# Patient Record
Sex: MALE | Race: WHITE | NOT HISPANIC OR LATINO | Employment: UNEMPLOYED | URBAN - METROPOLITAN AREA
[De-identification: names, ages, dates, MRNs, and addresses within clinical notes are randomized per-mention and may not be internally consistent; named-entity substitution may affect disease eponyms.]

---

## 2017-01-06 ENCOUNTER — ALLSCRIPTS OFFICE VISIT (OUTPATIENT)
Dept: OTHER | Facility: OTHER | Age: 22
End: 2017-01-06

## 2017-01-26 ENCOUNTER — ALLSCRIPTS OFFICE VISIT (OUTPATIENT)
Dept: OTHER | Facility: OTHER | Age: 22
End: 2017-01-26

## 2017-02-01 ENCOUNTER — GENERIC CONVERSION - ENCOUNTER (OUTPATIENT)
Dept: OTHER | Facility: OTHER | Age: 22
End: 2017-02-01

## 2017-04-11 ENCOUNTER — ALLSCRIPTS OFFICE VISIT (OUTPATIENT)
Dept: OTHER | Facility: OTHER | Age: 22
End: 2017-04-11

## 2017-04-11 DIAGNOSIS — R73.09 OTHER ABNORMAL GLUCOSE: ICD-10-CM

## 2017-04-11 DIAGNOSIS — E78.2 MIXED HYPERLIPIDEMIA: ICD-10-CM

## 2017-04-17 ENCOUNTER — GENERIC CONVERSION - ENCOUNTER (OUTPATIENT)
Dept: OTHER | Facility: OTHER | Age: 22
End: 2017-04-17

## 2017-07-25 ENCOUNTER — GENERIC CONVERSION - ENCOUNTER (OUTPATIENT)
Dept: OTHER | Facility: OTHER | Age: 22
End: 2017-07-25

## 2017-07-31 ENCOUNTER — GENERIC CONVERSION - ENCOUNTER (OUTPATIENT)
Dept: OTHER | Facility: OTHER | Age: 22
End: 2017-07-31

## 2017-08-04 ENCOUNTER — GENERIC CONVERSION - ENCOUNTER (OUTPATIENT)
Dept: OTHER | Facility: OTHER | Age: 22
End: 2017-08-04

## 2017-09-02 LAB
A/G RATIO (HISTORICAL): 1.5 (CALC) (ref 1–2.5)
ALBUMIN SERPL BCP-MCNC: 4.5 G/DL (ref 3.6–5.1)
ALP SERPL-CCNC: 61 U/L (ref 40–115)
ALT SERPL W P-5'-P-CCNC: 33 U/L (ref 9–46)
AST SERPL W P-5'-P-CCNC: 26 U/L (ref 10–40)
BILIRUB SERPL-MCNC: 0.9 MG/DL (ref 0.2–1.2)
BUN SERPL-MCNC: 19 MG/DL (ref 7–25)
BUN/CREA RATIO (HISTORICAL): ABNORMAL (CALC) (ref 6–22)
CALCIUM SERPL-MCNC: 9.5 MG/DL (ref 8.6–10.3)
CHLORIDE SERPL-SCNC: 108 MMOL/L (ref 98–110)
CHOLEST SERPL-MCNC: 160 MG/DL
CHOLEST/HDLC SERPL: 5.7 (CALC)
CO2 SERPL-SCNC: 20 MMOL/L (ref 20–31)
CREAT SERPL-MCNC: 1.08 MG/DL (ref 0.6–1.35)
EGFR AFRICAN AMERICAN (HISTORICAL): 114 ML/MIN/1.73M2
EGFR-AMERICAN CALC (HISTORICAL): 98 ML/MIN/1.73M2
GAMMA GLOBULIN (HISTORICAL): 3 G/DL (CALC) (ref 1.9–3.7)
GLUCOSE (HISTORICAL): 106 MG/DL (ref 65–99)
HDLC SERPL-MCNC: 28 MG/DL
LDL CHOLESTEROL (HISTORICAL): 108 MG/DL (CALC)
NON-HDL-CHOL (CHOL-HDL) (HISTORICAL): 132 MG/DL (CALC)
POTASSIUM SERPL-SCNC: 3.9 MMOL/L (ref 3.5–5.3)
SODIUM SERPL-SCNC: 138 MMOL/L (ref 135–146)
TOTAL PROTEIN (HISTORICAL): 7.5 G/DL (ref 6.1–8.1)
TRIGL SERPL-MCNC: 127 MG/DL

## 2017-10-30 ENCOUNTER — ALLSCRIPTS OFFICE VISIT (OUTPATIENT)
Dept: OTHER | Facility: OTHER | Age: 22
End: 2017-10-30

## 2018-01-09 NOTE — PROGRESS NOTES
Assessment    1  Elevated glucose (790 29) (R73 09)   2  Hyperlipidemia, mixed (272 2) (E78 2)   3  Mood disorder (296 90) (F39)    Plan  Mood disorder    · Ziprasidone HCl - 40 MG Oral Capsule; TAKE 1 CAPSULE TWICE DAILY    Discussion/Summary  Advice and education were given regarding aerobic exercise and weight loss  Patient discussion: discussed with the patient  Pt is fit for driving and forms are completed  He is to keep appointments as scheduled  The patient, patient's family was counseled regarding instructions for management, risk factor reductions, impressions, risks and benefits of treatment options, importance of compliance with treatment  The treatment plan was reviewed with the patient/guardian  The patient/guardian understands and agrees with the treatment plan     Self Referrals: Yes Psychiatrist      Chief Complaint  Patient is here for a PE      History of Present Illness  HPI: Pt is here for a drivers permit PE  He is not having any changes in his health  He is followed by Dr Tennille Nobles for his depression and Mood DO  Hyperlipidemia (Follow-Up): The patient states his hyperlipidemia has been stable since the last visit  He has no comorbid illnesses  He has no significant interval events  Symptoms: The patient is currently asymptomatic  Medications: The patient is not currently on any medications for his hyperlipidemia  Review of Systems    Constitutional: No fever or chills, feels well, no tiredness, no recent weight gain or weight loss  Eyes: No complaints of eye pain, no red eyes, no discharge from eyes, no itchy eyes  ENT: no complaints of earache, no hearing loss, no nosebleeds, no nasal discharge, no sore throat, no hoarseness  Cardiovascular: No complaints of slow heart rate, no fast heart rate, no chest pain, no palpitations, no leg claudication, no lower extremity     Respiratory: No complaints of shortness of breath, no wheezing, no cough, no SOB on exertion, no orthopnea or PND  Gastrointestinal: No complaints of abdominal pain, no constipation, no nausea or vomiting, no diarrhea or bloody stools  Genitourinary: No complaints of dysuria, no incontinence, no hesitancy, no nocturia, no genital lesion, no testicular pain  Musculoskeletal: No complaints of arthralgia, no myalgias, no joint swelling or stiffness, no limb pain or swelling  Integumentary: No complaints of skin rash or skin lesions, no itching, no skin wound, no dry skin  Neurological: No compliants of headache, no confusion, no convulsions, no numbness or tingling, no dizziness or fainting, no limb weakness, no difficulty walking  Psychiatric: Is not suicidal, no sleep disturbances, no anxiety or depression, no change in personality, no emotional problems  Endocrine: No complaints of proptosis, no hot flashes, no muscle weakness, no erectile dysfunction, no deepening of the voice, no feelings of weakness  Hematologic/Lymphatic: No complaints of swollen glands, no swollen glands in the neck, does not bleed easily, no easy bruising  ROS reviewed  Active Problems    1  Anxiety, generalized (300 02) (F41 1)   2  Depression, recurrent (296 30) (F33 9)   3  Elevated glucose (790 29) (R73 09)   4  Hyperlipidemia, mixed (272 2) (E78 2)   5  Mood disorder (296 90) (F39)   6   PPD positive (795 51) (R76 11)    Past Medical History    · History of Acute gastritis without hemorrhage, unspecified gastritis type (535 00) (K29 00)   · History of Acute maxillary sinusitis, recurrence not specified (461 0) (J01 00)   · History of Hand pain, right (729 5) (M79 641)   · History of acute pharyngitis (V12 69) (Z87 09)   · History of bronchitis (V12 69) (Z87 09)   · History of headache (V13 89) (Z87 898)   · History of Right acute serous otitis media, recurrence not specified (381 01) (H65 01)    Surgical History    · Denied: History Of Prior Surgery    Family History  Family History    · Family history of Adopted child    Social History    · Adopted a child   · Adopted at 5y/o by Lisa Mackey and Kelsey Cordero  [facility] [place] [facility]  · Employed   · Part time job as a  at the [employer]   · Home   · Lives with adoptive parents Lisa Mackey and Kelsey Cordero   · Never a smoker   · Unobtainable family history due to adoption (V49 89) (Z78 9)    Current Meds   1  ClonazePAM 0 5 MG Oral Tablet; Take one tab by mouth each day; Therapy: 90Axi4311 to (Evaluate:26Mar2017); Last Rx:02Sgl3091 Ordered   2  FluvoxaMINE Maleate 100 MG Oral Tablet; 1 tab in the AM and 2 tab in the PM Recorded   3  Topiramate 100 MG Oral Tablet; take 1 tab po daily; Therapy: 92Yuw2694 to (Evaluate:25Feb2017)  Requested for: 74Phi3117; Last   Rx:00Snz4585 Ordered   4  Topiramate 25 MG Oral Tablet Recorded    Allergies    1  Abilify TABS   2  Lithium   3  SEROquel TABS    Vitals   Recorded: 99KWO5459 03:39PM   Heart Rate 92   Systolic 715   Diastolic 80   Height 5 ft 4 in   Weight 236 lb    BMI Calculated 40 51   BSA Calculated 2 1   O2 Saturation 97     Physical Exam    Eyes   Conjunctiva and lids: No swelling, erythema, or discharge  Pupils and irises: Equal, round and reactive to light  Ears, Nose, Mouth, and Throat   External inspection of ears and nose: Normal     Otoscopic examination: Tympanic membrance translucent with normal light reflex  Canals patent without erythema  Oropharynx: Normal with no erythema, edema, exudate or lesions  Pulmonary   Respiratory effort: No increased work of breathing or signs of respiratory distress  Auscultation of lungs: Clear to auscultation  Cardiovascular   Auscultation of heart: Normal rate and rhythm, normal S1 and S2, without murmurs  Examination of extremities for edema and/or varicosities: Normal     Abdomen   Abdomen: Non-tender, no masses  Liver and spleen: No hepatomegaly or splenomegaly  Lymphatic   Palpation of lymph nodes in neck: No lymphadenopathy  Musculoskeletal   Gait and station: Normal     Digits and nails: Normal without clubbing or cyanosis  Inspection/palpation of joints, bones, and muscles: Normal     Skin   Skin and subcutaneous tissue: Normal without rashes or lesions  Neurologic   Cranial nerves: Cranial nerves 2-12 intact  Reflexes: 2+ and symmetric  Sensation: No sensory loss      Psychiatric   Orientation to person, place and time: Normal     Mood and affect: Normal        Signatures   Electronically signed by : ITALO Man; Oct 30 2017  4:28PM EST                       (Author)    Electronically signed by : Claude Decree, M D ; Oct 30 2017  4:31PM EST

## 2018-01-09 NOTE — MISCELLANEOUS
Message   Recorded as Task   Date: 12/06/2016 01:30 PM, Created By: Ayala Rich   Task Name: Medical Complaint Callback   Assigned To: Shirley Brooks   Regarding Patient: Ede Echols, Status: Active   CommentElnita Area - 06 Dec 2016 1:30 PM     TASK CREATED  Caller: Self; Medical Complaint; [phone] (Mobile Phone)  mo asked if you can give her a call about an urgent matter regarding her sons depression and medicine   I called the given contact # (James's father's cell phone) and his mom states he is sleeping too many hours, 1 full tab and even 1/2 tab of Promethazine is too tiring in the day  He also has been very "Negative' just down" staying on the couch, no ambition, also very "Nasty, ugly fights " He tells her he is depressed  Pt would not get on the phone but told mom he presently denies SI and HI  I reduced Promethazine to 1/4 tab qd and 1 full tab qhs  I increased Fluvoxamine to 125mg total per day  I stated if he becomes suicidal/homicidal she must take him to the ER  She asked what she should do if he refuses all meds (though she states he has been compliant so far), I told her if he has withdrawal Sxs to bring him to the ER  Otherwise f/u next appt later this month  Mom was satisfied with this and will call if any further problems      Plan  Anxiety, generalized    · From  Promethazine HCl - 50 MG Oral Tablet Take 1/2 - tablet at bedtime To  Promethazine HCl - 50 MG Oral Tablet Take 1/4 tab qd and (1) tab po at bedtime  Depression, recurrent    · FluvoxaMINE Maleate 50 MG Oral Tablet;  Take 1/2 tab po qd   (To be taken with the  100mg tab)    Signatures   Electronically signed by : JYOTI Prasad; Dec  6 2016  5:32PM EST                       (Author)
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
## 2018-01-11 NOTE — MISCELLANEOUS
Message   Recorded as Task   Date: 09/01/2016 02:54 PM, Created By: Tabatha Adair   Task Name: Care Coordination   Assigned To: Shirley Brooks   Regarding Patient: Ede Echols, Status: Active   Comment:    Tabatha Adair - 01 Sep 2016 2:54 PM     TASK CREATED  This is a pt of Dr Caitlin Walls who is transfering to your schedule  He is out of meds and was suppose to be scheduled for today with Dr Caitlin Walls   Please call mother Celio Ballard @ [phone] about medication problems and refills  James's mom Lisa left msg that Pt was out of medicines  Rx log shows he was given refills  I called mom on home phone # of record and she states she got his PCP Dr Bushra Grady to renew medicines  Note: Rx log shows Pt had refills on 8/4/2016 Rxs of Fluvoxamine and Trazodone, but no refills on the Topiramate  No further action required at this time        Signatures   Electronically signed by : JYOTI Prasad; Sep  1 2016  6:16PM EST                       (Author)

## 2018-01-12 NOTE — PSYCH
Behavioral Health Outpatient Intake    Referred By: DR Sherry Slaughter  Intake Questions: there are no developmental disabilities  the patient does not have a hearing impairment  the patient does not have an ICM or CTT  patient is not taking injectable psychiatric medications  Employment: The patient is not employed  at     Emergency Contact Information:   Emergency Contact: Kailee Folres   Phone Number: [phone]   Previous Psychiatric Treatment: He has previously been seen by a psychiatrist  Acosta Gallegos, 2015  He has previously been seen by a therapist  Diana Salazar   History: no  service  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

## 2018-01-13 VITALS
SYSTOLIC BLOOD PRESSURE: 120 MMHG | WEIGHT: 236 LBS | DIASTOLIC BLOOD PRESSURE: 80 MMHG | HEIGHT: 64 IN | HEART RATE: 92 BPM | OXYGEN SATURATION: 97 % | BODY MASS INDEX: 40.29 KG/M2

## 2018-01-13 VITALS
HEIGHT: 64 IN | BODY MASS INDEX: 42.51 KG/M2 | HEART RATE: 88 BPM | SYSTOLIC BLOOD PRESSURE: 118 MMHG | OXYGEN SATURATION: 98 % | DIASTOLIC BLOOD PRESSURE: 80 MMHG | WEIGHT: 249 LBS
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
## 2018-01-15 VITALS
TEMPERATURE: 97.4 F | SYSTOLIC BLOOD PRESSURE: 122 MMHG | HEIGHT: 64 IN | BODY MASS INDEX: 44.22 KG/M2 | HEART RATE: 94 BPM | DIASTOLIC BLOOD PRESSURE: 82 MMHG | WEIGHT: 259 LBS
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
## 2018-01-16 NOTE — MISCELLANEOUS
Message   Recorded as Task   Date: 09/30/2016 02:34 PM, Created By: Narinder Worthington   Task Name: Call Back   Assigned To: Stefano Huynh   Regarding Patient: Naveen Arriaza, Status: Active   Comment:    Beverly Lobo - 30 Sep 2016 2:34 PM     TASK CREATED  Caller: dad, Father; Results Inquiry; [phone] (Mobile Phone)  patient's father called this afternoon to see if you recieved recent labs you ordered? I left msg on home phone # of record that I did receive the results and for patient to f/u with PMD Dr Sherice Medrano, for a couple of abnormalities  PMD received the results from [facility] as well  Pt can call me to discuss results if desired        Signatures   Electronically signed by : JYOTI Black; Sep 30 2016  3:18PM EST                       (Author)

## 2018-01-16 NOTE — PSYCH
Message  Message Free Text Note Form: Mother had contacted the office regarding her son being sedated on Promethazine  She described he has tried several medications to help him with his sleep, and he was prescribed  Promethazine to help insomnia and decrease anxiety  He has been sedated and over sleeping  We discussed for now to decrease bedtime dose to 1/2 of 50 mg   and to hold morning dose  If any problems to contact us tomorrow, and ask to talk to our nurse Tamtomasz Blum  Mother agreeable to plan  Active Problems    1  Acute maxillary sinusitis, recurrence not specified (461 0) (J01 00)   2  Anxiety, generalized (300 02) (F41 1)   3  Depression, recurrent (296 30) (F33 9)   4  Elevated glucose (790 29) (R73 09)   5  Hand pain, right (729 5) (M79 641)   6  Hyperlipidemia, mixed (272 2) (E78 2)   7  Mood disorder (296 90) (F39)   8  PPD positive (795 51) (R76 11)    Current Meds   1  FluvoxaMINE Maleate 100 MG Oral Tablet; TAKE 1 TAB PO QHS; Therapy: 00VLM1918 to (Evaluate:02Jan2017)  Requested for: 07EQI9568; Last   Rx:03Nov2016 Ordered   2  Promethazine HCl - 50 MG Oral Tablet; Take 1/2 - 1 tab po qd and 1 tab po qhs for   anxiety and insomnia; Therapy: 98CPV9027 to (Evaluate:02Jan2017)  Requested for: 05TJR8552; Last   Rx:03Nov2016 Ordered   3  Topiramate 100 MG Oral Tablet (Topamax); take 1 tab po QD; Therapy: 71JRX2452 to (Evaluate:02Jan2017)  Requested for: 67VIJ5744; Last   Rx:03Nov2016 Ordered    Allergies    1  Abilify TABS   2  Lithium   3  SEROquel TABS    Plan    1   From  Promethazine HCl - 50 MG Oral Tablet Take 1/2 - 1 tab po qd and 1 tab   po qhs for anxiety and insomnia To Promethazine HCl - 50 MG Oral Tablet Take 1/2 -   tablet at bedtime    Signatures   Electronically signed by : Pop Chaparro MD; Nov 10 2016  8:26PM EST                       (Author)
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
## 2018-01-17 NOTE — PROGRESS NOTES
Assessment    1  Encounter for preventive health examination (V70 0) (Z00 00)   2  Hyperlipidemia, mixed (272 2) (E78 2)   3  Elevated glucose (790 29) (R73 09)    Plan  Elevated glucose, Hyperlipidemia, mixed    · (1) COMPREHENSIVE METABOLIC PANEL; Status:Active; Requested for:54Tdp3514;    · (1) LIPID PANEL, FASTING; Status:Active; Requested for:95Cef2569;     Discussion/Summary  Impression: health maintenance visit  Currently, he eats a healthy diet  Prostate cancer screening: PSA is not indicated  Testicular cancer screening: the risks and benefits of testicular cancer screening were discussed  Colorectal cancer screening: colorectal cancer screening is not indicated  The risks and benefits of immunizations were discussed  Advice and education were given regarding aerobic exercise, weight loss and cardiovascular risk reduction  Patient discussion: discussed with the patient  Follow-up with Dr Atilio Corbin as scheduled  Chief Complaint  Pt  in office today for a check up and to discuss his medications  History of Present Illness  HM, Adult Male: The patient is being seen for a health maintenance evaluation  General Health: The patient's health since the last visit is described as good  He has regular dental visits  He denies vision problems  He denies hearing loss  Immunizations status: up to date  Lifestyle:  He consumes a diverse and healthy diet  He does not have any weight concerns  He exercises regularly  He does not use tobacco  He denies alcohol use  He denies drug use  Screening: cancer screening reviewed and current  metabolic screening reviewed and current  risk screening reviewed and current  Review of Systems    Constitutional: No fever or chills, feels well, no tiredness, no recent weight gain or weight loss  Eyes: No complaints of eye pain, no red eyes, no discharge from eyes, no itchy eyes     ENT: no complaints of earache, no hearing loss, no nosebleeds, no nasal discharge, no sore throat, no hoarseness  Cardiovascular: No complaints of slow heart rate, no fast heart rate, no chest pain, no palpitations, no leg claudication, no lower extremity  Respiratory: No complaints of shortness of breath, no wheezing, no cough, no SOB on exertion, no orthopnea or PND  Gastrointestinal: No complaints of abdominal pain, no constipation, no nausea or vomiting, no diarrhea or bloody stools  Genitourinary: No complaints of dysuria, no incontinence, no hesitancy, no nocturia, no genital lesion, no testicular pain  Musculoskeletal: No complaints of arthralgia, no myalgias, no joint swelling or stiffness, no limb pain or swelling  Integumentary: No complaints of skin rash or skin lesions, no itching, no skin wound, no dry skin  Neurological: No compliants of headache, no confusion, no convulsions, no numbness or tingling, no dizziness or fainting, no limb weakness, no difficulty walking  Psychiatric: Is not suicidal, no sleep disturbances, no anxiety or depression, no change in personality, no emotional problems  Endocrine: No complaints of proptosis, no hot flashes, no muscle weakness, no erectile dysfunction, no deepening of the voice, no feelings of weakness  Hematologic/Lymphatic: No complaints of swollen glands, no swollen glands in the neck, does not bleed easily, no easy bruising  Active Problems    1  Acute gastritis without hemorrhage, unspecified gastritis type (535 00) (K29 00)   2  Acute pharyngitis, unspecified etiology (462) (J02 9)   3  Anxiety, generalized (300 02) (F41 1)   4  Bronchitis (490) (J40)   5  Depression, recurrent (296 30) (F33 9)   6  Elevated glucose (790 29) (R73 09)   7  Hyperlipidemia, mixed (272 2) (E78 2)   8  Mood disorder (296 90) (F39)   9  PPD positive (795 51) (R76 11)   10   Right acute serous otitis media, recurrence not specified (381 01) (H65 01)    Past Medical History    · History of Acute maxillary sinusitis, recurrence not specified (461 0) (J01 00)   · History of Hand pain, right (729 5) (M79 641)   · History of headache (V13 89) (Z87 898)    Family History  Family History    · Family history of Adopted child    Social History    · Adopted a child   · Adopted at 5y/o by Suni Carreno Raquel [employer]cuttaker  Pt is from [facility]  · Employed   · Part time job as a  at the [employer]   · Home   · Lives with adoptive parents Suni Leblanc and Raquel Boateng   · Never a smoker   · Unobtainable family history due to adoption (V49 89) (Z78 9)    Current Meds   1  ClonazePAM 0 5 MG Oral Tablet; Take one tab by mouth each day; Therapy: 72Qnk3632 to (Evaluate:26Mar2017); Last Rx:83Whj9443 Ordered   2  FluvoxaMINE Maleate 100 MG Oral Tablet; 1 tab in the AM and 2 tab in the PM Recorded   3  Topiramate 100 MG Oral Tablet; take 1 tab po daily; Therapy: 73Fuz2372 to (Evaluate:62Vap2318)  Requested for: 72Jbo0117; Last   Rx:52Jlq7848 Ordered   4  Topiramate 25 MG Oral Tablet Recorded    Allergies    1  Abilify TABS   2  Lithium   3  SEROquel TABS    Vitals   Recorded: 09Ljo7695 04:57PM   Heart Rate 89   Systolic 374   Diastolic 86   Height 5 ft 4 in   Weight 236 lb    BMI Calculated 40 51   BSA Calculated 2 11   O2 Saturation 97     Physical Exam    Constitutional   General appearance: No acute distress, well appearing and well nourished  Eyes   Conjunctiva and lids: No erythema, swelling or discharge  Pupils and irises: Equal, round, reactive to light  Ophthalmoscopic examination: Normal fundi and optic discs  Ears, Nose, Mouth, and Throat   External inspection of ears and nose: Normal     Otoscopic examination: Tympanic membranes translucent with normal light reflex  Canals patent without erythema  Hearing: Normal     Nasal mucosa, septum, and turbinates: Normal without edema or erythema  Lips, teeth, and gums: Normal, good dentition  Oropharynx: Normal with no erythema, edema, exudate or lesions      Neck   Neck: Supple, symmetric, trachea midline, no masses  Thyroid: Normal, no thyromegaly  Pulmonary   Respiratory effort: No increased work of breathing or signs of respiratory distress  Percussion of chest: Normal     Palpation of chest: Normal     Auscultation of lungs: Clear to auscultation  Cardiovascular   Palpation of heart: Normal PMI, no thrills  Auscultation of heart: Normal rate and rhythm, normal S1 and S2, no murmurs  Carotid pulses: 2+ bilaterally  Abdominal aorta: Normal     Femoral pulses: 2+ bilaterally  Pedal pulses: 2+ bilaterally  Examination of extremities for edema and/or varicosities: Normal     Chest   Breasts: Normal, no dimpling or skin changes appreciated  Palpation of breasts and axillae: Normal, no masses palpated  Chest: Normal     Abdomen   Abdomen: Non-tender, no masses  Liver and spleen: No hepatomegaly or splenomegaly  Examination for hernias: No hernias appreciated  Lymphatic   Palpation of lymph nodes in neck: No lymphadenopathy  Palpation of lymph nodes in axillae: No lymphadenopathy  Musculoskeletal   Gait and station: Normal     Inspection/palpation of digits and nails: Normal without clubbing or cyanosis  Inspection/palpation of joints, bones, and muscles: Normal     Range of motion: Normal     Stability: Normal     Muscle strength/tone: Normal     Skin   Skin and subcutaneous tissue: Normal without rashes or lesions  Palpation of skin and subcutaneous tissue: Normal turgor  Neurologic   Cranial nerves: Cranial nerves 2-12 intact  Reflexes: 2+ and symmetric  Sensation: No sensory loss  Psychiatric   Judgment and insight: Normal     Orientation to person, place and time: Normal     Recent and remote memory: Intact  Mood and affect: Normal        Signatures   Electronically signed by : Aditya Orona DO;  Apr 11 2017  5:17PM EST                       (Author)

## 2018-01-22 VITALS
DIASTOLIC BLOOD PRESSURE: 86 MMHG | HEART RATE: 89 BPM | OXYGEN SATURATION: 97 % | WEIGHT: 236 LBS | SYSTOLIC BLOOD PRESSURE: 126 MMHG | HEIGHT: 64 IN | BODY MASS INDEX: 40.29 KG/M2
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
## 2018-02-16 ENCOUNTER — TELEPHONE (OUTPATIENT)
Dept: FAMILY MEDICINE CLINIC | Facility: CLINIC | Age: 23
End: 2018-02-16

## 2018-02-16 DIAGNOSIS — R06.83 SNORING: Primary | ICD-10-CM

## 2018-02-16 RX ORDER — ZIPRASIDONE HYDROCHLORIDE 80 MG/1
80 CAPSULE ORAL EVERY EVENING
COMMUNITY
Start: 2018-01-31

## 2018-02-16 RX ORDER — FLUVOXAMINE MALEATE 100 MG
TABLET ORAL
COMMUNITY
End: 2018-05-03 | Stop reason: ALTCHOICE

## 2018-02-16 RX ORDER — CLONAZEPAM 0.5 MG/1
TABLET ORAL
COMMUNITY
Start: 2016-12-27 | End: 2020-02-24

## 2018-02-16 NOTE — TELEPHONE ENCOUNTER
Patient's mother is asking if an order can be written for pt to see a specialist to have a Sleep apnea study done, she said Dr Lawrence Thompson ordered for him like five years ago but they never followed through and forgot the name of the Doctor, she thinks was someone in [facility] area
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
## 2018-04-17 ENCOUNTER — OFFICE VISIT (OUTPATIENT)
Dept: PULMONOLOGY | Facility: CLINIC | Age: 23
End: 2018-04-17
Payer: COMMERCIAL

## 2018-04-17 VITALS
HEART RATE: 104 BPM | DIASTOLIC BLOOD PRESSURE: 100 MMHG | WEIGHT: 245 LBS | HEIGHT: 65 IN | SYSTOLIC BLOOD PRESSURE: 126 MMHG | BODY MASS INDEX: 40.82 KG/M2 | OXYGEN SATURATION: 97 %

## 2018-04-17 DIAGNOSIS — E66.9 OBESITY (BMI 35.0-39.9 WITHOUT COMORBIDITY): ICD-10-CM

## 2018-04-17 DIAGNOSIS — R06.83 SNORING: ICD-10-CM

## 2018-04-17 DIAGNOSIS — R40.0 DAYTIME SLEEPINESS: ICD-10-CM

## 2018-04-17 DIAGNOSIS — G47.33 OSA (OBSTRUCTIVE SLEEP APNEA): Primary | ICD-10-CM

## 2018-04-17 PROCEDURE — 99204 OFFICE O/P NEW MOD 45 MIN: CPT | Performed by: INTERNAL MEDICINE

## 2018-04-23 ENCOUNTER — HOSPITAL ENCOUNTER (OUTPATIENT)
Dept: SLEEP CENTER | Facility: CLINIC | Age: 23
Discharge: HOME | End: 2018-04-23
Payer: COMMERCIAL

## 2018-04-23 DIAGNOSIS — G47.33 OSA (OBSTRUCTIVE SLEEP APNEA): ICD-10-CM

## 2018-04-23 PROCEDURE — 95810 POLYSOM 6/> YRS 4/> PARAM: CPT

## 2018-04-23 PROCEDURE — 95810 POLYSOM 6/> YRS 4/> PARAM: CPT | Performed by: INTERNAL MEDICINE

## 2018-04-24 NOTE — PROGRESS NOTES
Sleep Study Documentation    Pre-Sleep Study       Sleep testing procedure explained to patient:YES    Patient napped prior to study:NO    Caffeine:Dayshift worker after 12PM   Caffeine use:NO    Alcohol:Dayshift workers after 5PM: Alcohol use:NO    Typical day for patient:NO       Study Documentation  Diagnostic   Snore: Moderate  Supplemental O2: no    O2 flow rate (L/min) range N/A  O2 flow rate (L/min) final N/A  Minimum SaO2 90%  Baseline SaO2 97%        Mode of Therapy:N/A    EKG abnormalities: no     EEG abnormalities: no    Study Terminated:no    Patient classification: student       Post-Sleep Study    Medication used at bedtime or during sleep study:NO    Patient reports time it took to fall asleep:greater than 60 minutes    Patient reports waking up during study:Denied    Patient reports sleeping 4 to 6 hours without dreaming  Patient reports sleep during study:worse than usual    Patient rated sleepiness: Very sleepy or tired    PAP treatment:no

## 2018-04-30 ENCOUNTER — TELEPHONE (OUTPATIENT)
Dept: PULMONOLOGY | Facility: CLINIC | Age: 23
End: 2018-04-30

## 2018-05-03 ENCOUNTER — OFFICE VISIT (OUTPATIENT)
Dept: PULMONOLOGY | Facility: CLINIC | Age: 23
End: 2018-05-03
Payer: COMMERCIAL

## 2018-05-03 ENCOUNTER — APPOINTMENT (OUTPATIENT)
Dept: LAB | Facility: CLINIC | Age: 23
End: 2018-05-03
Payer: COMMERCIAL

## 2018-05-03 ENCOUNTER — TELEPHONE (OUTPATIENT)
Dept: SLEEP CENTER | Facility: CLINIC | Age: 23
End: 2018-05-03

## 2018-05-03 VITALS
BODY MASS INDEX: 40.82 KG/M2 | WEIGHT: 245 LBS | OXYGEN SATURATION: 97 % | SYSTOLIC BLOOD PRESSURE: 130 MMHG | HEIGHT: 65 IN | HEART RATE: 86 BPM | DIASTOLIC BLOOD PRESSURE: 76 MMHG

## 2018-05-03 DIAGNOSIS — F33.9 DEPRESSION, RECURRENT (HCC): ICD-10-CM

## 2018-05-03 DIAGNOSIS — G47.19 EXCESSIVE DAYTIME SLEEPINESS: Primary | ICD-10-CM

## 2018-05-03 DIAGNOSIS — G47.19 EXCESSIVE DAYTIME SLEEPINESS: ICD-10-CM

## 2018-05-03 DIAGNOSIS — G47.61 PLMD (PERIODIC LIMB MOVEMENT DISORDER): ICD-10-CM

## 2018-05-03 LAB
25(OH)D3 SERPL-MCNC: 19.7 NG/ML (ref 30–100)
BASOPHILS # BLD AUTO: 0.04 THOUSANDS/ΜL (ref 0–0.1)
BASOPHILS NFR BLD AUTO: 1 % (ref 0–1)
EOSINOPHIL # BLD AUTO: 0.17 THOUSAND/ΜL (ref 0–0.61)
EOSINOPHIL NFR BLD AUTO: 2 % (ref 0–6)
ERYTHROCYTE [DISTWIDTH] IN BLOOD BY AUTOMATED COUNT: 12.7 % (ref 11.6–15.1)
FOLATE SERPL-MCNC: >20 NG/ML (ref 3.1–17.5)
HCT VFR BLD AUTO: 45.5 % (ref 36.5–49.3)
HGB BLD-MCNC: 15.3 G/DL (ref 12–17)
LYMPHOCYTES # BLD AUTO: 2.56 THOUSANDS/ΜL (ref 0.6–4.47)
LYMPHOCYTES NFR BLD AUTO: 37 % (ref 14–44)
MCH RBC QN AUTO: 28 PG (ref 26.8–34.3)
MCHC RBC AUTO-ENTMCNC: 33.6 G/DL (ref 31.4–37.4)
MCV RBC AUTO: 83 FL (ref 82–98)
MONOCYTES # BLD AUTO: 0.78 THOUSAND/ΜL (ref 0.17–1.22)
MONOCYTES NFR BLD AUTO: 11 % (ref 4–12)
NEUTROPHILS # BLD AUTO: 3.42 THOUSANDS/ΜL (ref 1.85–7.62)
NEUTS SEG NFR BLD AUTO: 49 % (ref 43–75)
NRBC BLD AUTO-RTO: 0 /100 WBCS
PLATELET # BLD AUTO: 308 THOUSANDS/UL (ref 149–390)
PMV BLD AUTO: 10.2 FL (ref 8.9–12.7)
RBC # BLD AUTO: 5.47 MILLION/UL (ref 3.88–5.62)
TSH SERPL DL<=0.05 MIU/L-ACNC: 2.75 UIU/ML (ref 0.36–3.74)
VIT B12 SERPL-MCNC: 808 PG/ML (ref 100–900)
WBC # BLD AUTO: 6.99 THOUSAND/UL (ref 4.31–10.16)

## 2018-05-03 PROCEDURE — 82607 VITAMIN B-12: CPT

## 2018-05-03 PROCEDURE — 99214 OFFICE O/P EST MOD 30 MIN: CPT | Performed by: PHYSICIAN ASSISTANT

## 2018-05-03 PROCEDURE — 82746 ASSAY OF FOLIC ACID SERUM: CPT

## 2018-05-03 PROCEDURE — 84443 ASSAY THYROID STIM HORMONE: CPT

## 2018-05-03 PROCEDURE — 85025 COMPLETE CBC W/AUTO DIFF WBC: CPT

## 2018-05-03 PROCEDURE — 86308 HETEROPHILE ANTIBODY SCREEN: CPT

## 2018-05-03 PROCEDURE — 82306 VITAMIN D 25 HYDROXY: CPT

## 2018-05-03 PROCEDURE — 36415 COLL VENOUS BLD VENIPUNCTURE: CPT

## 2018-05-03 RX ORDER — SERTRALINE HYDROCHLORIDE 100 MG/1
100 TABLET, FILM COATED ORAL DAILY
COMMUNITY
End: 2020-02-24

## 2018-05-03 NOTE — TELEPHONE ENCOUNTER
Left voicemail to notify sleep study has been read, results will be given today at pulmonary appointment

## 2018-05-03 NOTE — PROGRESS NOTES
Assessment:    1  Excessive daytime sleepiness  TSH, 3rd generation with T4 reflex    Vitamin D 25 hydroxy    CBC and differential    Vitamin B12/Folate, Serum Panel    Multiple sleep latency test with diagnostic study    Mononucleosis screen   2  PLMD (periodic limb movement disorder)     3  Dep[facility], [facility] ([facility] )         Plan: 25 y o  male never smoker with significant PMH of anxiety and depression who presents for follow-up of sleep study  Patient continues to have excessive daytime sleepiness  1   Excessive daytime sleepiness - reviewed diagnostic sleep study in detail with patient and family  No evidence of obstructive sleep apnea  [place] sleepiness score of 13  Will proceed with lab workup to rule out obvious medical cause for excessive sleepiness  Order slip given to patient and family  If this is all normal, will proceed with MSLT study to rule out idiopathic hypersomnia or narcolepsy  Patient and family are in agreement with plan  Of note, patient is on multiple medications that are likely contributing to his somnolence including Geodon, Zoloft, and Topamax  Will defer to psychiatry for any medication adjustments  2   Periodic limb movement disorder - mild RLS seen on diagnostic sleep study  Family is deferring any treatment at this time    Follow-up after completion of MSLT study    Subjective:     Patient ID: Chapo Angel is a 25 y o  male  Chief Complaint: excessive sleepiness    HPI  25 y o  male never smoker with significant PMH of anxiety and depression who presents for follow-up of sleep study  Patient continues to have excessive daytime sleepiness  Mother states that he takes 2-3 naps a day for several hours in the day  Patient admits to falling asleep while driving  He falls asleep in cars, during conversations, during class, and when at home  Father states that they were driving for total of 5 minutes and in that time patient was already asleep in the back of the car  Patient was recently started on Zoloft in the last month by psychiatrist   Patient denies having any comprehensive blood work in the last year other than CMP and lipid panel  Review of Systems  Review of Systems   Constitution: Negative for chills, decreased appetite, fever and weight gain  HENT: Negative for congestion  Eyes: Negative for visual disturbance  Cardiovascular: Negative for dyspnea on exertion, leg swelling and orthopnea  Respiratory: Negative for cough, shortness of breath, sleep disturbances due to breathing and wheezing  Hematologic/Lymphatic: Negative for adenopathy  Skin: Negative for rash  Musculoskeletal: Negative for muscle weakness  Gastrointestinal: Negative for abdominal pain, diarrhea, nausea and vomiting  Neurological: Positive for difficulty with concentration and excessive daytime sleepiness  Negative for seizures  Psychiatric/Behavioral: Negative for altered mental status and hallucinations  Allergic/Immunologic: Negative for environmental allergies  Objective:  /76   Pulse 86   Ht 5' 5" (1 651 m)   Wt 111 kg (245 lb)   SpO2 97%   BMI 40 77 kg/m²     Physical Exam   Constitutional: He is oriented to person, place, and time  He appears well-developed  No distress  Morbidly obese   HENT:   Head: Normocephalic and atraumatic  Crowded oropharyngeal airway   Neck: Normal range of motion  Short and wide neck   Cardiovascular: Normal rate and regular rhythm  Pulmonary/Chest: Effort normal and breath sounds normal    Abdominal: Soft  There is no tenderness  Musculoskeletal: Normal range of motion  He exhibits no edema or tenderness  Lymphadenopathy:     He has no cervical adenopathy  Neurological: He is alert and oriented to person, place, and time  Skin: Skin is warm and dry  He is not diaphoretic  Psychiatric: He has a normal mood and affect  His behavior is normal    Nursing note and vitals reviewed        Lab/Image Review: Reviewed all pertinent labs/radiology results  Past Medical History:   Diagnosis Date    Bronchitis        Social History   Substance Use Topics    Smoking status: Never Smoker    Smokeless tobacco: Never Used    Alcohol use 0 6 oz/week     1 Cans of beer per week      Comment: social       No family history on file      Patient Active Problem List   Diagnosis    Anxiety, generalized    Depression[facility] recurrent ([facility] )    Hyperlipidemia, mixed    PPD positive    PLMD (periodic limb movement disorder)    Snoring

## 2018-05-04 ENCOUNTER — TELEPHONE (OUTPATIENT)
Dept: PULMONOLOGY | Facility: CLINIC | Age: 23
End: 2018-05-04

## 2018-05-04 DIAGNOSIS — E55.9 VITAMIN D DEFICIENCY: Primary | ICD-10-CM

## 2018-05-04 LAB — HETEROPH AB SER QL: NEGATIVE

## 2018-05-04 NOTE — TELEPHONE ENCOUNTER
----- Message from Alba Delgado PA-C sent at 5/4/2018 12:44 PM EDT -----  Please also advise patient to get blood work done in 2 months for repeat vitamin D level  Please mail

## 2018-05-08 ENCOUNTER — TELEPHONE (OUTPATIENT)
Dept: PULMONOLOGY | Facility: CLINIC | Age: 23
End: 2018-05-08

## 2018-05-08 NOTE — TELEPHONE ENCOUNTER
I sent a message to the staff last week  Vitamin D level was low which can make you tired; sent Rx to pharmacy for 5000 units daily  Needs repeat Vitamin D level in 2 months  Please mail to patient if it has not been done      ThanksPablo

## 2018-06-18 ENCOUNTER — HOSPITAL ENCOUNTER (OUTPATIENT)
Dept: SLEEP CENTER | Facility: CLINIC | Age: 23
Discharge: HOME | End: 2018-06-18
Payer: COMMERCIAL

## 2018-06-18 DIAGNOSIS — G47.19 EXCESSIVE DAYTIME SLEEPINESS: ICD-10-CM

## 2018-06-18 PROCEDURE — 95810 POLYSOM 6/> YRS 4/> PARAM: CPT | Performed by: INTERNAL MEDICINE

## 2018-06-18 PROCEDURE — 95810 POLYSOM 6/> YRS 4/> PARAM: CPT

## 2018-06-19 ENCOUNTER — HOSPITAL ENCOUNTER (OUTPATIENT)
Dept: SLEEP CENTER | Facility: CLINIC | Age: 23
Discharge: HOME | End: 2018-06-19
Payer: COMMERCIAL

## 2018-06-19 PROCEDURE — 95805 MULTIPLE SLEEP LATENCY TEST: CPT | Performed by: INTERNAL MEDICINE

## 2018-06-19 PROCEDURE — 80307 DRUG TEST PRSMV CHEM ANLYZR: CPT | Performed by: INTERNAL MEDICINE

## 2018-06-19 PROCEDURE — 95805 MULTIPLE SLEEP LATENCY TEST: CPT

## 2018-06-19 NOTE — PROGRESS NOTES
Sleep Study Documentation    Pre-Sleep Study       Sleep testing procedure explained to patient:YES    Patient napped prior to study:YES- more than 30 minutes  Napped after 2PM: yes    Caffeine:Dayshift worker after 12PM   Caffeine use:NO    Alcohol:Dayshift workers after 5PM: Alcohol use:NO    Typical day for patient:NO       Study Documentation  Diagnostic   Snore: Moderate  Supplemental O2: no    Minimum SaO2 88%  Baseline SaO2 96%          EKG abnormalities: no     EEG abnormalities: no    Study Terminated:no    Patient classification: employed       Post-Sleep Study    Medication used at bedtime or during sleep study:YES other prescription medications    Patient reports time it took to fall asleep:greater than 60 minutes    Patient reports waking up during study:Denied    Patient reports sleeping less than 2 hours without dreaming  Patient reports sleep during study:worse than usual    Patient rated sleepiness: Somewhat sleepy or tired    PAP treatment:no

## 2018-06-19 NOTE — PROGRESS NOTES
MSLT Documentation           Sleep testing procedure explained to patient:YES      Study Documentation  MSLT  Nap 1: Lights out:8:15:30    Lights on:8:49:00     Asleep:  yes    EPOCH:60    TIME:8:34:00*     REM:  no     EPOCH:n/a     TIME:n/a      Patient reports sleeping: NO   Patient reports dreaming: NO    Nap 2: Lights out:10:12:30    Lights on:10:46:00     Asleep:  yes    EPOCH:43    TIME:10:31:00*     REM:  no     EPOCH:n/a     TIME:n/a      Patient reports sleeping: NO   Patient reports dreaming: NO    Nap 3: Lights out:12:09:30    Lights on:12:29:30     Asleep:  no    EPOCH:n/a    TIME:n/a*     REM:  no     EPOCH:n/a     TIME:n/a      Patient reports sleeping: NO   Patient reports dreaming: NO    Nap 4: Lights out:14:10:00    Lights on:14:30:00     Asleep:  no    EPOCH:n/a    TIME:n/a*     REM:  no     EPOCH:n/a     TIME:n/a      Patient reports sleeping: NO   Patient reports dreaming: NO    Nap 5: Lights out:16:11:30    Lights on:16:31:30     Asleep:  no    EPOCH:n/a    TIME:n/a*     REM:  no     EPOCH:n/a     TIME:n/a      Patient reports sleeping: NO   Patient reports dreaming: NO           Patient brought sleep diary to testing with him and it was not completed  Patient appeared to have increase restless leg movements at the start of each nap

## 2018-06-20 LAB
AMPHETAMINES UR QL SCN: NEGATIVE NG/ML
BARBITURATES UR QL SCN: NEGATIVE NG/ML
BENZODIAZ UR QL SCN: NEGATIVE NG/ML
BZE UR QL: NEGATIVE NG/ML
CANNABINOIDS UR QL SCN: NEGATIVE NG/ML
METHADONE UR QL SCN: NEGATIVE NG/ML
OPIATES UR QL: NEGATIVE NG/ML
PCP UR QL: NEGATIVE NG/ML
PROPOXYPH UR QL: NEGATIVE NG/ML

## 2018-06-27 ENCOUNTER — TELEPHONE (OUTPATIENT)
Dept: SLEEP CENTER | Facility: CLINIC | Age: 23
End: 2018-06-27

## 2018-06-27 NOTE — TELEPHONE ENCOUNTER
Left message that results of sleep study is complete and to discuss with doctor at follow up tomorrow

## 2018-06-28 ENCOUNTER — OFFICE VISIT (OUTPATIENT)
Dept: PULMONOLOGY | Facility: CLINIC | Age: 23
End: 2018-06-28
Payer: COMMERCIAL

## 2018-06-28 VITALS
HEART RATE: 103 BPM | BODY MASS INDEX: 41.82 KG/M2 | HEIGHT: 65 IN | DIASTOLIC BLOOD PRESSURE: 90 MMHG | OXYGEN SATURATION: 98 % | WEIGHT: 251 LBS | SYSTOLIC BLOOD PRESSURE: 120 MMHG

## 2018-06-28 DIAGNOSIS — E55.9 VITAMIN D DEFICIENCY: ICD-10-CM

## 2018-06-28 DIAGNOSIS — F33.9 DEPRESSION, RECURRENT (HCC): ICD-10-CM

## 2018-06-28 DIAGNOSIS — G47.19 EXCESSIVE DAYTIME SLEEPINESS: Primary | ICD-10-CM

## 2018-06-28 DIAGNOSIS — E66.01 MORBID OBESITY (HCC): ICD-10-CM

## 2018-06-28 PROBLEM — S62.338A: Status: ACTIVE | Noted: 2018-06-28

## 2018-06-28 PROCEDURE — 99214 OFFICE O/P EST MOD 30 MIN: CPT | Performed by: PHYSICIAN ASSISTANT

## 2018-06-28 NOTE — PROGRESS NOTES
Assessment:    1  Excessive daytime sleepiness     2  Vitamin D deficiency     3  Depression, recurrent ([facility] [address] )     4  Morbid obesity ([facility] )         Plan: 25 y o  male never smoker with significant PMH of anxiety and depression on Geodon, Topamax, and Zoloft who presents for follow-up of MSLT study  1   Excessive Daytime Sleepiness -reviewed MSLT study in detail with patient and mother  Patient slept 2 out of the 5 naps with no REM sleep  The average sleep latency for both naps was about 19 4 min  Discussed that there is no objective evidence sleepiness  No evidence of narcolepsy  No evidence of idiopathic hypersomnia  Do not feel that patient would benefit from any stimulant medications such as modafinil/Adderall, etc  (especially in the setting of his significant psychiatric disorder)  Do feel that a large part of his lethargy/sleepiness may be related to his medications  Currently he is on Zoloft, Geodon, and Topamax for depression  Vitamin D deficiency as below, TSH normal, normal CBC, normal mononucleosis screen, normal B12  Advised patient to follow up with his psychiatrist for review of medications  Discussed details with attending physician, Dr Bentley Gee  2   Vitamin D Deficiency -continue to take 5000 units of vitamin D3 daily  Vitamin-D level of 19 7 in May  Recommend repeating blood work  3   Morbid Obesity -BMI 41 77  Discussed adjusting diet/exercise to also improve daytime fatigue  Follow-up on an as-needed basis    Subjective:     Patient ID: Isabel Jefferson is a 25 y o  male  Chief Complaint: daytime sleepiness    HPI  25 y o  male never smoker with significant PMH of anxiety and depression on Geodon, Topamax, and Zoloft who presents for follow-up of MSLT study  Patient had blood work completed after last visit and was found to be deficient in vitamin D  He was started on vitamin-D supplements with slight improvement in symptoms    However, he continues to complain excessive daytime sleepiness  First visit, [place] Sleepiness Scale was completed and revealed a score 13  Patient does admit that a few months ago he was started on Zoloft  However, he reports the sleepiness was present even before this  Patient states he has been on his other medications for a long period of time  Patient also completed MSLT study within the last month  Review of Systems  Review of Systems   Constitution: Negative for chills, decreased appetite, fever, malaise/fatigue and weight gain  HENT: Negative for congestion  Eyes: Negative for visual disturbance  Cardiovascular: Negative for dyspnea on exertion, leg swelling and orthopnea  Respiratory: Negative for cough, shortness of breath, sleep disturbances due to breathing and wheezing  Hematologic/Lymphatic: Negative for adenopathy  Skin: Negative for rash  Musculoskeletal: Negative for muscle weakness  Gastrointestinal: Negative for abdominal pain, diarrhea, nausea and vomiting  Neurological: Positive for excessive daytime sleepiness  Psychiatric/Behavioral: Negative for altered mental status and hallucinations  Allergic/Immunologic: Negative for environmental allergies  Objective:  /90   Pulse 103   Ht 5' 5" (1 651 m)   Wt 114 kg (251 lb)   SpO2 98%   BMI 41 77 kg/m²     Physical Exam   Constitutional: He is oriented to person, place, and time  He appears well-developed  No distress  Morbidly obese   HENT:   Head: Normocephalic and atraumatic  Neck: Normal range of motion  Cardiovascular: Normal rate and regular rhythm  Pulmonary/Chest: Effort normal and breath sounds normal    Abdominal: Soft  There is no tenderness  Musculoskeletal: Normal range of motion  He exhibits no edema or tenderness  Lymphadenopathy:     He has no cervical adenopathy  Neurological: He is alert and oriented to person, place, and time  Skin: Skin is warm and dry  He is not diaphoretic     Psychiatric: He has a normal mood and affect  His behavior is normal    Nursing note and vitals reviewed  Lab/Image Review: Reviewed all pertinent labs/radiology results  Past Medical History:   Diagnosis Date    Bronchitis     NIMO (obstructive sleep apnea)        Social History   Substance Use Topics    Smoking status: Never Smoker    Smokeless tobacco: Never Used    Alcohol use 0 6 oz/week     1 Cans of beer per week      Comment: social       History reviewed  No pertinent family history      Patient Active Problem List   Diagnosis    Anxiety, generalized    Depression, recurrent ([address] )    Hyperlipidemia, mixed    PPD positive    Snoring    Excessive daytime sleepiness    Morbid obesity (HCC)    Fracture of neck of fifth metacarpal bone

## 2018-07-09 ENCOUNTER — OFFICE VISIT (OUTPATIENT)
Dept: FAMILY MEDICINE CLINIC | Facility: CLINIC | Age: 23
End: 2018-07-09
Payer: COMMERCIAL

## 2018-07-09 VITALS
DIASTOLIC BLOOD PRESSURE: 72 MMHG | BODY MASS INDEX: 42.32 KG/M2 | WEIGHT: 254 LBS | HEART RATE: 88 BPM | HEIGHT: 65 IN | OXYGEN SATURATION: 97 % | SYSTOLIC BLOOD PRESSURE: 120 MMHG

## 2018-07-09 DIAGNOSIS — M70.21 OLECRANON BURSITIS OF RIGHT ELBOW: Primary | ICD-10-CM

## 2018-07-09 PROBLEM — S62.338A: Status: RESOLVED | Noted: 2018-06-28 | Resolved: 2018-07-09

## 2018-07-09 PROCEDURE — 3008F BODY MASS INDEX DOCD: CPT | Performed by: NURSE PRACTITIONER

## 2018-07-09 PROCEDURE — 99213 OFFICE O/P EST LOW 20 MIN: CPT | Performed by: NURSE PRACTITIONER

## 2018-07-09 PROCEDURE — 1036F TOBACCO NON-USER: CPT | Performed by: NURSE PRACTITIONER

## 2018-07-09 NOTE — ASSESSMENT & PLAN NOTE
To begin OTC acetaminophen and topical voltaren gel  Counseled on avoiding aggravating activities  Discussed the importance of rest and icing  Follow-up with or orthopedist no improvement next 4 weeks  Follow-up as needed

## 2018-07-09 NOTE — PROGRESS NOTES
Assessment/Plan:    Olecranon bursitis of right elbow  To begin OTC acetaminophen and topical voltaren gel  Counseled on avoiding aggravating activities  Discussed the importance of rest and icing  Follow-up with or orthopedist no improvement next 4 weeks  Follow-up as needed  Diagnoses and all orders for this visit:    Olecranon bursitis of right elbow  -     diclofenac sodium (VOLTAREN) 1 %; Apply 2 g topically 3 (three) times a day  -     Ambulatory referral to Orthopedic Surgery; Future          Subjective:      Patient ID: Tiffani Singh is a 25 y o  male  Umberto Day presents with his mother reporting swelling in his right elbow  This is been present for the past month  Denies recent/prior injury, decrease in range of motion, tenderness, warmth, or fever  He does do a lot of repetitive motions such as hammering and yard work as he has been doing work for family friends  He also reports to leaning on the elbow frequently while on the computer  Nothing has been used to treat his symptoms          The following portions of the patient's history were reviewed and updated as appropriate: He   Patient Active Problem List    Diagnosis Date Noted    Olecranon bursitis of right elbow 07/09/2018    Morbid obesity ([facility] [address] ) 06/28/2018    Excessive daytime sleepiness     Snoring     Hyperlipidemia, mixed 10/11/2016    Depression, recurrent ([facility] [address] ) 04/26/2016    Anxiety, generalized 11/28/2015    PPD positive 11/28/2015     Current Outpatient Prescriptions   Medication Sig Dispense Refill    Cholecalciferol (VITAMIN D-3) 5000 units TABS Take 1 tablet by mouth daily 90 tablet 3    clonazePAM (KlonoPIN) 0 5 mg tablet Take by mouth      diclofenac sodium (VOLTAREN) 1 % Apply 2 g topically 3 (three) times a day 100 g 1    sertraline (ZOLOFT) 100 mg tablet Take 100 mg by mouth daily      topiramate (TOPAMAX) 200 MG tablet       ziprasidone (GEODON) 80 mg capsule        No current facility-administered medications for this visit  He is allergic to abilify [aripiprazole]; lithium; lorazepam; and seroquel [quetiapine]       Review of Systems   Constitutional: Negative  Respiratory: Negative  Cardiovascular: Negative  Musculoskeletal: Positive for joint swelling  Psychiatric/Behavioral: Negative  /72   Pulse 88   Ht 5' 5" (1 651 m)   Wt 115 kg (254 lb)   SpO2 97%   BMI 42 27 kg/m²     Objective:     Physical Exam   Constitutional: He is oriented to person, place, and time  He appears well-developed and well-nourished  HENT:   Head: Normocephalic and atraumatic  Musculoskeletal:   Presence of swelling in right elbow  Full ROM intact  No erythema, warmth, or tenderness  Skin intact  Neurological: He is alert and oriented to person, place, and time     Psychiatric:   Flat affect

## 2018-07-09 NOTE — PATIENT INSTRUCTIONS
Elbow Bursitis   WHAT YOU NEED TO KNOW:   Elbow bursitis is inflammation of the bursa in your elbow  The bursa is a fluid-filled sac that acts as a cushion between a bone and a tendon  A tendon is a cord of strong tissue that connects muscles to bones  The bursa is located right under the point of your elbow  DISCHARGE INSTRUCTIONS:   Medicines:   · NSAIDs:  These medicines decrease swelling, pain, and fever  NSAIDs are available without a doctor's order  Ask your healthcare provider which medicine is right for you  Ask how much to take and when to take it  Take as directed  NSAIDs can cause stomach bleeding and kidney problems if not taken correctly  · Antibiotics: These help fight an infection caused by bacteria  You may need antibiotics if your bursitis is caused by infection  · Take your medicine as directed  Contact your healthcare provider if you think your medicine is not helping or if you have side effects  Tell him of her if you are allergic to any medicine  Keep a list of the medicines, vitamins, and herbs you take  Include the amounts, and when and why you take them  Bring the list or the pill bottles to follow-up visits  Carry your medicine list with you in case of an emergency  Manage your symptoms:   · Rest:  Rest your elbow as much as possible to decrease pain and swelling  Slowly start to do more each day  Return to your daily activities as directed  · Ice:  Ice helps decrease swelling and pain  Ice may also help prevent tissue damage  Use an ice pack, or put crushed ice in a plastic bag  Cover it with a towel and place it on your elbow for 15 to 20 minutes, 3 to 4 times each day, as directed  · Compress:  Healthcare providers may wrap your arm with tape or an elastic bandage to decrease swelling  Loosen the elastic bandage if you start to lose feeling in your fingers  · Elevate:  Raise your elbow above the level of your heart as often as you can   This will help decrease swelling and pain  Prop your elbow on pillows or blankets to keep it elevated comfortably  Physical therapy:  A physical therapist teaches you exercises to help improve movement and strength, and to decrease pain  Prevent another elbow injury:   · Avoid injury and pressure to your elbows: Wear elbow pads or protectors when you play sports  Do not lean on your elbows or clench your fists  Do not tightly  small items, such as tools or pens  · Stretch, warm up, and cool down:  Always stretch and do warmup and cool-down exercises before and after you exercise  This will help loosen your muscles and decrease stress on your elbow  Rest between workouts  Follow up with your healthcare provider as directed:  Write down your questions so you remember to ask them during your visits  Contact your healthcare provider if:   · Your pain and swelling increase  · Your symptoms do not improve after 10 days of treatment  · You have a fever  · You have questions or concerns about your condition or care  © 2017 [facility] Information is for End User's use only and may not be sold, redistributed or otherwise used for commercial purposes  All illustrations and images included in CareNotes® are the copyrighted property of [employer] , [employer]  or Steve Bradford  The above information is an  only  It is not intended as medical advice for individual conditions or treatments  Talk to your doctor, nurse or pharmacist before following any medical regimen to see if it is safe and effective for you

## 2019-06-19 ENCOUNTER — TELEPHONE (OUTPATIENT)
Dept: FAMILY MEDICINE CLINIC | Facility: CLINIC | Age: 24
End: 2019-06-19

## 2019-06-19 ENCOUNTER — OFFICE VISIT (OUTPATIENT)
Dept: FAMILY MEDICINE CLINIC | Facility: CLINIC | Age: 24
End: 2019-06-19
Payer: COMMERCIAL

## 2019-06-19 VITALS
TEMPERATURE: 96.7 F | BODY MASS INDEX: 46.98 KG/M2 | SYSTOLIC BLOOD PRESSURE: 112 MMHG | HEIGHT: 65 IN | DIASTOLIC BLOOD PRESSURE: 92 MMHG | HEART RATE: 110 BPM | WEIGHT: 282 LBS | OXYGEN SATURATION: 97 %

## 2019-06-19 DIAGNOSIS — R51.9 PERSISTENT HEADACHES: ICD-10-CM

## 2019-06-19 DIAGNOSIS — M77.12 LATERAL EPICONDYLITIS OF LEFT ELBOW: Primary | ICD-10-CM

## 2019-06-19 DIAGNOSIS — E66.01 MORBID OBESITY WITH BMI OF 45.0-49.9, ADULT (HCC): ICD-10-CM

## 2019-06-19 DIAGNOSIS — I10 ESSENTIAL HYPERTENSION: ICD-10-CM

## 2019-06-19 DIAGNOSIS — E66.01 MORBID OBESITY (HCC): ICD-10-CM

## 2019-06-19 PROBLEM — M70.21 OLECRANON BURSITIS OF RIGHT ELBOW: Status: RESOLVED | Noted: 2018-07-09 | Resolved: 2019-06-19

## 2019-06-19 PROCEDURE — 99213 OFFICE O/P EST LOW 20 MIN: CPT | Performed by: FAMILY MEDICINE

## 2019-06-19 RX ORDER — LISINOPRIL 10 MG/1
10 TABLET ORAL DAILY
Qty: 30 TABLET | Refills: 1 | Status: SHIPPED | OUTPATIENT
Start: 2019-06-19 | End: 2019-07-12 | Stop reason: SDUPTHER

## 2019-06-19 RX ORDER — BUTALBITAL, ACETAMINOPHEN AND CAFFEINE 50; 325; 40 MG/1; MG/1; MG/1
1 TABLET ORAL EVERY 4 HOURS PRN
Qty: 30 TABLET | Refills: 0 | Status: SHIPPED | OUTPATIENT
Start: 2019-06-19 | End: 2019-06-19 | Stop reason: SDUPTHER

## 2019-06-19 RX ORDER — ZOLPIDEM TARTRATE 5 MG/1
5 TABLET ORAL
COMMUNITY
End: 2020-02-24

## 2019-06-19 RX ORDER — BUTALBITAL, ACETAMINOPHEN AND CAFFEINE 50; 325; 40 MG/1; MG/1; MG/1
1 TABLET ORAL EVERY 4 HOURS PRN
Qty: 30 TABLET | Refills: 0 | Status: SHIPPED | OUTPATIENT
Start: 2019-06-19 | End: 2019-08-12

## 2019-07-01 ENCOUNTER — OFFICE VISIT (OUTPATIENT)
Dept: BARIATRICS | Facility: CLINIC | Age: 24
End: 2019-07-01
Payer: COMMERCIAL

## 2019-07-01 ENCOUNTER — APPOINTMENT (OUTPATIENT)
Dept: LAB | Facility: HOSPITAL | Age: 24
End: 2019-07-01
Payer: COMMERCIAL

## 2019-07-01 VITALS
DIASTOLIC BLOOD PRESSURE: 72 MMHG | WEIGHT: 284.6 LBS | HEIGHT: 65 IN | BODY MASS INDEX: 47.42 KG/M2 | HEART RATE: 87 BPM | RESPIRATION RATE: 18 BRPM | TEMPERATURE: 97.9 F | SYSTOLIC BLOOD PRESSURE: 132 MMHG

## 2019-07-01 DIAGNOSIS — Z91.89 AT RISK FOR SLEEP APNEA: ICD-10-CM

## 2019-07-01 DIAGNOSIS — E66.01 OBESITY, CLASS III, BMI 40-49.9 (MORBID OBESITY) (HCC): ICD-10-CM

## 2019-07-01 DIAGNOSIS — I10 ESSENTIAL HYPERTENSION: ICD-10-CM

## 2019-07-01 DIAGNOSIS — E66.01 MORBID OBESITY (HCC): ICD-10-CM

## 2019-07-01 DIAGNOSIS — F33.9 DEPRESSION, RECURRENT (HCC): Primary | ICD-10-CM

## 2019-07-01 DIAGNOSIS — F33.9 DEPRESSION, RECURRENT (HCC): ICD-10-CM

## 2019-07-01 PROBLEM — E66.813 OBESITY, CLASS III, BMI 40-49.9 (MORBID OBESITY): Status: ACTIVE | Noted: 2018-06-28

## 2019-07-01 LAB
ALBUMIN SERPL BCP-MCNC: 3.6 G/DL (ref 3.5–5)
ALP SERPL-CCNC: 54 U/L (ref 46–116)
ALT SERPL W P-5'-P-CCNC: 37 U/L (ref 12–78)
ANION GAP SERPL CALCULATED.3IONS-SCNC: 13 MMOL/L (ref 4–13)
AST SERPL W P-5'-P-CCNC: 19 U/L (ref 5–45)
BILIRUB SERPL-MCNC: 0.2 MG/DL (ref 0.2–1)
BUN SERPL-MCNC: 22 MG/DL (ref 5–25)
CALCIUM SERPL-MCNC: 8.8 MG/DL (ref 8.3–10.1)
CHLORIDE SERPL-SCNC: 107 MMOL/L (ref 100–108)
CHOLEST SERPL-MCNC: 178 MG/DL (ref 50–200)
CO2 SERPL-SCNC: 20 MMOL/L (ref 21–32)
CREAT SERPL-MCNC: 0.96 MG/DL (ref 0.6–1.3)
GFR SERPL CREATININE-BSD FRML MDRD: 112 ML/MIN/1.73SQ M
GLUCOSE P FAST SERPL-MCNC: 105 MG/DL (ref 65–99)
HDLC SERPL-MCNC: 28 MG/DL (ref 40–60)
INSULIN SERPL-ACNC: 46 MU/L (ref 3–25)
LDLC SERPL CALC-MCNC: 107 MG/DL (ref 0–100)
NONHDLC SERPL-MCNC: 150 MG/DL
POTASSIUM SERPL-SCNC: 4.1 MMOL/L (ref 3.5–5.3)
PROT SERPL-MCNC: 7.8 G/DL (ref 6.4–8.2)
SODIUM SERPL-SCNC: 140 MMOL/L (ref 136–145)
TRIGL SERPL-MCNC: 214 MG/DL
TSH SERPL DL<=0.05 MIU/L-ACNC: 2.99 UIU/ML (ref 0.36–3.74)

## 2019-07-01 PROCEDURE — 36415 COLL VENOUS BLD VENIPUNCTURE: CPT

## 2019-07-01 PROCEDURE — 80053 COMPREHEN METABOLIC PANEL: CPT

## 2019-07-01 PROCEDURE — 80061 LIPID PANEL: CPT

## 2019-07-01 PROCEDURE — 84443 ASSAY THYROID STIM HORMONE: CPT

## 2019-07-01 PROCEDURE — 99244 OFF/OP CNSLTJ NEW/EST MOD 40: CPT | Performed by: PHYSICIAN ASSISTANT

## 2019-07-01 PROCEDURE — 83525 ASSAY OF INSULIN: CPT

## 2019-07-01 NOTE — ASSESSMENT & PLAN NOTE
-Discussed options of HealthyCORE-Intensive Lifestyle Intervention Program, Very Low Calorie Diet-VLCD, Conservative Program, Ladi-En-Y Gastric Bypass and Vertical Sleeve Gastrectomy and the role of weight loss medications   -Initial weight loss goal of 5-10% weight loss for improved health  -Screening labs  Recommend checking lab coverage before having labs drawn    -NEEDS Lipid, a1c, tsh, fasting insulin, cmp  - STOP BANG-5/8  -Patient is interested in pursuing HealthyCORE-Intensive Lifestyle Intervention Program   -not interested in surgery

## 2019-07-01 NOTE — PROGRESS NOTES
Assessment/Plan:    Obesity, Class III, BMI 40-49 9 (morbid obesity) ([place] [address] )  -Discussed options of HealthyCORE-Intensive Lifestyle Intervention Program, Very Low Calorie Diet-VLCD, Conservative Program, Ladi-En-Y Gastric Bypass and Vertical Sleeve Gastrectomy and the role of weight loss medications   -Initial weight loss goal of 5-10% weight loss for improved health  -Screening labs  Recommend checking lab coverage before having labs drawn  -NEEDS Lipid, a1c, tsh, fasting insulin, cmp  - STOP BANG-  -Patient is interested in pursuing HealthyCORE-Intensive Lifestyle Intervention Program   -not interested in surgery         Depression, recurrent ([place] [address] )  Depression and anxiety  Taking zoloft,geodon, ambien and klonopin  -continue management with prescribing provider    Essential hypertension  Taking lisinopril  -should improve with weight loss, dietary, and lifestyle changes  -continue management with prescribing provider    At risk for sleep apnea  STOP BAN/8  -Patient declined sleep medicine referral  - will recheck in 3-4 months after weight loss has occured  -Discussed risks of untreated sleep apnea such as sudden cardiac death by arrhythmia, uncontrolled hypertension, difficulty with weight loss, decreased quality sleep, increased insulin resistance, and stroke  -Should improve with dietary and lifestyle changes          Follow up for healthy core     Diagnoses and all orders for this visit:    Depression, recurrent ([place] [address] )  -     Lipid panel; Future  -     TSH, 3rd generation with Free T4 reflex; Future  -     Comprehensive metabolic panel; Future  -     Insulin, fasting; Future  -     Insulin, fasting; Future    Morbid obesity ([place] [address] )  -     Ambulatory referral to Weight Management    Obesity, Class III, BMI 40-49 9 (morbid obesity) ([place] [address] )  -     Lipid panel; Future  -     TSH, 3rd generation with Free T4 reflex; Future  -     Comprehensive metabolic panel; Future  -     Insulin, fasting;  Future  -     Insulin, fasting; Future    Essential hypertension  -     Lipid panel; Future  -     TSH, 3rd generation with Free T4 reflex; Future  -     Comprehensive metabolic panel; Future  -     Insulin, fasting; Future  -     Insulin, fasting; Future    At risk for sleep apnea  -     Lipid panel; Future  -     TSH, 3rd generation with Free T4 reflex; Future  -     Comprehensive metabolic panel; Future  -     Insulin, fasting; Future  -     Insulin, fasting; Future          Subjective:   Chief Complaint   Patient presents with    Consult     Pt here for MWM consult  Patient ID: Anson Levin  is a 26 y o  male with excess weight/obesity here to pursue weight management  Past Medical History:   Diagnosis Date    Anxiety     Bronchitis     Depression     Hypertension     Morbid obesity with BMI of 45 0-49 9, adult ([facility])        HPI:  Obesity/Excess Weight:  Severity: Severe  Onset: since age 16     Modifiers: Diet and Exercise  Contributing factors: Poor Food Choices and Insufficient Physical Activity, portion size   Associated symptoms: comorbid conditions and fatigue, headaches     Goals:190 lbs   Hydration: 4-5 water bottles  Alcohol: 2 drinks per month    The following portions of the patient's history were reviewed and updated as appropriate: allergies, current medications, past family history, past medical history, past social history, past surgical history and problem list     Review of Systems   HENT: Negative for sore throat  Respiratory: Negative for cough and shortness of breath  Cardiovascular: Negative for chest pain and palpitations  Gastrointestinal: Negative for abdominal pain, constipation, diarrhea, nausea and vomiting  Denies GERD   Endocrine: Negative for cold intolerance and heat intolerance  Genitourinary: Negative for dysuria  Musculoskeletal: Positive for back pain (diffuse )  Negative for arthralgias  Skin: Negative for rash  Neurological: Negative for headaches  Psychiatric/Behavioral: Negative for suicidal ideas (denies HI)  + depression and anxiety- controlled on meds        Objective:    /72 (BP Location: Left arm, Patient Position: Sitting, Cuff Size: Large)   Pulse 87   Temp 97 9 °F (36 6 °C) (Tympanic)   Resp 18   Ht 5' 5" (1 651 m)   Wt 129 kg (284 lb 9 6 oz)   BMI 47 36 kg/m²     Physical Exam   Nursing note and vitals reviewed  Constitutional   General appearance: Abnormal   well developed and morbidly obese  Eyes No conjunctival pallor  Ears, Nose, Mouth, and Throat Oral mucosa moist    Pulmonary   Respiratory effort: No increased work of breathing or signs of respiratory distress  Auscultation of lungs: Clear to auscultation, equal breath sounds bilaterally, no wheezes, no rales, no rhonci  Cardiovascular   Auscultation of heart: Normal rate and rhythm, normal S1 and S2, without murmurs  Examination of extremities for edema and/or varicosities: Normal   no edema  Abdomen   Abdomen: Abnormal   The abdomen was obese  Bowel sounds were normal  The abdomen was soft and nontender     Musculoskeletal   Gait and station: Normal     Psychiatric   Orientation to person, place and time: Normal     Affect: appropriate

## 2019-07-01 NOTE — ASSESSMENT & PLAN NOTE
Taking lisinopril  -should improve with weight loss, dietary, and lifestyle changes  -continue management with prescribing provider

## 2019-07-02 ENCOUNTER — TELEPHONE (OUTPATIENT)
Dept: BARIATRICS | Facility: CLINIC | Age: 24
End: 2019-07-02

## 2019-07-02 DIAGNOSIS — E16.1 HYPERINSULINEMIA: Primary | ICD-10-CM

## 2019-07-02 NOTE — TELEPHONE ENCOUNTER
----- Message from Medhat Morel PA-C sent at 7/2/2019  9:06 AM EDT -----  Please call patient and inform him that his fasting insulin is elevated  Recommend the patient follow a low fat, low cholesterol diet, limiting refined carbohydrates like white bread, white rice, white pasta, chips/pretzels, sweets/desserts, and sugary beverages  Increase fruits/vegetables  Increase exercise as tolerated  (can consider metformin in the future) I have ordered an A1C for his to have drawn to check for diabetes  His cmp and tsh are within normal range  His lipid panel shows elevated triglcyerides and ldl  Same dietary recommendations as above  Should f/u with pcp regarding lipid panel

## 2019-07-03 ENCOUNTER — TELEPHONE (OUTPATIENT)
Dept: BARIATRICS | Facility: CLINIC | Age: 24
End: 2019-07-03

## 2019-07-03 NOTE — TELEPHONE ENCOUNTER
----- Message from Santino Reid PA-C sent at 7/2/2019  9:06 AM EDT -----  Please call patient and inform him that his fasting insulin is elevated  Recommend the patient follow a low fat, low cholesterol diet, limiting refined carbohydrates like white bread, white rice, white pasta, chips/pretzels, sweets/desserts, and sugary beverages  Increase fruits/vegetables  Increase exercise as tolerated  (can consider metformin in the future) I have ordered an A1C for his to have drawn to check for diabetes  His cmp and tsh are within normal range  His lipid panel shows elevated triglcyerides and ldl  Same dietary recommendations as above  Should f/u with pcp regarding lipid panel

## 2019-07-05 ENCOUNTER — TELEPHONE (OUTPATIENT)
Dept: BARIATRICS | Facility: CLINIC | Age: 24
End: 2019-07-05

## 2019-07-05 NOTE — TELEPHONE ENCOUNTER
Asked patient if this was a good time to discuss his lab results with him and he indicated it was  Relayed his lab results and PA recommendations to him, as well as telling him to have A1C labs drawn and see his PCP re: harini     Patient then stuttered and said, "This is not a good time to talk to you  I will call back next week "      ----- Message from Gwynn Cooks, PA-C sent at 7/2/2019  9:06 AM EDT -----  Please call patient and inform him that his fasting insulin is elevated  Recommend the patient follow a low fat, low cholesterol diet, limiting refined carbohydrates like white bread, white rice, white pasta, chips/pretzels, sweets/desserts, and sugary beverages  Increase fruits/vegetables  Increase exercise as tolerated  (can consider metformin in the future) I have ordered an A1C for his to have drawn to check for diabetes  His cmp and tsh are within normal range  His lipid panel shows elevated triglcyerides and ldl  Same dietary recommendations as above  Should f/u with pcp regarding lipid panel

## 2019-07-12 DIAGNOSIS — I10 ESSENTIAL HYPERTENSION: ICD-10-CM

## 2019-07-16 RX ORDER — LISINOPRIL 10 MG/1
TABLET ORAL
Qty: 30 TABLET | Refills: 1 | Status: SHIPPED | OUTPATIENT
Start: 2019-07-16 | End: 2019-08-11 | Stop reason: SDUPTHER

## 2019-07-17 ENCOUNTER — APPOINTMENT (OUTPATIENT)
Dept: LAB | Facility: HOSPITAL | Age: 24
End: 2019-07-17
Payer: COMMERCIAL

## 2019-07-17 ENCOUNTER — OFFICE VISIT (OUTPATIENT)
Dept: BARIATRICS | Facility: CLINIC | Age: 24
End: 2019-07-17

## 2019-07-17 DIAGNOSIS — R63.5 ABNORMAL WEIGHT GAIN: ICD-10-CM

## 2019-07-17 DIAGNOSIS — E16.1 HYPERINSULINEMIA: ICD-10-CM

## 2019-07-17 LAB
EST. AVERAGE GLUCOSE BLD GHB EST-MCNC: 120 MG/DL
HBA1C MFR BLD: 5.8 % (ref 4.2–6.3)

## 2019-07-17 PROCEDURE — RECHECK

## 2019-07-17 PROCEDURE — WMPRO12

## 2019-07-17 PROCEDURE — 83036 HEMOGLOBIN GLYCOSYLATED A1C: CPT

## 2019-07-17 PROCEDURE — 36415 COLL VENOUS BLD VENIPUNCTURE: CPT

## 2019-07-17 NOTE — PROGRESS NOTES
Weight Management Medical Nutrition Assessment  Bassam Granado was here today as a new start in [facility]  Today he weighs 282 8 lbs  He does not currently track his food and does not exercise  He was not very open to answering questions, but his mother was also in the appointment and she tended to dominate the conversation  Was not able to get a clear picture as to what he is willing to change or do as far as reaching his goals  Anthropometric Measurements  Start Weight (lbs): 282 2 lbs  Current Weight (lbs): 282 2 lbs  TBW % Change from start weight: 0%  Ideal Body Weight (lbs): 136 lbs  Goal Weight (lbs): 220 lbs    Weight Loss History  Previous weight loss attempts: Self Created Diets (Portion Control, Healthy Food Choices, etc )    Food and Nutrition Related History  Wake up: 11 am  Bed Time:  3 am    Food Recall  Breakfast: skip   Snack:  Lunch: sandwich,   Snack: chips  Dinner: stew and polenta and salad or lemon chicken with veg and potatoes  Snack: 2 am chips, ham sandwich      Beverages: water and sugary tea   Volume of beverage intake:  64 oz    Weekends: Same  Cravings: sugary sweet  Trouble area of day: night time    Frequency of Eating out: every 2 days  Food restrictions:none  Cooking: self   Food Shopping: self    Physical Activity Intake  Activity:gym with Trainer  Frequency:two times per week  Physical limitations/barriers to exercise:  none    Estimated Needs  Energy    [place] Energy Needs: BMR : 3433   1-2# loss weekly sedentary:  1652 - 2152    1-2# loss weekly lightly active: 2038 - 2528  Protein: 74 - 93 gr     (1 2-1 5g/kg IBW)  Fluid:  72 oz    (35mL/kg IBW)    Nutrition Diagnosis  Yes;     Overweight/obesity  related to Excess energy intake as evidenced by  BMI more than normative standard for age and sex (obesity-grade III 36+)       Nutrition Intervention    Nutrition Prescription  Calories:  1500 - 1600  Protein:  74 - 93 gr  Fluid:  72 oz    Nutrition Education:    Healthy Core Manual  Calorie controlled menu  Lean protein food choices  Healthy snack options  Food journaling tips      Nutrition Counseling:  Strategies: meal planning, portion sizes, healthy snack choices, hydration, fiber intake, protein intake, exercise, food journal      Monitoring and Evaluation:  Evaluation criteria:  Energy Intake  Meet protein needs  Maintain adequate hydration  Monitor weekly weight  Meal planning/preparation  Food journal   Decreased portions at mealtimes and snacks  Physical activity     Barriers to learning:none  Readiness to change: Action  Comprehension: fair  Expected Compliance: fair

## 2019-07-18 ENCOUNTER — TELEPHONE (OUTPATIENT)
Dept: BARIATRICS | Facility: CLINIC | Age: 24
End: 2019-07-18

## 2019-07-18 NOTE — TELEPHONE ENCOUNTER
----- Message from Umesh Ritchie PA-C sent at 7/18/2019  8:28 AM EDT -----  Please call patient and inform him that his a1c is prediabetic range  Follow dietary recommendations discussed yesterday

## 2019-07-19 ENCOUNTER — TELEPHONE (OUTPATIENT)
Dept: BARIATRICS | Facility: CLINIC | Age: 24
End: 2019-07-19

## 2019-07-19 NOTE — TELEPHONE ENCOUNTER
Relayed lab results and PA recommendations to patient, who verbalized understanding and thanked  Me     ----- Message from Charisse Lr PA-C sent at 7/18/2019  8:28 AM EDT -----  Please call patient and inform him that his a1c is prediabetic range  Follow dietary recommendations discussed yesterday

## 2019-07-24 ENCOUNTER — CLINICAL SUPPORT (OUTPATIENT)
Dept: BARIATRICS | Facility: CLINIC | Age: 24
End: 2019-07-24

## 2019-07-24 VITALS — HEIGHT: 65 IN | BODY MASS INDEX: 46.02 KG/M2 | WEIGHT: 276.2 LBS

## 2019-07-24 DIAGNOSIS — R63.5 ABNORMAL WEIGHT GAIN: Primary | ICD-10-CM

## 2019-07-24 PROCEDURE — RECHECK

## 2019-08-11 DIAGNOSIS — I10 ESSENTIAL HYPERTENSION: ICD-10-CM

## 2019-08-12 ENCOUNTER — OFFICE VISIT (OUTPATIENT)
Dept: BARIATRICS | Facility: CLINIC | Age: 24
End: 2019-08-12

## 2019-08-12 ENCOUNTER — OFFICE VISIT (OUTPATIENT)
Dept: FAMILY MEDICINE CLINIC | Facility: CLINIC | Age: 24
End: 2019-08-12
Payer: COMMERCIAL

## 2019-08-12 VITALS — WEIGHT: 279.4 LBS | HEIGHT: 65 IN | BODY MASS INDEX: 46.55 KG/M2

## 2019-08-12 VITALS
HEIGHT: 65 IN | HEART RATE: 98 BPM | SYSTOLIC BLOOD PRESSURE: 124 MMHG | OXYGEN SATURATION: 98 % | DIASTOLIC BLOOD PRESSURE: 84 MMHG | BODY MASS INDEX: 46.48 KG/M2 | TEMPERATURE: 98.5 F | WEIGHT: 279 LBS

## 2019-08-12 VITALS — BODY MASS INDEX: 47.12 KG/M2 | HEIGHT: 65 IN | WEIGHT: 282.8 LBS

## 2019-08-12 DIAGNOSIS — I10 ESSENTIAL HYPERTENSION: ICD-10-CM

## 2019-08-12 DIAGNOSIS — R63.5 ABNORMAL WEIGHT GAIN: Primary | ICD-10-CM

## 2019-08-12 DIAGNOSIS — G44.229 CHRONIC TENSION-TYPE HEADACHE, NOT INTRACTABLE: Primary | ICD-10-CM

## 2019-08-12 PROCEDURE — 1036F TOBACCO NON-USER: CPT | Performed by: FAMILY MEDICINE

## 2019-08-12 PROCEDURE — RECHECK

## 2019-08-12 PROCEDURE — 99213 OFFICE O/P EST LOW 20 MIN: CPT | Performed by: FAMILY MEDICINE

## 2019-08-12 PROCEDURE — 3074F SYST BP LT 130 MM HG: CPT | Performed by: FAMILY MEDICINE

## 2019-08-12 PROCEDURE — 3079F DIAST BP 80-89 MM HG: CPT | Performed by: FAMILY MEDICINE

## 2019-08-12 RX ORDER — RIZATRIPTAN BENZOATE 10 MG/1
10 TABLET, ORALLY DISINTEGRATING ORAL ONCE AS NEEDED
Qty: 9 TABLET | Refills: 0 | Status: SHIPPED | OUTPATIENT
Start: 2019-08-12 | End: 2019-10-08 | Stop reason: SDUPTHER

## 2019-08-12 RX ORDER — LISINOPRIL 10 MG/1
TABLET ORAL
Qty: 30 TABLET | Refills: 1 | Status: SHIPPED | OUTPATIENT
Start: 2019-08-12 | End: 2019-10-08

## 2019-08-12 NOTE — PATIENT INSTRUCTIONS
Hypertension   AMBULATORY CARE:   Hypertension  is high blood pressure (BP)  Your BP is the force of your blood moving against the walls of your arteries  Normal BP is less than 120/80  Prehypertension is between 120/80 and 139/89  Hypertension is 140/90 or higher  Hypertension causes your BP to get so high that your heart has to work much harder than normal  This can damage your heart  You can control hypertension with a healthy lifestyle or medicines  A controlled blood pressure helps protect your organs, such as your heart, lungs, brain, and kidneys  Common symptoms include the following:   · Headache     · Blurred vision     · Chest pain     · Dizziness or weakness     · Trouble breathing    · Nosebleeds  Call 911 for any of the following:   · You have discomfort in your chest that feels like squeezing, pressure, fullness, or pain  · You become confused or have difficulty speaking  · You suddenly feel lightheaded or have trouble breathing  · You have pain or discomfort in your back, neck, jaw, stomach, or arm  Seek care immediately if:   · You have a severe headache or vision loss  · You have weakness in an arm or leg  Contact your healthcare provider if:   · You feel faint, dizzy, confused, or drowsy  · You have been taking your BP medicine and your BP is still higher than your healthcare provider says it should be  · You have questions or concerns about your condition or care  Treatment for hypertension  may include medicine to lower your BP and lower your cholesterol level  A low cholesterol level helps prevent heart disease and makes it easier to control your blood pressure  You may also need to make lifestyle changes  Take your medicine exactly as directed  Manage hypertension:  Talk with your healthcare provider about these and other ways to manage hypertension:  · Check your BP at home  Sit and rest for 5 minutes before you take your BP   Extend your arm and support it on a flat surface  Your arm should be at the same level as your heart  Follow the directions that came with your BP monitor  If possible, take at least 2 BP readings each time  Take your BP at least twice a day at the same times each day, such as morning and evening  Keep a record of your BP readings and bring it to your follow-up visits  Ask your healthcare provider what your BP should be  · Limit sodium (salt) as directed  Too much sodium can affect your fluid balance  Check labels to find low-sodium or no-salt-added foods  Some low-sodium foods use potassium salts for flavor  Too much potassium can also cause health problems  Your healthcare provider will tell you how much sodium and potassium are safe for you to have in a day  He or she may recommend that you limit sodium to 2,300 mg a day  · Follow the meal plan recommended by your healthcare provider  A dietitian or your provider can give you more information on low-sodium plans or the DASH (Dietary Approaches to Stop Hypertension) eating plan  The DASH plan is low in sodium, unhealthy fats, and total fat  It is high in potassium, calcium, and fiber  · Exercise to maintain a healthy weight  Exercise at least 30 minutes per day, on most days of the week  This will help decrease your blood pressure  Ask your healthcare provider about the best exercise plan for you  · Decrease stress  This may help lower your BP  Learn ways to relax, such as deep breathing or listening to music  · Limit alcohol  Women should limit alcohol to 1 drink a day  Men should limit alcohol to 2 drinks a day  A drink of alcohol is 12 ounces of beer, 5 ounces of wine, or 1½ ounces of liquor  · Do not smoke  Nicotine and other chemicals in cigarettes and cigars can increase your BP and also cause lung damage  Ask your healthcare provider for information if you currently smoke and need help to quit  E-cigarettes or smokeless tobacco still contain nicotine  Talk to your healthcare provider before you use these products  · Manage any other health conditions you have  Health conditions such as diabetes can increase your risk for hypertension  Follow your healthcare provider's instructions and take all your medicines as directed  Follow up with your healthcare provider as directed: You will need to return to have your BP checked and to have other lab tests done  Write down your questions so you remember to ask them during your visits  © 2017 [facility] [place] Information is for End User's use only and may not be sold, redistributed or otherwise used for commercial purposes  All illustrations and images included in CareNotes® are the copyrighted property of A D A M [employer]  or Steve Bradford  The above information is an  only  It is not intended as medical advice for individual conditions or treatments  Talk to your doctor, nurse or pharmacist before following any medical regimen to see if it is safe and effective for you

## 2019-08-12 NOTE — PROGRESS NOTES
Weight Management Medical Nutrition Assessment  Jamari Montes De Oca was here today for his 2 week follow-up in [facility]  Today he weighs 279 4 lbs giving him a loss of 3 4 lbs in the last 3 weeks  He has started to track his food but is not always consistent  He has been drinking more water, and did give up soda after starting the program   He does admit that when he was on vaction last week, he started to drink soda again and had one every day last week  He agreed to stop and did not buy any to have at home  Tolumiriam Camila seemed really tired and mentioned that he was "sleepy "  His mother reports that he is on BP medication  He has an appointment today with his PCP - I told them to discuss that with him      Anthropometric Measurements  Start Weight (lbs): 282 8 lbs  Current Weight (lbs): 279 2 lbs  TBW % Change from start weight:1%  Ideal Body Weight (lbs): 136 lbs  Goal Weight (lbs):  220 lbs     Weight Loss History  Previous weight loss attempts: Self Created Diets (Portion Control, Healthy Food Choices, etc )     Food and Nutrition Related History  Wake up: 11 am  Bed Time:  3 am     Food Recall  Breakfast: protein bar            Snack: toast  Lunch: shrimp gumbo   Snack: not always (if needed will have a string cheese)   Dinner: steak, tomatoes, mozzarella, salad  Snack: cutting out late night snacking        Beverages: water and sugary tea   Volume of beverage intake:  64 oz     Weekends: Same  Cravings: sugary sweet  Trouble area of day: night time     Frequency of Eating out: every 2 days  Food restrictions:none  Cooking: self   Food Shopping: self     Physical Activity Intake  Activity:gym with Trainer  Frequency:two times per week  Physical limitations/barriers to exercise:  none     Estimated Needs  Energy     [employer] Needs:  BMR : 2194    1-2# loss weekly sedentary: 0422 - 2135    1-2# loss weekly lightly active: 2017 - 2517  Protein: 74 - 93    (1 2-1 5g/kg IBW)  Fluid:    72 oz  (35mL/kg IBW)     Nutrition Diagnosis  Yes;     Overweight/obesity  related to Excess energy intake as evidenced by  BMI more than normative standard for age and sex (obesity-grade III 36+)     Nutrition Intervention     Nutrition Prescription  Calories:  1500 -1600  Protein:  74 - 93 gr  Fluid:   72 oz     Meal Plan  Breakfast:  400 calories  Snack:100 calories  Lunch:  400 calories  Snack:100 calories  Dinner: 500 calorie  Snack:     Limiting carbs to 30 grams per meal and 15 grams at each snack    Nutrition Education:    Healthy Core Manual  Calorie controlled menu  Lean protein food choices  Healthy snack options  Food journaling tips        Nutrition Counseling:  Strategies: meal planning, portion sizes, healthy snack choices, hydration, fiber intake, protein intake, exercise, food journal        Monitoring and Evaluation:  Evaluation criteria:  Energy Intake  Meet protein needs  Maintain adequate hydration  Monitor weekly weight  Meal planning/preparation  Food journal   Decreased portions at mealtimes and snacks  Physical activity      Barriers to learning:none  Readiness to change: Action  Comprehension: fair  Expected Compliance: fair

## 2019-08-12 NOTE — PROGRESS NOTES
Assessment/Plan:       Diagnoses and all orders for this visit:    Chronic tension-type headache, not intractable  -     MRI brain wo contrast; Future  -     rizatriptan (MAXALT-MLT) 10 MG disintegrating tablet; Take 1 tablet (10 mg total) by mouth once as needed for migraine for up to 1 dose May repeat in 2 hours if needed    Essential hypertension        He is to continue his lisinopril, will call with results of his MRI  He is to let us know how he is doing with the Maxalt for his headaches  Subjective:      Patient ID: Sarah Carter is a 26 y o  male  Patient comes in today for follow-up on his hypertension, he states that despite his blood pressure improving he is still having frontal headaches almost on a daily basis  He states that he has used Fioricet however when he takes this the headaches improved and then come back worse  He denies any fevers chills sinus congestion or vision changes  The following portions of the patient's history were reviewed and updated as appropriate:   He has a past medical history of Anxiety, Bronchitis, Depression, Hypertension, and Morbid obesity with BMI of 45 0-49 9, adult ([facility] [address] )  ,  does not have any pertinent problems on file  ,   has a past surgical history that includes No past surgeries  ,  family history is not on file  He was adopted  ,   reports that he has never smoked  He has never used smokeless tobacco  He reports that he drinks about 1 0 standard drinks of alcohol per week  He reports that he does not use drugs  ,  is allergic to abilify [aripiprazole]; lithium; lorazepam; and seroquel [quetiapine]     Current Outpatient Medications   Medication Sig Dispense Refill    Cholecalciferol (VITAMIN D-3) 5000 units TABS Take 1 tablet by mouth daily 90 tablet 3    clonazePAM (KlonoPIN) 0 5 mg tablet Take by mouth      lisinopril (ZESTRIL) 10 mg tablet TAKE 1 TABLET BY MOUTH EVERY DAY 30 tablet 1    sertraline (ZOLOFT) 100 mg tablet Take 100 mg by mouth daily  topiramate (TOPAMAX) 200 MG tablet       ziprasidone (GEODON) 80 mg capsule Take 80 mg by mouth 2 (two) times a day with meals       zolpidem (AMBIEN) 5 mg tablet Take 5 mg by mouth daily at bedtime as needed for sleep      rizatriptan (MAXALT-MLT) 10 MG disintegrating tablet Take 1 tablet (10 mg total) by mouth once as needed for migraine for up to 1 dose May repeat in 2 hours if needed 9 tablet 0     No current facility-administered medications for this visit  Review of Systems   Constitutional: Negative for chills, fatigue and fever  HENT: Negative for congestion, ear pain, hearing loss, postnasal drip, rhinorrhea and sore throat  Eyes: Negative for pain and visual disturbance  Respiratory: Negative for chest tightness, shortness of breath and wheezing  Cardiovascular: Negative for chest pain and leg swelling  Gastrointestinal: Negative for abdominal distention, abdominal pain, constipation, diarrhea and vomiting  Endocrine: Negative for cold intolerance and heat intolerance  Genitourinary: Negative for difficulty urinating, frequency and urgency  Musculoskeletal: Negative for arthralgias and gait problem  Skin: Negative for color change  Neurological: Positive for headaches  Negative for dizziness, tremors, syncope and numbness  Hematological: Negative for adenopathy  Psychiatric/Behavioral: Negative for agitation, confusion and sleep disturbance  The patient is not nervous/anxious  Objective:  Vitals:    08/12/19 1610   BP: 124/84   Pulse: 98   Temp: 98 5 °F (36 9 °C)   TempSrc: Tympanic   SpO2: 98%   Weight: 127 kg (279 lb)   Height: 5' 5" (1 651 m)     Body mass index is 46 43 kg/m²  Physical Exam   Constitutional: He is oriented to person, place, and time  He appears well-developed and well-nourished  HENT:   Head: Normocephalic  Mouth/Throat: Oropharynx is clear and moist    Eyes: Conjunctivae are normal  No scleral icterus     Neck: Normal range of motion  No thyromegaly present  Cardiovascular: Normal rate, regular rhythm and normal heart sounds  No murmur heard  Pulmonary/Chest: Effort normal and breath sounds normal  No respiratory distress  He has no wheezes  Abdominal: Soft  Bowel sounds are normal  He exhibits no distension  There is no tenderness  Musculoskeletal: Normal range of motion  He exhibits no edema or tenderness  Lymphadenopathy:     He has no cervical adenopathy  Neurological: He is alert and oriented to person, place, and time  No cranial nerve deficit  Skin: Skin is warm and dry  No rash noted  No pallor  Psychiatric: He has a normal mood and affect  His behavior is normal    Nursing note and vitals reviewed

## 2019-08-14 ENCOUNTER — CLINICAL SUPPORT (OUTPATIENT)
Dept: BARIATRICS | Facility: CLINIC | Age: 24
End: 2019-08-14

## 2019-08-14 VITALS — WEIGHT: 275.8 LBS | HEIGHT: 65 IN | BODY MASS INDEX: 45.95 KG/M2

## 2019-08-14 DIAGNOSIS — R63.5 ABNORMAL WEIGHT GAIN: Primary | ICD-10-CM

## 2019-08-14 PROCEDURE — RECHECK

## 2019-08-21 ENCOUNTER — CLINICAL SUPPORT (OUTPATIENT)
Dept: BARIATRICS | Facility: CLINIC | Age: 24
End: 2019-08-21

## 2019-08-21 VITALS — HEIGHT: 65 IN | WEIGHT: 276 LBS | BODY MASS INDEX: 45.98 KG/M2

## 2019-08-21 DIAGNOSIS — R63.5 ABNORMAL WEIGHT GAIN: Primary | ICD-10-CM

## 2019-08-21 PROCEDURE — RECHECK

## 2019-08-23 ENCOUNTER — HOSPITAL ENCOUNTER (OUTPATIENT)
Dept: MRI IMAGING | Facility: CLINIC | Age: 24
Discharge: HOME | End: 2019-08-23
Payer: COMMERCIAL

## 2019-08-23 DIAGNOSIS — G44.229 CHRONIC TENSION-TYPE HEADACHE, NOT INTRACTABLE: ICD-10-CM

## 2019-08-23 PROCEDURE — 70551 MRI BRAIN STEM W/O DYE: CPT

## 2019-08-28 ENCOUNTER — CLINICAL SUPPORT (OUTPATIENT)
Dept: BARIATRICS | Facility: CLINIC | Age: 24
End: 2019-08-28

## 2019-08-28 VITALS — HEIGHT: 65 IN | WEIGHT: 275.2 LBS | BODY MASS INDEX: 45.85 KG/M2

## 2019-08-28 DIAGNOSIS — R63.5 ABNORMAL WEIGHT GAIN: Primary | ICD-10-CM

## 2019-08-28 PROCEDURE — RECHECK

## 2019-09-03 DIAGNOSIS — I10 ESSENTIAL HYPERTENSION: ICD-10-CM

## 2019-09-03 RX ORDER — LISINOPRIL 10 MG/1
TABLET ORAL
Qty: 30 TABLET | Refills: 1 | Status: SHIPPED | OUTPATIENT
Start: 2019-09-03 | End: 2019-10-08

## 2019-09-04 ENCOUNTER — CLINICAL SUPPORT (OUTPATIENT)
Dept: BARIATRICS | Facility: CLINIC | Age: 24
End: 2019-09-04

## 2019-09-04 VITALS — HEIGHT: 65 IN | BODY MASS INDEX: 45.95 KG/M2 | WEIGHT: 275.8 LBS

## 2019-09-04 DIAGNOSIS — R63.5 ABNORMAL WEIGHT GAIN: Primary | ICD-10-CM

## 2019-09-04 PROCEDURE — RECHECK

## 2019-09-11 ENCOUNTER — CLINICAL SUPPORT (OUTPATIENT)
Dept: BARIATRICS | Facility: CLINIC | Age: 24
End: 2019-09-11

## 2019-09-11 VITALS — BODY MASS INDEX: 45.95 KG/M2 | HEIGHT: 65 IN | WEIGHT: 275.8 LBS

## 2019-09-11 DIAGNOSIS — R63.5 ABNORMAL WEIGHT GAIN: Primary | ICD-10-CM

## 2019-09-11 PROCEDURE — RECHECK

## 2019-09-18 ENCOUNTER — CLINICAL SUPPORT (OUTPATIENT)
Dept: BARIATRICS | Facility: CLINIC | Age: 24
End: 2019-09-18

## 2019-09-18 VITALS — BODY MASS INDEX: 46.08 KG/M2 | WEIGHT: 276.6 LBS | HEIGHT: 65 IN

## 2019-09-18 DIAGNOSIS — R63.5 ABNORMAL WEIGHT GAIN: Primary | ICD-10-CM

## 2019-09-18 PROCEDURE — RECHECK

## 2019-09-19 ENCOUNTER — OFFICE VISIT (OUTPATIENT)
Dept: BARIATRICS | Facility: CLINIC | Age: 24
End: 2019-09-19

## 2019-09-19 VITALS — BODY MASS INDEX: 46.48 KG/M2 | WEIGHT: 279 LBS | HEIGHT: 65 IN

## 2019-09-19 DIAGNOSIS — R63.5 ABNORMAL WEIGHT GAIN: Primary | ICD-10-CM

## 2019-09-19 PROCEDURE — RECHECK

## 2019-09-19 NOTE — PROGRESS NOTES
Weight Management Medical Nutrition Assessment  Jamari Montes De Oca was here today for his 2 month follow-up in [facility]   Today he weighs 279 0 lbs meaning he has maintained his weight since the last visit  He is not very focused on his weight loss goal and admits that he does not want to make all of the changes needed for weight loss  His mother was also present and when she asked him if he wanted to continue or to follow another program, he would not answer  Finally he become very aggravated/agitated and asked "why is this decision being put on me "  He was getting progressively more angry and did eventually say that he was not happy and is not ready to change at this time  He has one more class left in month 2 and will come to that, but does not want to renew at this time  Anthropometric Measurements  Start Weight (lbs): 282 8 lbs  Current Weight (lbs): 279 0 lbs  TBW % Change from start weight:1%  Ideal Body Weight (lbs): 136 lbs  Goal Weight (lbs):  220 lbs     Weight Loss History  Previous weight loss attempts: Self Created Diets (Portion Control, Healthy Food Choices, etc )     Food and Nutrition Related History  Wake up: 11 am  Bed Time:  3 am       Food Recall  Breakfast:skips           Snack: toast  Lunch: 3 ham and cheese sandwiches   Snack: not always (if needed will have a string cheese)   Dinner: stew with polenta ( 3 large bowls)  Snack: sandwich and chips at 3am        Beverages: water and sugary tea   Volume of beverage intake:  64 oz     Weekends: Same  Cravings: sugary sweet  Trouble area of day: night time     Frequency of Eating out: every 2 days  Food restrictions:none  Cooking: self   Food Shopping: self     Physical Activity Intake  Activity:gym with Trainer  Frequency:two times per week  Physical limitations/barriers to exercise:  none     Estimated Needs  Energy     [employer] Needs:  BMR : 2194    1-2# loss weekly sedentary: 1633 - 2133    1-2# loss weekly lightly active: 2017 - 1062  Protein: 74 - 93    (1 2-1 5g/kg IBW)  Fluid:    72 oz  (35mL/kg IBW)     Nutrition Diagnosis  Yes;    Overweight/obesity  related to Excess energy intake as evidenced by  BMI more than normative standard for age and sex (obesity-grade III 36+)     Nutrition Intervention     Nutrition Prescription  Calories:  1500 -1600  Protein:  74 - 93 gr  Fluid:   72 oz     Meal Plan  Breakfast:  400 calories  Snack:100 calories  Lunch:  400 calories  Snack:100 calories  Dinner: 500 calorie  Snack:     Limiting carbs to 30 grams per meal and 15 grams at each snack     Nutrition Education:    Healthy Core Manual  Calorie controlled menu  Lean protein food choices  Healthy snack options  Food journaling tips        Nutrition Counseling:  Strategies: meal planning, portion sizes, healthy snack choices, hydration, fiber intake, protein intake, exercise, food journal        Monitoring and Evaluation:  Evaluation criteria:  Energy Intake  Meet protein needs  Maintain adequate hydration  Monitor weekly weight  Meal planning/preparation  Food journal   Decreased portions at mealtimes and snacks  Physical activity      Barriers to learning:none  Readiness to change: Action  Comprehension: fair  Expected Compliance: poor

## 2019-09-27 DIAGNOSIS — I10 ESSENTIAL HYPERTENSION: ICD-10-CM

## 2019-09-27 RX ORDER — LISINOPRIL 10 MG/1
TABLET ORAL
Qty: 30 TABLET | Refills: 5 | Status: SHIPPED | OUTPATIENT
Start: 2019-09-27 | End: 2019-10-08 | Stop reason: SDUPTHER

## 2019-10-02 ENCOUNTER — CLINICAL SUPPORT (OUTPATIENT)
Dept: BARIATRICS | Facility: CLINIC | Age: 24
End: 2019-10-02

## 2019-10-02 VITALS — BODY MASS INDEX: 46.02 KG/M2 | HEIGHT: 65 IN | WEIGHT: 276.2 LBS

## 2019-10-02 DIAGNOSIS — R63.5 ABNORMAL WEIGHT GAIN: Primary | ICD-10-CM

## 2019-10-02 PROCEDURE — RECHECK

## 2019-10-08 DIAGNOSIS — I10 ESSENTIAL HYPERTENSION: ICD-10-CM

## 2019-10-08 DIAGNOSIS — G44.229 CHRONIC TENSION-TYPE HEADACHE, NOT INTRACTABLE: ICD-10-CM

## 2019-10-08 RX ORDER — RIZATRIPTAN BENZOATE 10 MG/1
10 TABLET, ORALLY DISINTEGRATING ORAL ONCE AS NEEDED
Qty: 9 TABLET | Refills: 1 | Status: SHIPPED | OUTPATIENT
Start: 2019-10-08 | End: 2020-02-22 | Stop reason: SDUPTHER

## 2019-10-08 RX ORDER — LISINOPRIL 20 MG/1
20 TABLET ORAL DAILY
Qty: 30 TABLET | Refills: 3 | Status: SHIPPED | OUTPATIENT
Start: 2019-10-08 | End: 2020-02-10

## 2019-10-14 ENCOUNTER — OFFICE VISIT (OUTPATIENT)
Dept: BARIATRICS | Facility: CLINIC | Age: 24
End: 2019-10-14

## 2019-10-14 DIAGNOSIS — R63.5 ABNORMAL WEIGHT GAIN: ICD-10-CM

## 2019-10-14 PROCEDURE — RECHECK

## 2019-10-14 PROCEDURE — DB3PK

## 2019-10-14 NOTE — PROGRESS NOTES
Weight Management Medical Nutrition Assessment  Taylor Headley was here today for his 1/3 RD visits   Today he weighs 280 6 lbs giving him a gain of 1 lbs since his last appointment  He reports that he can't go to bed any earlier than 2 - 3 am, he will not get up earlier than noon to have breakfast   He also has stopped going to the gym and reports that he is "taking a month off "  He refuses to track his food or measure portions  When asked what he is willing to change, he said that he would be will not to have seconds at meals  At this time, I question his readiness to change  Please note: he did mention that he is very depressed and seemed to be struggling with that  He reports that he is being followed by a physiatrist, and is going to counseling  His dad confirmed that and also mentioned that he is taking medication as well       Anthropometric Measurements  Start Weight (lbs): 282 8 lbs  Current Weight (lbs): 280 6 lbs  TBW % Change from start weight:1%  Ideal Body Weight (lbs): 136 lbs  Goal Weight (lbs):  220 lbs     Weight Loss History  Previous weight loss attempts: Self Created Diets (Portion Control, Healthy Food Choices, etc )     Food and Nutrition Related History  Wake up: 11 am  Bed Time:  3 am        Food Recall  Breakfast:skips           Snack:   Lunch: spaghetti and meatballs, 2 pieces of italian garlic bread  Snack: sandwhich  Dinner: spaghetti with meatballs, more garlic bread  Snack: sandwich and chips at 3am        Beverages: water and sugary tea   Volume of beverage intake:  64 oz     Weekends: Same  Cravings: sugary sweet  Trouble area of day: night time     Frequency of Eating out: every 2 days  Food restrictions:none  Cooking: self   Food Shopping: self     Physical Activity Intake  Activity:gym with Trainer  Frequency:two times per week  Physical limitations/barriers to exercise:  none     Estimated Needs  Energy     [employer] Needs:  BMR : 2565    3-8# loss weekly LOTTIEERZJOE: 3848 - 1178    5-7# loss weekly lightly active: 2017 - 2517  Protein: 74 - 93    (1 2-1 5g/kg IBW)  Fluid:    72 oz  (35mL/kg IBW)     Nutrition Diagnosis  Yes;    Overweight/obesity  related to Excess energy intake as evidenced by  BMI more than normative standard for age and sex (obesity-grade III 40+)     Nutrition Intervention     Nutrition Prescription  Calories:  1500 -1600  Protein:  74 - 93 gr  Fluid:   72 oz     Meal Plan  Breakfast:  400 calories  Snack:100 calories  Lunch:  400 calories  Snack:100 calories  Dinner: 500 calorie  Snack:     Limiting carbs to 30 grams per meal and 15 grams at each snack     Nutrition Education:    Healthy Core Manual  Calorie controlled menu  Lean protein food choices  Healthy snack options  Food journaling tips        Nutrition Counseling:  Strategies: meal planning, portion sizes, healthy snack choices, hydration, fiber intake, protein intake, exercise, food journal        Monitoring and Evaluation:  Evaluation criteria:  Energy Intake  Meet protein needs  Maintain adequate hydration  Monitor weekly weight  Meal planning/preparation  Food journal   Decreased portions at mealtimes and snacks  Physical activity      Barriers to learning:none  Readiness to change: Action  Comprehension: fair  Expected Compliance: poor

## 2019-10-28 ENCOUNTER — OFFICE VISIT (OUTPATIENT)
Dept: BARIATRICS | Facility: CLINIC | Age: 24
End: 2019-10-28

## 2019-10-28 VITALS — WEIGHT: 276.8 LBS | BODY MASS INDEX: 46.12 KG/M2 | HEIGHT: 65 IN

## 2019-10-28 DIAGNOSIS — R63.5 ABNORMAL WEIGHT GAIN: Primary | ICD-10-CM

## 2019-10-28 PROCEDURE — RECHECK

## 2019-10-28 NOTE — PROGRESS NOTES
Weight Management Medical Nutrition Assessment  Umang Gomez was here today for his 2/3 RD visits   Today he weighs 276 0 lbs giving him a loss of 4 6 lbs since his last appointment  He has made some significant lifestyle changes in the last 2 weeks  For example, he only has one serving at dinner and does not go back for seconds, he has been drinking more water daily, and he no longer snacks on chips/pretzles etc only has chopped vegetables, and fruit as a snack (for the last 2 weeks)  He is seeing a therapist for his depression and that seems to be helping him  He has not been exercising at all  Discussed the importance of exercise for weight loss as well as just overall good health  His dad was with him for the appointment and seems to be very good support him       Anthropometric Measurements  Start Weight (lbs): 282 8 lbs  Current Weight (lbs): 276 8 lbs  TBW % Change from start weight:1%  Ideal Body Weight (lbs): 136 lbs  Goal Weight (lbs):  220 lbs     Weight Loss History  Previous weight loss attempts: Self Created Diets (Portion Control, Healthy Food Choices, etc )     Food and Nutrition Related History  Wake up: 9 am  Bed Time:  12 midnight         Food Recall  Breakfast:trying to eat breakfast (2 hot pockets)       Snack:   Lunch: salad or sandwhich  Snack: not always  Dinner: not having seconds - pot roast and potatoes, sugar cookies  Snack: no snak        Beverages: water and sugary tea   Volume of beverage intake:  64 oz     Weekends: Same  Cravings: sugary sweet  Trouble area of day: night time     Frequency of Eating out: every 2 days  Food restrictions:none  Cooking: self   Food Shopping: self     Physical Activity Intake  Activity:gym with Trainer  Frequency:two times per week  Physical limitations/barriers to exercise:  none     Estimated Needs  Energy     [employer] Needs:  BMR : 8635  6-9# loss weekly sedentary: 6292 - 3738    8-7# loss weekly lightly active: [address]  Protein: 74 - 93    (1 2-1 5g/kg IBW)  Fluid:    72 oz  (35mL/kg IBW)     Nutrition Diagnosis  Yes;    Overweight/obesity  related to Excess energy intake as evidenced by  BMI more than normative standard for age and sex (obesity-grade III 36+)     Nutrition Intervention     Nutrition Prescription  Calories:  1500 -1600  Protein:  74 - 93 gr  Fluid:   72 oz     Meal Plan  Breakfast:  400 calories  Snack:100 calories  Lunch:  400 calories  Snack:100 calories  Dinner: 500 calorie  Snack:     Limiting carbs to 30 grams per meal and 15 grams at each snack     Nutrition Education:    Healthy Core Manual  Calorie controlled menu  Lean protein food choices  Healthy snack options  Food journaling tips        Nutrition Counseling:  Strategies: meal planning, portion sizes, healthy snack choices, hydration, fiber intake, protein intake, exercise, food journal        Monitoring and Evaluation:  Evaluation criteria:  Energy Intake  Meet protein needs  Maintain adequate hydration  Monitor weekly weight  Meal planning/preparation  Food journal   Decreased portions at mealtimes and snacks  Physical activity      Barriers to learning:none  Readiness to change: Action  Comprehension: fair  Expected Compliance: poor

## 2019-11-11 ENCOUNTER — OFFICE VISIT (OUTPATIENT)
Dept: BARIATRICS | Facility: CLINIC | Age: 24
End: 2019-11-11

## 2019-11-11 VITALS — HEIGHT: 65 IN | WEIGHT: 275.6 LBS | BODY MASS INDEX: 45.92 KG/M2

## 2019-11-11 DIAGNOSIS — R63.5 ABNORMAL WEIGHT GAIN: Primary | ICD-10-CM

## 2019-11-11 PROCEDURE — RECHECK

## 2019-11-11 NOTE — PROGRESS NOTES
Weight Management Medical Nutrition Assessment  Rogerio was here today for his 3/3 RD visits   Today he weighs 275 6 lbs giving him a loss of 1 2 lbs since his last appointment  He continues to cut out night time snacking and also limits his snacks during the day  He does not go back for seconds most of the time and is having his parents plate his food so that he only takes one portion  He still struggles with portion sizes and admits that he had 2 sandwiches for breakfast, and a couple of days ago, he had 4 slices of pizza  He still has not been exercising at all  Discussed the importance of exercise for weight loss as well as just overall good health  His dad was with him for the appointment and seems to be very good support him          Anthropometric Measurements  Start Weight (lbs): 282 8 lbs  Current Weight (lbs): 275 6 lbs  TBW % Change from start weight:2%  Ideal Body Weight (lbs): 136 lbs  Goal Weight (lbs):  220 lbs     Weight Loss History  Previous weight loss attempts: Self Created Diets (Portion Control, Healthy Food Choices, etc )     Food and Nutrition Related History  Wake up: 9 am  Bed Time:  12 midnight         Food Recall  Breakfast: soup and 2 sandwiches      Snack:   Lunch: 2 lean pockets   Snack: not always  Dinner:  has 2 helpings pork, asparagus roasted potatoes  Snack: apple pie        Beverages: water and sugary tea   Volume of beverage intake:  64 oz     Weekends: Same  Cravings: sugary sweet  Trouble area of day: night time     Frequency of Eating out: every 2 days  Food restrictions:none  Cooking: self   Food Shopping: self     Physical Activity Intake  Activity: walks  Frequency:two times per week  Physical limitations/barriers to exercise:  none     Estimated Needs  Energy     [employer] Needs:  BMR : 3294  5-2# loss weekly sedentary: 1615 - 0493    8-9# loss weekly lightly active: 1973 [facility]  Protein: 74 - 93    (1 2-1 5g/kg IBW)  Fluid:    72 oz  (35mL/kg IBW)     Nutrition Diagnosis  Yes;    Overweight/obesity  related to Excess energy intake as evidenced by  BMI more than normative standard for age and sex (obesity-grade III 36+)     Nutrition Intervention     Nutrition Prescription  Calories:  1500 -1600  Protein:  74 - 93 gr  Fluid:   72 oz     Meal Plan  Breakfast:  400 calories  Snack:100 calories  Lunch:  400 calories  Snack:100 calories  Dinner: 500 calorie  Snack:     Limiting carbs to 30 grams per meal and 15 grams at each snack     Nutrition Education:    Healthy Core Manual  Calorie controlled menu  Lean protein food choices  Healthy snack options  Food journaling tips        Nutrition Counseling:  Strategies: meal planning, portion sizes, healthy snack choices, hydration, fiber intake, protein intake, exercise, food journal        Monitoring and Evaluation:  Evaluation criteria:  Energy Intake  Meet protein needs  Maintain adequate hydration  Monitor weekly weight  Meal planning/preparation  Food journal   Decreased portions at mealtimes and snacks  Physical activity      Barriers to learning:none  Readiness to change: Action  Comprehension: fair  Expected Compliance: poor

## 2019-11-25 ENCOUNTER — OFFICE VISIT (OUTPATIENT)
Dept: BARIATRICS | Facility: CLINIC | Age: 24
End: 2019-11-25

## 2019-11-25 VITALS — WEIGHT: 276.3 LBS | BODY MASS INDEX: 46.03 KG/M2 | HEIGHT: 65 IN

## 2019-11-25 DIAGNOSIS — R63.5 ABNORMAL WEIGHT GAIN: ICD-10-CM

## 2019-11-25 PROCEDURE — RECHECK

## 2019-11-25 PROCEDURE — DB3PK

## 2019-11-25 NOTE — PROGRESS NOTES
Weight Management Medical Nutrition Assessment  Rogerio was here today for his 1/3 RD visits   Today he weighs 276 3 lbs giving him a gain of  7 oz since his last appointment  Lucy Tomlinson seems to be doing better with his night snacking, but is continuing to struggle with his portion sizes  For example he ate 4 slices of pizza at dinner the other night, and this morning had 2 breakfast sandwiches  Reviewed calories and portion sizes with him  Also discussed eating slower so he listen to his body cues when he is getting full  [facility] and he basically told me that he plans to eat 4 pieces of fried chicken, mashed potatoes and stuffing  Again, reiterated the importance of hunger cues, and portion sizes  He refused to limit his portions at [facility]  He will not work out, and told me he will not track his food    Still not sure if he is ready to make the necessary changes needed to reach his weight loss goal       Anthropometric Measurements  Start Weight (lbs): 282 8 lbs  Current Weight (lbs): 276 3 lbs  TBW % Change from start weight:2%  Ideal Body Weight (lbs): 136 lbs  Goal Weight (lbs):  220 lbs     Weight Loss History  Previous weight loss attempts: Self Created Diets (Portion Control, Healthy Food Choices, etc )     Food and Nutrition Related History  Wake up: 9 am  Bed Time:  12 midnight         Food Recall  Breakfast: lean pockets 2   Snack:   Lunch: (3) eggs, (3) chapman, bread (2) and fruit  Snack: not always  Dinner: large plate of lasagne, 3 pieces of bread  Snack: pound cake        Beverages: water and sugary tea   Volume of beverage intake:  64 oz     Weekends: Same  Cravings: sugary sweet  Trouble area of day: night time     Frequency of Eating out: every 2 days  Food restrictions:none  Cooking: self   Food Shopping: self     Physical Activity Intake  Activity: walks  Frequency:two times per week  Physical limitations/barriers to exercise:  none     Estimated Needs  Energy     [address]  [facility] [id number] Needs:  BMR : 0289  0-0# loss weekly sedentary: 1615 - 2115    1-2# loss weekly lightly active: 1996 - 2496  Protein: 74 - 93    (1 2-1 5g/kg IBW)  Fluid:    72 oz  (35mL/kg IBW)     Nutrition Diagnosis  Yes;    Overweight/obesity  related to Excess energy intake as evidenced by  BMI more than normative standard for age and sex (obesity-grade III 40+)     Nutrition Intervention     Nutrition Prescription  Calories:  1500 -1600  Protein:  74 - 93 gr  Fluid:   72 oz     Meal Plan  Breakfast:  400 calories  Snack:100 calories  Lunch:  400 calories  Snack:100 calories  Dinner: 500 calorie  Snack:     Limiting carbs to 30 grams per meal and 15 grams at each snack     Nutrition Education:    Healthy Core Manual  Calorie controlled menu  Lean protein food choices  Healthy snack options  Food journaling tips        Nutrition Counseling:  Strategies: meal planning, portion sizes, healthy snack choices, hydration, fiber intake, protein intake, exercise, food journal        Monitoring and Evaluation:  Evaluation criteria:  Energy Intake  Meet protein needs  Maintain adequate hydration  Monitor weekly weight  Meal planning/preparation  Food journal   Decreased portions at mealtimes and snacks  Physical activity      Barriers to learning:none  Readiness to change: Action  Comprehension: fair  Expected Compliance: poor

## 2019-12-06 NOTE — PROGRESS NOTES
Weight Management Medical Nutrition Assessment  Filippo Santacruz was here today for his 2/3 RD visits   Today he weighs 278 6 lbs giving him a gain of 2 3 lbs since his last appointment   He does not want to track his food or add exercise into his routine  When asked what he would be willing to change, he said," I don't want to change anything "  I told him that he will not be able to lose weight if he is not willing to make any changes  He told me that he would think about it      Anthropometric Measurements  Start Weight (lbs): 282 8 lbs  Current Weight (lbs): 278 6 lbs  TBW % Change from start weight:2%  Ideal Body Weight (lbs): 136 lbs  Goal Weight (lbs):  220 lbs     Weight Loss History  Previous weight loss attempts: Self Created Diets (Portion Control, Healthy Food Choices, etc )     Food and Nutrition Related History  Wake up: 9 am  Bed Time:  12 midnight         Food Recall  Breakfast: lean pockets 2   Snack:   Lunch: 4 slices pizza  Snack: not always  Dinner: Maikel Coronado sandwich  Snack: 2 sandwiches around midnight        Beverages: water and sugary tea   Volume of beverage intake:  64 oz     Weekends: Same  Cravings: sugary sweet  Trouble area of day: night time     Frequency of Eating out: every 2 days  Food restrictions:none  Cooking: self   Food Shopping: self     Physical Activity Intake  Activity: walks  Frequency:two times per week  Physical limitations/barriers to exercise:  none     Estimated Needs  Energy     [employer] Needs:  BMR : 3621  7-8# loss weekly sedentary: 1623 - 2123   1-2# loss weekly lightly active: 2005 - 2505  Protein: 74 - 93    (1 2-1 5g/kg IBW)  Fluid:    72 oz  (35mL/kg IBW)     Nutrition Diagnosis  Yes;    Overweight/obesity  related to Excess energy intake as evidenced by  BMI more than normative standard for age and sex (obesity-grade III 36+)     Nutrition Intervention     Nutrition Prescription  Calories:  1500 -1600  Protein:  74 - 93 gr  Fluid:   72 oz     Meal Plan  Breakfast:  400 calories  Snack:100 calories  Lunch:  400 calories  Snack:100 calories  Dinner: 500 calorie  Snack:     Limiting carbs to 30 grams per meal and 15 grams at each snack     Nutrition Education:    Healthy Core Manual  Calorie controlled menu  Lean protein food choices  Healthy snack options  Food journaling tips        Nutrition Counseling:  Strategies: meal planning, portion sizes, healthy snack choices, hydration, fiber intake, protein intake, exercise, food journal        Monitoring and Evaluation:  Evaluation criteria:  Energy Intake  Meet protein needs  Maintain adequate hydration  Monitor weekly weight  Meal planning/preparation  Food journal   Decreased portions at mealtimes and snacks  Physical activity      Barriers to learning:none  Readiness to change: Action  Comprehension: fair  Expected Compliance: poor

## 2019-12-09 ENCOUNTER — OFFICE VISIT (OUTPATIENT)
Dept: BARIATRICS | Facility: CLINIC | Age: 24
End: 2019-12-09

## 2019-12-09 DIAGNOSIS — R63.5 ABNORMAL WEIGHT GAIN: Primary | ICD-10-CM

## 2019-12-09 PROCEDURE — RECHECK

## 2019-12-23 ENCOUNTER — OFFICE VISIT (OUTPATIENT)
Dept: BARIATRICS | Facility: CLINIC | Age: 24
End: 2019-12-23

## 2019-12-23 VITALS — HEIGHT: 65 IN | BODY MASS INDEX: 46.42 KG/M2 | WEIGHT: 278.6 LBS

## 2019-12-23 VITALS — WEIGHT: 280.2 LBS | BODY MASS INDEX: 46.69 KG/M2 | HEIGHT: 65 IN

## 2019-12-23 DIAGNOSIS — R63.5 ABNORMAL WEIGHT GAIN: Primary | ICD-10-CM

## 2019-12-23 PROCEDURE — RECHECK

## 2019-12-23 NOTE — PROGRESS NOTES
Weight Management Medical Nutrition Assessment  Rogerio was here today for his 3/3 RD visits   Today he weighs 280 2 lbs giving him a gain of 1 6 lbs since his last appointment   He continues to not be willing to make changes  Reviewed food journaling, portion sizes and exercise  He is not willing to do anything to reach his weight loss goal   He asked about surgery because he needs a "quick fix "  Explained the process to him (weight checks, attending classes, liquid diet before surgery etc )  He decided that he will not consider surgery (offered to sign him up for an info bassem  )  At this time, he does not want to continue any weight loss program        Anthropometric Measurements  Start Weight (lbs): 282 8 lbs  Current Weight (lbs): 280 2 lbs  TBW % Change from start weight:1%  Ideal Body Weight (lbs): 136 lbs  Goal Weight (lbs):  220 lbs     Weight Loss History  Previous weight loss attempts: Self Created Diets (Portion Control, Healthy Food Choices, etc )     Food and Nutrition Related History  Wake up: 9 am  Bed Time:  12 midnight         Food Recall  Breakfast: 4 eggs   Snack:   Lunch: 2 hamburger with rolls  Snack: fruit or cheese stick with crackers (large bowl)  Dinner: pizza (4 slices)  Snack: cookies and milk        Beverages: water and sugary tea   Volume of beverage intake:  64 oz     Weekends: Same  Cravings: sugary sweet  Trouble area of day: night time     Frequency of Eating out: every 2 days  Food restrictions:none  Cooking: self   Food Shopping: self     Physical Activity Intake  Activity: walks  Frequency:two times per week  Physical limitations/barriers to exercise:  none     Estimated Needs  Energy     [employer] Needs:  BMR : 2193  1-2# loss weekly sedentary: 1631 - 2131    1-2# loss weekly lightly active: 2015 - 4113  Protein: 74 - 93    (1 2-1 5g/kg IBW)  Fluid:    72 oz  (35mL/kg IBW)     Nutrition Diagnosis  Yes;    Overweight/obesity  related to Excess energy intake as evidenced by [facility] more than normative standard for age and sex (obesity-grade III 36+)     Nutrition Intervention     Nutrition Prescription  Calories:  1500 -1600  Protein:  74 - 93 gr  Fluid:   72 oz     Meal Plan  Breakfast:  400 calories  Snack:100 calories  Lunch:  400 calories  Snack:100 calories  Dinner: 500 calorie  Snack:     Limiting carbs to 30 grams per meal and 15 grams at each snack     Nutrition Education:    Healthy Core Manual  Calorie controlled menu  Lean protein food choices  Healthy snack options  Food journaling tips        Nutrition Counseling:  Strategies: meal planning, portion sizes, healthy snack choices, hydration, fiber intake, protein intake, exercise, food journal        Monitoring and Evaluation:  Evaluation criteria:  Energy Intake  Meet protein needs  Maintain adequate hydration  Monitor weekly weight  Meal planning/preparation  Food journal   Decreased portions at mealtimes and snacks  Physical activity      Barriers to learning:none  Readiness to change: Action  Comprehension: fair  Expected Compliance: poor

## 2020-02-10 DIAGNOSIS — I10 ESSENTIAL HYPERTENSION: ICD-10-CM

## 2020-02-10 RX ORDER — LISINOPRIL 20 MG/1
TABLET ORAL
Qty: 90 TABLET | Refills: 1 | Status: SHIPPED | OUTPATIENT
Start: 2020-02-10 | End: 2020-02-24

## 2020-02-20 DIAGNOSIS — G44.229 CHRONIC TENSION-TYPE HEADACHE, NOT INTRACTABLE: ICD-10-CM

## 2020-02-22 ENCOUNTER — NURSE TRIAGE (OUTPATIENT)
Dept: OTHER | Facility: OTHER | Age: 25
End: 2020-02-22

## 2020-02-22 DIAGNOSIS — G44.229 CHRONIC TENSION-TYPE HEADACHE, NOT INTRACTABLE: ICD-10-CM

## 2020-02-22 RX ORDER — RIZATRIPTAN BENZOATE 10 MG/1
10 TABLET, ORALLY DISINTEGRATING ORAL ONCE AS NEEDED
Qty: 9 TABLET | Refills: 0 | Status: SHIPPED | OUTPATIENT
Start: 2020-02-22 | End: 2020-03-11 | Stop reason: SDUPTHER

## 2020-02-22 NOTE — TELEPHONE ENCOUNTER
Call to pt's mom to inform her refill request sent to provider on call, A  Sabine Fee   Pt is completely out of medication

## 2020-02-22 NOTE — TELEPHONE ENCOUNTER
Regarding: Mom called 2/20 for refill, new  did not route it and PCP never received it  ----- Message from Khoi Rodriguez sent at 2/22/2020 11:54 AM EST -----  "My son needs a refill on his Rizatripan "

## 2020-02-22 NOTE — TELEPHONE ENCOUNTER
Called 2 days ago, spoke with  in office  Refill request not forwarded to PCP, Dr Austin Lau Connect message sent to [employer]

## 2020-02-24 ENCOUNTER — OFFICE VISIT (OUTPATIENT)
Dept: FAMILY MEDICINE CLINIC | Facility: CLINIC | Age: 25
End: 2020-02-24
Payer: COMMERCIAL

## 2020-02-24 VITALS
RESPIRATION RATE: 20 BRPM | BODY MASS INDEX: 46.25 KG/M2 | HEART RATE: 90 BPM | OXYGEN SATURATION: 98 % | TEMPERATURE: 99.4 F | SYSTOLIC BLOOD PRESSURE: 110 MMHG | WEIGHT: 277.6 LBS | HEIGHT: 65 IN | DIASTOLIC BLOOD PRESSURE: 66 MMHG

## 2020-02-24 DIAGNOSIS — F41.1 ANXIETY, GENERALIZED: ICD-10-CM

## 2020-02-24 DIAGNOSIS — I10 ESSENTIAL HYPERTENSION: Primary | ICD-10-CM

## 2020-02-24 DIAGNOSIS — G43.009 MIGRAINE WITHOUT AURA AND WITHOUT STATUS MIGRAINOSUS, NOT INTRACTABLE: ICD-10-CM

## 2020-02-24 PROCEDURE — 3008F BODY MASS INDEX DOCD: CPT | Performed by: FAMILY MEDICINE

## 2020-02-24 PROCEDURE — 3074F SYST BP LT 130 MM HG: CPT | Performed by: FAMILY MEDICINE

## 2020-02-24 PROCEDURE — 99214 OFFICE O/P EST MOD 30 MIN: CPT | Performed by: FAMILY MEDICINE

## 2020-02-24 PROCEDURE — 1036F TOBACCO NON-USER: CPT | Performed by: FAMILY MEDICINE

## 2020-02-24 PROCEDURE — 3078F DIAST BP <80 MM HG: CPT | Performed by: FAMILY MEDICINE

## 2020-02-24 RX ORDER — METHYLPREDNISOLONE 4 MG/1
TABLET ORAL
Qty: 21 TABLET | Refills: 0 | Status: SHIPPED | OUTPATIENT
Start: 2020-02-24 | End: 2020-03-01

## 2020-02-24 RX ORDER — LOSARTAN POTASSIUM 100 MG/1
100 TABLET ORAL DAILY
Qty: 30 TABLET | Refills: 5 | Status: SHIPPED | OUTPATIENT
Start: 2020-02-24 | End: 2020-08-24 | Stop reason: SDUPTHER

## 2020-02-24 RX ORDER — RIZATRIPTAN BENZOATE 10 MG/1
10 TABLET, ORALLY DISINTEGRATING ORAL ONCE AS NEEDED
Qty: 9 TABLET | Refills: 0 | OUTPATIENT
Start: 2020-02-24

## 2020-02-24 RX ORDER — ZOLPIDEM TARTRATE 10 MG/1
10 TABLET ORAL
COMMUNITY
End: 2022-02-28

## 2020-02-24 RX ORDER — DESVENLAFAXINE 50 MG/1
50 TABLET, EXTENDED RELEASE ORAL DAILY
Start: 2020-02-24

## 2020-02-24 RX ORDER — RIZATRIPTAN BENZOATE 10 MG/1
TABLET, ORALLY DISINTEGRATING ORAL
Qty: 9 TABLET | Refills: 1 | Status: SHIPPED | OUTPATIENT
Start: 2020-02-24 | End: 2020-02-24

## 2020-02-24 NOTE — PROGRESS NOTES
Assessment/Plan:       Diagnoses and all orders for this visit:    Essential hypertension  -     losartan (COZAAR) 100 MG tablet; Take 1 tablet (100 mg total) by mouth daily    Migraine without aura and without status migrainosus, not intractable  -     methylPREDNISolone 4 MG tablet therapy pack; Use as directed on package    Anxiety, generalized  -     desvenlafaxine succinate (PRISTIQ) 50 mg 24 hr tablet; Take 1 tablet (50 mg total) by mouth daily    Other orders  -     zolpidem (AMBIEN) 10 mg tablet; zolpidem 10 mg tablet        Essential hypertension    Stop the lisinopril due to the cough, start losartan 100 mg daily  Migraine without aura and without status migrainosus, not intractable    Medrol Dosepak to break his migraines, continue Topamax and then use Maxalt as needed  Anxiety, generalized    Follow-up with psychiatrist as scheduled  Follow-up in 6 months to recheck his blood pressure  Subjective:      Patient ID: West Holman is a 22 y o  male  Patient comes in today for check of his blood pressure  He does complain of increased frequency of his migraines over the last week  He states that these have been occurring daily  He has been using his Maxalt which does help but then the headache returns the next day  He states that he has been taking his  Lisinopril for his hypertension however he has noted a persistent dry cough  He does continue to see his psychiatrist and is doing well with this  The following portions of the patient's history were reviewed and updated as appropriate:   He has a past medical history of Anxiety, Bronchitis, Depression, Hypertension, and Morbid obesity with BMI of 45 0-49 9, adult ([facility] [address] )  ,  does not have any pertinent problems on file  ,   has a past surgical history that includes No past surgeries  ,  family history is not on file  He was adopted  ,   reports that he has never smoked   He has never used smokeless tobacco  He reports that he drinks about 1 0 standard drinks of alcohol per week  He reports that he does not use drugs  ,  is allergic to abilify [aripiprazole]; lithium; lorazepam; and seroquel [quetiapine]     Current Outpatient Medications   Medication Sig Dispense Refill    Cholecalciferol (VITAMIN D-3) 5000 units TABS Take 1 tablet by mouth daily 90 tablet 3    rizatriptan (MAXALT-MLT) 10 MG disintegrating tablet Take 1 tablet (10 mg total) by mouth once as needed for migraine for up to 1 dose May repeat in 2 hours if needed 9 tablet 0    topiramate (TOPAMAX) 200 MG tablet       ziprasidone (GEODON) 80 mg capsule Take 80 mg by mouth 2 (two) times a day with meals       zolpidem (AMBIEN) 10 mg tablet zolpidem 10 mg tablet      desvenlafaxine succinate (PRISTIQ) 50 mg 24 hr tablet Take 1 tablet (50 mg total) by mouth daily      losartan (COZAAR) 100 MG tablet Take 1 tablet (100 mg total) by mouth daily 30 tablet 5    methylPREDNISolone 4 MG tablet therapy pack Use as directed on package 21 tablet 0     No current facility-administered medications for this visit  Review of Systems   Constitutional: Negative for chills, fatigue and fever  HENT: Negative for congestion, ear pain, hearing loss, postnasal drip, rhinorrhea and sore throat  Eyes: Negative for pain and visual disturbance  Respiratory: Negative for chest tightness, shortness of breath and wheezing  Cardiovascular: Negative for chest pain and leg swelling  Gastrointestinal: Negative for abdominal distention, abdominal pain, constipation, diarrhea and vomiting  Endocrine: Negative for cold intolerance and heat intolerance  Genitourinary: Negative for difficulty urinating, frequency and urgency  Musculoskeletal: Negative for arthralgias and gait problem  Skin: Negative for color change  Neurological: Positive for headaches  Negative for dizziness, tremors, syncope and numbness  Hematological: Negative for adenopathy     Psychiatric/Behavioral: Negative for agitation, confusion and sleep disturbance  The patient is nervous/anxious  Objective:  Vitals:    02/24/20 1604   BP: 110/66   Pulse: 90   Resp: 20   Temp: 99 4 °F (37 4 °C)   SpO2: 98%   Weight: 126 kg (277 lb 9 6 oz)   Height: 5' 5" (1 651 m)     Body mass index is 46 2 kg/m²  Physical Exam   Constitutional: He is oriented to person, place, and time  He appears well-developed and well-nourished  HENT:   Head: Normocephalic  Mouth/Throat: Oropharynx is clear and moist    Eyes: Conjunctivae are normal  No scleral icterus  Neck: Normal range of motion  No thyromegaly present  Cardiovascular: Normal rate, regular rhythm and normal heart sounds  No murmur heard  Pulmonary/Chest: Effort normal and breath sounds normal  No respiratory distress  He has no wheezes  Abdominal: Soft  Bowel sounds are normal  He exhibits no distension  There is no tenderness  Musculoskeletal: Normal range of motion  He exhibits no edema or tenderness  Lymphadenopathy:     He has no cervical adenopathy  Neurological: He is alert and oriented to person, place, and time  No cranial nerve deficit  Skin: Skin is warm and dry  No rash noted  No pallor  Psychiatric: He has a normal mood and affect  His behavior is normal    Nursing note and vitals reviewed

## 2020-02-28 ENCOUNTER — OFFICE VISIT (OUTPATIENT)
Dept: FAMILY MEDICINE CLINIC | Facility: CLINIC | Age: 25
End: 2020-02-28
Payer: COMMERCIAL

## 2020-02-28 VITALS
DIASTOLIC BLOOD PRESSURE: 66 MMHG | HEIGHT: 66 IN | HEART RATE: 87 BPM | TEMPERATURE: 97 F | WEIGHT: 277 LBS | BODY MASS INDEX: 44.52 KG/M2 | OXYGEN SATURATION: 97 % | SYSTOLIC BLOOD PRESSURE: 116 MMHG

## 2020-02-28 DIAGNOSIS — R05.9 COUGH IN ADULT: Primary | ICD-10-CM

## 2020-02-28 PROCEDURE — 3008F BODY MASS INDEX DOCD: CPT | Performed by: PHYSICIAN ASSISTANT

## 2020-02-28 PROCEDURE — 3074F SYST BP LT 130 MM HG: CPT | Performed by: PHYSICIAN ASSISTANT

## 2020-02-28 PROCEDURE — 99213 OFFICE O/P EST LOW 20 MIN: CPT | Performed by: PHYSICIAN ASSISTANT

## 2020-02-28 PROCEDURE — 1036F TOBACCO NON-USER: CPT | Performed by: PHYSICIAN ASSISTANT

## 2020-02-28 PROCEDURE — 3078F DIAST BP <80 MM HG: CPT | Performed by: PHYSICIAN ASSISTANT

## 2020-02-28 RX ORDER — ZIPRASIDONE HYDROCHLORIDE 40 MG/1
40 CAPSULE ORAL EVERY MORNING
COMMUNITY

## 2020-02-28 RX ORDER — BUPROPION HCL 300 MG
300 TABLET, EXTENDED RELEASE 24 HR ORAL EVERY MORNING
COMMUNITY

## 2020-02-28 RX ORDER — BUPROPION HYDROCHLORIDE 150 MG/1
150 TABLET ORAL EVERY MORNING
COMMUNITY

## 2020-02-28 NOTE — PROGRESS NOTES
Assessment/Plan:          Diagnoses and all orders for this visit:    Cough in adult    Other orders  -     ziprasidone (GEODON) 40 mg capsule; Take 40 mg by mouth 2 (two) times a day with meals  -     buPROPion (WELLBUTRIN XL) 150 mg 24 hr tablet; Take 150 mg by mouth every morning  -     buPROPion (Wellbutrin XL) 300 mg 24 hr tablet; Take 300 mg by mouth every morning        Patient is to continue symptomatic treatment  He is to follow-up if he has any worsening  Subjective:      Patient ID: Farhana Tan is a 22 y o  male  Cough   This is a recurrent problem  The current episode started yesterday  The problem has been waxing and waning  The problem occurs every few minutes  The cough is non-productive  Pertinent negatives include no chest pain, chills, ear congestion, ear pain, fever, headaches, nasal congestion, postnasal drip, rhinorrhea, sore throat, shortness of breath or wheezing  The symptoms are aggravated by lying down  He has tried OTC cough suppressant for the symptoms  The treatment provided moderate relief  The following portions of the patient's history were reviewed and updated as appropriate:   He has a past medical history of Anxiety, Bronchitis, Depression, Hypertension, and Morbid obesity with BMI of 45 0-49 9, adult ([facility] [address] )  ,  does not have any pertinent problems on file  ,   has a past surgical history that includes No past surgeries  ,  family history is not on file  He was adopted  ,   reports that he has never smoked  He has never used smokeless tobacco  He reports that he drinks about 1 0 standard drinks of alcohol per week  He reports that he does not use drugs  ,  is allergic to abilify [aripiprazole]; lithium; lorazepam; and seroquel [quetiapine]     Current Outpatient Medications   Medication Sig Dispense Refill    buPROPion (WELLBUTRIN XL) 150 mg 24 hr tablet Take 150 mg by mouth every morning      buPROPion (Wellbutrin XL) 300 mg 24 hr tablet Take 300 mg by mouth every morning      Cholecalciferol (VITAMIN D-3) 5000 units TABS Take 1 tablet by mouth daily 90 tablet 3    desvenlafaxine succinate (PRISTIQ) 50 mg 24 hr tablet Take 1 tablet (50 mg total) by mouth daily      losartan (COZAAR) 100 MG tablet Take 1 tablet (100 mg total) by mouth daily 30 tablet 5    methylPREDNISolone 4 MG tablet therapy pack Use as directed on package 21 tablet 0    rizatriptan (MAXALT-MLT) 10 MG disintegrating tablet Take 1 tablet (10 mg total) by mouth once as needed for migraine for up to 1 dose May repeat in 2 hours if needed 9 tablet 0    topiramate (TOPAMAX) 200 MG tablet 200 mg 2 (two) times a day       ziprasidone (GEODON) 40 mg capsule Take 40 mg by mouth 2 (two) times a day with meals      ziprasidone (GEODON) 80 mg capsule Take 80 mg by mouth 2 (two) times a day with meals       zolpidem (AMBIEN) 10 mg tablet zolpidem 10 mg tablet       No current facility-administered medications for this visit  Review of Systems   Constitutional: Negative for activity change, appetite change, chills and fever  HENT: Negative for congestion, ear pain, postnasal drip, rhinorrhea, sinus pressure, sneezing, sore throat and trouble swallowing  Eyes: Negative for itching  Respiratory: Positive for cough  Negative for chest tightness, shortness of breath and wheezing  Cardiovascular: Negative for chest pain  Neurological: Negative for dizziness, light-headedness and headaches  Objective:  Vitals:    02/28/20 1452   BP: 116/66   Pulse: 87   Temp: (!) 97 °F (36 1 °C)   SpO2: 97%   Weight: 126 kg (277 lb)   Height: 5' 5 55" (1 665 m)     Body mass index is 45 32 kg/m²  Physical Exam   Constitutional: He is oriented to person, place, and time  HENT:   Head: Normocephalic     Right Ear: Tympanic membrane, external ear and ear canal normal    Left Ear: Tympanic membrane, external ear and ear canal normal    Nose: Nose normal    Mouth/Throat: Oropharynx is clear and moist    Eyes: Conjunctivae are normal    Cardiovascular: Normal rate, regular rhythm and normal heart sounds  Pulmonary/Chest: Effort normal and breath sounds normal  He has no wheezes  He has no rales  Lymphadenopathy:     He has no cervical adenopathy  Neurological: He is alert and oriented to person, place, and time  Skin: Skin is warm and dry  Nursing note and vitals reviewed

## 2020-02-28 NOTE — PATIENT INSTRUCTIONS
Acute Cough   WHAT YOU NEED TO KNOW:   What is an acute cough? An acute cough can last up to 3 weeks  Common causes of an acute cough include a cold, allergies, or a lung infection  How is the cause of an acute cough diagnosed? Your healthcare provider will examine you and listen to your lungs  Tell your healthcare provider if you cough up any mucus, or have a fever or shortness of breath  Also tell your provider what makes the cough better or worse  Depending on your symptoms, you may need a chest x-ray  A sample of mucus may be collected and tested for infection  How is an acute cough treated? An acute cough usually goes away on its own  Ask your healthcare provider about medicines you can take to decrease your cough  You may need medicine to stop the cough, decrease swelling in your airways, or help open your airways  Medicine may also be given to help you cough up mucus  If you have an infection caused by bacteria, you may need antibiotics  What can I do to manage my cough? · Do not smoke and stay away from others who smoke  Nicotine and other chemicals in cigarettes and cigars can cause lung damage and make your cough worse  Ask your healthcare provider for information if you currently smoke and need help to quit  E-cigarettes or smokeless tobacco still contain nicotine  Talk to your healthcare provider before you use these products  · Drink extra liquids as directed  Liquids will help thin and loosen mucus so you can cough it up  Liquids will also help prevent dehydration  Examples of good liquids to drink include water, fruit juice, and broth  Do not drink liquids that contain caffeine  Caffeine can increase your risk for dehydration  Ask your healthcare provider how much liquid to drink each day  · Rest as directed  Do not do activities that make your cough worse, such as exercise  · Use a humidifier or vaporizer    Use a cool mist humidifier or a vaporizer to increase air moisture in your home  This may make it easier for you to breathe and help decrease your cough  · Eat 2 to 5 mL of honey 2 times each day  Honey can help thin mucus and decrease your cough  · Use cough drops or lozenges  These can help decrease throat irritation and your cough  When should I seek immediate care? · You have trouble breathing or feel short of breath  · You cough up blood, or you see blood in your mucus  · You faint or feel weak or dizzy  · You have chest pain when you cough or take a deep breath  · You have new wheezing  When should I contact my healthcare provider? · You have a fever  · Your cough lasts longer than 4 weeks  · Your symptoms do not improve with treatment  · You have questions or concerns about your condition or care  CARE AGREEMENT:   You have the right to help plan your care  Learn about your health condition and how it may be treated  Discuss treatment options with your caregivers to decide what care you want to receive  You always have the right to refuse treatment  The above information is an  only  It is not intended as medical advice for individual conditions or treatments  Talk to your doctor, nurse or pharmacist before following any medical regimen to see if it is safe and effective for you  © 2017 [employer]  Information is for End User's use only and may not be sold, redistributed or otherwise used for commercial purposes  All illustrations and images included in CareNotes® are the copyrighted property of A D A M [employer]  or Steve Bradford

## 2020-03-11 DIAGNOSIS — G44.229 CHRONIC TENSION-TYPE HEADACHE, NOT INTRACTABLE: ICD-10-CM

## 2020-03-11 RX ORDER — RIZATRIPTAN BENZOATE 10 MG/1
10 TABLET, ORALLY DISINTEGRATING ORAL ONCE AS NEEDED
Qty: 9 TABLET | Refills: 0 | Status: SHIPPED | OUTPATIENT
Start: 2020-03-11 | End: 2020-05-07 | Stop reason: SDUPTHER

## 2020-05-07 ENCOUNTER — TELEPHONE (OUTPATIENT)
Dept: FAMILY MEDICINE CLINIC | Facility: CLINIC | Age: 25
End: 2020-05-07

## 2020-05-07 DIAGNOSIS — G44.229 CHRONIC TENSION-TYPE HEADACHE, NOT INTRACTABLE: ICD-10-CM

## 2020-05-07 RX ORDER — RIZATRIPTAN BENZOATE 5 MG/1
5 TABLET, ORALLY DISINTEGRATING ORAL ONCE AS NEEDED
Qty: 9 TABLET | Refills: 3 | Status: SHIPPED | OUTPATIENT
Start: 2020-05-07 | End: 2020-07-27 | Stop reason: SDUPTHER

## 2020-07-27 DIAGNOSIS — L03.311 CELLULITIS OF ABDOMINAL WALL: Primary | ICD-10-CM

## 2020-07-27 DIAGNOSIS — G44.229 CHRONIC TENSION-TYPE HEADACHE, NOT INTRACTABLE: ICD-10-CM

## 2020-07-27 RX ORDER — RIZATRIPTAN BENZOATE 5 MG/1
5 TABLET, ORALLY DISINTEGRATING ORAL ONCE AS NEEDED
Qty: 9 TABLET | Refills: 3 | Status: SHIPPED | OUTPATIENT
Start: 2020-07-27 | End: 2021-02-22 | Stop reason: SDUPTHER

## 2020-08-24 ENCOUNTER — OFFICE VISIT (OUTPATIENT)
Dept: FAMILY MEDICINE CLINIC | Facility: CLINIC | Age: 25
End: 2020-08-24
Payer: COMMERCIAL

## 2020-08-24 VITALS
WEIGHT: 238.8 LBS | HEIGHT: 66 IN | TEMPERATURE: 97.5 F | OXYGEN SATURATION: 98 % | HEART RATE: 99 BPM | DIASTOLIC BLOOD PRESSURE: 74 MMHG | BODY MASS INDEX: 38.38 KG/M2 | SYSTOLIC BLOOD PRESSURE: 112 MMHG

## 2020-08-24 DIAGNOSIS — F33.9 DEPRESSION, RECURRENT (HCC): ICD-10-CM

## 2020-08-24 DIAGNOSIS — I10 ESSENTIAL HYPERTENSION: Primary | ICD-10-CM

## 2020-08-24 DIAGNOSIS — G43.009 MIGRAINE WITHOUT AURA AND WITHOUT STATUS MIGRAINOSUS, NOT INTRACTABLE: ICD-10-CM

## 2020-08-24 DIAGNOSIS — F41.1 ANXIETY, GENERALIZED: ICD-10-CM

## 2020-08-24 PROBLEM — R05.9 COUGH IN ADULT: Status: RESOLVED | Noted: 2020-02-28 | Resolved: 2020-08-24

## 2020-08-24 PROCEDURE — 99214 OFFICE O/P EST MOD 30 MIN: CPT | Performed by: FAMILY MEDICINE

## 2020-08-24 PROCEDURE — 3074F SYST BP LT 130 MM HG: CPT | Performed by: FAMILY MEDICINE

## 2020-08-24 PROCEDURE — 3078F DIAST BP <80 MM HG: CPT | Performed by: FAMILY MEDICINE

## 2020-08-24 PROCEDURE — 1036F TOBACCO NON-USER: CPT | Performed by: FAMILY MEDICINE

## 2020-08-24 PROCEDURE — 3008F BODY MASS INDEX DOCD: CPT | Performed by: FAMILY MEDICINE

## 2020-08-24 RX ORDER — PROPRANOLOL HYDROCHLORIDE 80 MG/1
80 CAPSULE, EXTENDED RELEASE ORAL DAILY
Qty: 30 CAPSULE | Refills: 5 | Status: SHIPPED | OUTPATIENT
Start: 2020-08-24 | End: 2021-01-11 | Stop reason: SDUPTHER

## 2020-08-24 RX ORDER — LOSARTAN POTASSIUM 50 MG/1
50 TABLET ORAL DAILY
Qty: 30 TABLET | Refills: 5 | Status: SHIPPED | OUTPATIENT
Start: 2020-08-24 | End: 2021-02-09

## 2020-08-24 NOTE — ASSESSMENT & PLAN NOTE
Change losartan to 50 mg p o  daily and add propranolol 80 mg daily for both blood pressure control and to see if this helps with his headaches

## 2020-08-24 NOTE — ASSESSMENT & PLAN NOTE
Continue the Topamax in the Maxalt, will add propranolol  To see if this decreases the frequency of headaches

## 2020-08-24 NOTE — PROGRESS NOTES
Assessment/Plan:  BMI Counseling: Body mass index is 39 13 kg/m²  The BMI is above normal  Nutrition recommendations include decreasing portion sizes and encouraging healthy choices of fruits and vegetables  Exercise recommendations include moderate physical activity 150 minutes/week  No pharmacotherapy was ordered  Problem List Items Addressed This Visit        Cardiovascular and Mediastinum    Essential hypertension - Primary     Change losartan to 50 mg p o  daily and add propranolol 80 mg daily for both blood pressure control and to see if this helps with his headaches  Relevant Medications    losartan (COZAAR) 50 mg tablet    propranolol (INDERAL LA) 80 mg 24 hr capsule    Migraine without aura and without status migrainosus, not intractable       Continue the Topamax in the Maxalt, will add propranolol  To see if this decreases the frequency of headaches         Relevant Medications    propranolol (INDERAL LA) 80 mg 24 hr capsule       Other    Anxiety, generalized      Follow-up with his psychiatrist as scheduled  Depression, recurrent ([facility] )       Currently stable, follow-up with his psychiatrist, continue his medications  Subjective:      Patient ID: Silvino South is a 22 y o  male  Patient comes in today for checkup his hypertension, he does continue to take his losartan, his blood pressures are much better controlled  He does state that his headaches are still persistent and he has several a week  He does take the Topamax and is using the Maxalt as needed which does help  He is up-to-date with his psychiatrist, he is taking his medications as prescribed  The following portions of the patient's history were reviewed and updated as appropriate:   He has a past medical history of Anxiety, Bronchitis, Depression, Hypertension, and Morbid obesity with BMI of 45 0-49 9, adult ([facility] )  ,  does not have any pertinent problems on file  ,   has a past surgical history that includes No past surgeries  ,  family history is not on file  He was adopted  ,   reports that he has never smoked  He has never used smokeless tobacco  He reports current alcohol use of about 1 0 standard drinks of alcohol per week  He reports that he does not use drugs  ,  is allergic to abilify [aripiprazole]; lithium; lorazepam; and seroquel [quetiapine]     Current Outpatient Medications   Medication Sig Dispense Refill    buPROPion (WELLBUTRIN XL) 150 mg 24 hr tablet Take 150 mg by mouth every morning      buPROPion (Wellbutrin XL) 300 mg 24 hr tablet Take 300 mg by mouth every morning      Cholecalciferol (VITAMIN D-3) 5000 units TABS Take 1 tablet by mouth daily 90 tablet 3    desvenlafaxine succinate (PRISTIQ) 50 mg 24 hr tablet Take 1 tablet (50 mg total) by mouth daily      losartan (COZAAR) 50 mg tablet Take 1 tablet (50 mg total) by mouth daily 30 tablet 5    mupirocin (BACTROBAN) 2 % ointment Apply topically 3 (three) times a day 22 g 1    rizatriptan (MAXALT-MLT) 5 MG disintegrating tablet Take 1 tablet (5 mg total) by mouth once as needed for migraine May repeat in 2 hours if needed 9 tablet 3    topiramate (TOPAMAX) 200 MG tablet 200 mg 2 (two) times a day       ziprasidone (GEODON) 40 mg capsule Take 40 mg by mouth 2 (two) times a day with meals      ziprasidone (GEODON) 80 mg capsule Take 80 mg by mouth 2 (two) times a day with meals       zolpidem (AMBIEN) 10 mg tablet zolpidem 10 mg tablet      propranolol (INDERAL LA) 80 mg 24 hr capsule Take 1 capsule (80 mg total) by mouth daily 30 capsule 5     No current facility-administered medications for this visit  Review of Systems   Constitutional: Negative for chills, fatigue and fever  HENT: Negative for congestion, ear pain, hearing loss, postnasal drip, rhinorrhea and sore throat  Eyes: Negative for pain and visual disturbance  Respiratory: Negative for chest tightness, shortness of breath and wheezing  Cardiovascular: Negative for chest pain and leg swelling  Gastrointestinal: Negative for abdominal distention, abdominal pain, constipation, diarrhea and vomiting  Endocrine: Negative for cold intolerance and heat intolerance  Genitourinary: Negative for difficulty urinating, frequency and urgency  Musculoskeletal: Negative for arthralgias and gait problem  Skin: Negative for color change  Neurological: Positive for headaches  Negative for dizziness, tremors, syncope and numbness  Hematological: Negative for adenopathy  Psychiatric/Behavioral: Negative for agitation, confusion and sleep disturbance  The patient is not nervous/anxious  Objective:  Vitals:    08/24/20 1612   BP: 112/74   Pulse: 99   Temp: 97 5 °F (36 4 °C)   TempSrc: Tympanic   SpO2: 98%   Weight: 108 kg (238 lb 12 8 oz)   Height: 5' 5 5" (1 664 m)     Body mass index is 39 13 kg/m²  Physical Exam  Vitals signs and nursing note reviewed  Constitutional:       Appearance: He is well-developed  HENT:      Head: Normocephalic  Eyes:      General: No scleral icterus  Conjunctiva/sclera: Conjunctivae normal    Neck:      Musculoskeletal: Normal range of motion  Thyroid: No thyromegaly  Cardiovascular:      Rate and Rhythm: Normal rate and regular rhythm  Heart sounds: Normal heart sounds  No murmur  Pulmonary:      Effort: Pulmonary effort is normal  No respiratory distress  Breath sounds: Normal breath sounds  No wheezing  Abdominal:      General: Bowel sounds are normal  There is no distension  Palpations: Abdomen is soft  Tenderness: There is no abdominal tenderness  Musculoskeletal: Normal range of motion  General: No tenderness  Lymphadenopathy:      Cervical: No cervical adenopathy  Skin:     General: Skin is warm and dry  Coloration: Skin is not pale  Findings: No rash     Neurological:      Mental Status: He is alert and oriented to person, place, and time       Cranial Nerves: No cranial nerve deficit     Psychiatric:         Behavior: Behavior normal

## 2021-01-11 ENCOUNTER — TELEMEDICINE (OUTPATIENT)
Dept: FAMILY MEDICINE CLINIC | Facility: CLINIC | Age: 26
End: 2021-01-11
Payer: COMMERCIAL

## 2021-01-11 DIAGNOSIS — G43.009 MIGRAINE WITHOUT AURA AND WITHOUT STATUS MIGRAINOSUS, NOT INTRACTABLE: ICD-10-CM

## 2021-01-11 DIAGNOSIS — I10 ESSENTIAL HYPERTENSION: ICD-10-CM

## 2021-01-11 DIAGNOSIS — B34.9 VIRAL INFECTION, UNSPECIFIED: ICD-10-CM

## 2021-01-11 DIAGNOSIS — R05.9 COUGH IN ADULT: Primary | ICD-10-CM

## 2021-01-11 PROCEDURE — 99214 OFFICE O/P EST MOD 30 MIN: CPT | Performed by: PHYSICIAN ASSISTANT

## 2021-01-11 PROCEDURE — U0003 INFECTIOUS AGENT DETECTION BY NUCLEIC ACID (DNA OR RNA); SEVERE ACUTE RESPIRATORY SYNDROME CORONAVIRUS 2 (SARS-COV-2) (CORONAVIRUS DISEASE [COVID-19]), AMPLIFIED PROBE TECHNIQUE, MAKING USE OF HIGH THROUGHPUT TECHNOLOGIES AS DESCRIBED BY CMS-2020-01-R: HCPCS | Performed by: PHYSICIAN ASSISTANT

## 2021-01-11 PROCEDURE — U0005 INFEC AGEN DETEC AMPLI PROBE: HCPCS | Performed by: PHYSICIAN ASSISTANT

## 2021-01-11 RX ORDER — PROPRANOLOL HYDROCHLORIDE 80 MG/1
80 CAPSULE, EXTENDED RELEASE ORAL DAILY
Qty: 30 CAPSULE | Refills: 5 | Status: SHIPPED | OUTPATIENT
Start: 2021-01-11 | End: 2021-07-14 | Stop reason: SDUPTHER

## 2021-01-11 RX ORDER — BENZONATATE 200 MG/1
200 CAPSULE ORAL 3 TIMES DAILY PRN
Qty: 30 CAPSULE | Refills: 1 | Status: SHIPPED | OUTPATIENT
Start: 2021-01-11 | End: 2022-02-28

## 2021-01-11 NOTE — PROGRESS NOTES
COVID-19 Virtual Visit     Assessment/Plan:    Problem List Items Addressed This Visit        Cardiovascular and Mediastinum    Essential hypertension    Migraine without aura and without status migrainosus, not intractable         Disposition:     I recommended the patient to come to our office to perform PCR testing for COVID-19  I have spent 10 minutes directly with the patient  Greater than 50% of this time was spent in counseling/coordination of care regarding: risks and benefits of treatment options, instructions for management and importance of treatment compliance  Encounter provider Malgorzata Epps PA-C    Provider located at [facility] [address]  [address] [facility]  JOYCE [address]  [facility] [id number] [place] [address]  [phone]    Recent Visits  No visits were found meeting these conditions  Showing recent visits within past 7 days and meeting all other requirements     Today's Visits  Date Type Provider Dept   01/11/21 Telemedicine CHITO Araya [facility] [address] today's visits and meeting all other requirements     Future Appointments  No visits were found meeting these conditions  Showing future appointments within next 150 days and meeting all other requirements      This virtual check-in was done via [employer] and patient was informed that this is not a secure, HIPAA-compliant platform  He agrees to proceed  Patient agrees to participate in a virtual check in via telephone or video visit instead of presenting to the office to address urgent/immediate medical needs  Patient is aware this is a billable service  After connecting through [facility], the patient was identified by name and date of birth  Juancarlos Zamorano was informed that this was a telemedicine visit and that the exam was being conducted confidentially over secure lines  My office door was closed  No one else was in the room  Lulu Melendez acknowledged consent and understanding of privacy and security of the telemedicine visit  I informed the patient that I have reviewed his record in Epic and presented the opportunity for him to ask any questions regarding the visit today  The patient agreed to participate  Subjective: Lulu Melendez is a 26 y o  male who is concerned about COVID-19  Patient's symptoms include chills, fatigue, nasal congestion, rhinorrhea, sore throat, cough and headache  Patient denies fever, anosmia, loss of taste, shortness of breath, chest tightness, abdominal pain, nausea, vomiting, diarrhea and myalgias       Date of symptom onset: 1/8/2021    Exposure:   Contact with a person who is under investigation (PUI) for or who is positive for COVID-19 within the last 14 days?: No    Hospitalized recently for fever and/or lower respiratory symptoms?: No      Currently a healthcare worker that is involved in direct patient care?: No      Works in a special setting where the risk of COVID-19 transmission may be high? (this may include long-term care, correctional and residential facilities; homeless shelters; assisted-living facilities and group homes ): No      Resident in a special setting where the risk of COVID-19 transmission may be high? (this may include long-term care, correctional and residential facilities; homeless shelters; assisted-living facilities and group homes ): No      No results found for: [employer], [address] [facility], [address] [place] [address],[address], [place] [address]  Past Medical History:   Diagnosis Date    Anxiety     Bronchitis     Depression     Hypertension     Morbid obesity with BMI of 45 0-49 9, adult ([facility] [address] )      Past Surgical History:   Procedure Laterality Date    NO PAST SURGERIES       Current Outpatient Medications   Medication Sig Dispense Refill    buPROPion (WELLBUTRIN XL) 150 mg 24 hr tablet Take 150 mg by mouth every morning      buPROPion (Wellbutrin XL) 300 mg 24 hr tablet Take 300 mg by mouth every morning      desvenlafaxine succinate (PRISTIQ) 50 mg 24 hr tablet Take 1 tablet (50 mg total) by mouth daily      losartan (COZAAR) 50 mg tablet Take 1 tablet (50 mg total) by mouth daily 30 tablet 5    propranolol (INDERAL LA) 80 mg 24 hr capsule Take 1 capsule (80 mg total) by mouth daily 30 capsule 5    rizatriptan (MAXALT-MLT) 5 MG disintegrating tablet Take 1 tablet (5 mg total) by mouth once as needed for migraine May repeat in 2 hours if needed 9 tablet 3    topiramate (TOPAMAX) 200 MG tablet 200 mg 2 (two) times a day       ziprasidone (GEODON) 40 mg capsule Take 40 mg by mouth 2 (two) times a day with meals      ziprasidone (GEODON) 80 mg capsule Take 80 mg by mouth 2 (two) times a day with meals       zolpidem (AMBIEN) 10 mg tablet zolpidem 10 mg tablet      Cholecalciferol (VITAMIN D-3) 5000 units TABS Take 1 tablet by mouth daily (Patient not taking: Reported on 1/11/2021) 90 tablet 3    mupirocin (BACTROBAN) 2 % ointment Apply topically 3 (three) times a day (Patient not taking: Reported on 1/11/2021) 22 g 1     No current facility-administered medications for this visit  Allergies   Allergen Reactions    Abilify [Aripiprazole]     Lithium     Lorazepam Other (See Comments)     aggressive    Seroquel [Quetiapine]        Review of Systems   Constitutional: Positive for chills and fatigue  Negative for fever  HENT: Positive for congestion, rhinorrhea and sore throat  Respiratory: Positive for cough  Negative for chest tightness and shortness of breath  Gastrointestinal: Negative for abdominal pain, diarrhea, nausea and vomiting  Musculoskeletal: Negative for myalgias  Neurological: Positive for headaches  Objective:    Vitals:       Physical Exam  Vitals signs and nursing note reviewed  Constitutional:       Appearance: Normal appearance  HENT:      Head: Normocephalic and atraumatic        Right Ear: External ear normal       Left Ear: External ear normal    Eyes: Conjunctiva/sclera: Conjunctivae normal    Neck:      Musculoskeletal: Normal range of motion  Pulmonary:      Effort: Pulmonary effort is normal       Comments: Coughing with talking  Neurological:      Mental Status: He is alert and oriented to person, place, and time  Psychiatric:         Mood and Affect: Mood normal          Behavior: Behavior normal        VIRTUAL VISIT DISCLAIMER    Yessy Vargas acknowledges that he has consented to an online visit or consultation  He understands that the online visit is based solely on information provided by him, and that, in the absence of a face-to-face physical evaluation by the physician, the diagnosis he receives is both limited and provisional in terms of accuracy and completeness  This is not intended to replace a full medical face-to-face evaluation by the physician  Yessy Vargas understands and accepts these terms

## 2021-01-13 LAB — SARS-COV-2 RNA SPEC QL NAA+PROBE: NOT DETECTED

## 2021-01-13 NOTE — RESULT ENCOUNTER NOTE
I called Quan Lockwood and let him know that his COVID-19 swab was negative  I advised him to continue social distancing procedures

## 2021-01-20 ENCOUNTER — TELEPHONE (OUTPATIENT)
Dept: FAMILY MEDICINE CLINIC | Facility: CLINIC | Age: 26
End: 2021-01-20

## 2021-01-20 ENCOUNTER — TELEMEDICINE (OUTPATIENT)
Dept: FAMILY MEDICINE CLINIC | Facility: CLINIC | Age: 26
End: 2021-01-20
Payer: COMMERCIAL

## 2021-01-20 DIAGNOSIS — J06.9 VIRAL UPPER RESPIRATORY TRACT INFECTION: Primary | ICD-10-CM

## 2021-01-20 PROCEDURE — U0005 INFEC AGEN DETEC AMPLI PROBE: HCPCS | Performed by: FAMILY MEDICINE

## 2021-01-20 PROCEDURE — U0003 INFECTIOUS AGENT DETECTION BY NUCLEIC ACID (DNA OR RNA); SEVERE ACUTE RESPIRATORY SYNDROME CORONAVIRUS 2 (SARS-COV-2) (CORONAVIRUS DISEASE [COVID-19]), AMPLIFIED PROBE TECHNIQUE, MAKING USE OF HIGH THROUGHPUT TECHNOLOGIES AS DESCRIBED BY CMS-2020-01-R: HCPCS | Performed by: FAMILY MEDICINE

## 2021-01-20 PROCEDURE — 99213 OFFICE O/P EST LOW 20 MIN: CPT | Performed by: FAMILY MEDICINE

## 2021-01-20 RX ORDER — FLUTICASONE PROPIONATE 50 MCG
1 SPRAY, SUSPENSION (ML) NASAL DAILY
Qty: 16 G | Refills: 2 | Status: SHIPPED | OUTPATIENT
Start: 2021-01-20 | End: 2022-02-28

## 2021-01-20 NOTE — LETTER
January 20, 2021    Patient: Curly Avery  YOB: 1995  Date of Last Encounter: 1/11/2021      To whom it may concern: Curly Avery was seen via virtual visit on 1/20/21  Please excuse patient from work for 1/20/21-1/22/21  Please call me office with questions or concerns       Sincerely,         Akhil Vuong, DO

## 2021-01-20 NOTE — PROGRESS NOTES
Virtual Regular Visit    Assessment/Plan:    Problem List Items Addressed This Visit     None      Visit Diagnoses     Viral upper respiratory tract infection    -  Primary    Relevant Medications    fluticasone (FLONASE) 50 mcg/act nasal spray    Other Relevant Orders    Novel Coronavirus (COVID-19), PCR [facility] Collected in Office         Reason for visit is   Chief Complaint   Patient presents with    Cough    Headache    Fatigue    Virtual Regular Visit      Encounter provider Brooke Zarate DO    Provider located at [place] [address] [facility] 2  [facility]    Recent Visits  No visits were found meeting these conditions  Showing recent visits within past 7 days and meeting all other requirements     Today's Visits  Date Type Provider Dept   01/20/21 Telephone Aziza Cordova DO [address] [facility]   01/20/21 Telemedicine Brooke Zarate DO [address] [place] [address]   Showing today's visits and meeting all other requirements     Future Appointments  No visits were found meeting these conditions  Showing future appointments within next 150 days and meeting all other requirements      The patient was identified by name and date of birth  Errol Brock was informed that this is a telemedicine visit and that the visit is being conducted through [address] [place] [address] and patient was informed that this is not a secure, HIPAA-compliant platform  He agrees to proceed     My office door was closed  No one else was in the room  He acknowledged consent and understanding of privacy and security of the video platform  The patient has agreed to participate and understands they can discontinue the visit at any time  Patient is aware this is a billable service  Subjective  Errol Brock is a 26 y o  male      HPI   Patient states that he is congested, sore throat, runny nose, headache for the last 10 days  Productive cough  Denies fevers  Notes that he is a little more tired and feeling fatigued  Notes that he has taken Tylenol for the headache without much improvement  He is taking his migraine medications  Denies vision changes  Denies N/V/diarrhea/abdominal pain  Denies SOB  Has been using OTC medications (Coricidin) with some improvement  Feels that he was getting better and then got worse over the last two days  No sick contacts that he knows of  COVID test negative at the beginning of symptoms  Patient is not allowed to return to work after being off for the last 2 days unless he has a negative COVID test  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, around a lot of people and often is not wearing a mask  Got flu shot this year       Past Medical History:   Diagnosis Date    Anxiety     Bronchitis     Depression     Hypertension     Morbid obesity with BMI of 45 0-49 9, adult (HCC)      Past Surgical History:   Procedure Laterality Date    NO PAST SURGERIES       Current Outpatient Medications   Medication Sig Dispense Refill    benzonatate (TESSALON) 200 MG capsule Take 1 capsule (200 mg total) by mouth 3 (three) times a day as needed for cough 30 capsule 1    buPROPion (WELLBUTRIN XL) 150 mg 24 hr tablet Take 150 mg by mouth every morning      buPROPion (Wellbutrin XL) 300 mg 24 hr tablet Take 300 mg by mouth every morning      desvenlafaxine succinate (PRISTIQ) 50 mg 24 hr tablet Take 1 tablet (50 mg total) by mouth daily      losartan (COZAAR) 50 mg tablet Take 1 tablet (50 mg total) by mouth daily 30 tablet 5    propranolol (INDERAL LA) 80 mg 24 hr capsule Take 1 capsule (80 mg total) by mouth daily 30 capsule 5    topiramate (TOPAMAX) 200 MG tablet 200 mg 2 (two) times a day       ziprasidone (GEODON) 40 mg capsule Take 40 mg by mouth 2 (two) times a day with meals      ziprasidone (GEODON) 80 mg capsule Take 80 mg by mouth 2 (two) times a day with meals       zolpidem (AMBIEN) 10 mg tablet zolpidem 10 mg tablet      fluticasone (FLONASE) 50 mcg/act nasal spray 1 spray into each nostril daily 16 g 2    rizatriptan (MAXALT-MLT) 5 MG disintegrating tablet Take 1 tablet (5 mg total) by mouth once as needed for migraine May repeat in 2 hours if needed 9 tablet 3     No current facility-administered medications for this visit  Allergies   Allergen Reactions    Abilify [Aripiprazole]     Lithium     Lorazepam Other (See Comments)     aggressive    Seroquel [Quetiapine]      Review of Systems   Constitutional: Positive for fatigue  Negative for chills and fever  HENT: Positive for congestion, rhinorrhea and sore throat  Negative for ear pain and sinus pain  Eyes: Negative for pain and visual disturbance  Respiratory: Positive for cough  Negative for shortness of breath  Cardiovascular: Negative for chest pain and leg swelling  Gastrointestinal: Negative for abdominal pain, constipation, diarrhea, nausea and vomiting  Genitourinary: Negative for dysuria, frequency and urgency  Musculoskeletal: Negative for neck pain  Skin: Negative for rash  Neurological: Positive for headaches  Negative for dizziness and light-headedness  Psychiatric/Behavioral: Negative for sleep disturbance  Video Exam    Vitals:     Physical Exam  Vitals signs reviewed  Constitutional:       General: He is not in acute distress  Appearance: Normal appearance  He is not ill-appearing  HENT:      Head: Normocephalic and atraumatic  Right Ear: External ear normal       Left Ear: External ear normal       Nose: Nose normal       Mouth/Throat:      Mouth: Mucous membranes are moist    Eyes:      Extraocular Movements: Extraocular movements intact  Conjunctiva/sclera: Conjunctivae normal    Pulmonary:      Effort: Pulmonary effort is normal  No respiratory distress  Breath sounds: No wheezing  Neurological:      General: No focal deficit present        Mental Status: He is alert  Mental status is at baseline  I spent 11 minutes directly with the patient during this visit    VIRTUAL VISIT DISCLAIMER    Oli Crews acknowledges that he has consented to an online visit or consultation  He understands that the online visit is based solely on information provided by him, and that, in the absence of a face-to-face physical evaluation by the physician, the diagnosis he receives is both limited and provisional in terms of accuracy and completeness  This is not intended to replace a full medical face-to-face evaluation by the physician  Oli Crews understands and accepts these terms      Marcella Guy DO  [facility]  1/20/2021 10:42 AM

## 2021-01-21 LAB — SARS-COV-2 RNA RESP QL NAA+PROBE: NEGATIVE

## 2021-01-23 ENCOUNTER — OFFICE VISIT (OUTPATIENT)
Dept: FAMILY MEDICINE CLINIC | Facility: CLINIC | Age: 26
End: 2021-01-23
Payer: COMMERCIAL

## 2021-01-23 VITALS
BODY MASS INDEX: 45.8 KG/M2 | WEIGHT: 285 LBS | OXYGEN SATURATION: 97 % | HEIGHT: 66 IN | DIASTOLIC BLOOD PRESSURE: 86 MMHG | HEART RATE: 94 BPM | SYSTOLIC BLOOD PRESSURE: 132 MMHG | TEMPERATURE: 97.7 F

## 2021-01-23 DIAGNOSIS — J01.10 ACUTE FRONTAL SINUSITIS, RECURRENCE NOT SPECIFIED: Primary | ICD-10-CM

## 2021-01-23 PROCEDURE — 3075F SYST BP GE 130 - 139MM HG: CPT | Performed by: STUDENT IN AN ORGANIZED HEALTH CARE EDUCATION/TRAINING PROGRAM

## 2021-01-23 PROCEDURE — 3725F SCREEN DEPRESSION PERFORMED: CPT | Performed by: STUDENT IN AN ORGANIZED HEALTH CARE EDUCATION/TRAINING PROGRAM

## 2021-01-23 PROCEDURE — 99214 OFFICE O/P EST MOD 30 MIN: CPT | Performed by: STUDENT IN AN ORGANIZED HEALTH CARE EDUCATION/TRAINING PROGRAM

## 2021-01-23 PROCEDURE — 1036F TOBACCO NON-USER: CPT | Performed by: STUDENT IN AN ORGANIZED HEALTH CARE EDUCATION/TRAINING PROGRAM

## 2021-01-23 PROCEDURE — 3008F BODY MASS INDEX DOCD: CPT | Performed by: STUDENT IN AN ORGANIZED HEALTH CARE EDUCATION/TRAINING PROGRAM

## 2021-01-23 PROCEDURE — 3079F DIAST BP 80-89 MM HG: CPT | Performed by: STUDENT IN AN ORGANIZED HEALTH CARE EDUCATION/TRAINING PROGRAM

## 2021-01-23 RX ORDER — AMOXICILLIN AND CLAVULANATE POTASSIUM 875; 125 MG/1; MG/1
1 TABLET, FILM COATED ORAL EVERY 12 HOURS SCHEDULED
Qty: 20 TABLET | Refills: 0 | Status: SHIPPED | OUTPATIENT
Start: 2021-01-23 | End: 2021-01-30

## 2021-01-23 NOTE — LETTER
January 23, 2021     Patient: Laura Escalante   YOB: 1995   Date of Visit: 1/23/2021       To Whom it May Concern: Laura Escalante is under my professional care  He was seen in my office on 1/23/2021  He may return to work on 1/25/2021 if symptoms are improving       If you have any questions or concerns, please don't hesitate to call           Sincerely,          Cristofer Alejandro MD        CC: No Recipients

## 2021-01-23 NOTE — PROGRESS NOTES
Assessment/Plan:         Problem List Items Addressed This Visit     None      Visit Diagnoses     Acute frontal sinusitis, recurrence not specified    -  Primary    Relevant Medications    amoxicillin-clavulanate (Augmentin) 875-125 mg per tablet        Negative x2 for covid, symptoms and physical consistent for sinusitis  Will treat and discussed signs and sx's to call back for    Subjective:      Patient ID: Farhana Tan is a 26 y o  male  Cough  This is a new problem  The current episode started in the past 7 days  The problem has been waxing and waning  The problem occurs every few minutes  The cough is productive of sputum  Associated symptoms include headaches, myalgias, nasal congestion, postnasal drip, rhinorrhea and a sore throat  Pertinent negatives include no chest pain, chills, ear congestion, ear pain, heartburn, hemoptysis, rash, shortness of breath, sweats, weight loss or wheezing  The symptoms are aggravated by cold air  Risk factors for lung disease include occupational exposure  He has tried OTC cough suppressant for the symptoms  The treatment provided mild relief  His past medical history is significant for environmental allergies  There is no history of asthma, bronchiectasis, bronchitis, COPD, emphysema or pneumonia  Sinusitis  This is a new problem  The current episode started in the past 7 days  The problem has been waxing and waning since onset  The pain is moderate  Associated symptoms include congestion, coughing, headaches, sinus pressure, a sore throat and swollen glands  Pertinent negatives include no chills, diaphoresis, ear pain, hoarse voice, neck pain, shortness of breath or sneezing  Past treatments include spray decongestants  The treatment provided mild relief         The following portions of the patient's history were reviewed and updated as appropriate:   Past Medical History:  He has a past medical history of Anxiety, Bronchitis, Depression, Hypertension, and Morbid obesity with BMI of 45 0-49 9, adult ([address] )  ,  _______________________________________________________________________  Medical Problems:  does not have any pertinent problems on file ,  _______________________________________________________________________  Past Surgical History:   has a past surgical history that includes No past surgeries  ,  _______________________________________________________________________  Family History:  family history is not on file  He was adopted  ,  _______________________________________________________________________  Social History:   reports that he has never smoked  He has never used smokeless tobacco  He reports current alcohol use of about 1 0 standard drinks of alcohol per week  He reports that he does not use drugs  ,  _______________________________________________________________________  Allergies:  is allergic to abilify [aripiprazole]; lithium; lorazepam; and seroquel [quetiapine]     _______________________________________________________________________  Current Outpatient Medications   Medication Sig Dispense Refill    benzonatate (TESSALON) 200 MG capsule Take 1 capsule (200 mg total) by mouth 3 (three) times a day as needed for cough 30 capsule 1    buPROPion (WELLBUTRIN XL) 150 mg 24 hr tablet Take 150 mg by mouth every morning      buPROPion (Wellbutrin XL) 300 mg 24 hr tablet Take 300 mg by mouth every morning      desvenlafaxine succinate (PRISTIQ) 50 mg 24 hr tablet Take 1 tablet (50 mg total) by mouth daily      fluticasone (FLONASE) 50 mcg/act nasal spray 1 spray into each nostril daily 16 g 2    losartan (COZAAR) 50 mg tablet Take 1 tablet (50 mg total) by mouth daily 30 tablet 5    propranolol (INDERAL LA) 80 mg 24 hr capsule Take 1 capsule (80 mg total) by mouth daily 30 capsule 5    rizatriptan (MAXALT-MLT) 5 MG disintegrating tablet Take 1 tablet (5 mg total) by mouth once as needed for migraine May repeat in 2 hours if needed 9 tablet 3    topiramate (TOPAMAX) 200 MG tablet 200 mg 2 (two) times a day       zolpidem (AMBIEN) 10 mg tablet Take 10 mg by mouth daily at bedtime as needed       amoxicillin-clavulanate (Augmentin) 875-125 mg per tablet Take 1 tablet by mouth every 12 (twelve) hours for 7 days 20 tablet 0    ziprasidone (GEODON) 40 mg capsule Take 40 mg by mouth 2 (two) times a day with meals      ziprasidone (GEODON) 80 mg capsule Take 80 mg by mouth 2 (two) times a day with meals        No current facility-administered medications for this visit       _______________________________________________________________________  Review of Systems   Constitutional: Negative for chills, diaphoresis and weight loss  HENT: Positive for congestion, postnasal drip, rhinorrhea, sinus pressure and sore throat  Negative for ear pain, hoarse voice and sneezing  Respiratory: Positive for cough  Negative for hemoptysis, shortness of breath and wheezing  Cardiovascular: Negative for chest pain  Gastrointestinal: Negative for heartburn  Musculoskeletal: Positive for myalgias  Negative for neck pain  Skin: Negative for rash  Allergic/Immunologic: Positive for environmental allergies  Neurological: Positive for headaches  Objective:  Vitals:    01/23/21 0929   BP: 132/86   BP Location: Left arm   Patient Position: Sitting   Cuff Size: Large   Pulse: 94   Temp: 97 7 °F (36 5 °C)   TempSrc: Tympanic   SpO2: 97%   Weight: 129 kg (285 lb)   Height: 5' 5 5" (1 664 m)     Body mass index is 46 71 kg/m²  Physical Exam  Constitutional:       General: He is not in acute distress  Appearance: He is not ill-appearing  HENT:      Head: Normocephalic and atraumatic  Right Ear: Hearing, tympanic membrane, ear canal and external ear normal       Left Ear: Hearing, tympanic membrane, ear canal and external ear normal       Nose: Mucosal edema, congestion and rhinorrhea present        Mouth/Throat:      Mouth: Mucous membranes are moist       Pharynx: Oropharynx is clear  Posterior oropharyngeal erythema present  No oropharyngeal exudate or uvula swelling  Tonsils: No tonsillar exudate or tonsillar abscesses  3+ on the right  3+ on the left  Eyes:      Extraocular Movements: Extraocular movements intact  Conjunctiva/sclera: Conjunctivae normal       Pupils: Pupils are equal, round, and reactive to light  Neck:      Musculoskeletal: Normal range of motion  Cardiovascular:      Rate and Rhythm: Normal rate and regular rhythm  Pulses: Normal pulses  Heart sounds: No murmur  Pulmonary:      Effort: Pulmonary effort is normal  No respiratory distress  Breath sounds: Wheezing (faint end expiratory in b/l lower lobes improved after a cough) present  Chest:      Chest wall: No tenderness  Abdominal:      General: Bowel sounds are normal       Palpations: Abdomen is soft  Tenderness: There is no abdominal tenderness  Musculoskeletal: Normal range of motion  Skin:     General: Skin is warm and dry  Capillary Refill: Capillary refill takes less than 2 seconds  Findings: No rash  Neurological:      General: No focal deficit present  Mental Status: He is alert  Mental status is at baseline

## 2021-02-09 DIAGNOSIS — I10 ESSENTIAL HYPERTENSION: ICD-10-CM

## 2021-02-09 RX ORDER — LOSARTAN POTASSIUM 50 MG/1
TABLET ORAL
Qty: 90 TABLET | Refills: 1 | Status: SHIPPED | OUTPATIENT
Start: 2021-02-09 | End: 2021-08-19 | Stop reason: SDUPTHER

## 2021-02-22 ENCOUNTER — OFFICE VISIT (OUTPATIENT)
Dept: FAMILY MEDICINE CLINIC | Facility: CLINIC | Age: 26
End: 2021-02-22
Payer: COMMERCIAL

## 2021-02-22 VITALS
WEIGHT: 285 LBS | DIASTOLIC BLOOD PRESSURE: 78 MMHG | TEMPERATURE: 97 F | BODY MASS INDEX: 45.8 KG/M2 | HEART RATE: 83 BPM | SYSTOLIC BLOOD PRESSURE: 110 MMHG | HEIGHT: 66 IN | OXYGEN SATURATION: 97 %

## 2021-02-22 DIAGNOSIS — E78.2 HYPERLIPIDEMIA, MIXED: ICD-10-CM

## 2021-02-22 DIAGNOSIS — I10 ESSENTIAL HYPERTENSION: ICD-10-CM

## 2021-02-22 DIAGNOSIS — E66.01 OBESITY, CLASS III, BMI 40-49.9 (MORBID OBESITY) (HCC): ICD-10-CM

## 2021-02-22 DIAGNOSIS — G44.229 CHRONIC TENSION-TYPE HEADACHE, NOT INTRACTABLE: ICD-10-CM

## 2021-02-22 DIAGNOSIS — G43.009 MIGRAINE WITHOUT AURA AND WITHOUT STATUS MIGRAINOSUS, NOT INTRACTABLE: ICD-10-CM

## 2021-02-22 DIAGNOSIS — Z00.00 ANNUAL PHYSICAL EXAM: Primary | ICD-10-CM

## 2021-02-22 PROCEDURE — 99395 PREV VISIT EST AGE 18-39: CPT | Performed by: FAMILY MEDICINE

## 2021-02-22 NOTE — PATIENT INSTRUCTIONS

## 2021-02-22 NOTE — PROGRESS NOTES
[facility] [address] [place] [address]    NAME: Darrick Hanley  AGE: 26 y o  SEX: male  : 1995     DATE: 2021     Assessment and Plan:     Problem List Items Addressed This Visit        Cardiovascular and Mediastinum    Essential hypertension    Migraine without aura and without status migrainosus, not intractable    Relevant Orders    Ambulatory referral to Neurology       Other    Hyperlipidemia, mixed    Relevant Orders    Comprehensive metabolic panel    Lipid panel    Obesity, Class III, BMI 40-49 9 (morbid obesity) ([address] )      Other Visit Diagnoses     Annual physical exam    -  Primary          Immunizations and preventive care screenings were discussed with patient today  Appropriate education was printed on patient's after visit summary  Counseling:  · Dental Health: discussed importance of regular tooth brushing, flossing, and dental visits  BMI Counseling: Body mass index is 46 71 kg/m²  The BMI is above normal  Nutrition recommendations include decreasing portion sizes and encouraging healthy choices of fruits and vegetables  Exercise recommendations include moderate physical activity 150 minutes/week  No pharmacotherapy was ordered  No follow-ups on file  Chief Complaint:     Chief Complaint   Patient presents with    Follow-up      History of Present Illness:     Adult Annual Physical   Patient here for a comprehensive physical exam  The patient reports no problems  Diet and Physical Activity  · Diet/Nutrition: heart healthy (low sodium) diet  · Exercise: no formal exercise  Depression Screening  PHQ-9 Depression Screening    PHQ-9:   Frequency of the following problems over the past two weeks:           General Health  · Sleep: sleeps well  · Hearing: normal - bilateral   · Vision: no vision problems  · Dental: regular dental visits   Health  · History of STDs? [place] Crocarlos no      Review of Systems:     Review of Systems   Constitutional: Negative for chills, fatigue and fever  HENT: Negative for congestion, ear pain, hearing loss, postnasal drip, rhinorrhea and sore throat  Eyes: Negative for pain and visual disturbance  Respiratory: Negative for chest tightness, shortness of breath and wheezing  Cardiovascular: Negative for chest pain and leg swelling  Gastrointestinal: Negative for abdominal distention, abdominal pain, constipation, diarrhea and vomiting  Endocrine: Negative for cold intolerance and heat intolerance  Genitourinary: Negative for difficulty urinating, frequency and urgency  Musculoskeletal: Negative for arthralgias and gait problem  Skin: Negative for color change  Neurological: Positive for headaches  Negative for dizziness, tremors, syncope and numbness  Hematological: Negative for adenopathy  Psychiatric/Behavioral: Negative for agitation, confusion and sleep disturbance  The patient is not nervous/anxious  Past Medical History:     Past Medical History:   Diagnosis Date    Anxiety     Bronchitis     Depression     Hypertension     Morbid obesity with BMI of 45 0-49 9, adult (HCC)       Past Surgical History:     Past Surgical History:   Procedure Laterality Date    NO PAST SURGERIES        Social History:        Social History     Socioeconomic History    Marital status: Single     Spouse name: None    Number of children: None    Years of education: None    Highest education level: None   Occupational History    Occupation: student    Occupation:      Employer: [employer]    Financial resource strain: None    Food insecurity     Worry: None     Inability: None    Transportation needs     Medical: None     Non-medical: None   Tobacco Use    Smoking status: Never Smoker    Smokeless tobacco: Never Used   Substance and Sexual Activity    Alcohol use:  Yes     Alcohol/week: 1 0 standard drinks     Types: 1 Cans of beer per week     Comment: social    Drug use: No    Sexual activity: None   Lifestyle    Physical activity     Days per week: None     Minutes per session: None    Stress: None   Relationships    Social connections     Talks on phone: None     Gets together: None     Attends Hoahaoism service: None     Active member of club or organization: None     Attends meetings of clubs or organizations: None     Relationship status: None    Intimate partner violence     Fear of current or ex partner: None     Emotionally abused: None     Physically abused: None     Forced sexual activity: None   Other Topics Concern    None   Social History Narrative    Lives with adoptive parents      Family History:     Family History   Adopted: Yes      Current Medications:     Current Outpatient Medications   Medication Sig Dispense Refill    benzonatate (TESSALON) 200 MG capsule Take 1 capsule (200 mg total) by mouth 3 (three) times a day as needed for cough 30 capsule 1    buPROPion (WELLBUTRIN XL) 150 mg 24 hr tablet Take 150 mg by mouth every morning      buPROPion (Wellbutrin XL) 300 mg 24 hr tablet Take 300 mg by mouth every morning      desvenlafaxine succinate (PRISTIQ) 50 mg 24 hr tablet Take 1 tablet (50 mg total) by mouth daily      fluticasone (FLONASE) 50 mcg/act nasal spray 1 spray into each nostril daily 16 g 2    losartan (COZAAR) 50 mg tablet TAKE 1 TABLET BY MOUTH EVERY DAY 90 tablet 1    propranolol (INDERAL LA) 80 mg 24 hr capsule Take 1 capsule (80 mg total) by mouth daily 30 capsule 5    topiramate (TOPAMAX) 200 MG tablet 200 mg 2 (two) times a day       ziprasidone (GEODON) 40 mg capsule Take 40 mg by mouth 2 (two) times a day with meals      ziprasidone (GEODON) 80 mg capsule Take 80 mg by mouth 2 (two) times a day with meals       zolpidem (AMBIEN) 10 mg tablet Take 10 mg by mouth daily at bedtime as needed       rizatriptan (MAXALT-MLT) 5 MG disintegrating tablet Take 1 tablet (5 mg total) by mouth once as needed for migraine May repeat in 2 hours if needed 9 tablet 3     No current facility-administered medications for this visit  Allergies: Allergies   Allergen Reactions    Abilify [Aripiprazole]     Lithium     Lorazepam Other (See Comments)     aggressive    Seroquel [Quetiapine]       Physical Exam:     /78 (BP Location: Left arm, Patient Position: Sitting)   Pulse 83   Temp (!) 97 °F (36 1 °C) (Tympanic)   Ht 5' 5 5" (1 664 m)   Wt 129 kg (285 lb)   SpO2 97%   BMI 46 71 kg/m²     Physical Exam  Constitutional:       Appearance: He is well-developed  He is obese  HENT:      Head: Normocephalic  Right Ear: External ear normal       Left Ear: External ear normal       Nose: Nose normal    Eyes:      Conjunctiva/sclera: Conjunctivae normal       Pupils: Pupils are equal, round, and reactive to light  Neck:      Musculoskeletal: Normal range of motion  Thyroid: No thyromegaly  Cardiovascular:      Rate and Rhythm: Normal rate and regular rhythm  Heart sounds: Normal heart sounds  Pulmonary:      Effort: Pulmonary effort is normal       Breath sounds: Normal breath sounds  Abdominal:      General: Bowel sounds are normal       Palpations: Abdomen is soft  Musculoskeletal: Normal range of motion  Skin:     General: Skin is warm and dry  Neurological:      Mental Status: He is alert and oriented to person, place, and time     Psychiatric:         Behavior: Behavior normal           DO Amanda Rodríguez [phone] [facility]

## 2021-02-23 ENCOUNTER — TELEPHONE (OUTPATIENT)
Dept: NEUROLOGY | Facility: CLINIC | Age: 26
End: 2021-02-23

## 2021-02-23 RX ORDER — RIZATRIPTAN BENZOATE 5 MG/1
5 TABLET, ORALLY DISINTEGRATING ORAL ONCE AS NEEDED
Qty: 9 TABLET | Refills: 3 | Status: SHIPPED | OUTPATIENT
Start: 2021-02-23 | End: 2022-05-18 | Stop reason: ALTCHOICE

## 2021-02-23 NOTE — TELEPHONE ENCOUNTER
Best contact number for patient: [phone]     Emergency Contact name and number:[phone]    Referring provider and telephone number:    Primary Care Provider Name and if affiliated with [facility]:  Alek Loya     Reason for Appointment/Dx: migraines     Neurology Location patient would like to be seen: [place]     Order received? Yes                                                 Records Received? Yes    Have you ever seen another Neurologist?       No    Insurance Information    Insurance Name:    ID/Policy #:    Secondary Insurance:    ID/Policy#: Workman's Comp/ Accident/ School  Information      Workman's Comp/Accident/School related?        No    If yes name of Insurance company:    Date of Injury:    Type of Injury:    [address] Name and Telephone Number:    Notes:                   Appointment date: 03/01/2021

## 2021-03-01 ENCOUNTER — CONSULT (OUTPATIENT)
Dept: NEUROLOGY | Facility: CLINIC | Age: 26
End: 2021-03-01
Payer: COMMERCIAL

## 2021-03-01 VITALS
SYSTOLIC BLOOD PRESSURE: 114 MMHG | HEIGHT: 66 IN | DIASTOLIC BLOOD PRESSURE: 78 MMHG | WEIGHT: 282 LBS | BODY MASS INDEX: 45.32 KG/M2 | HEART RATE: 80 BPM

## 2021-03-01 DIAGNOSIS — I10 ESSENTIAL HYPERTENSION: Primary | ICD-10-CM

## 2021-03-01 DIAGNOSIS — F33.9 DEPRESSION, RECURRENT (HCC): ICD-10-CM

## 2021-03-01 DIAGNOSIS — E66.01 OBESITY, CLASS III, BMI 40-49.9 (MORBID OBESITY) (HCC): ICD-10-CM

## 2021-03-01 DIAGNOSIS — G43.009 MIGRAINE WITHOUT AURA AND WITHOUT STATUS MIGRAINOSUS, NOT INTRACTABLE: ICD-10-CM

## 2021-03-01 PROCEDURE — 99204 OFFICE O/P NEW MOD 45 MIN: CPT | Performed by: PSYCHIATRY & NEUROLOGY

## 2021-03-01 NOTE — PROGRESS NOTES
Cynthia Santiago is a 26 y o  male  Chief Complaint   Patient presents with    Migraine       Assessment:  1  Migraine without aura and without status migrainosus, not intractable        Plan:   MRI scan of the brain  blood work  Riboflavin  200 mg a day  magnesium 400 mg a day  avoid migraine triggers  patient already on  Topamax 200 mg twice a day which is being managed by his psychiatrist for depression  follow-up in 2 months  Discussion:   differential diagnosis discussed with the patient and his mother, possibilities include migraine headache variant versus other etiologies, would recommend an MRI scan of the brain and blood work to evaluate for the headaches, patient to call me after the test to discuss the results, he was advised to avoid migraine triggers which we discussed in detail including foods to avoid, he was advised to keep himself well hydrated, limit caffeine to 12 oz per day, sleep regularly for 8 hours a night and avoid stress, and limit over-the-counter analgesics to 2 times a week, we discussed different prophylactic medications, patient is on Topamax 200 mg twice a day being managed by the psychiatrist for his depression  I advised the patient to take riboflavin 200 mg a day and magnesium 200-400 mg a day, side effects discussed with the patient,  We discussed other prophylactic medications including Neurontin given her the side effects of weight gain will hold off on that, we may need to use C Grp injections in the future if he continues to have headaches, he was advised lifestyle modification including to keep his blood pressure cholesterol and sugar under control and to do regular exercises and keep ideal body weight,  To go to the hospital if has any worsening symptoms and call me, follow up with other physicians and see me back in 2-3 months      Subjective:    HPI    patient is here for evaluation of  Headaches for the last several years at least 5 years, is accompanied with his mother, he has history of hypertension and snoring but according to the patient he has been checked out for sleep apnea and was told he does not have sleep apnea, he has been having headaches at least 3 times a week they are mostly middle to the frontal head region not associated with photophobia and phonophobia, he does not get any visual symptoms,  He has history of depression but denies any depression at the current time, no suicidal or homicidal thoughts, he takes either Maxalt or Excedrin migraine 2 times a week, according to the patient's family she has seen an ophthalmologist and was told his eye exams is normal a few months back, he is adopted and hence does not know his family history, no other complaints    Vitals:    03/01/21 0915   BP: 114/78   BP Location: Left arm   Patient Position: Sitting   Cuff Size: Large   Pulse: 80   Weight: 128 kg (282 lb)   Height: 5' 6" (1 676 m)       Current Medications    Current Outpatient Medications:     buPROPion (WELLBUTRIN XL) 150 mg 24 hr tablet, Take 150 mg by mouth every morning, Disp: , Rfl:     buPROPion (Wellbutrin XL) 300 mg 24 hr tablet, Take 300 mg by mouth every morning, Disp: , Rfl:     desvenlafaxine succinate (PRISTIQ) 50 mg 24 hr tablet, Take 1 tablet (50 mg total) by mouth daily, Disp: , Rfl:     losartan (COZAAR) 50 mg tablet, TAKE 1 TABLET BY MOUTH EVERY DAY (Patient taking differently: every evening ), Disp: 90 tablet, Rfl: 1    propranolol (INDERAL LA) 80 mg 24 hr capsule, Take 1 capsule (80 mg total) by mouth daily (Patient taking differently: Take 80 mg by mouth every evening ), Disp: 30 capsule, Rfl: 5    rizatriptan (MAXALT-MLT) 5 MG disintegrating tablet, Take 1 tablet (5 mg total) by mouth once as needed for migraine May repeat in 2 hours if needed, Disp: 9 tablet, Rfl: 3    topiramate (TOPAMAX) 200 MG tablet, 200 mg 2 (two) times a day , Disp: , Rfl:     ziprasidone (GEODON) 40 mg capsule, Take 40 mg by mouth every morning , Disp: , Rfl:     ziprasidone (GEODON) 80 mg capsule, Take 80 mg by mouth every evening , Disp: , Rfl:     zolpidem (AMBIEN) 10 mg tablet, Take 10 mg by mouth daily at bedtime as needed , Disp: , Rfl:     benzonatate (TESSALON) 200 MG capsule, Take 1 capsule (200 mg total) by mouth 3 (three) times a day as needed for cough (Patient not taking: Reported on 3/1/2021), Disp: 30 capsule, Rfl: 1    fluticasone (FLONASE) 50 mcg/act nasal spray, 1 spray into each nostril daily (Patient not taking: Reported on 3/1/2021), Disp: 16 g, Rfl: 2      Allergies  Abilify [aripiprazole], Lithium, Lorazepam, and Seroquel [quetiapine]    Past Medical History  Past Medical History:   Diagnosis Date    Anxiety     Bronchitis     Depression     Hypertension     Morbid obesity with BMI of 45 0-49 9, adult ([facility] )          Past Surgical History:  Past Surgical History:   Procedure Laterality Date    NO PAST SURGERIES           Family History:  Family History   Adopted: Yes       Social History:   reports that he has never smoked  He has never used smokeless tobacco  He reports current alcohol use of about 1 0 standard drinks of alcohol per week  He reports that he does not use drugs  I have reviewed the past medical history, surgical history, social and family history, current medications, allergies vitals, review of systems, and updated this information as appropriate today  Objective:    Physical Exam    Neurological Exam      GENERAL:  Cooperative in no acute distress  Well-developed and well-nourished    HEAD and NECK   Head is atraumatic normocephalic with no lesions or masses  Neck is supple with full range of motion    CARDIOVASCULAR  Carotid Arteries-no carotid bruits  NEUROLOGIC:  Mental Status-the patient is awake alert and oriented without aphasia or apraxia  Cranial Nerves: Visual fields are full to confrontation    Visual acuity is 20/20 with hand-held chart Extraocular movements are full without nystagmus  Pupils are 2-1/2 mm and reactive  Face is symmetrical to light touch  Movements of facial expression move symmetrically  Hearing is normal to finger rub bilaterally  Soft palate lifts symmetrically  Shoulder shrug is symmetrical  Tongue is midline without atrophy  Motor: No drift is noted on arm extension  Strength is full in the upper and lower extremities with normal bulk and tone  Sensory: Intact to temperature and vibratory sensation in the upper and lower extremities bilaterally  Cortical function is intact  Coordination: Finger to nose testing is performed accurately  Romberg is negative  Gait reveals a normal base with symmetrical arm swing  Tandem walk is normal   Reflexes:       2+ and symmetrical Toes are downgoing   no meningeal signs, no cervical spine tenderness  ROS:  Review of Systems   Constitutional: Positive for fatigue  Negative for appetite change and fever  HENT: Positive for tinnitus  Negative for drooling, ear pain, trouble swallowing and voice change  Eyes: Negative for photophobia, pain and visual disturbance  Respiratory: Negative for chest tightness and shortness of breath  Cardiovascular: Negative for chest pain, palpitations and leg swelling  Gastrointestinal: Negative for abdominal pain, constipation, diarrhea, nausea and vomiting  Endocrine: Negative for cold intolerance and heat intolerance  Genitourinary: Negative for difficulty urinating, frequency and urgency  Musculoskeletal: Positive for back pain (Middle back)  Negative for gait problem, joint swelling, myalgias and neck pain  Skin: Negative for rash  Neurological: Positive for headaches (Daily headaches)  Negative for dizziness, tremors, seizures, syncope, facial asymmetry, speech difficulty, weakness, light-headedness and numbness  Psychiatric/Behavioral: Positive for dysphoric mood and sleep disturbance   Negative for agitation, behavioral problems, confusion and decreased concentration  The patient is nervous/anxious  The patient is not hyperactive

## 2021-03-10 DIAGNOSIS — Z23 ENCOUNTER FOR IMMUNIZATION: ICD-10-CM

## 2021-03-11 ENCOUNTER — HOSPITAL ENCOUNTER (OUTPATIENT)
Dept: MRI IMAGING | Facility: CLINIC | Age: 26
Discharge: HOME | End: 2021-03-11
Payer: COMMERCIAL

## 2021-03-11 DIAGNOSIS — G43.009 MIGRAINE WITHOUT AURA AND WITHOUT STATUS MIGRAINOSUS, NOT INTRACTABLE: ICD-10-CM

## 2021-03-11 PROCEDURE — 70551 MRI BRAIN STEM W/O DYE: CPT

## 2021-03-11 PROCEDURE — G1004 CDSM NDSC: HCPCS

## 2021-03-13 ENCOUNTER — APPOINTMENT (OUTPATIENT)
Dept: LAB | Facility: HOSPITAL | Age: 26
End: 2021-03-13
Attending: PSYCHIATRY & NEUROLOGY
Payer: COMMERCIAL

## 2021-03-13 DIAGNOSIS — G43.009 MIGRAINE WITHOUT AURA AND WITHOUT STATUS MIGRAINOSUS, NOT INTRACTABLE: ICD-10-CM

## 2021-03-13 LAB
ALBUMIN SERPL BCP-MCNC: 3.6 G/DL (ref 3.5–5)
ALP SERPL-CCNC: 49 U/L (ref 46–116)
ALT SERPL W P-5'-P-CCNC: 34 U/L (ref 12–78)
ANION GAP SERPL CALCULATED.3IONS-SCNC: 12 MMOL/L (ref 4–13)
AST SERPL W P-5'-P-CCNC: 19 U/L (ref 5–45)
BASOPHILS # BLD AUTO: 0.06 THOUSANDS/ΜL (ref 0–0.1)
BASOPHILS NFR BLD AUTO: 1 % (ref 0–1)
BILIRUB SERPL-MCNC: 0.34 MG/DL (ref 0.2–1)
BUN SERPL-MCNC: 22 MG/DL (ref 5–25)
CALCIUM SERPL-MCNC: 8.7 MG/DL (ref 8.3–10.1)
CHLORIDE SERPL-SCNC: 108 MMOL/L (ref 100–108)
CO2 SERPL-SCNC: 22 MMOL/L (ref 21–32)
CREAT SERPL-MCNC: 1.03 MG/DL (ref 0.6–1.3)
EOSINOPHIL # BLD AUTO: 0.15 THOUSAND/ΜL (ref 0–0.61)
EOSINOPHIL NFR BLD AUTO: 2 % (ref 0–6)
ERYTHROCYTE [DISTWIDTH] IN BLOOD BY AUTOMATED COUNT: 13.2 % (ref 11.6–15.1)
GFR SERPL CREATININE-BSD FRML MDRD: 101 ML/MIN/1.73SQ M
GLUCOSE P FAST SERPL-MCNC: 107 MG/DL (ref 65–99)
HCT VFR BLD AUTO: 44.2 % (ref 36.5–49.3)
HGB BLD-MCNC: 14.3 G/DL (ref 12–17)
IMM GRANULOCYTES # BLD AUTO: 0.02 THOUSAND/UL (ref 0–0.2)
IMM GRANULOCYTES NFR BLD AUTO: 0 % (ref 0–2)
LYMPHOCYTES # BLD AUTO: 2.66 THOUSANDS/ΜL (ref 0.6–4.47)
LYMPHOCYTES NFR BLD AUTO: 35 % (ref 14–44)
MCH RBC QN AUTO: 26.7 PG (ref 26.8–34.3)
MCHC RBC AUTO-ENTMCNC: 32.4 G/DL (ref 31.4–37.4)
MCV RBC AUTO: 83 FL (ref 82–98)
MONOCYTES # BLD AUTO: 0.74 THOUSAND/ΜL (ref 0.17–1.22)
MONOCYTES NFR BLD AUTO: 10 % (ref 4–12)
NEUTROPHILS # BLD AUTO: 3.99 THOUSANDS/ΜL (ref 1.85–7.62)
NEUTS SEG NFR BLD AUTO: 52 % (ref 43–75)
NRBC BLD AUTO-RTO: 0 /100 WBCS
PLATELET # BLD AUTO: 339 THOUSANDS/UL (ref 149–390)
PMV BLD AUTO: 10.2 FL (ref 8.9–12.7)
POTASSIUM SERPL-SCNC: 3.9 MMOL/L (ref 3.5–5.3)
PROT SERPL-MCNC: 7.5 G/DL (ref 6.4–8.2)
RBC # BLD AUTO: 5.36 MILLION/UL (ref 3.88–5.62)
SODIUM SERPL-SCNC: 142 MMOL/L (ref 136–145)
TSH SERPL DL<=0.05 MIU/L-ACNC: 2.6 UIU/ML (ref 0.36–3.74)
WBC # BLD AUTO: 7.62 THOUSAND/UL (ref 4.31–10.16)

## 2021-03-13 PROCEDURE — 84443 ASSAY THYROID STIM HORMONE: CPT

## 2021-03-13 PROCEDURE — 86617 LYME DISEASE ANTIBODY: CPT

## 2021-03-13 PROCEDURE — 85025 COMPLETE CBC W/AUTO DIFF WBC: CPT

## 2021-03-13 PROCEDURE — 80053 COMPREHEN METABOLIC PANEL: CPT

## 2021-03-13 PROCEDURE — 86618 LYME DISEASE ANTIBODY: CPT

## 2021-03-13 PROCEDURE — 36415 COLL VENOUS BLD VENIPUNCTURE: CPT

## 2021-03-16 ENCOUNTER — TELEPHONE (OUTPATIENT)
Dept: NEUROLOGY | Facility: CLINIC | Age: 26
End: 2021-03-16

## 2021-03-16 LAB — B BURGDOR IGG+IGM SER-ACNC: 149

## 2021-03-16 NOTE — TELEPHONE ENCOUNTER
----- Message from Daryl Goldberg MD sent at 3/16/2021 12:58 PM EDT -----  Please call the patient regarding his abnormal result   Please have the patient follow up with family physician regarding abnormal Lyme

## 2021-03-16 NOTE — TELEPHONE ENCOUNTER
Patient made aware of labs and to f/u with PCP, patient verbalized understanding  Lab results forwarded to PCP

## 2021-03-17 ENCOUNTER — TELEPHONE (OUTPATIENT)
Dept: FAMILY MEDICINE CLINIC | Facility: CLINIC | Age: 26
End: 2021-03-17

## 2021-03-17 DIAGNOSIS — A69.20 LYME DISEASE: Primary | ICD-10-CM

## 2021-03-17 LAB
B BURGDOR IGG PATRN SER IB-IMP: POSITIVE
B BURGDOR IGM PATRN SER IB-IMP: NEGATIVE
B BURGDOR18KD IGG SER QL IB: ABNORMAL
B BURGDOR23KD IGG SER QL IB: ABNORMAL
B BURGDOR23KD IGM SER QL IB: ABNORMAL
B BURGDOR28KD IGG SER QL IB: PRESENT
B BURGDOR30KD IGG SER QL IB: ABNORMAL
B BURGDOR39KD IGG SER QL IB: PRESENT
B BURGDOR39KD IGM SER QL IB: PRESENT
B BURGDOR41KD IGG SER QL IB: PRESENT
B BURGDOR41KD IGM SER QL IB: ABNORMAL
B BURGDOR45KD IGG SER QL IB: PRESENT
B BURGDOR58KD IGG SER QL IB: PRESENT
B BURGDOR66KD IGG SER QL IB: ABNORMAL
B BURGDOR93KD IGG SER QL IB: ABNORMAL

## 2021-03-17 RX ORDER — DOXYCYCLINE HYCLATE 100 MG
100 TABLET ORAL 2 TIMES DAILY
Qty: 28 TABLET | Refills: 0 | Status: SHIPPED | OUTPATIENT
Start: 2021-03-17 | End: 2021-03-31

## 2021-03-17 NOTE — TELEPHONE ENCOUNTER
----- Message from Amarilis Webb DO sent at 3/17/2021  3:12 PM EDT -----      ----- Message -----  From: Russ Parks RN  Sent: 3/16/2021   1:20 PM EDT  To: Amarilis Webb DO    The lyme titer ordered by Dr Flakita Peña is positive, he needs to take doxycycline twice a day for 14 days, I sent this into [facility]

## 2021-03-18 ENCOUNTER — IMMUNIZATIONS (OUTPATIENT)
Dept: FAMILY MEDICINE CLINIC | Facility: HOSPITAL | Age: 26
End: 2021-03-18

## 2021-03-18 DIAGNOSIS — Z23 ENCOUNTER FOR IMMUNIZATION: Primary | ICD-10-CM

## 2021-03-18 PROCEDURE — 0001A SARS-COV-2 / COVID-19 MRNA VACCINE (PFIZER-BIONTECH) 30 MCG: CPT

## 2021-03-18 PROCEDURE — 91300 SARS-COV-2 / COVID-19 MRNA VACCINE (PFIZER-BIONTECH) 30 MCG: CPT

## 2021-03-19 ENCOUNTER — OFFICE VISIT (OUTPATIENT)
Dept: FAMILY MEDICINE CLINIC | Facility: CLINIC | Age: 26
End: 2021-03-19
Payer: COMMERCIAL

## 2021-03-19 VITALS
DIASTOLIC BLOOD PRESSURE: 82 MMHG | TEMPERATURE: 96.9 F | SYSTOLIC BLOOD PRESSURE: 118 MMHG | HEART RATE: 84 BPM | BODY MASS INDEX: 45 KG/M2 | WEIGHT: 280 LBS | HEIGHT: 66 IN | OXYGEN SATURATION: 98 %

## 2021-03-19 DIAGNOSIS — A69.20 LYME DISEASE: Primary | ICD-10-CM

## 2021-03-19 PROCEDURE — 1036F TOBACCO NON-USER: CPT | Performed by: FAMILY MEDICINE

## 2021-03-19 PROCEDURE — 3074F SYST BP LT 130 MM HG: CPT | Performed by: FAMILY MEDICINE

## 2021-03-19 PROCEDURE — 99213 OFFICE O/P EST LOW 20 MIN: CPT | Performed by: FAMILY MEDICINE

## 2021-03-19 PROCEDURE — 3079F DIAST BP 80-89 MM HG: CPT | Performed by: FAMILY MEDICINE

## 2021-03-19 PROCEDURE — 3008F BODY MASS INDEX DOCD: CPT | Performed by: FAMILY MEDICINE

## 2021-03-19 PROCEDURE — 3725F SCREEN DEPRESSION PERFORMED: CPT | Performed by: FAMILY MEDICINE

## 2021-03-19 NOTE — PROGRESS NOTES
Assessment/Plan:         Problem List Items Addressed This Visit     None      Visit Diagnoses     Lyme disease    -  Primary            Subjective:      Patient ID: Curly Avery is a 26 y o  male  Patient comes in today with request his neurologist for follow-up of his positive Lyme titer  States that he did get the doxycycline prescription  The Lyme titer was done as a workup for his headaches  The following portions of the patient's history were reviewed and updated as appropriate:   Past Medical History:  He has a past medical history of Anxiety, Bronchitis, Depression, Hypertension, and Morbid obesity with BMI of 45 0-49 9, adult ([address] )  ,  _______________________________________________________________________  Medical Problems:  does not have any pertinent problems on file ,  _______________________________________________________________________  Past Surgical History:   has a past surgical history that includes No past surgeries  ,  _______________________________________________________________________  Family History:  family history is not on file  He was adopted  ,  _______________________________________________________________________  Social History:   reports that he has never smoked  He has never used smokeless tobacco  He reports current alcohol use of about 1 0 standard drinks of alcohol per week  He reports that he does not use drugs  ,  _______________________________________________________________________  Allergies:  is allergic to abilify [aripiprazole]; lithium; lorazepam; and seroquel [quetiapine]     _______________________________________________________________________  Current Outpatient Medications   Medication Sig Dispense Refill    buPROPion (WELLBUTRIN XL) 150 mg 24 hr tablet Take 150 mg by mouth every morning      buPROPion (Wellbutrin XL) 300 mg 24 hr tablet Take 300 mg by mouth every morning      desvenlafaxine succinate (PRISTIQ) 50 mg 24 hr tablet Take 1 tablet (50 mg total) by mouth daily      doxycycline hyclate (VIBRA-TABS) 100 mg tablet Take 1 tablet (100 mg total) by mouth 2 (two) times a day for 14 days 28 tablet 0    fluticasone (FLONASE) 50 mcg/act nasal spray 1 spray into each nostril daily 16 g 2    losartan (COZAAR) 50 mg tablet TAKE 1 TABLET BY MOUTH EVERY DAY (Patient taking differently: every evening ) 90 tablet 1    propranolol (INDERAL LA) 80 mg 24 hr capsule Take 1 capsule (80 mg total) by mouth daily (Patient taking differently: Take 80 mg by mouth every evening ) 30 capsule 5    rizatriptan (MAXALT-MLT) 5 MG disintegrating tablet Take 1 tablet (5 mg total) by mouth once as needed for migraine May repeat in 2 hours if needed 9 tablet 3    topiramate (TOPAMAX) 200 MG tablet 200 mg 2 (two) times a day       ziprasidone (GEODON) 40 mg capsule Take 40 mg by mouth every morning       ziprasidone (GEODON) 80 mg capsule Take 80 mg by mouth every evening       zolpidem (AMBIEN) 10 mg tablet Take 10 mg by mouth daily at bedtime as needed       benzonatate (TESSALON) 200 MG capsule Take 1 capsule (200 mg total) by mouth 3 (three) times a day as needed for cough (Patient not taking: Reported on 3/19/2021) 30 capsule 1     No current facility-administered medications for this visit       _______________________________________________________________________  Review of Systems   Constitutional: Negative for chills, fatigue and fever  HENT: Negative for congestion, ear pain, hearing loss, postnasal drip, rhinorrhea and sore throat  Eyes: Negative for pain and visual disturbance  Respiratory: Negative for chest tightness, shortness of breath and wheezing  Cardiovascular: Negative for chest pain and leg swelling  Gastrointestinal: Negative for abdominal distention, abdominal pain, constipation, diarrhea and vomiting  Endocrine: Negative for cold intolerance and heat intolerance     Genitourinary: Negative for difficulty urinating, frequency and urgency  Musculoskeletal: Negative for arthralgias and gait problem  Skin: Negative for color change  Neurological: Positive for headaches  Negative for dizziness, tremors, syncope and numbness  Hematological: Negative for adenopathy  Psychiatric/Behavioral: Negative for agitation, confusion and sleep disturbance  The patient is not nervous/anxious  Objective:  Vitals:    03/19/21 1054   BP: 118/82   BP Location: Left arm   Patient Position: Sitting   Cuff Size: Large   Pulse: 84   Temp: (!) 96 9 °F (36 1 °C)   SpO2: 98%   Weight: 127 kg (280 lb)   Height: 5' 6" (1 676 m)     Body mass index is 45 19 kg/m²  Physical Exam  Vitals signs and nursing note reviewed  Constitutional:       Appearance: He is well-developed  He is obese  HENT:      Head: Normocephalic  Eyes:      General: No scleral icterus  Conjunctiva/sclera: Conjunctivae normal    Neck:      Musculoskeletal: Normal range of motion  Thyroid: No thyromegaly  Cardiovascular:      Rate and Rhythm: Normal rate and regular rhythm  Heart sounds: Normal heart sounds  No murmur  Pulmonary:      Effort: Pulmonary effort is normal  No respiratory distress  Breath sounds: Normal breath sounds  No wheezing  Abdominal:      General: Bowel sounds are normal  There is no distension  Palpations: Abdomen is soft  Tenderness: There is no abdominal tenderness  Musculoskeletal: Normal range of motion  General: No tenderness  Lymphadenopathy:      Cervical: No cervical adenopathy  Skin:     General: Skin is warm and dry  Coloration: Skin is not pale  Findings: No rash  Neurological:      Mental Status: He is alert and oriented to person, place, and time  Cranial Nerves: No cranial nerve deficit     Psychiatric:         Behavior: Behavior normal

## 2021-03-30 ENCOUNTER — TELEPHONE (OUTPATIENT)
Dept: FAMILY MEDICINE CLINIC | Facility: CLINIC | Age: 26
End: 2021-03-30

## 2021-03-30 NOTE — TELEPHONE ENCOUNTER
T/c from patient stating he is on Doxycycline and now has a rash  Rash is on his forehead and cheeks  Looks bad but not itchy, looks dry, not oozing, flat rash, red  Pt also vomited 5 times, had abd cramping;  approx 30 minutes after taking the medication  He did take with food  Pt has d/c the medication and feels much better now   Advised that you may not respond until tomorrow, call back on call if needed tonight or uc or er if worsens     [phone]

## 2021-03-31 NOTE — TELEPHONE ENCOUNTER
Pt returned call back, pt confirmed 8 pills left of doxycycline hyclate (VIBRA-TABS) 100 mg tablet  Awaiting further advices  Pal Mcdonnell

## 2021-03-31 NOTE — TELEPHONE ENCOUNTER
Lm on vm for pt - told him Dr Elizabet Meng needs to know how many doses are left in his pill bottle in order to give him further advice

## 2021-04-10 ENCOUNTER — IMMUNIZATIONS (OUTPATIENT)
Dept: FAMILY MEDICINE CLINIC | Facility: HOSPITAL | Age: 26
End: 2021-04-10

## 2021-04-10 DIAGNOSIS — Z23 ENCOUNTER FOR IMMUNIZATION: Primary | ICD-10-CM

## 2021-04-10 PROCEDURE — 0002A SARS-COV-2 / COVID-19 MRNA VACCINE (PFIZER-BIONTECH) 30 MCG: CPT

## 2021-04-10 PROCEDURE — 91300 SARS-COV-2 / COVID-19 MRNA VACCINE (PFIZER-BIONTECH) 30 MCG: CPT

## 2021-06-23 ENCOUNTER — TELEPHONE (OUTPATIENT)
Dept: NEUROLOGY | Facility: CLINIC | Age: 26
End: 2021-06-23

## 2021-07-02 ENCOUNTER — TELEPHONE (OUTPATIENT)
Dept: NEUROLOGY | Facility: CLINIC | Age: 26
End: 2021-07-02

## 2021-07-02 NOTE — TELEPHONE ENCOUNTER
ADD ON - patient has been scheduled with Dr Luis Felipe Gee on July 8 @ 10a - insurance is still in force

## 2021-07-08 ENCOUNTER — OFFICE VISIT (OUTPATIENT)
Dept: NEUROLOGY | Facility: CLINIC | Age: 26
End: 2021-07-08
Payer: COMMERCIAL

## 2021-07-08 VITALS
HEIGHT: 66 IN | BODY MASS INDEX: 48.21 KG/M2 | DIASTOLIC BLOOD PRESSURE: 72 MMHG | WEIGHT: 300 LBS | SYSTOLIC BLOOD PRESSURE: 106 MMHG | HEART RATE: 82 BPM

## 2021-07-08 DIAGNOSIS — E78.2 HYPERLIPIDEMIA, MIXED: ICD-10-CM

## 2021-07-08 DIAGNOSIS — I10 ESSENTIAL HYPERTENSION: ICD-10-CM

## 2021-07-08 DIAGNOSIS — G43.009 MIGRAINE WITHOUT AURA AND WITHOUT STATUS MIGRAINOSUS, NOT INTRACTABLE: Primary | ICD-10-CM

## 2021-07-08 PROCEDURE — 99214 OFFICE O/P EST MOD 30 MIN: CPT | Performed by: PSYCHIATRY & NEUROLOGY

## 2021-07-08 PROCEDURE — 3074F SYST BP LT 130 MM HG: CPT | Performed by: PSYCHIATRY & NEUROLOGY

## 2021-07-08 PROCEDURE — 3008F BODY MASS INDEX DOCD: CPT | Performed by: PSYCHIATRY & NEUROLOGY

## 2021-07-08 PROCEDURE — 3078F DIAST BP <80 MM HG: CPT | Performed by: PSYCHIATRY & NEUROLOGY

## 2021-07-08 PROCEDURE — 1036F TOBACCO NON-USER: CPT | Performed by: PSYCHIATRY & NEUROLOGY

## 2021-07-08 RX ORDER — DOXEPIN HYDROCHLORIDE 75 MG/1
75 CAPSULE ORAL
COMMUNITY
Start: 2021-05-13

## 2021-07-08 NOTE — PROGRESS NOTES
Nikolas Uribe is a 26 y o  male  Assessment:  1  Migraine without aura and without status migrainosus, not intractable    2  Essential hypertension    3  Hyperlipidemia, mixed        Plan:  Riboflavin 200 mg a day  magnesium  200 mg a day  avoid migraine triggers  patient is already on Topamax which is being managed by his psychiatrist for depression  follow-up in 4 months  Discussion:  emperatriz tial diagnosis discussed with the patient and his mother, most likely migraine headaches versus other etiologies,  Patient's MRI scan of the brain was unremarkable, he was advised to take magnesium and riboflavin 200 mg a day which he has not yet started, he was advised to avoid migraine triggers which we discussed in detail including foods to avoid, to keep himself well hydrated, limit caffeine to 12 oz per day sleep regularly for 8 hours a night and avoid stress and limit over-the-counter analgesics to 2 times a week, to keep his blood pressure cholesterol sugar and weight under control, he was advised to follow-up with an ophthalmologist for routine eye exam, to go to the hospital if has any worsening symptoms and call me otherwise to see me back in 4 months and follow up with his other physicians      Subjective:    HPI    patient is here in follow-up for headaches for the last several years, he is accompanied with his mother, since his last visit he is doing good he feels he is much better he gets headaches maybe 1 or 2 times a week they are mostly in the middle of the frontal head region not associated with photophobia and phonophobia no visual symptoms his depression seems to be under control he denies having any suicidal or homicidal thoughts his headache is dull in nature lasting for couple of hours and he takes an ibuprofen to relieve it he has seen an ophthalmologist in the past and was told his eye examination is normal he has not yet started riboflavin and magnesium he is on Topamax for his depression which is being managed by his psychiatrist no other complaints      Vitals:    07/08/21 1016   BP: 106/72   BP Location: Left arm   Patient Position: Sitting   Cuff Size: Adult   Pulse: 82   Weight: 136 kg (300 lb)   Height: 5' 6" (1 676 m)       Current Medications    Current Outpatient Medications:     buPROPion (Wellbutrin XL) 300 mg 24 hr tablet, Take 300 mg by mouth every morning, Disp: , Rfl:     desvenlafaxine succinate (PRISTIQ) 50 mg 24 hr tablet, Take 1 tablet (50 mg total) by mouth daily, Disp: , Rfl:     doxepin (SINEquan) 75 MG capsule, TAKE 1 CAPSULE BY MOUTH EVERY DAY AT NIGHT, Disp: , Rfl:     losartan (COZAAR) 50 mg tablet, TAKE 1 TABLET BY MOUTH EVERY DAY (Patient taking differently: every evening ), Disp: 90 tablet, Rfl: 1    propranolol (INDERAL LA) 80 mg 24 hr capsule, Take 1 capsule (80 mg total) by mouth daily (Patient taking differently: Take 80 mg by mouth every morning ), Disp: 30 capsule, Rfl: 5    topiramate (TOPAMAX) 200 MG tablet, 200 mg 2 (two) times a day , Disp: , Rfl:     ziprasidone (GEODON) 40 mg capsule, Take 40 mg by mouth every morning , Disp: , Rfl:     ziprasidone (GEODON) 80 mg capsule, Take 80 mg by mouth every evening , Disp: , Rfl:     benzonatate (TESSALON) 200 MG capsule, Take 1 capsule (200 mg total) by mouth 3 (three) times a day as needed for cough (Patient not taking: Reported on 3/19/2021), Disp: 30 capsule, Rfl: 1    buPROPion (WELLBUTRIN XL) 150 mg 24 hr tablet, Take 150 mg by mouth every morning (Patient not taking: Reported on 7/8/2021), Disp: , Rfl:     fluticasone (FLONASE) 50 mcg/act nasal spray, 1 spray into each nostril daily (Patient not taking: Reported on 7/8/2021), Disp: 16 g, Rfl: 2    rizatriptan (MAXALT-MLT) 5 MG disintegrating tablet, Take 1 tablet (5 mg total) by mouth once as needed for migraine May repeat in 2 hours if needed, Disp: 9 tablet, Rfl: 3    zolpidem (AMBIEN) 10 mg tablet, Take 10 mg by mouth daily at bedtime as needed  (Patient not taking: Reported on 7/8/2021), Disp: , Rfl:       Allergies  Abilify [aripiprazole], Lithium, Lorazepam, and Seroquel [quetiapine]    Past Medical History  Past Medical History:   Diagnosis Date    Anxiety     Bronchitis     Concussion     Sports related    Depression     Hypertension     Morbid obesity with BMI of 45 0-49 9, adult ([facility] [address] )          Past Surgical History:  Past Surgical History:   Procedure Laterality Date    NO PAST SURGERIES           Family History:  Family History   Adopted: Yes       Social History:   reports that he has never smoked  He has never used smokeless tobacco  He reports current alcohol use of about 1 0 standard drinks of alcohol per week  He reports that he does not use drugs  I have reviewed the past medical history, surgical history, social and family history, current medications, allergies vitals, review of systems, and updated this information as appropriate today  Objective:    Physical Exam    Neurological Exam    GENERAL:  Cooperative in no acute distress  Well-developed and well-nourished    HEAD and NECK   Head is atraumatic normocephalic with no lesions or masses  Neck is supple with full range of motion    CARDIOVASCULAR  Carotid Arteries-no carotid bruits  NEUROLOGIC:  Mental Status-the patient is awake alert and oriented without aphasia or apraxia  Cranial Nerves: Visual fields are full to confrontation  Visual acuity is 20/20 with hand-held chart  Extraocular movements are full without nystagmus  Pupils are 2-1/2 mm and reactive  Face is symmetrical to light touch  Movements of facial expression move symmetrically  Hearing is normal to finger rub bilaterally  Soft palate lifts symmetrically  Shoulder shrug is symmetrical  Tongue is midline without atrophy  Motor: No drift is noted on arm extension  Strength is full in the upper and lower extremities with normal bulk and tone    Coordination: Finger to nose testing is performed accurately  Gait reveals a normal base with symmetrical arm swing  ROS:  Review of Systems   Constitutional: Negative  Negative for appetite change and fever  HENT: Negative  Negative for hearing loss, tinnitus, trouble swallowing and voice change  Eyes: Positive for pain  Negative for photophobia  Respiratory: Negative  Negative for shortness of breath  Cardiovascular: Negative  Negative for palpitations  Gastrointestinal: Negative  Negative for nausea and vomiting  Endocrine: Negative  Negative for cold intolerance  Genitourinary: Negative  Negative for dysuria, frequency and urgency  Musculoskeletal: Negative for back pain, myalgias and neck pain  Skin: Negative  Negative for rash  Neurological: Positive for headaches  Negative for dizziness, tremors, seizures, syncope, facial asymmetry, speech difficulty, weakness, light-headedness and numbness  Hematological: Negative  Does not bruise/bleed easily  Psychiatric/Behavioral: Negative  Negative for confusion, hallucinations and sleep disturbance

## 2021-07-14 DIAGNOSIS — I10 ESSENTIAL HYPERTENSION: ICD-10-CM

## 2021-07-14 DIAGNOSIS — G43.009 MIGRAINE WITHOUT AURA AND WITHOUT STATUS MIGRAINOSUS, NOT INTRACTABLE: ICD-10-CM

## 2021-07-14 RX ORDER — PROPRANOLOL HYDROCHLORIDE 80 MG/1
80 CAPSULE, EXTENDED RELEASE ORAL EVERY MORNING
Qty: 30 CAPSULE | Refills: 5 | Status: SHIPPED | OUTPATIENT
Start: 2021-07-14 | End: 2022-02-28 | Stop reason: SDUPTHER

## 2021-08-19 DIAGNOSIS — I10 ESSENTIAL HYPERTENSION: ICD-10-CM

## 2021-08-19 RX ORDER — LOSARTAN POTASSIUM 50 MG/1
50 TABLET ORAL DAILY
Qty: 90 TABLET | Refills: 1 | Status: SHIPPED | OUTPATIENT
Start: 2021-08-19 | End: 2022-02-28 | Stop reason: SDUPTHER

## 2021-08-23 ENCOUNTER — OFFICE VISIT (OUTPATIENT)
Dept: FAMILY MEDICINE CLINIC | Facility: CLINIC | Age: 26
End: 2021-08-23
Payer: COMMERCIAL

## 2021-08-23 VITALS
DIASTOLIC BLOOD PRESSURE: 78 MMHG | TEMPERATURE: 97.3 F | SYSTOLIC BLOOD PRESSURE: 118 MMHG | OXYGEN SATURATION: 98 % | HEART RATE: 85 BPM | HEIGHT: 66 IN | WEIGHT: 299.6 LBS | BODY MASS INDEX: 48.15 KG/M2

## 2021-08-23 DIAGNOSIS — E78.2 HYPERLIPIDEMIA, MIXED: ICD-10-CM

## 2021-08-23 DIAGNOSIS — L03.115 CELLULITIS OF RIGHT LOWER EXTREMITY: ICD-10-CM

## 2021-08-23 DIAGNOSIS — I10 ESSENTIAL HYPERTENSION: Primary | ICD-10-CM

## 2021-08-23 DIAGNOSIS — E66.01 OBESITY, CLASS III, BMI 40-49.9 (MORBID OBESITY) (HCC): ICD-10-CM

## 2021-08-23 PROBLEM — Z91.89 AT RISK FOR SLEEP APNEA: Status: RESOLVED | Noted: 2019-07-01 | Resolved: 2021-08-23

## 2021-08-23 PROCEDURE — 3078F DIAST BP <80 MM HG: CPT | Performed by: FAMILY MEDICINE

## 2021-08-23 PROCEDURE — 3074F SYST BP LT 130 MM HG: CPT | Performed by: FAMILY MEDICINE

## 2021-08-23 PROCEDURE — 1036F TOBACCO NON-USER: CPT | Performed by: FAMILY MEDICINE

## 2021-08-23 PROCEDURE — 99213 OFFICE O/P EST LOW 20 MIN: CPT | Performed by: FAMILY MEDICINE

## 2021-08-23 PROCEDURE — 3008F BODY MASS INDEX DOCD: CPT | Performed by: FAMILY MEDICINE

## 2021-08-23 RX ORDER — DIPHENHYDRAMINE HCL 50 MG
CAPSULE ORAL
COMMUNITY
End: 2022-02-28

## 2021-08-23 RX ORDER — AMOXICILLIN AND CLAVULANATE POTASSIUM 875; 125 MG/1; MG/1
TABLET, FILM COATED ORAL
COMMUNITY
End: 2022-02-28

## 2021-08-23 RX ORDER — DOXYCYCLINE HYCLATE 100 MG
TABLET ORAL
COMMUNITY
End: 2021-08-23

## 2021-08-23 RX ORDER — SODIUM FLUORIDE 5 MG/G
GEL, DENTIFRICE DENTAL
COMMUNITY
End: 2022-02-28

## 2021-08-23 NOTE — PROGRESS NOTES
Assessment/Plan:         Problem List Items Addressed This Visit        Cardiovascular and Mediastinum    Essential hypertension - Primary      Well controlled, continue propranolol, losartan  Encouraged to lose weight  Other    Hyperlipidemia, mixed       Get his blood work done, as previously ordered  Obesity, Class III, BMI 40-49 9 (morbid obesity) ([facility] )      Other Visit Diagnoses     Cellulitis of right lower extremity        Relevant Medications    mupirocin (BACTROBAN) 2 % ointment            Subjective:      Patient ID: Holley Concepcion is a 26 y o  male  Patient comes in today for checkup on his hypertension, he is taking his losartan and his propanolol as prescribed  He did not get his blood work done as previously ordered  He is not checking his blood pressure routinely at home  The following portions of the patient's history were reviewed and updated as appropriate:   Past Medical History:  He has a past medical history of Anxiety, Bronchitis, Concussion, Depression, Hypertension, and Morbid obesity with BMI of 45 0-49 9, adult ([facility] )  ,  _______________________________________________________________________  Medical Problems:  does not have any pertinent problems on file ,  _______________________________________________________________________  Past Surgical History:   has a past surgical history that includes No past surgeries  ,  _______________________________________________________________________  Family History:  family history is not on file  He was adopted  ,  _______________________________________________________________________  Social History:   reports that he has never smoked  He has never used smokeless tobacco  He reports current alcohol use of about 1 0 standard drinks of alcohol per week  He reports that he does not use drugs  ,  _______________________________________________________________________  Allergies:  is allergic to abilify [aripiprazole], lithium, lorazepam, and seroquel [quetiapine]     _______________________________________________________________________  Current Outpatient Medications   Medication Sig Dispense Refill    buPROPion (Wellbutrin XL) 300 mg 24 hr tablet Take 300 mg by mouth every morning      desvenlafaxine succinate (PRISTIQ) 50 mg 24 hr tablet Take 1 tablet (50 mg total) by mouth daily      doxepin (SINEquan) 75 MG capsule TAKE 1 CAPSULE BY MOUTH EVERY DAY AT NIGHT      losartan (COZAAR) 50 mg tablet Take 1 tablet (50 mg total) by mouth daily 90 tablet 1    mupirocin (BACTROBAN) 2 % ointment Apply topically 2 (two) times a day 22 g 1    SODIUM FLUORIDE, DENTAL GEL, (DentaGel) 1 1 % GEL DentaGel 1 1 %      topiramate (TOPAMAX) 200 MG tablet 200 mg 2 (two) times a day       ziprasidone (GEODON) 40 mg capsule Take 40 mg by mouth every morning       ziprasidone (GEODON) 80 mg capsule Take 80 mg by mouth every evening       amoxicillin-clavulanate (AUGMENTIN) 875-125 mg per tablet amoxicillin 875 mg-potassium clavulanate 125 mg tablet   TAKE 1 TABLET BY MOUTH EVERY 12 HOURS FOR 7 DAYS (Patient not taking: Reported on 8/23/2021)      benzonatate (TESSALON) 200 MG capsule Take 1 capsule (200 mg total) by mouth 3 (three) times a day as needed for cough (Patient not taking: Reported on 3/19/2021) 30 capsule 1    buPROPion (WELLBUTRIN XL) 150 mg 24 hr tablet Take 150 mg by mouth every morning (Patient not taking: Reported on 7/8/2021)      diphenhydrAMINE (BENADRYL) 50 mg capsule diphenhydramine 50 mg capsule (Patient not taking: Reported on 8/23/2021)      fluticasone (FLONASE) 50 mcg/act nasal spray 1 spray into each nostril daily (Patient not taking: Reported on 7/8/2021) 16 g 2    propranolol (INDERAL LA) 80 mg 24 hr capsule Take 1 capsule (80 mg total) by mouth every morning 30 capsule 5    rizatriptan (MAXALT-MLT) 5 MG disintegrating tablet Take 1 tablet (5 mg total) by mouth once as needed for migraine May repeat in 2 hours if needed 9 tablet 3    zolpidem (AMBIEN) 10 mg tablet Take 10 mg by mouth daily at bedtime as needed  (Patient not taking: Reported on 7/8/2021)       No current facility-administered medications for this visit      _______________________________________________________________________  Review of Systems   Constitutional: Negative for chills, fatigue and fever  HENT: Negative for congestion, ear pain, hearing loss, postnasal drip, rhinorrhea and sore throat  Eyes: Negative for pain and visual disturbance  Respiratory: Negative for chest tightness, shortness of breath and wheezing  Cardiovascular: Negative for chest pain and leg swelling  Gastrointestinal: Negative for abdominal distention, abdominal pain, constipation, diarrhea and vomiting  Endocrine: Negative for cold intolerance and heat intolerance  Genitourinary: Negative for difficulty urinating, frequency and urgency  Musculoskeletal: Negative for arthralgias and gait problem  Skin: Negative for color change  Neurological: Negative for dizziness, tremors, syncope, numbness and headaches  Hematological: Negative for adenopathy  Psychiatric/Behavioral: Negative for agitation, confusion and sleep disturbance  The patient is not nervous/anxious  Objective:  Vitals:    08/23/21 1517   BP: 118/78   BP Location: Left arm   Patient Position: Sitting   Pulse: 85   Temp: (!) 97 3 °F (36 3 °C)   TempSrc: Tympanic   SpO2: 98%   Weight: 136 kg (299 lb 9 6 oz)   Height: 5' 6" (1 676 m)     Body mass index is 48 36 kg/m²  Physical Exam  Vitals and nursing note reviewed  Constitutional:       Appearance: He is well-developed  He is obese  HENT:      Head: Normocephalic  Eyes:      General: No scleral icterus  Conjunctiva/sclera: Conjunctivae normal    Neck:      Thyroid: No thyromegaly  Cardiovascular:      Rate and Rhythm: Normal rate and regular rhythm  Heart sounds: Normal heart sounds  No murmur heard  Pulmonary:      Effort: Pulmonary effort is normal  No respiratory distress  Breath sounds: Normal breath sounds  No wheezing  Abdominal:      General: Bowel sounds are normal  There is no distension  Palpations: Abdomen is soft  Tenderness: There is no abdominal tenderness  Musculoskeletal:         General: No tenderness  Normal range of motion  Cervical back: Normal range of motion  Lymphadenopathy:      Cervical: No cervical adenopathy  Skin:     General: Skin is warm and dry  Coloration: Skin is not pale  Findings: No rash  Neurological:      Mental Status: He is alert and oriented to person, place, and time  Cranial Nerves: No cranial nerve deficit     Psychiatric:         Behavior: Behavior normal

## 2021-11-26 ENCOUNTER — APPOINTMENT (OUTPATIENT)
Dept: LAB | Facility: HOSPITAL | Age: 26
End: 2021-11-26
Payer: COMMERCIAL

## 2021-11-26 DIAGNOSIS — E78.2 HYPERLIPIDEMIA, MIXED: ICD-10-CM

## 2021-11-26 LAB
ALBUMIN SERPL BCP-MCNC: 3.4 G/DL (ref 3.5–5)
ALP SERPL-CCNC: 57 U/L (ref 46–116)
ALT SERPL W P-5'-P-CCNC: 36 U/L (ref 12–78)
ANION GAP SERPL CALCULATED.3IONS-SCNC: 11 MMOL/L (ref 4–13)
AST SERPL W P-5'-P-CCNC: 21 U/L (ref 5–45)
BILIRUB SERPL-MCNC: 0.37 MG/DL (ref 0.2–1)
BUN SERPL-MCNC: 17 MG/DL (ref 5–25)
CALCIUM ALBUM COR SERPL-MCNC: 8.8 MG/DL (ref 8.3–10.1)
CALCIUM SERPL-MCNC: 8.3 MG/DL (ref 8.3–10.1)
CHLORIDE SERPL-SCNC: 108 MMOL/L (ref 100–108)
CHOLEST SERPL-MCNC: 160 MG/DL
CO2 SERPL-SCNC: 23 MMOL/L (ref 21–32)
CREAT SERPL-MCNC: 1.03 MG/DL (ref 0.6–1.3)
GFR SERPL CREATININE-BSD FRML MDRD: 101 ML/MIN/1.73SQ M
GLUCOSE P FAST SERPL-MCNC: 111 MG/DL (ref 65–99)
HDLC SERPL-MCNC: 33 MG/DL
LDLC SERPL CALC-MCNC: 104 MG/DL (ref 0–100)
NONHDLC SERPL-MCNC: 127 MG/DL
POTASSIUM SERPL-SCNC: 3.9 MMOL/L (ref 3.5–5.3)
PROT SERPL-MCNC: 7.5 G/DL (ref 6.4–8.2)
SODIUM SERPL-SCNC: 142 MMOL/L (ref 136–145)
TRIGL SERPL-MCNC: 114 MG/DL

## 2021-11-26 PROCEDURE — 80061 LIPID PANEL: CPT

## 2021-11-26 PROCEDURE — 36415 COLL VENOUS BLD VENIPUNCTURE: CPT

## 2021-11-26 PROCEDURE — 80053 COMPREHEN METABOLIC PANEL: CPT

## 2021-12-30 ENCOUNTER — TELEPHONE (OUTPATIENT)
Dept: NEUROLOGY | Facility: CLINIC | Age: 26
End: 2021-12-30

## 2022-01-04 ENCOUNTER — OFFICE VISIT (OUTPATIENT)
Dept: NEUROLOGY | Facility: CLINIC | Age: 27
End: 2022-01-04
Payer: COMMERCIAL

## 2022-01-04 VITALS
WEIGHT: 315 LBS | TEMPERATURE: 97.8 F | HEIGHT: 66 IN | BODY MASS INDEX: 50.62 KG/M2 | DIASTOLIC BLOOD PRESSURE: 80 MMHG | HEART RATE: 90 BPM | SYSTOLIC BLOOD PRESSURE: 130 MMHG

## 2022-01-04 DIAGNOSIS — E78.2 HYPERLIPIDEMIA, MIXED: ICD-10-CM

## 2022-01-04 DIAGNOSIS — G43.009 MIGRAINE WITHOUT AURA AND WITHOUT STATUS MIGRAINOSUS, NOT INTRACTABLE: ICD-10-CM

## 2022-01-04 DIAGNOSIS — I10 ESSENTIAL HYPERTENSION: ICD-10-CM

## 2022-01-04 DIAGNOSIS — F41.1 ANXIETY, GENERALIZED: ICD-10-CM

## 2022-01-04 PROCEDURE — 3075F SYST BP GE 130 - 139MM HG: CPT | Performed by: PSYCHIATRY & NEUROLOGY

## 2022-01-04 PROCEDURE — 1036F TOBACCO NON-USER: CPT | Performed by: PSYCHIATRY & NEUROLOGY

## 2022-01-04 PROCEDURE — 3008F BODY MASS INDEX DOCD: CPT | Performed by: PSYCHIATRY & NEUROLOGY

## 2022-01-04 PROCEDURE — 99214 OFFICE O/P EST MOD 30 MIN: CPT | Performed by: PSYCHIATRY & NEUROLOGY

## 2022-01-04 PROCEDURE — 3079F DIAST BP 80-89 MM HG: CPT | Performed by: PSYCHIATRY & NEUROLOGY

## 2022-01-04 NOTE — PROGRESS NOTES
Dangelo Wilkerson is a 23 y o  male     Assessment:  1  Migraine without aura and without status migrainosus, not intractable    2  Essential hypertension    3  Hyperlipidemia, mixed    4  Anxiety, generalized        Plan:  Riboflavin 200 mg a day  Magnesium 200 mg a day  Avoid migraine triggers  Patient is already on Topamax will discuss with his psychiatrist to see if patient can be started on Neurontin or a CGRP injection  Follow-up in 3-4 months  Discussion:  Differential diagnosis discussed with the patient and his mother, most likely migraine headaches versus other etiologies, patient's MRI scan of the brain was unremarkable, he is on Topamax twice a day which is being managed by his psychiatrist, discussed different medication options, could try Neurontin but patient is already on lot of other medications and hence there could be an interaction other option would be is to try him on a CGRP injection I will discuss that with his psychiatrist, advised my office to connect me to the psychiatrist, patient and the family will call me in a couple of days to follow-up on that, meanwhile he was advised to avoid migraine triggers which we discussed in detail including foods to avoid to keep himself well hydrated limit caffeine to 12 oz per day sleep regularly at least for 8 hours a night and avoid stress and limit over-the-counter analgesics to 2 times a week  To keep his blood pressure cholesterol sugar and weight under control to go to the hospital if has any worsening symptoms and call me otherwise to see me back in 3-4 months and follow up with his other physicians  For technical reasons the chart could not be signed on the same day        Subjective:    HPI  Patient is here in follow-up for headaches for the last several years, he is accompanied with his mother, since his last visit he still has headaches almost every day not associated with photophobia or phonophobia they could last for half an hour to an hour, he denies any visual symptoms is no suicidal or homicidal thoughts his headaches are dull to throbbing in nature sometimes he takes an ibuprofen to relieve it he has seen an ophthalmologist in the past and was told his eye examinations is normal he is on Topamax for his depression which is being managed by his psychiatrist appetite is good weight has been stable no other complaints  Past Medical History:   Diagnosis Date    Anxiety     Bronchitis     Concussion     Sports related    Depression     Hypertension     Morbid obesity with BMI of 45 0-49 9, adult ([address] )        Family History:  Family History   Adopted: Yes       Past Surgical History:  Past Surgical History:   Procedure Laterality Date    NO PAST SURGERIES         Social History:   reports that he has never smoked  He has never used smokeless tobacco  He reports current alcohol use of about 1 0 standard drink of alcohol per week  He reports that he does not use drugs      Allergies:  Abilify [aripiprazole], Lithium, Lorazepam, and Seroquel [quetiapine]      Current Outpatient Medications:     buPROPion (WELLBUTRIN XL) 150 mg 24 hr tablet, Take 150 mg by mouth every morning  , Disp: , Rfl:     buPROPion (Wellbutrin XL) 300 mg 24 hr tablet, Take 300 mg by mouth every morning, Disp: , Rfl:     desvenlafaxine succinate (PRISTIQ) 50 mg 24 hr tablet, Take 1 tablet (50 mg total) by mouth daily, Disp: , Rfl:     doxepin (SINEquan) 75 MG capsule, TAKE 1 CAPSULE BY MOUTH EVERY DAY AT NIGHT, Disp: , Rfl:     losartan (COZAAR) 50 mg tablet, Take 1 tablet (50 mg total) by mouth daily, Disp: 90 tablet, Rfl: 1    propranolol (INDERAL LA) 80 mg 24 hr capsule, Take 1 capsule (80 mg total) by mouth every morning, Disp: 30 capsule, Rfl: 5    topiramate (TOPAMAX) 200 MG tablet, 200 mg 2 (two) times a day , Disp: , Rfl:     ziprasidone (GEODON) 40 mg capsule, Take 40 mg by mouth every morning , Disp: , Rfl:     ziprasidone (GEODON) 80 mg capsule, Take 80 mg by mouth every evening , Disp: , Rfl:     amoxicillin-clavulanate (AUGMENTIN) 875-125 mg per tablet, amoxicillin 875 mg-potassium clavulanate 125 mg tablet  TAKE 1 TABLET BY MOUTH EVERY 12 HOURS FOR 7 DAYS (Patient not taking: Reported on 8/23/2021), Disp: , Rfl:     benzonatate (TESSALON) 200 MG capsule, Take 1 capsule (200 mg total) by mouth 3 (three) times a day as needed for cough (Patient not taking: Reported on 3/19/2021), Disp: 30 capsule, Rfl: 1    diphenhydrAMINE (BENADRYL) 50 mg capsule, diphenhydramine 50 mg capsule (Patient not taking: Reported on 8/23/2021), Disp: , Rfl:     fluticasone (FLONASE) 50 mcg/act nasal spray, 1 spray into each nostril daily (Patient not taking: Reported on 7/8/2021), Disp: 16 g, Rfl: 2    mupirocin (BACTROBAN) 2 % ointment, Apply topically 2 (two) times a day (Patient not taking: Reported on 1/4/2022 ), Disp: 22 g, Rfl: 1    rizatriptan (MAXALT-MLT) 5 MG disintegrating tablet, Take 1 tablet (5 mg total) by mouth once as needed for migraine May repeat in 2 hours if needed, Disp: 9 tablet, Rfl: 3    SODIUM FLUORIDE, DENTAL GEL, (DentaGel) 1 1 % GEL, DentaGel 1 1 % (Patient not taking: Reported on 1/4/2022), Disp: , Rfl:     zolpidem (AMBIEN) 10 mg tablet, Take 10 mg by mouth daily at bedtime as needed  (Patient not taking: Reported on 7/8/2021), Disp: , Rfl:     I have reviewed the past medical history, surgical history, social and family history, current medications, allergies vitals, review of systems, and updated this information as appropriate today  Objective:    Vitals:    01/04/22 1553   BP: 130/80   BP Location: Left arm   Patient Position: Sitting   Cuff Size: Large   Pulse: 90   Temp: 97 8 °F (36 6 °C)   Weight: (!) 143 kg (315 lb 6 4 oz)   Height: 5' 6" (1 676 m)       Physical Exam    Neurological Exam    GENERAL:  Cooperative in no acute distress   Well-developed and well-nourished    HEAD and NECK   Head is atraumatic normocephalic with no lesions or masses  Neck is supple with full range of motion    CARDIOVASCULAR  Carotid Arteries-no carotid bruits  NEUROLOGIC:  Mental Status-the patient is awake alert and oriented without aphasia or apraxia  Cranial Nerves: Visual fields are full to confrontation  Extraocular movements are full without nystagmus  Pupils are 2-1/2 mm and reactive  Face is symmetrical to light touch  Movements of facial expression move symmetrically  Hearing is normal to finger rub bilaterally  Soft palate lifts symmetrically  Shoulder shrug is symmetrical  Tongue is midline without atrophy  Motor: No drift is noted on arm extension  Strength is full in the upper and lower extremities with normal bulk and tone  Coordination: Finger to nose testing is performed accurately  Romberg is negative  Gait reveals a normal base with symmetrical arm swing  No meningeal signs            ROS:    Review of Systems   Constitutional: Negative  Negative for appetite change and fever  HENT: Negative  Negative for hearing loss, tinnitus, trouble swallowing and voice change  Eyes: Negative  Negative for photophobia and pain  Respiratory: Negative  Negative for shortness of breath  Cardiovascular: Negative  Negative for palpitations  Gastrointestinal: Negative  Negative for nausea and vomiting  Endocrine: Negative  Negative for cold intolerance  Genitourinary: Negative  Negative for dysuria, frequency and urgency  Musculoskeletal: Positive for myalgias and neck pain  Skin: Negative  Negative for rash  Neurological: Positive for tremors and headaches  Negative for dizziness, seizures, syncope, facial asymmetry, speech difficulty, weakness, light-headedness and numbness  Hematological: Negative  Does not bruise/bleed easily  Psychiatric/Behavioral: Positive for sleep disturbance  Negative for confusion and hallucinations

## 2022-02-26 ENCOUNTER — APPOINTMENT (OUTPATIENT)
Dept: LAB | Facility: HOSPITAL | Age: 27
End: 2022-02-26
Attending: PSYCHIATRY & NEUROLOGY
Payer: COMMERCIAL

## 2022-02-26 DIAGNOSIS — E78.2 HYPERLIPIDEMIA, MIXED: ICD-10-CM

## 2022-02-26 LAB
ERYTHROCYTE [DISTWIDTH] IN BLOOD BY AUTOMATED COUNT: 13.6 % (ref 11.6–15.1)
HCT VFR BLD AUTO: 43.1 % (ref 36.5–49.3)
HGB BLD-MCNC: 14 G/DL (ref 12–17)
MCH RBC QN AUTO: 26.3 PG (ref 26.8–34.3)
MCHC RBC AUTO-ENTMCNC: 32.5 G/DL (ref 31.4–37.4)
MCV RBC AUTO: 81 FL (ref 82–98)
PLATELET # BLD AUTO: 289 THOUSANDS/UL (ref 149–390)
PMV BLD AUTO: 10.5 FL (ref 8.9–12.7)
RBC # BLD AUTO: 5.32 MILLION/UL (ref 3.88–5.62)
WBC # BLD AUTO: 7.78 THOUSAND/UL (ref 4.31–10.16)

## 2022-02-26 PROCEDURE — 36415 COLL VENOUS BLD VENIPUNCTURE: CPT

## 2022-02-26 PROCEDURE — 85027 COMPLETE CBC AUTOMATED: CPT

## 2022-02-28 ENCOUNTER — OFFICE VISIT (OUTPATIENT)
Dept: FAMILY MEDICINE CLINIC | Facility: CLINIC | Age: 27
End: 2022-02-28
Payer: COMMERCIAL

## 2022-02-28 VITALS
WEIGHT: 315 LBS | TEMPERATURE: 97.6 F | DIASTOLIC BLOOD PRESSURE: 78 MMHG | BODY MASS INDEX: 50.62 KG/M2 | HEIGHT: 66 IN | SYSTOLIC BLOOD PRESSURE: 124 MMHG

## 2022-02-28 DIAGNOSIS — I10 ESSENTIAL HYPERTENSION: ICD-10-CM

## 2022-02-28 DIAGNOSIS — E66.01 MORBID OBESITY WITH BMI OF 50.0-59.9, ADULT (HCC): Primary | ICD-10-CM

## 2022-02-28 DIAGNOSIS — F33.9 DEPRESSION, RECURRENT (HCC): ICD-10-CM

## 2022-02-28 DIAGNOSIS — K60.2 FISSURE, ANAL: ICD-10-CM

## 2022-02-28 DIAGNOSIS — G43.009 MIGRAINE WITHOUT AURA AND WITHOUT STATUS MIGRAINOSUS, NOT INTRACTABLE: ICD-10-CM

## 2022-02-28 PROCEDURE — 3008F BODY MASS INDEX DOCD: CPT | Performed by: PSYCHIATRY & NEUROLOGY

## 2022-02-28 PROCEDURE — 99213 OFFICE O/P EST LOW 20 MIN: CPT | Performed by: FAMILY MEDICINE

## 2022-02-28 RX ORDER — PROPRANOLOL HYDROCHLORIDE 80 MG/1
80 CAPSULE, EXTENDED RELEASE ORAL EVERY MORNING
Qty: 90 CAPSULE | Refills: 3 | Status: SHIPPED | OUTPATIENT
Start: 2022-02-28 | End: 2022-07-19

## 2022-02-28 RX ORDER — LOSARTAN POTASSIUM 50 MG/1
50 TABLET ORAL DAILY
Qty: 90 TABLET | Refills: 3 | Status: SHIPPED | OUTPATIENT
Start: 2022-02-28

## 2022-02-28 RX ORDER — HYDROCORTISONE 25 MG/G
CREAM TOPICAL 2 TIMES DAILY
Qty: 28 G | Refills: 1 | Status: SHIPPED | OUTPATIENT
Start: 2022-02-28 | End: 2022-05-18 | Stop reason: ALTCHOICE

## 2022-02-28 NOTE — PROGRESS NOTES
Assessment/Plan:         Problem List Items Addressed This Visit        Cardiovascular and Mediastinum    Essential hypertension    Relevant Medications    losartan (COZAAR) 50 mg tablet    propranolol (INDERAL LA) 80 mg 24 hr capsule    Migraine without aura and without status migrainosus, not intractable    Relevant Medications    propranolol (INDERAL LA) 80 mg 24 hr capsule       Other    Depression, recurrent (HCC)    Morbid obesity with BMI of 50 0-59 9, adult ([facility] ) - Primary    Relevant Orders    Ambulatory Referral to Bariatric Surgery      Other Visit Diagnoses     Fissure, anal        Relevant Medications    hydrocortisone (ANUSOL-HC) 2 5 % rectal cream            Subjective:      Patient ID: Melanie Macedo is a 23 y o  male  Patient comes in today for checkup, he is up-to-date with his psychiatrist who manages his depression  He is taking his losartan his propranolol for his hypertension  He would like to discuss pursuing bariatric surgery  The following portions of the patient's history were reviewed and updated as appropriate:   Past Medical History:  He has a past medical history of Anxiety, Bronchitis, Concussion, Depression, Hypertension, and Morbid obesity with BMI of 45 0-49 9, adult ([facility] )  ,  _______________________________________________________________________  Medical Problems:  does not have any pertinent problems on file ,  _______________________________________________________________________  Past Surgical History:   has a past surgical history that includes No past surgeries  ,  _______________________________________________________________________  Family History:  family history is not on file  He was adopted  ,  _______________________________________________________________________  Social History:   reports that he has never smoked  He has never used smokeless tobacco  He reports current alcohol use of about 1 0 standard drink of alcohol per week   He reports that he does not use drugs  ,  _______________________________________________________________________  Allergies:  is allergic to abilify [aripiprazole], lithium, lorazepam, and seroquel [quetiapine]     _______________________________________________________________________  Current Outpatient Medications   Medication Sig Dispense Refill    buPROPion (WELLBUTRIN XL) 150 mg 24 hr tablet Take 150 mg by mouth every morning        buPROPion (Wellbutrin XL) 300 mg 24 hr tablet Take 300 mg by mouth every morning      desvenlafaxine succinate (PRISTIQ) 50 mg 24 hr tablet Take 1 tablet (50 mg total) by mouth daily      doxepin (SINEquan) 75 MG capsule TAKE 1 CAPSULE BY MOUTH EVERY DAY AT NIGHT      hydrocortisone (ANUSOL-HC) 2 5 % rectal cream Apply topically 2 (two) times a day 28 g 1    losartan (COZAAR) 50 mg tablet Take 1 tablet (50 mg total) by mouth daily 90 tablet 3    propranolol (INDERAL LA) 80 mg 24 hr capsule Take 1 capsule (80 mg total) by mouth every morning 90 capsule 3    rizatriptan (MAXALT-MLT) 5 MG disintegrating tablet Take 1 tablet (5 mg total) by mouth once as needed for migraine May repeat in 2 hours if needed 9 tablet 3    topiramate (TOPAMAX) 200 MG tablet 200 mg 2 (two) times a day       ziprasidone (GEODON) 40 mg capsule Take 40 mg by mouth every morning       ziprasidone (GEODON) 80 mg capsule Take 80 mg by mouth every evening        No current facility-administered medications for this visit      _______________________________________________________________________  Review of Systems   Constitutional: Negative for chills, fatigue and fever  HENT: Negative for congestion, ear pain, hearing loss, postnasal drip, rhinorrhea and sore throat  Eyes: Negative for pain and visual disturbance  Respiratory: Negative for chest tightness, shortness of breath and wheezing  Cardiovascular: Negative for chest pain and leg swelling     Gastrointestinal: Negative for abdominal distention, abdominal pain, constipation, diarrhea and vomiting  Endocrine: Negative for cold intolerance and heat intolerance  Genitourinary: Negative for difficulty urinating, frequency and urgency  Musculoskeletal: Negative for arthralgias and gait problem  Skin: Negative for color change  Neurological: Negative for dizziness, tremors, syncope, numbness and headaches  Hematological: Negative for adenopathy  Psychiatric/Behavioral: Negative for agitation, confusion and sleep disturbance  The patient is not nervous/anxious  Objective:  Vitals:    02/28/22 1547   BP: 124/78   Temp: 97 6 °F (36 4 °C)   Weight: (!) 143 kg (316 lb)   Height: 5' 6" (1 676 m)     Body mass index is 51 kg/m²  Physical Exam  Vitals and nursing note reviewed  Constitutional:       Appearance: He is well-developed  He is obese  HENT:      Head: Normocephalic  Eyes:      General: No scleral icterus  Conjunctiva/sclera: Conjunctivae normal    Neck:      Thyroid: No thyromegaly  Cardiovascular:      Rate and Rhythm: Normal rate and regular rhythm  Heart sounds: Normal heart sounds  No murmur heard  Pulmonary:      Effort: Pulmonary effort is normal  No respiratory distress  Breath sounds: Normal breath sounds  No wheezing  Abdominal:      General: Bowel sounds are normal  There is no distension  Palpations: Abdomen is soft  Tenderness: There is no abdominal tenderness  Musculoskeletal:         General: No tenderness  Normal range of motion  Cervical back: Normal range of motion  Lymphadenopathy:      Cervical: No cervical adenopathy  Skin:     General: Skin is warm and dry  Coloration: Skin is not pale  Findings: No rash  Neurological:      Mental Status: He is alert and oriented to person, place, and time  Cranial Nerves: No cranial nerve deficit     Psychiatric:         Behavior: Behavior normal
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
## 2022-03-15 ENCOUNTER — CLINICAL SUPPORT (OUTPATIENT)
Dept: BARIATRICS | Facility: CLINIC | Age: 27
End: 2022-03-15

## 2022-03-15 VITALS
HEART RATE: 67 BPM | RESPIRATION RATE: 16 BRPM | BODY MASS INDEX: 50.56 KG/M2 | WEIGHT: 314.6 LBS | DIASTOLIC BLOOD PRESSURE: 76 MMHG | SYSTOLIC BLOOD PRESSURE: 120 MMHG | TEMPERATURE: 95.6 F | HEIGHT: 66 IN

## 2022-03-15 DIAGNOSIS — Z71.89 ENCOUNTER FOR PRE-BARIATRIC SURGERY COUNSELING AND EDUCATION: Primary | ICD-10-CM

## 2022-03-15 DIAGNOSIS — Z98.84 BARIATRIC SURGERY STATUS: Primary | ICD-10-CM

## 2022-03-15 DIAGNOSIS — E66.01 MORBID OBESITY WITH BMI OF 50.0-59.9, ADULT (HCC): ICD-10-CM

## 2022-03-15 PROCEDURE — RECHECK

## 2022-03-15 NOTE — PROGRESS NOTES
Bariatric Nutrition Assessment Note - Initial Visit    Insurance:    Type of surgery    Interested in Vertical sleeve gastrectomy  Surgeon: Dr Dell Moe - Consult on 3/17/2022    Nutrition Assessment   Luly Santa  23 y o   male     Height: 5'6"  Eval Weight: 315 3#   BMI: 50 9  Wt with BMI of 25: 155#  Pre-Op Excess Wt: 160#  BMI to Qualify at 40 = 248#  PMH includes: HTN    Pt advised not to gain weight during preop process  Pt encouraged to lose weight via healthy eating and exercise  Pt may follow Liver Shrinking diet 2 weeks prior to DOS depending on BMI at time  This diet will promote weight loss  Blood pressure 120/76, pulse 67, temperature (!) 95 6 °F (35 3 °C), resp  rate 16, height 5' 6" (1 676 m), weight (!) 143 kg (314 lb 9 6 oz)  Weight History Reason for WLS:HTN  And wants to avoid diabetes and simplify current medication   Onset of Obesity: Childhood (Teen - on Lithium, Seroquil, Ambilify- gained 60#)  Family history of obesity: Unknown Adopted from [facility] at age 12  Wt Loss Attempts: Exercise  Self Created Diets (Portion Control, Healthy Food Choices, etc ) MWM programs  Maximum Wt Lost: 5-10#    Review of History and Medications   OTC: MultiVitamin  Vitamin D  Past Medical History:   Diagnosis Date    Anxiety     Bronchitis     Concussion     Sports related    Depression     Hypertension     Morbid obesity with BMI of 45 0-49 9, adult ([address] )      Past Surgical History:   Procedure Laterality Date    NO PAST SURGERIES       Social History     Socioeconomic History    Marital status: Single     Spouse name: None    Number of children: None    Years of education: None    Highest education level: None   Occupational History    Occupation: student    Occupation:      Employer: [employer]   Tobacco Use    Smoking status: Never Smoker    Smokeless tobacco: Never Used   Substance and Sexual Activity    Alcohol use:  Yes     Alcohol/week: 1 0 standard drink     Types: 1 Cans of beer per week     Comment: social    Drug use: No    Sexual activity: None   Other Topics Concern    None   Social History Narrative    Lives with adoptive parents     Social Determinants of Health     Financial Resource Strain: Not on file   Food Insecurity: Not on file   Transportation Needs: Not on file   Physical Activity: Not on file   Stress: Not on file   Social Connections: Not on file   Intimate Partner Violence: Not on file   Housing Stability: Not on file       Current Outpatient Medications:     buPROPion (Wellbutrin XL) 300 mg 24 hr tablet, Take 300 mg by mouth every morning, Disp: , Rfl:     desvenlafaxine succinate (PRISTIQ) 50 mg 24 hr tablet, Take 1 tablet (50 mg total) by mouth daily, Disp: , Rfl:     doxepin (SINEquan) 75 MG capsule, TAKE 1 CAPSULE BY MOUTH EVERY DAY AT NIGHT, Disp: , Rfl:     hydrocortisone (ANUSOL-HC) 2 5 % rectal cream, Apply topically 2 (two) times a day, Disp: 28 g, Rfl: 1    losartan (COZAAR) 50 mg tablet, Take 1 tablet (50 mg total) by mouth daily, Disp: 90 tablet, Rfl: 3    propranolol (INDERAL LA) 80 mg 24 hr capsule, Take 1 capsule (80 mg total) by mouth every morning, Disp: 90 capsule, Rfl: 3    topiramate (TOPAMAX) 200 MG tablet, 200 mg 2 (two) times a day , Disp: , Rfl:     ziprasidone (GEODON) 40 mg capsule, Take 40 mg by mouth every morning , Disp: , Rfl:     ziprasidone (GEODON) 80 mg capsule, Take 80 mg by mouth every evening , Disp: , Rfl:     buPROPion (WELLBUTRIN XL) 150 mg 24 hr tablet, Take 150 mg by mouth every morning   (Patient not taking: Reported on 3/15/2022 ), Disp: , Rfl:     rizatriptan (MAXALT-MLT) 5 MG disintegrating tablet, Take 1 tablet (5 mg total) by mouth once as needed for migraine May repeat in 2 hours if needed, Disp: 9 tablet, Rfl: 3  Food Intake and Lifestyle Assessment   Food Intake Assessment completed via usual diet recall  Breakfast: Sleeps in - 12-1:00 Double serving Cereal, eggs, Waffles, Pancakes - leftovers  Snack: Chips, Belvitas  Lunch: 3-4 pm   Snack: Ham & Cheese SW - 2 [employer]  Dinner: Vimal Otto (take-out) Mom cooks - lean protein, starch, vegetables - raw Double helpings  Snack: Binges at night sw, leftovers  Mom says snacks hidden   Beverage intake: water 32 oz X3, coffee 1 cup and alcohol (beer - occ)  Protein supplement: Not at present  Portions: "double:  Estimated protein intake per day: Exceeds requirements  Estimated fluid intake per day: 96 oz water  Meals eaten away from home: Some Take-out  Typical meal pattern: 3 meals per day and snacks/binges during day and nightEating Behaviors: Consumption of high calorie/ high fat foods, Consumption of high calorie beverages, Large portion sizes, Frequent snacking/ grazing, Binge eating, Mindless eating, Emotional eating and Craves sweet foods  Pt denies any food behaviors that would lead to purging though state he binges "eats until he is full"  Food allergies or intolerances: None     Intolerance to roast turkey - vomits  Allergies   Allergen Reactions    Abilify [Aripiprazole]     Lithium     Lorazepam Other (See Comments)     aggressive    Seroquel [Quetiapine]      Cultural or Yazidism considerations: None    Physical Assessment  Physical Activity sedentary - plays videos games  Types of exercise: Will exercise but dislikes  Current physical limitations: none    Psychosocial Assessment   Support systems: parent(s) - some relative had surgery  Socioeconomic factors: N/A    Nutrition Diagnosis  Diagnosis: Overweight / Obesity (NC-3 3)  Related to: Physical inactivity and Excessive energy intake  As Evidenced by: BMI >25     Nutrition Prescription: Recommend the following diet  Low fat, Low sugar, High protein and Regular    Interventions and Teaching   Discussed pre-op and post-op nutrition guidelines  Patient educated and handouts provided    Surgical changes to stomach / GI  Capacity of post-surgery stomach  Diet progression  Adequate hydration  Sugar and fat restriction to decrease "dumping syndrome"  Fat restriction to decrease steatorrhea  Expected weight loss  Weight loss plateaus/ possibility of weight regain  Exercise  Suggestions for pre-op diet  Nutrition considerations after surgery  Protein supplements  Meal planning and preparation  Appropriate carbohydrate, protein, and fat intake, and food/fluid choices to maximize safe weight loss, nutrient intake, and tolerance   Dietary and lifestyle changes  Possible problems with poor eating habits  Intuitive eating  Techniques for self monitoring and keeping daily food journal  Potential for food intolerance after surgery, and ways to deal with them including: lactose intolerance, nausea, reflux, vomiting, diarrhea, food intolerance, appetite changes, gas  Vitamin / Mineral supplementation of Multivitamin with minerals, Calcium, Vitamin B12, Iron and Vitamin D    Education provided to: patient    Barriers to learning:   Readiness to change: contemplation    Prior research on procedure: internet    Comprehension: needs reinforcement     Expected Compliance: Poor    Recommendations  Pt is not an appropriate candidate for surgery  Noted mental health disability, bingeing, lack of motivation and other food related behaviors would impair adherence to the restricive diet r/t WLS thus placing him at risk for post op complications  Pt reports that when he was in orphanage in [facility] he was told that the fastest eater get the most food so said that statement is stuck in his head so continues to follow that way of eating  He admits to eating double portions consistently and grazing continuously between meals and at night  Pt has very sedentary lifestyle as sleeps long hours and when awake, plays video games  Parents accompanied pt and very supportive   Has made numerous attempts to help manage Pino's weight including Shannan IRIZARRY and Healthy Core programs, gym memberships, etc  Pt acknowledges interest in having surgery at start of evaluation but unsure if pt would proceed after learning about program expectations and guidelines  Evaluation / Monitoring  Dietitian to Monitor: Eating pattern as discussed Tolerance of nutrition prescription Body weight Lab values Physical activity Bowel pattern    Goals  Advised pt to select 1-2 goals to work on   Pt did not commit to any specific goals during session   Pre-Surgery guidelines  > Trial Baritastic for food logging  > Establish regular meal pattern - include fruits, vegetables and whole grains  > Focus on protein - include lean protein at each meal and snack -> Decrease portions  > Limit processed foods, fast foods and dining away from home  > Include meal prepping  > Limit snacks - healthier choices and portion; avoid grazing-bingeing  > Slow pace of eating and drinking - practice 30/60 minute rule  > Reduce/eliminate caffeine & carbonation before pre-op diet  > Maintainwater intake - 64 oz  > Increase physical activity/establish exercise regimen   > Continuet multi vitamin and additional Vitamin D 2000IU  Work on skills to cope with emotional eating/mindfull eating    Time Spent:    1 Hour 15 Minutes

## 2022-03-17 ENCOUNTER — OFFICE VISIT (OUTPATIENT)
Dept: BARIATRICS | Facility: CLINIC | Age: 27
End: 2022-03-17
Payer: COMMERCIAL

## 2022-03-17 ENCOUNTER — PREP FOR PROCEDURE (OUTPATIENT)
Dept: BARIATRICS | Facility: CLINIC | Age: 27
End: 2022-03-17

## 2022-03-17 VITALS
TEMPERATURE: 98.4 F | SYSTOLIC BLOOD PRESSURE: 122 MMHG | HEIGHT: 66 IN | WEIGHT: 314.6 LBS | DIASTOLIC BLOOD PRESSURE: 80 MMHG | BODY MASS INDEX: 50.56 KG/M2 | HEART RATE: 96 BPM | RESPIRATION RATE: 16 BRPM

## 2022-03-17 DIAGNOSIS — F33.9 DEPRESSION, RECURRENT (HCC): ICD-10-CM

## 2022-03-17 DIAGNOSIS — E66.01 MORBID OBESITY WITH BMI OF 50.0-59.9, ADULT (HCC): ICD-10-CM

## 2022-03-17 DIAGNOSIS — F41.1 ANXIETY, GENERALIZED: ICD-10-CM

## 2022-03-17 DIAGNOSIS — R73.03 PREDIABETES: ICD-10-CM

## 2022-03-17 DIAGNOSIS — E88.81 METABOLIC SYNDROME: ICD-10-CM

## 2022-03-17 DIAGNOSIS — E66.01 MORBID OBESITY (HCC): Primary | ICD-10-CM

## 2022-03-17 DIAGNOSIS — I10 ESSENTIAL HYPERTENSION: ICD-10-CM

## 2022-03-17 DIAGNOSIS — Z01.818 ENCOUNTER FOR OTHER PREPROCEDURAL EXAMINATION: Primary | ICD-10-CM

## 2022-03-17 DIAGNOSIS — E78.2 HYPERLIPIDEMIA, MIXED: ICD-10-CM

## 2022-03-17 DIAGNOSIS — G43.009 MIGRAINE WITHOUT AURA AND WITHOUT STATUS MIGRAINOSUS, NOT INTRACTABLE: ICD-10-CM

## 2022-03-17 DIAGNOSIS — E66.01 MORBID (SEVERE) OBESITY DUE TO EXCESS CALORIES (HCC): ICD-10-CM

## 2022-03-17 PROBLEM — E88.810 METABOLIC SYNDROME: Status: ACTIVE | Noted: 2022-03-17

## 2022-03-17 PROCEDURE — 1036F TOBACCO NON-USER: CPT | Performed by: SURGERY

## 2022-03-17 PROCEDURE — 3079F DIAST BP 80-89 MM HG: CPT | Performed by: SURGERY

## 2022-03-17 PROCEDURE — 99204 OFFICE O/P NEW MOD 45 MIN: CPT | Performed by: SURGERY

## 2022-03-17 PROCEDURE — 3074F SYST BP LT 130 MM HG: CPT | Performed by: SURGERY

## 2022-03-17 PROCEDURE — 3008F BODY MASS INDEX DOCD: CPT | Performed by: SURGERY

## 2022-03-17 NOTE — PROGRESS NOTES
BARIATRIC INITIAL CONSULT - BARIATRIC SURGERY    Silvino South 23 y o  male MRN: [id number]  Unit/Bed#:  Encounter: [id number]      HPI:  Silvino South is a 23 y o  male who presents with a longstanding history of morbid obesity and inability to sustain a meaningful weight loss  Present with father  Currently unemployed  Desires to pursue metabolic and bariatric surgery to improve his health  +GERD  Daily NSAIDs  Denies DVT/PE  Denies tobacco   Here today to discuss bariatric options  Visit type: initial visit    Symptoms: inability to loss weight and back pain    Associated Symptoms: depressed mood    Associated Conditions: glucose intolerance, low HDL and elevated LDL  Disease Complications: hypertension  Weight Loss Interest: high    Exercise Frequency:infrequency  Types of Exercise: walking    Review of Systems   Constitutional: Positive for unexpected weight change  Musculoskeletal: Positive for back pain  Neurological: Positive for headaches  Psychiatric/Behavioral: Positive for dysphoric mood and sleep disturbance  All other systems reviewed and are negative        Historical Information   Past Medical History:   Diagnosis Date    Anxiety     Bronchitis     Concussion     Sports related    Depression     Hypertension     Morbid obesity with BMI of 45 0-49 9, adult ([facility] [address] )      Past Surgical History:   Procedure Laterality Date    NO PAST SURGERIES       Social History   Social History     Substance and Sexual Activity   Alcohol Use Yes    Alcohol/week: 1 0 standard drink    Types: 1 Cans of beer per week    Comment: social     Social History     Substance and Sexual Activity   Drug Use No     Social History     Tobacco Use   Smoking Status Never Smoker   Smokeless Tobacco Never Used     Family History: non-contributory    Meds/Allergies   all medications and allergies reviewed  Allergies   Allergen Reactions    Abilify [Aripiprazole]     Lithium     Lorazepam Other (See Comments) aggressive    Seroquel [Quetiapine]        Objective     Current Vitals:   /80 (BP Location: Right arm, Patient Position: Sitting, Cuff Size: Large)   Pulse 96   Temp 98 4 °F (36 9 °C)   Resp 16   Ht 5' 6" (1 676 m)   Wt (!) 143 kg (314 lb 9 6 oz)   BMI 50 78 kg/m²     Invasive Devices  Report    None                 Physical Exam  Constitutional:       Appearance: Normal appearance  HENT:      Head: Atraumatic  Nose: No rhinorrhea  Eyes:      Extraocular Movements: Extraocular movements intact  Cardiovascular:      Rate and Rhythm: Normal rate  Pulmonary:      Effort: Pulmonary effort is normal  No respiratory distress  Abdominal:      General: Abdomen is flat  There is no distension  Musculoskeletal:         General: Normal range of motion  Cervical back: Normal range of motion  Skin:     General: Skin is warm and dry  Neurological:      General: No focal deficit present  Mental Status: He is alert and oriented to person, place, and time  Psychiatric:         Mood and Affect: Mood normal          Behavior: Behavior normal          Lab Results: I have personally reviewed pertinent lab results  Imaging: I have personally reviewed pertinent reports  EKG, Pathology, and Other Studies: I have personally reviewed pertinent reports  Assessment/PLAN:    23 y o  yo male with a long standing h/o of obesity and inability to sustain any meaningful weight loss on his own despite several attempts  He is interested in the Laparoscopic sleeve gastrectomy and albin-en-y gastric bypass  Patient has been counseled about the risk of developing gastroesophageal reflux disease (GERD), worsening of current GERD and/or silent reflux  Patient has also been counseled on the risk of developing Smart's esophagus (18%)  As a result the patient may require treatment with medications, further interventions and possibly additional surgery   Patient will require routine endoscopic surveillance to monitor for these possible complications  As a part of his pre op evaluation, he will be referred to a cardiologist and for a sleep evaluation and consult after successfully completing an evaluation with our pre-certification/, registered dietician and licensed clinical   He needs an EGD to evaluate the anatomy of his GI tract  I have spent over 45 minutes with him face to face in the office today discussing his options and details of the surgery  We have seen an animation of the surgery on the computer that illustrates how the operation is done and how the anatomy will be altered with the procedure  Over 50% of this was coordinating care  I have discussed and educated the patient with regards to the components of our multidisciplinary program and the importance of compliance and follow up in the post operative period  He was given the opportunity to ask questions and I have answered all of them  The patient was also instructed with regards to the importance of behavior modification, nutritional counseling, support meeting attendance and lifestyle changes that are important to ensure success  Although there is a great statistical chance of improvement or even resolution of most of his associated comorbidities, the results vary from patient to patient and they largely depend on his commitment and compliance  Weight loss goal will be determined at the time of his evaluation        Pema Russell MD  3/17/2022  11:58 AM

## 2022-03-17 NOTE — LETTER
March 17, 2022     Aditya Orona DO  [address]  [address] [place], [place] [address]    Patient: Latia Soni   YOB: 1995   Date of Visit: 3/17/2022       Dear Dr Melbourne Dubin: Thank you for referring Latia Soni to me for evaluation for metabolic and bariatric surgery  Below are my notes for this consultation  If you have questions, please do not hesitate to call me  I look forward to following your patient along with you  Sincerely,        Anil Canada MD        CC: No Recipients  Anil Canada MD  3/17/2022 12:02 PM  Sign when Signing Visit      [facility] - BARIATRIC SURGERY    Latia Soni 23 y o  male MRN: [id number]  Unit/Bed#:  Encounter: [id number]      HPI:  Latia Soni is a 23 y o  male who presents with a longstanding history of morbid obesity and inability to sustain a meaningful weight loss  Present with father  Currently unemployed  Desires to pursue metabolic and bariatric surgery to improve his health  +GERD  Daily NSAIDs  Denies DVT/PE  Denies tobacco   Here today to discuss bariatric options  Visit type: initial visit    Symptoms: inability to loss weight and back pain    Associated Symptoms: depressed mood    Associated Conditions: glucose intolerance, low HDL and elevated LDL  Disease Complications: hypertension  Weight Loss Interest: high    Exercise Frequency:infrequency  Types of Exercise: walking    Review of Systems   Constitutional: Positive for unexpected weight change  Musculoskeletal: Positive for back pain  Neurological: Positive for headaches  Psychiatric/Behavioral: Positive for dysphoric mood and sleep disturbance  All other systems reviewed and are negative        Historical Information   Past Medical History:   Diagnosis Date    Anxiety     Bronchitis     Concussion     Sports related    Depression     Hypertension     Morbid obesity with BMI of 45 0-49 9, adult [facility])      Past Surgical History: Procedure Laterality Date    NO PAST SURGERIES       Social History   Social History     Substance and Sexual Activity   Alcohol Use Yes    Alcohol/week: 1 0 standard drink    Types: 1 Cans of beer per week    Comment: social     Social History     Substance and Sexual Activity   Drug Use No     Social History     Tobacco Use   Smoking Status Never Smoker   Smokeless Tobacco Never Used     Family History: non-contributory    Meds/Allergies   all medications and allergies reviewed  Allergies   Allergen Reactions    Abilify [Aripiprazole]     Lithium     Lorazepam Other (See Comments)     aggressive    Seroquel [Quetiapine]        Objective     Current Vitals:   /80 (BP Location: Right arm, Patient Position: Sitting, Cuff Size: Large)   Pulse 96   Temp 98 4 °F (36 9 °C)   Resp 16   Ht 5' 6" (1 676 m)   Wt (!) 143 kg (314 lb 9 6 oz)   BMI 50 78 kg/m²     Invasive Devices  Report    None                 Physical Exam  Constitutional:       Appearance: Normal appearance  HENT:      Head: Atraumatic  Nose: No rhinorrhea  Eyes:      Extraocular Movements: Extraocular movements intact  Cardiovascular:      Rate and Rhythm: Normal rate  Pulmonary:      Effort: Pulmonary effort is normal  No respiratory distress  Abdominal:      General: Abdomen is flat  There is no distension  Musculoskeletal:         General: Normal range of motion  Cervical back: Normal range of motion  Skin:     General: Skin is warm and dry  Neurological:      General: No focal deficit present  Mental Status: He is alert and oriented to person, place, and time  Psychiatric:         Mood and Affect: Mood normal          Behavior: Behavior normal          Lab Results: I have personally reviewed pertinent lab results  Imaging: I have personally reviewed pertinent reports  EKG, Pathology, and Other Studies: I have personally reviewed pertinent reports          Assessment/PLAN:    23 y o  yo male with a long standing h/o of obesity and inability to sustain any meaningful weight loss on his own despite several attempts  He is interested in the Laparoscopic sleeve gastrectomy and albin-en-y gastric bypass  Patient has been counseled about the risk of developing gastroesophageal reflux disease (GERD), worsening of current GERD and/or silent reflux  Patient has also been counseled on the risk of developing Smart's esophagus (18%)  As a result the patient may require treatment with medications, further interventions and possibly additional surgery  Patient will require routine endoscopic surveillance to monitor for these possible complications  As a part of his pre op evaluation, he will be referred to a cardiologist and for a sleep evaluation and consult after successfully completing an evaluation with our pre-certification/, registered dietician and licensed clinical   He needs an EGD to evaluate the anatomy of his GI tract  I have spent over 45 minutes with him face to face in the office today discussing his options and details of the surgery  We have seen an animation of the surgery on the computer that illustrates how the operation is done and how the anatomy will be altered with the procedure  Over 50% of this was coordinating care  I have discussed and educated the patient with regards to the components of our multidisciplinary program and the importance of compliance and follow up in the post operative period  He was given the opportunity to ask questions and I have answered all of them  The patient was also instructed with regards to the importance of behavior modification, nutritional counseling, support meeting attendance and lifestyle changes that are important to ensure success      Although there is a great statistical chance of improvement or even resolution of most of his associated comorbidities, the results vary from patient to patient and they largely depend on his commitment and compliance  Weight loss goal will be determined at the time of his evaluation        Gianfranco Nye MD  3/17/2022  11:58 AM

## 2022-03-21 DIAGNOSIS — Z98.84 BARIATRIC SURGERY STATUS: Primary | ICD-10-CM

## 2022-04-14 ENCOUNTER — OFFICE VISIT (OUTPATIENT)
Dept: PULMONOLOGY | Facility: CLINIC | Age: 27
End: 2022-04-14
Payer: COMMERCIAL

## 2022-04-14 ENCOUNTER — OFFICE VISIT (OUTPATIENT)
Dept: BARIATRICS | Facility: CLINIC | Age: 27
End: 2022-04-14

## 2022-04-14 VITALS
TEMPERATURE: 97.7 F | SYSTOLIC BLOOD PRESSURE: 126 MMHG | OXYGEN SATURATION: 96 % | HEART RATE: 94 BPM | WEIGHT: 315 LBS | BODY MASS INDEX: 50.62 KG/M2 | HEIGHT: 66 IN | DIASTOLIC BLOOD PRESSURE: 84 MMHG

## 2022-04-14 VITALS — BODY MASS INDEX: 51.33 KG/M2 | WEIGHT: 315 LBS

## 2022-04-14 DIAGNOSIS — G47.09 OTHER INSOMNIA: ICD-10-CM

## 2022-04-14 DIAGNOSIS — G47.33 OSA (OBSTRUCTIVE SLEEP APNEA): Primary | ICD-10-CM

## 2022-04-14 DIAGNOSIS — E66.01 MORBID OBESITY (HCC): ICD-10-CM

## 2022-04-14 DIAGNOSIS — Z98.84 BARIATRIC SURGERY STATUS: ICD-10-CM

## 2022-04-14 DIAGNOSIS — E66.01 MORBID (SEVERE) OBESITY DUE TO EXCESS CALORIES (HCC): Primary | ICD-10-CM

## 2022-04-14 PROCEDURE — 1036F TOBACCO NON-USER: CPT | Performed by: INTERNAL MEDICINE

## 2022-04-14 PROCEDURE — 99204 OFFICE O/P NEW MOD 45 MIN: CPT | Performed by: INTERNAL MEDICINE

## 2022-04-14 PROCEDURE — 3008F BODY MASS INDEX DOCD: CPT | Performed by: INTERNAL MEDICINE

## 2022-04-14 PROCEDURE — 3074F SYST BP LT 130 MM HG: CPT | Performed by: INTERNAL MEDICINE

## 2022-04-14 PROCEDURE — RECHECK

## 2022-04-14 NOTE — PROGRESS NOTES
Bariatric Nutrition Assessment Note - Follow-up - Monthly Prep Visit    Insurance: No required weight checks    Type of surgery    Interested in Vertical sleeve gastrectomy but since talked to Dr Danny Paul  Surgeon: Dr Carl Friend - [facility] Javier on 3/17/2022    Nutrition Assessment   Yanely Mccloud  23 y o   male     Height: 5'6"  Weight: 318#  Eval Weight: 315 3#   BMI: 50 9  Wt with BMI of 25: 155#  Pre-Op Excess Wt: 160#  BMI to Qualify at 40 = 248#  PMH includes: HTN    Pt advised not to gain weight during preop process  Pt encouraged to lose weight via healthy eating and exercise  Pt may follow Liver Shrinking diet 2 weeks prior to DOS depending on BMI at time  This diet will promote weight loss  There were no vitals taken for this visit      Weight History Reason for WLS:HTN  And wants to avoid diabetes and simplify current medication   Onset of Obesity: Childhood (Teen - on Lithium, Seroquil, Ambilify- gained 60#)  Family history of obesity: Unknown Adopted from [facility] at age 12  Wt Loss Attempts: Exercise  Self Created Diets (Portion Control, Healthy Food Choices, etc ) MWM programs  Maximum Wt Lost: 5-10#    Review of History and Medications   OTC: MultiVitamin  Vitamin D  Past Medical History:   Diagnosis Date    Anxiety     Bronchitis     Concussion     Sports related    Depression     Hypertension     Morbid obesity with BMI of 45 0-49 9, adult ([address] )     Sleep apnea, obstructive      Past Surgical History:   Procedure Laterality Date    NO PAST SURGERIES       Social History     Socioeconomic History    Marital status: Single     Spouse name: Not on file    Number of children: Not on file    Years of education: Not on file    Highest education level: Not on file   Occupational History    Occupation: student    Occupation:      Employer: [employer]   Tobacco Use    Smoking status: Never Smoker    Smokeless tobacco: Never Used   Substance and Sexual Activity    Alcohol use:  Yes     Alcohol/week: 1 0 standard drink     Types: 1 Cans of beer per week     Comment: social    Drug use: No    Sexual activity: Not on file   Other Topics Concern    Not on file   Social History Narrative    Lives with adoptive parents     Social Determinants of Health     Financial Resource Strain: Not on file   Food Insecurity: Not on file   Transportation Needs: Not on file   Physical Activity: Not on file   Stress: Not on file   Social Connections: Not on file   Intimate Partner Violence: Not on file   Housing Stability: Not on file       Current Outpatient Medications:     buPROPion (WELLBUTRIN XL) 150 mg 24 hr tablet, Take 150 mg by mouth every morning  , Disp: , Rfl:     buPROPion (Wellbutrin XL) 300 mg 24 hr tablet, Take 300 mg by mouth every morning, Disp: , Rfl:     desvenlafaxine succinate (PRISTIQ) 50 mg 24 hr tablet, Take 1 tablet (50 mg total) by mouth daily, Disp: , Rfl:     doxepin (SINEquan) 75 MG capsule, TAKE 1 CAPSULE BY MOUTH EVERY DAY AT NIGHT, Disp: , Rfl:     hydrocortisone (ANUSOL-HC) 2 5 % rectal cream, Apply topically 2 (two) times a day, Disp: 28 g, Rfl: 1    losartan (COZAAR) 50 mg tablet, Take 1 tablet (50 mg total) by mouth daily, Disp: 90 tablet, Rfl: 3    propranolol (INDERAL LA) 80 mg 24 hr capsule, Take 1 capsule (80 mg total) by mouth every morning, Disp: 90 capsule, Rfl: 3    rizatriptan (MAXALT-MLT) 5 MG disintegrating tablet, Take 1 tablet (5 mg total) by mouth once as needed for migraine May repeat in 2 hours if needed, Disp: 9 tablet, Rfl: 3    topiramate (TOPAMAX) 200 MG tablet, 200 mg 2 (two) times a day , Disp: , Rfl:     ziprasidone (GEODON) 40 mg capsule, Take 40 mg by mouth every morning , Disp: , Rfl:     ziprasidone (GEODON) 80 mg capsule, Take 80 mg by mouth every evening , Disp: , Rfl:   Food Intake and Lifestyle Assessment   Food Intake Assessment completed via usual diet recall  Breakfast: Sleeps in - 12-1:00 Double serving Cereal, eggs, Waffles, Pancakes - leftovers  Snack: Chips, Belvitas  Lunch: 3-4 pm   Snack: Ham & Cheese SW - 2 [employer]  Dinner: Debra Siddiqi (take-out) Mom cooks - lean protein, starch, vegetables - raw Double helpings  Snack: Binges at night sw, leftovers  Mom says snacks hidden   Beverage intake: water 32 oz X3, coffee 1 cup and alcohol (beer - occ)  Protein supplement: Not at present  Portions: "double:  Estimated protein intake per day: Exceeds requirements  Estimated fluid intake per day: 96 oz water  Meals eaten away from home: Some Take-out  Typical meal pattern: 3 meals per day and snacks/binges during day and nightEating Behaviors: Consumption of high calorie/ high fat foods, Consumption of high calorie beverages, Large portion sizes, Frequent snacking/ grazing, Binge eating, Mindless eating, Emotional eating and Craves sweet foods  Pt denies any food behaviors that would lead to purging though state he binges "eats until he is full"  Food allergies or intolerances: None     Intolerance to roast turkey - vomits  Allergies   Allergen Reactions    Abilify [Aripiprazole]     Lithium     Lorazepam Other (See Comments)     aggressive    Seroquel [Quetiapine]      Cultural or Restoration considerations: None    Physical Assessment  Physical Activity sedentary - plays videos games  Types of exercise: Will exercise but dislikes  Current physical limitations: none    Psychosocial Assessment   Support systems: parent(s) - some relative had surgery  Socioeconomic factors: N/A    Nutrition Diagnosis  Diagnosis: Overweight / Obesity (NC-3 3)  Related to: Physical inactivity and Excessive energy intake  As Evidenced by: BMI >25     Nutrition Prescription: Recommend the following diet  Low fat, Low sugar, High protein and Regular    Interventions and Teaching   Discussed pre-op and post-op nutrition guidelines  Patient educated and handouts provided    Surgical changes to stomach / GI  Capacity of post-surgery stomach  Diet progression  Adequate hydration  Sugar and fat restriction to decrease "dumping syndrome"  Fat restriction to decrease steatorrhea  Expected weight loss  Weight loss plateaus/ possibility of weight regain  Exercise  Suggestions for pre-op diet  Nutrition considerations after surgery  Protein supplements  Meal planning and preparation  Appropriate carbohydrate, protein, and fat intake, and food/fluid choices to maximize safe weight loss, nutrient intake, and tolerance   Dietary and lifestyle changes  Possible problems with poor eating habits  Intuitive eating  Techniques for self monitoring and keeping daily food journal  Potential for food intolerance after surgery, and ways to deal with them including: lactose intolerance, nausea, reflux, vomiting, diarrhea, food intolerance, appetite changes, gas  Vitamin / Mineral supplementation of Multivitamin with minerals, Calcium, Vitamin B12, Iron and Vitamin D    Education provided to: patient    Barriers to learning:   Readiness to change: contemplation    Prior research on procedure: internet    Comprehension: needs reinforcement     Expected Compliance: Poor    Recommendations  Pt is not an appropriate candidate for surgery  Noted mental health disability, bingeing, lack of motivation and other food related behaviors would impair adherence to the restricive diet r/t WLS thus placing him at risk for post op complications  Pt reports that when he was in [facility] in [facility] he was told that the fastest eater get the most food so said that statement is stuck in his head so continues to follow that way of eating  He admits to eating double portions consistently and grazing continuously between meals and at night  Pt has very sedentary lifestyle as sleeps long hours and when awake, plays video games  Parents accompanied pt and very supportive   Has made numerous attempts to help manage Pino's weight including [facility]  MWM and Healthy Core programs, gym memberships, etc  Pt acknowledges interest in having surgery at start of evaluation but unsure if pt would proceed after learning about program expectations and guidelines  Monthly PreOp Visit 4/14/2022  Pt states he is overwhelmed with the process and uncertain whether he wants to have the surgery  Pt became very tearful/emotional  States he knows weight loss will help his depression though not sure he can change habits and or sustain them  Pt discusses situations at orphanage that attributes to his eating pattern but has been unable to break these habits formed  Support provided during discussion  Guided pt in ways he can modify his eating habits and get better control  Pt admits needs a better sleep schedule and trying to get employment in hopes of establishing a routine  Pt accompanied by his mom who had numerous questions about the workflow/requirements  Workflow reviewed in detail  Workflow:   Psych and/or D+A Clearance: Needs to complete 4/19/2022   PCP Letter: Received   Support Group: Needs to complete- advised on podcasts    Surgeon Appt  Met w/Dr Yakelin Porter EGD  Scheduled 4/22 however advised to cancel if not cleared by psych on 4/19   Cardiac Risk Assessment Scheduled 5/18/2022   Sleep Study on 4/21/2022 had consult 4/14/2022   Blood work Needs to complete - will order once cleared    Nicotine test N/A   6 Month Pre-Operative Program: N/A   Weight Loss  Advised not to gain- wt loss recommended 5%/16#      Evaluation / Monitoring  Dietitian to Monitor: Eating pattern as discussed Tolerance of nutrition prescription Body weight Lab values Physical activity Bowel pattern    Goals  Advised pt to select 1-2 goals to work on   Pt did not commit to any specific goals during session   Pre-Surgery guidelines  > Trial Baritastic for food logging  > Establish regular meal pattern - include fruits, vegetables and whole grains  > Focus on protein - include lean protein at each meal and snack -> Decrease portions  > Limit processed foods, fast foods and dining away from home  > Include meal prepping  > Limit snacks - healthier choices and portion; avoid grazing-bingeing  > Slow pace of eating and drinking - practice 30/60 minute rule  > Reduce/eliminate caffeine & carbonation before pre-op diet  > Maintainwater intake - 64 oz  > Increase physical activity/establish exercise regimen   > Continuet multi vitamin and additional Vitamin D 2000IU  Work on skills to cope with emotional eating/mindfull eating    Time Spent:    30 Minutes

## 2022-04-14 NOTE — PROGRESS NOTES
Assessment/Plan:     Diagnoses and all orders for this visit:    NIMO (obstructive sleep apnea)  -     Diagnostic Sleep Study; Future    Bariatric surgery status  -     Ambulatory referral to Sleep Medicine    Other insomnia    Morbid obesity ([facility] [address] )          Plan for follow up:  Patient has signs and symptoms of obstructive sleep apnea  Etiology pathogenesis of obstructive sleep apnea discussed in detail  Testing procedure was discussed  Will get an all-night polysomnogram and follow-up  Also has history of sleep onset insomnia he has been on doxepin, the medication list currently states he is on doxepin 75 mg but his dad states he has been on 300 mg, and he is also using over-the-counter sleep aid which he takes at around 11:00 p m  He states  I have reviewed his prior diagnostic sleep study with very good sleep efficiency at 95 3%, with sleep onset latency of 12 minutes and he had another study with diagnostic study followed by multiple sleep latency testing at which time the diagnostic study demonstrated sleep efficiency of 77 3%, the sleep onset latency was 68 minutes  Given his significant weight gain since the last study a but 3 years ago, he would benefit from the all-night polysomnogram and follow-up if there is evidence of abnormal nocturnal breathing he will undergo a CPAP titration study for maximal benefit  His daytime tiredness and fatigue likely multifactorial so secondary to his significant side effects from the medications as well  Recommend weight loss  Following up with Bariatric Clinic  Follow-up after the above testing  Return in about 6 weeks (around 5/26/2022)  All questions are answered to the patient's satisfaction and understanding  He verbalizes understanding  He is encouraged to call with any further questions or concerns  Portions of the record may have been created with voice recognition software    Occasional wrong word or "sound a like" substitutions may have occurred due to the inherent limitations of voice recognition software  Read the chart carefully and recognize, using context, where substitutions have occurred  a    Electronically Signed by Carlos Holcomb MD    ______________________________________________________________________    Chief Complaint:   Chief Complaint   Patient presents with    Follow-up    Sleep Apnea        Patient ID: Lev Rae is a 23 y o  y o  male has a past medical history of Anxiety, Bronchitis, Concussion, Depression, Hypertension, Morbid obesity with BMI of 45 0-49 9, adult ([address] ), and Sleep apnea, obstructive  4/14/2022  Patient presents today for initial visit  Zach Vazquez  is a very pleasant 64-year-old gentleman, here for evaluation for obstructive sleep apnea with his dad  He he is referred by a bariatric the the clinic  He the wall is following up with the Psychiatry/Psychology, recurrent depression has been on was the medications, states that he has a history of insomnia for several years no more than 10 years he states, states he goes to bed at around will midnight or 1:00 a m , and he states it takes about an hour or sometimes 1-1/2 hours to fall asleep, and is out of bed at all noon time, occasionally has 1 nocturnal awakening for nocturia, when he is out of the bed he still tired and fatigued he states, he no history of any early morning headaches        Occupational/Exposure history:not working   Travel history: none   Review of Systems   Constitutional: Positive for fatigue and unexpected weight change (weight gain in the past 3-4 years several lb he states  )  HENT: Negative  Eyes: Negative  Respiratory: Negative  History of loud snoring witnessed apneic spells choking and gasping for air  Cardiovascular: Negative  Gastrointestinal: Negative  Endocrine: Negative  Genitourinary: Negative  Musculoskeletal: Negative  Allergic/Immunologic: Negative  Neurological: Negative  Hematological: Negative  Psychiatric/Behavioral: Positive for sleep disturbance  Negative for hallucinations  Social history: He reports that he has never smoked  He has never used smokeless tobacco  He reports current alcohol use of about 1 0 standard drink of alcohol per week  He reports that he does not use drugs      Past surgical history:   Past Surgical History:   Procedure Laterality Date    NO PAST SURGERIES       Family history:   Family History   Adopted: Yes       Immunization History   Administered Date(s) Administered    BCG 05/30/1998, 08/18/2000, 04/06/2001, 07/06/2001, 02/26/2002    COVID-19 PFIZER VACCINE 0 3 ML IM 03/18/2021, 04/10/2021    DTaP 04/21/1997, 05/26/1997, 08/26/1997, 07/08/2000, 12/02/2002    H1N1, All Formulations 12/03/2009    HPV Quadrivalent 07/27/2014, 11/02/2014    Hep A, ped/adol, 2 dose 06/11/2013    Hep B, Adolescent or Pediatric 11/19/2002, 12/30/2002, 06/02/2003, 05/25/2014    Hepatitis A 06/01/2013, 06/11/2013, 07/27/2014    HiB 11/19/2002    INFLUENZA 12/05/2009, 10/07/2015    IPV 07/27/2014    Influenza, injectable, quadrivalent, preservative free 0 5 mL 10/16/2020    MMR 03/16/1998, 11/19/2002    Meningococcal MCV4P 08/17/2009    OPV 04/21/1997, 05/26/1997, 08/26/1997, 02/16/1998, 04/19/1998    Tdap 08/17/2009    Tuberculin Skin Test-PPD Intradermal 04/27/2015, 06/05/2015    Typhoid Live, oral 06/11/2013    Typhoid, Unspecified 06/11/2013    Varicella 12/02/2002, 06/21/2004, 08/17/2009     Current Outpatient Medications   Medication Sig Dispense Refill    buPROPion (WELLBUTRIN XL) 150 mg 24 hr tablet Take 150 mg by mouth every morning        buPROPion (Wellbutrin XL) 300 mg 24 hr tablet Take 300 mg by mouth every morning      desvenlafaxine succinate (PRISTIQ) 50 mg 24 hr tablet Take 1 tablet (50 mg total) by mouth daily      doxepin (SINEquan) 75 MG capsule TAKE 1 CAPSULE BY MOUTH EVERY DAY AT NIGHT      hydrocortisone (ANUSOL-HC) 2 5 % rectal cream Apply topically 2 (two) times a day 28 g 1    losartan (COZAAR) 50 mg tablet Take 1 tablet (50 mg total) by mouth daily 90 tablet 3    topiramate (TOPAMAX) 200 MG tablet 200 mg 2 (two) times a day       ziprasidone (GEODON) 40 mg capsule Take 40 mg by mouth every morning       ziprasidone (GEODON) 80 mg capsule Take 80 mg by mouth every evening       propranolol (INDERAL LA) 80 mg 24 hr capsule Take 1 capsule (80 mg total) by mouth every morning 90 capsule 3    rizatriptan (MAXALT-MLT) 5 MG disintegrating tablet Take 1 tablet (5 mg total) by mouth once as needed for migraine May repeat in 2 hours if needed 9 tablet 3     No current facility-administered medications for this visit  Allergies: Abilify [aripiprazole], Lithium, Lorazepam, and Seroquel [quetiapine]    Objective:  Vitals:    04/14/22 0927   BP: 126/84   Pulse: 94   Temp: 97 7 °F (36 5 °C)   SpO2: 96%   Weight: (!) 144 kg (317 lb)   Height: 5' 6" (1 676 m)   Oxygen Therapy  SpO2: 96 %    Wt Readings from Last 3 Encounters:   04/14/22 (!) 144 kg (318 lb)   04/14/22 (!) 144 kg (317 lb)   03/17/22 (!) 143 kg (314 lb 9 6 oz)     Body mass index is 51 17 kg/m²  Physical Exam  Constitutional:       Appearance: He is well-developed  HENT:      Head: Normocephalic and atraumatic  Mouth/Throat:      Comments: Crowded oropharyngeal airways  Eyes:      Pupils: Pupils are equal, round, and reactive to light  Neck:      Comments: Short and wide neck  Cardiovascular:      Rate and Rhythm: Normal rate and regular rhythm  Heart sounds: Normal heart sounds  Pulmonary:      Effort: Pulmonary effort is normal       Breath sounds: Normal breath sounds  Musculoskeletal:         General: Normal range of motion  Cervical back: Normal range of motion and neck supple  Skin:     General: Skin is warm and dry  Neurological:      Mental Status: He is alert and oriented to person, place, and time     Psychiatric:         Behavior: Behavior normal  Diagnostics:  I have personally reviewed pertinent reports      ESS: Total score: 2

## 2022-04-21 ENCOUNTER — HOSPITAL ENCOUNTER (OUTPATIENT)
Dept: SLEEP CENTER | Facility: CLINIC | Age: 27
Discharge: HOME | End: 2022-04-21
Payer: COMMERCIAL

## 2022-04-21 DIAGNOSIS — G47.33 OSA (OBSTRUCTIVE SLEEP APNEA): ICD-10-CM

## 2022-04-21 PROCEDURE — 95810 POLYSOM 6/> YRS 4/> PARAM: CPT

## 2022-04-21 PROCEDURE — 95810 POLYSOM 6/> YRS 4/> PARAM: CPT | Performed by: INTERNAL MEDICINE

## 2022-04-22 ENCOUNTER — ANESTHESIA (OUTPATIENT)
Dept: GASTROENTEROLOGY | Facility: HOSPITAL | Age: 27
End: 2022-04-22

## 2022-04-22 ENCOUNTER — ANESTHESIA EVENT (OUTPATIENT)
Dept: GASTROENTEROLOGY | Facility: HOSPITAL | Age: 27
End: 2022-04-22

## 2022-04-22 ENCOUNTER — HOSPITAL ENCOUNTER (OUTPATIENT)
Dept: GASTROENTEROLOGY | Facility: HOSPITAL | Age: 27
Setting detail: OUTPATIENT SURGERY
Discharge: HOME | End: 2022-04-22
Attending: SURGERY | Admitting: SURGERY
Payer: COMMERCIAL

## 2022-04-22 VITALS
RESPIRATION RATE: 25 BRPM | OXYGEN SATURATION: 94 % | SYSTOLIC BLOOD PRESSURE: 115 MMHG | DIASTOLIC BLOOD PRESSURE: 53 MMHG | HEIGHT: 66 IN | HEART RATE: 90 BPM | BODY MASS INDEX: 50.62 KG/M2 | TEMPERATURE: 97.5 F | WEIGHT: 315 LBS

## 2022-04-22 DIAGNOSIS — E66.01 MORBID OBESITY (HCC): ICD-10-CM

## 2022-04-22 PROCEDURE — 88342 IMHCHEM/IMCYTCHM 1ST ANTB: CPT | Performed by: PATHOLOGY

## 2022-04-22 PROCEDURE — 43239 EGD BIOPSY SINGLE/MULTIPLE: CPT | Performed by: SURGERY

## 2022-04-22 PROCEDURE — 88305 TISSUE EXAM BY PATHOLOGIST: CPT | Performed by: PATHOLOGY

## 2022-04-22 RX ORDER — LIDOCAINE HYDROCHLORIDE 20 MG/ML
INJECTION, SOLUTION EPIDURAL; INFILTRATION; INTRACAUDAL; PERINEURAL AS NEEDED
Status: DISCONTINUED | OUTPATIENT
Start: 2022-04-22 | End: 2022-04-22

## 2022-04-22 RX ORDER — MIDAZOLAM HYDROCHLORIDE 2 MG/2ML
INJECTION, SOLUTION INTRAMUSCULAR; INTRAVENOUS AS NEEDED
Status: DISCONTINUED | OUTPATIENT
Start: 2022-04-22 | End: 2022-04-22

## 2022-04-22 RX ORDER — SODIUM CHLORIDE, SODIUM LACTATE, POTASSIUM CHLORIDE, CALCIUM CHLORIDE 600; 310; 30; 20 MG/100ML; MG/100ML; MG/100ML; MG/100ML
125 INJECTION, SOLUTION INTRAVENOUS CONTINUOUS
Status: DISCONTINUED | OUTPATIENT
Start: 2022-04-22 | End: 2022-04-26 | Stop reason: HOSPADM

## 2022-04-22 RX ORDER — LIDOCAINE HYDROCHLORIDE 10 MG/ML
0.5 INJECTION, SOLUTION EPIDURAL; INFILTRATION; INTRACAUDAL; PERINEURAL ONCE AS NEEDED
Status: DISCONTINUED | OUTPATIENT
Start: 2022-04-22 | End: 2022-04-26 | Stop reason: HOSPADM

## 2022-04-22 RX ORDER — SODIUM CHLORIDE, SODIUM LACTATE, POTASSIUM CHLORIDE, CALCIUM CHLORIDE 600; 310; 30; 20 MG/100ML; MG/100ML; MG/100ML; MG/100ML
INJECTION, SOLUTION INTRAVENOUS CONTINUOUS PRN
Status: DISCONTINUED | OUTPATIENT
Start: 2022-04-22 | End: 2022-04-22

## 2022-04-22 RX ORDER — PROPOFOL 10 MG/ML
INJECTION, EMULSION INTRAVENOUS AS NEEDED
Status: DISCONTINUED | OUTPATIENT
Start: 2022-04-22 | End: 2022-04-22

## 2022-04-22 RX ADMIN — PROPOFOL 70 MG: 10 INJECTION, EMULSION INTRAVENOUS at 10:47

## 2022-04-22 RX ADMIN — SODIUM CHLORIDE, SODIUM LACTATE, POTASSIUM CHLORIDE, AND CALCIUM CHLORIDE: .6; .31; .03; .02 INJECTION, SOLUTION INTRAVENOUS at 10:45

## 2022-04-22 RX ADMIN — MIDAZOLAM HYDROCHLORIDE 2 MG: 1 INJECTION, SOLUTION INTRAMUSCULAR; INTRAVENOUS at 10:47

## 2022-04-22 RX ADMIN — TOPICAL ANESTHETIC 1 SPRAY: 200 SPRAY DENTAL; PERIODONTAL at 10:47

## 2022-04-22 RX ADMIN — LIDOCAINE HYDROCHLORIDE 100 MG: 20 INJECTION, SOLUTION EPIDURAL; INFILTRATION; INTRACAUDAL; PERINEURAL at 10:47

## 2022-04-22 NOTE — PROGRESS NOTES
Sleep Study Documentation    Pre-Sleep Study       Sleep testing procedure explained to patient:YES    Patient napped prior to study:NO    [address] [place] worker after 12PM   Caffeine use:NO    Alcohol:Dayshift workers after 5PM: Alcohol use:NO    Typical day for patient:YES       Study Documentation    Sleep Study Indications: Snoring and unrefreshing sleep    Sleep Study: Diagnostic   Snore: Moderate  Supplemental O2: no    O2 flow rate (L/min) range   O2 flow rate (L/min) final   Minimum SaO2 75%  Baseline SaO2  94%    EKG abnormalities: no     EEG abnormalities: no    Sleep Study Recorded < 2 hours: N/A    Sleep Study Recorded > 2 hours but incomplete study: N/A    Sleep Study Recorded 6 hours but no sleep obtained: NO    Patient classification: unemployed       Post-Sleep Study    Medication used at bedtime or during sleep study:YES prescription sleep aid    Patient reports time it took to fall asleep:30 to 60 minutes    Patient reports waking up during study:Denied    Patient reports sleeping 2 to 4 hours with dreaming  Patient reports sleep during study:typical    Patient rated sleepiness: Somewhat sleepy or tired    PAP treatment:no

## 2022-04-22 NOTE — H&P
This is a 23 y o  male with a history of morbid obesity and Body mass index is 51 6 kg/m²  Here for an EGD to evaluate the anatomy of the GI tract and to rule out the presence of H  pylori  Physical Exam    /72   Pulse 90   Temp 97 5 °F (36 4 °C) (Temporal)   Resp 20   Ht 5' 6" (1 676 m)   Wt (!) 145 kg (319 lb 10 7 oz)   SpO2 93%   BMI 51 60 kg/m²    AAOx3  RRR  CTA B  Abdomen obese  Benign  A/P:    This is a 23 y o  male with a history of morbid obesity and Body mass index is 51 6 kg/m²       Will proceed with the EGD and biopsies        Vitaliy Weldon MD  4/22/2022  10:15 AM

## 2022-04-22 NOTE — ANESTHESIA POSTPROCEDURE EVALUATION
Post-Op Assessment Note    CV Status:  Stable    Pain management: adequate     Mental Status:  Alert and awake   Hydration Status:  Euvolemic   PONV Controlled:  Controlled   Airway Patency:  Patent      Post Op Vitals Reviewed: Yes      Staff: CRNA         No complications documented      BP 95/52 (04/22/22 1059)    Temp      Pulse 99 (04/22/22 1059)   Resp 20 (04/22/22 1059)    SpO2 94 % (04/22/22 1059)

## 2022-05-03 ENCOUNTER — TELEPHONE (OUTPATIENT)
Dept: PULMONOLOGY | Facility: CLINIC | Age: 27
End: 2022-05-03

## 2022-05-03 NOTE — TELEPHONE ENCOUNTER
Spoke with patient and his father  They have a follow up scheduled in June and will discuss treatment options then  Pt with anxiety about using a CPAP  Would like to discuss all options at visit before ordering CPAP

## 2022-05-11 ENCOUNTER — TELEPHONE (OUTPATIENT)
Dept: BARIATRICS | Facility: CLINIC | Age: 27
End: 2022-05-11

## 2022-05-11 DIAGNOSIS — A04.8 H. PYLORI INFECTION: Primary | ICD-10-CM

## 2022-05-11 RX ORDER — AMOXICILLIN 500 MG/1
1000 CAPSULE ORAL EVERY 12 HOURS SCHEDULED
Qty: 56 CAPSULE | Refills: 0 | Status: SHIPPED | OUTPATIENT
Start: 2022-05-11 | End: 2022-05-25

## 2022-05-11 RX ORDER — OMEPRAZOLE 20 MG/1
20 TABLET, DELAYED RELEASE ORAL 2 TIMES DAILY
Qty: 28 TABLET | Refills: 0 | Status: SHIPPED | OUTPATIENT
Start: 2022-05-11 | End: 2022-05-25

## 2022-05-11 RX ORDER — CLARITHROMYCIN 500 MG/1
500 TABLET, COATED ORAL EVERY 12 HOURS SCHEDULED
Qty: 28 TABLET | Refills: 0 | Status: SHIPPED | OUTPATIENT
Start: 2022-05-11 | End: 2022-05-25

## 2022-05-11 NOTE — TELEPHONE ENCOUNTER
Please advise patient of H  Pylori infection found on EGD biopsy  Three medications - 2 antibiotics and PPI x 14 days   Thank you

## 2022-05-11 NOTE — TELEPHONE ENCOUNTER
Advised patient of findings and of the prescriptions sent into [facility]   Patient verbalized understanding

## 2022-05-18 ENCOUNTER — CONSULT (OUTPATIENT)
Dept: CARDIOLOGY CLINIC | Facility: CLINIC | Age: 27
End: 2022-05-18
Payer: COMMERCIAL

## 2022-05-18 VITALS
WEIGHT: 315 LBS | OXYGEN SATURATION: 97 % | HEIGHT: 66 IN | RESPIRATION RATE: 16 BRPM | SYSTOLIC BLOOD PRESSURE: 118 MMHG | BODY MASS INDEX: 50.62 KG/M2 | DIASTOLIC BLOOD PRESSURE: 74 MMHG | HEART RATE: 95 BPM

## 2022-05-18 DIAGNOSIS — Z01.810 PREOPERATIVE CARDIOVASCULAR EXAMINATION: Primary | ICD-10-CM

## 2022-05-18 DIAGNOSIS — I10 PRIMARY HYPERTENSION: ICD-10-CM

## 2022-05-18 DIAGNOSIS — Z98.84 BARIATRIC SURGERY STATUS: ICD-10-CM

## 2022-05-18 PROCEDURE — 93000 ELECTROCARDIOGRAM COMPLETE: CPT | Performed by: INTERNAL MEDICINE

## 2022-05-18 PROCEDURE — 99204 OFFICE O/P NEW MOD 45 MIN: CPT | Performed by: INTERNAL MEDICINE

## 2022-05-18 NOTE — PROGRESS NOTES
Cardiology Office Note    Errol Brock 23 y o  male MRN: [id number]    05/18/22          Assessment:  1  Preoperative evaluation  2  Obesity with inability to sustain meaningful weight loss   3  Hypertension   4  HLD  5  NIMO   6  Depression/anxiety    Plan:  · He is at very low risk of MACE  Will evaluate with a TTE for risk stratification  · BP at goal; cont losartan and propranolol   Ambulatory blood pressure monitoring and maintaining a low sodium diet was advised  · He was advised to notify us with the onset of cardiac symptoms  Follow up: 4 months or sooner as needed    1  Preoperative cardiovascular examination     2  Bariatric surgery status  Ambulatory referral to Cardiology   3  Primary hypertension  Echo complete w/ contrast if indicated       HPI: Errol Brock is a 23y o  year old male with history of obesity with inability to sustain meaningful weight loss and hypertension who was referred by his bariatric surgeon Dr Liliya Lyons to establish care  He is being evaluated for laparoscopic sleeve gastrectomy versus Ladi-en-Y gastric bypass  He has a history of NIMO  He reports nocturnal sleep disturbance and daytime somnolence with frequent eating that has resulted in weight gain  He has however started making lifestyle modifications and has started exercising at [employer]  He notes stable dyspnea possibly secondary to deconditioning  He denies recent chest pain, palpitations, or any other cardiac concerns at this time        ECG personally reviewed: NSR, normal ECG    Family History: mother with CAD s/p PCI    Social history: denies tobacco and recreational drug use,       Allergies   Allergen Reactions    Abilify [Aripiprazole]     Lithium     Lorazepam Other (See Comments)     aggressive    Seroquel [Quetiapine]          Current Outpatient Medications:     amoxicillin (AMOXIL) 500 mg capsule, Take 2 capsules (1,000 mg total) by mouth every 12 (twelve) hours for 14 days, Disp: 56 capsule, Rfl: 0    buPROPion (WELLBUTRIN XL) 150 mg 24 hr tablet, Take 150 mg by mouth every morning  , Disp: , Rfl:     buPROPion (Wellbutrin XL) 300 mg 24 hr tablet, Take 300 mg by mouth every morning  , Disp: , Rfl:     clarithromycin (BIAXIN) 500 mg tablet, Take 1 tablet (500 mg total) by mouth every 12 (twelve) hours for 14 days, Disp: 28 tablet, Rfl: 0    desvenlafaxine succinate (PRISTIQ) 50 mg 24 hr tablet, Take 1 tablet (50 mg total) by mouth daily, Disp: , Rfl:     doxepin (SINEquan) 75 MG capsule, Take 75 mg by mouth daily at bedtime, Disp: , Rfl:     losartan (COZAAR) 50 mg tablet, Take 1 tablet (50 mg total) by mouth daily, Disp: 90 tablet, Rfl: 3    propranolol (INDERAL LA) 80 mg 24 hr capsule, Take 1 capsule (80 mg total) by mouth every morning, Disp: 90 capsule, Rfl: 3    topiramate (TOPAMAX) 200 MG tablet, 200 mg 2 (two) times a day , Disp: , Rfl:     ziprasidone (GEODON) 40 mg capsule, Take 40 mg by mouth every morning , Disp: , Rfl:     ziprasidone (GEODON) 80 mg capsule, Take 80 mg by mouth every evening , Disp: , Rfl:     omeprazole (PriLOSEC OTC) 20 MG tablet, Take 1 tablet (20 mg total) by mouth in the morning and 1 tablet (20 mg total) before bedtime  Do all this for 14 days   (Patient not taking: Reported on 5/18/2022), Disp: 28 tablet, Rfl: 0    Past Medical History:   Diagnosis Date    Anxiety     Bronchitis     Concussion     Sports related    Depression     Hypertension     Morbid obesity with BMI of 45 0-49 9, adult ([facility] [address] )     Sleep apnea, obstructive        Family History   Adopted: Yes       Past Surgical History:   Procedure Laterality Date    NO PAST SURGERIES         Social History     Socioeconomic History    Marital status: Single     Spouse name: Not on file    Number of children: Not on file    Years of education: Not on file    Highest education level: Not on file   Occupational History    Occupation: student    Occupation:      Employer: [employer]   Tobacco Use    Smoking status: Never Smoker    Smokeless tobacco: Never Used   Substance and Sexual Activity    Alcohol use: Yes     Alcohol/week: 1 0 standard drink     Types: 1 Cans of beer per week     Comment: social    Drug use: No    Sexual activity: Not on file   Other Topics Concern    Not on file   Social History Narrative    Lives with adoptive parents     Social Determinants of Health     Financial Resource Strain: Not on file   Food Insecurity: Not on file   Transportation Needs: Not on file   Physical Activity: Not on file   Stress: Not on file   Social Connections: Not on file   Intimate Partner Violence: Not on file   Housing Stability: Not on file       Review of Systems   Constitutional: Positive for weight gain  Negative for diaphoresis and weight loss  HENT: Negative for congestion  Cardiovascular: Positive for dyspnea on exertion (stable)  Negative for chest pain, irregular heartbeat, leg swelling, near-syncope, orthopnea, palpitations, paroxysmal nocturnal dyspnea and syncope  Respiratory: Negative for shortness of breath, sleep disturbances due to breathing and snoring  Hematologic/Lymphatic: Does not bruise/bleed easily  Skin: Negative for rash  Musculoskeletal: Negative for myalgias  Gastrointestinal: Negative for nausea and vomiting  Neurological: Negative for excessive daytime sleepiness and light-headedness  Psychiatric/Behavioral: Positive for depression  The patient is not nervous/anxious  Vitals: /74 (BP Location: Left arm, Patient Position: Sitting)   Pulse 95   Resp 16   Ht 5' 6" (1 676 m)   Wt (!) 145 kg (320 lb)   SpO2 97%   BMI 51 65 kg/m²       Physical Exam:     GEN: Alert and oriented x 3, in no acute distress  Well appearing and well nourished  HEENT: Sclera anicteric, conjunctivae pink, mucous membranes moist  Oropharynx clear  NECK: Supple, no carotid bruits, no significant JVD   Trachea midline, no thyromegaly  HEART: Regular rhythm, normal S1 and S2, no murmurs, clicks, gallops or rubs  PMI nondisplaced, no thrills  LUNGS: Clear to auscultation bilaterally; no wheezes, rales, or rhonchi  No increased work of breathing or signs of respiratory distress  ABDOMEN: Soft, nontender, nondistended, normoactive bowel sounds  EXTREMITIES: Skin warm and well perfused, no clubbing, cyanosis, or edema  NEURO: No focal findings  Normal speech  Mood and affect normal    SKIN: Normal without suspicious lesions on exposed skin

## 2022-05-20 ENCOUNTER — OFFICE VISIT (OUTPATIENT)
Dept: BARIATRICS | Facility: CLINIC | Age: 27
End: 2022-05-20

## 2022-05-20 DIAGNOSIS — Z01.818 PREOP TESTING: ICD-10-CM

## 2022-05-20 DIAGNOSIS — E66.01 OBESITY, CLASS III, BMI 40-49.9 (MORBID OBESITY) (HCC): Primary | ICD-10-CM

## 2022-05-20 PROCEDURE — RECHECK

## 2022-05-20 NOTE — PROGRESS NOTES
Bariatric Nutrition Assessment Note - Follow-up - Monthly Prep Visit    Insurance: No required weight checks    Type of surgery    Interested in Vertical sleeve gastrectomy but since talked to Dr Meli Deshpande  Surgeon: Dr Gwen Laguna on 3/17/2022    Nutrition Assessment   Bradley Trinh  23 y o   male     Height: 5'6"  Weight: 316 2#  Eval Weight: 315 3#   BMI: 50 9  Wt with BMI of 25: 155#  Pre-Op Excess Wt: 160#  BMI to Qualify at 40 = 248#  PMH includes: HTN    Pt advised not to gain weight during preop process  Pt encouraged to lose weight via healthy eating and exercise  Pt may follow Liver Shrinking diet 2 weeks prior to DOS depending on BMI at time  This diet will promote weight loss  There were no vitals taken for this visit      Weight History Reason for WLS:HTN  And wants to avoid diabetes and simplify current medication   Onset of Obesity: Childhood (Teen - on Lithium, Seroquil, Ambilify- gained 60#)  Family history of obesity: Unknown Adopted from [facility] at age 12  Wt Loss Attempts: Exercise  Self Created Diets (Portion Control, Healthy Food Choices, etc ) MWM programs  Maximum Wt Lost: 5-10#    Review of History and Medications   OTC: MultiVitamin  Vitamin D  Past Medical History:   Diagnosis Date    Anxiety     Bronchitis     Concussion     Sports related    Depression     Hypertension     Morbid obesity with BMI of 45 0-49 9, adult ([address] )     Sleep apnea, obstructive      Past Surgical History:   Procedure Laterality Date    NO PAST SURGERIES       Social History     Socioeconomic History    Marital status: Single     Spouse name: Not on file    Number of children: Not on file    Years of education: Not on file    Highest education level: Not on file   Occupational History    Occupation: student    Occupation:      Employer: [employer]   Tobacco Use    Smoking status: Never Smoker    Smokeless tobacco: Never Used   Substance and Sexual Activity    Alcohol use: Yes     Alcohol/week: 1 0 standard drink     Types: 1 Cans of beer per week     Comment: social    Drug use: No    Sexual activity: Not on file   Other Topics Concern    Not on file   Social History Narrative    Lives with adoptive parents     Social Determinants of Health     Financial Resource Strain: Not on file   Food Insecurity: Not on file   Transportation Needs: Not on file   Physical Activity: Not on file   Stress: Not on file   Social Connections: Not on file   Intimate Partner Violence: Not on file   Housing Stability: Not on file       Current Outpatient Medications:     amoxicillin (AMOXIL) 500 mg capsule, Take 2 capsules (1,000 mg total) by mouth every 12 (twelve) hours for 14 days, Disp: 56 capsule, Rfl: 0    buPROPion (WELLBUTRIN XL) 150 mg 24 hr tablet, Take 150 mg by mouth every morning  , Disp: , Rfl:     buPROPion (Wellbutrin XL) 300 mg 24 hr tablet, Take 300 mg by mouth every morning  , Disp: , Rfl:     clarithromycin (BIAXIN) 500 mg tablet, Take 1 tablet (500 mg total) by mouth every 12 (twelve) hours for 14 days, Disp: 28 tablet, Rfl: 0    desvenlafaxine succinate (PRISTIQ) 50 mg 24 hr tablet, Take 1 tablet (50 mg total) by mouth daily, Disp: , Rfl:     doxepin (SINEquan) 75 MG capsule, Take 75 mg by mouth daily at bedtime, Disp: , Rfl:     losartan (COZAAR) 50 mg tablet, Take 1 tablet (50 mg total) by mouth daily, Disp: 90 tablet, Rfl: 3    omeprazole (PriLOSEC OTC) 20 MG tablet, Take 1 tablet (20 mg total) by mouth in the morning and 1 tablet (20 mg total) before bedtime  Do all this for 14 days   (Patient not taking: Reported on 5/18/2022), Disp: 28 tablet, Rfl: 0    propranolol (INDERAL LA) 80 mg 24 hr capsule, Take 1 capsule (80 mg total) by mouth every morning, Disp: 90 capsule, Rfl: 3    topiramate (TOPAMAX) 200 MG tablet, 200 mg 2 (two) times a day , Disp: , Rfl:     ziprasidone (GEODON) 40 mg capsule, Take 40 mg by mouth every morning , Disp: , Rfl:    ziprasidone (GEODON) 80 mg capsule, Take 80 mg by mouth every evening , Disp: , Rfl:   Food Intake and Lifestyle Assessment   Food Intake Assessment completed via usual diet recall  Breakfast: Sleeps in - 12-1:00 Double serving Cereal, eggs, Waffles, Pancakes - leftovers  Snack: Chips, [place]  Lunch: 3-4 pm   Snack: Ham & Cheese SW - 2 [employer]  Dinner: [employer] (take-out) Mom cooks - lean protein, starch, vegetables - raw Double helpings  Snack: Binges at night sw, leftovers  Mom says snacks hidden   Beverage intake: water 32 oz X3, coffee 1 cup and alcohol (beer - occ)  Protein supplement: Not at present  Portions: "double:  Estimated protein intake per day: Exceeds requirements  Estimated fluid intake per day: 96 oz water  Meals eaten away from home: Some Take-out  Typical meal pattern: 3 meals per day and snacks/binges during day and nightEating Behaviors: Consumption of high calorie/ high fat foods, Consumption of high calorie beverages, Large portion sizes, Frequent snacking/ grazing, Binge eating, Mindless eating, Emotional eating and Craves sweet foods  Pt denies any food behaviors that would lead to purging though state he binges "eats until he is full"  Food allergies or intolerances: None     Intolerance to roast turkey - vomits  Allergies   Allergen Reactions    Abilify [Aripiprazole]     Lithium     Lorazepam Other (See Comments)     aggressive    Seroquel [Quetiapine]      Cultural or Judaism considerations: None    Physical Assessment  Physical Activity sedentary - plays videos games  Types of exercise:  Will exercise but dislikes  Current physical limitations: none    Psychosocial Assessment   Support systems: parent(s) - some relative had surgery  Socioeconomic factors: N/A    Nutrition Diagnosis  Diagnosis: Overweight / Obesity (NC-3 3)  Related to: Physical inactivity and Excessive energy intake  As Evidenced by: BMI >25     Nutrition Prescription: Recommend the following diet  Low fat, Low sugar, High protein and Regular    Interventions and Teaching   Discussed pre-op and post-op nutrition guidelines  Patient educated and handouts provided  Surgical changes to stomach / GI  Capacity of post-surgery stomach  Diet progression  Adequate hydration  Sugar and fat restriction to decrease "dumping syndrome"  Fat restriction to decrease steatorrhea  Expected weight loss  Weight loss plateaus/ possibility of weight regain  Exercise  Suggestions for pre-op diet  Nutrition considerations after surgery  Protein supplements  Meal planning and preparation  Appropriate carbohydrate, protein, and fat intake, and food/fluid choices to maximize safe weight loss, nutrient intake, and tolerance   Dietary and lifestyle changes  Possible problems with poor eating habits  Intuitive eating  Techniques for self monitoring and keeping daily food journal  Potential for food intolerance after surgery, and ways to deal with them including: lactose intolerance, nausea, reflux, vomiting, diarrhea, food intolerance, appetite changes, gas  Vitamin / Mineral supplementation of Multivitamin with minerals, Calcium, Vitamin B12, Iron and Vitamin D    Education provided to: patient    Barriers to learning:   Readiness to change: contemplation    Prior research on procedure: internet    Comprehension: needs reinforcement     Expected Compliance: Poor    Recommendations  Pt is not an appropriate candidate for surgery  Noted mental health disability, bingeing, lack of motivation and other food related behaviors would impair adherence to the restricive diet r/t WLS thus placing him at risk for post op complications  Pt reports that when he was in [facility] in [facility] he was told that the fastest eater get the most food so said that statement is stuck in his head so continues to follow that way of eating  He admits to eating double portions consistently and grazing continuously between meals and at night   Pt has very sedentary lifestyle as sleeps long hours and when awake, plays video games  Parents accompanied pt and very supportive  Has made numerous attempts to help manage Pino's weight including Joanie IRIZARRY and Healthy Core programs, gym memberships, etc  Pt acknowledges interest in having surgery at start of evaluation but unsure if pt would proceed after learning about program expectations and guidelines  Monthly PreOp Visit 4/14/2022  Pt states he is overwhelmed with the process and uncertain whether he wants to have the surgery  Pt became very tearful/emotional  States he knows weight loss will help his depression though not sure he can change habits and or sustain them  Pt discusses situations at orphanage that attributes to his eating pattern but has been unable to break these habits formed  Support provided during discussion  Guided pt in ways he can modify his eating habits and get better control  Pt admits needs a better sleep schedule and trying to get employment in hopes of establishing a routine  Monthly PreOp Visit 5/20/2022  Reports having 2 bouts of depression where he broke down and cried re: weight  (First on Mother's day and 2nd at the gym during spin class) Explained reason as body dysmorphia  Goes back to being bullied in high school - called "fat and ugly"  States makes him wants to have the surgery more as feels it will help with his depression and he will have more confidence; also wants to get off BP meds   Feels he still has obstacles in process including NIMO  couldn't tolerate machine in past  Discussed changes in his eating habits  Appears to make healthier choices and more mindful  For example - after mowing lawn he was hungry and ate  2 bananas, yogurt,  2 sandwiches and cucumbers  Said before he would eat chips and other "junk" food  vs chips or   Pt states he decided to join [employer]  In spin classes but more comfortable in circuit traing classes   Goes 3-4 X /week Trying different classes  Also mows the grass and walks   Pt accompanied by his mom who had numerous questions about the workflow/requirements  Workflow reviewed in detail  Workflow:   Psych and/or D+A Clearance: Completed   PCP Letter: Received   Support Group: Needs to complete- To have podcasts resent and pt may consider attending virtual Support Group   Surgeon Appt  Met w/Dr Aguirre Ran EGD  Completed 4/22/22    Cardiac Risk Assessment Completed - 5/18/2022 needs echo   Sleep Study on 4/21/2022 had consult 4/14/2022   Blood work - ordered pt to complete    Nicotine test N/A   6 Month Pre-Operative Program: N/A   Weight Loss  Advised not to gain- wt loss recommended 5%/16#      Evaluation / Monitoring  Dietitian to Monitor: Eating pattern as discussed Tolerance of nutrition prescription Body weight Lab values Physical activity Bowel pattern    Goals  Advised pt to select 1-2 goals to work on   Pt did not commit to any specific goals during session   Pre-Surgery guidelines  > Trial [employer]itastic for food logging  > Establish regular meal pattern - include fruits, vegetables and whole grains  > Focus on protein - include lean protein at each meal and snack -> Decrease portions  > Limit processed foods, fast foods and dining away from home  > Include meal prepping  > Limit snacks - healthier choices and portion; avoid grazing-bingeing  > Slow pace of eating and drinking - practice 30/60 minute rule  > Reduce/eliminate caffeine & carbonation before pre-op diet  > Maintainwater intake - 64 oz  > Increase physical activity/establish exercise regimen   > Continuet multi vitamin and additional Vitamin D 2000IU  Work on skills to cope with emotional eating/mindfull eating    Time Spent:  60 Minutes

## 2022-05-24 ENCOUNTER — OFFICE VISIT (OUTPATIENT)
Dept: PULMONOLOGY | Facility: CLINIC | Age: 27
End: 2022-05-24
Payer: COMMERCIAL

## 2022-05-24 ENCOUNTER — OFFICE VISIT (OUTPATIENT)
Dept: FAMILY MEDICINE CLINIC | Facility: CLINIC | Age: 27
End: 2022-05-24
Payer: COMMERCIAL

## 2022-05-24 ENCOUNTER — TELEPHONE (OUTPATIENT)
Dept: BARIATRICS | Facility: CLINIC | Age: 27
End: 2022-05-24

## 2022-05-24 VITALS
OXYGEN SATURATION: 97 % | DIASTOLIC BLOOD PRESSURE: 62 MMHG | HEIGHT: 66 IN | BODY MASS INDEX: 50.62 KG/M2 | HEART RATE: 91 BPM | TEMPERATURE: 97.6 F | SYSTOLIC BLOOD PRESSURE: 118 MMHG | WEIGHT: 315 LBS | RESPIRATION RATE: 12 BRPM

## 2022-05-24 VITALS
HEART RATE: 91 BPM | DIASTOLIC BLOOD PRESSURE: 78 MMHG | WEIGHT: 315 LBS | SYSTOLIC BLOOD PRESSURE: 110 MMHG | BODY MASS INDEX: 50.62 KG/M2 | OXYGEN SATURATION: 96 % | HEIGHT: 66 IN | TEMPERATURE: 97.5 F

## 2022-05-24 DIAGNOSIS — G47.33 OSA (OBSTRUCTIVE SLEEP APNEA): Primary | ICD-10-CM

## 2022-05-24 DIAGNOSIS — K12.30 MUCOSITIS: Primary | ICD-10-CM

## 2022-05-24 DIAGNOSIS — E66.01 MORBID OBESITY (HCC): ICD-10-CM

## 2022-05-24 PROCEDURE — 99214 OFFICE O/P EST MOD 30 MIN: CPT | Performed by: INTERNAL MEDICINE

## 2022-05-24 PROCEDURE — 3078F DIAST BP <80 MM HG: CPT | Performed by: STUDENT IN AN ORGANIZED HEALTH CARE EDUCATION/TRAINING PROGRAM

## 2022-05-24 PROCEDURE — 99213 OFFICE O/P EST LOW 20 MIN: CPT | Performed by: STUDENT IN AN ORGANIZED HEALTH CARE EDUCATION/TRAINING PROGRAM

## 2022-05-24 PROCEDURE — 3074F SYST BP LT 130 MM HG: CPT | Performed by: STUDENT IN AN ORGANIZED HEALTH CARE EDUCATION/TRAINING PROGRAM

## 2022-05-24 PROCEDURE — 1036F TOBACCO NON-USER: CPT | Performed by: INTERNAL MEDICINE

## 2022-05-24 PROCEDURE — 3725F SCREEN DEPRESSION PERFORMED: CPT | Performed by: STUDENT IN AN ORGANIZED HEALTH CARE EDUCATION/TRAINING PROGRAM

## 2022-05-24 PROCEDURE — 3008F BODY MASS INDEX DOCD: CPT | Performed by: INTERNAL MEDICINE

## 2022-05-24 RX ORDER — DOXEPIN HYDROCHLORIDE 100 MG/1
100 CAPSULE ORAL
COMMUNITY
Start: 2022-05-06

## 2022-05-24 RX ORDER — NICOTINE POLACRILEX 4 MG/1
20 GUM, CHEWING ORAL DAILY
COMMUNITY
Start: 2022-05-17

## 2022-05-24 NOTE — PROGRESS NOTES
Assessment/Plan:         Problem List Items Addressed This Visit    None     Visit Diagnoses     Mucositis    -  Primary    Relevant Medications    al mag oxide-diphenhydramine-lidocaine viscous (MAGIC MOUTHWASH) 1:1:1 suspension        Reduce salt in diet and caried diet discussed  Also discussed using OTC rincinol        Subjective:      Patient ID: Bradley Trinh is a 23 y o  male  HPI    Patient coming in to discuss mouth and tongue pain  Reports this started 4-5 days ago and is unchanged  Has been eating a diet very high in salt (lots of peanuts, chips, and sunflower seeds)  He also developed canker sores which he used salt water rinse and oragel with relief     The following portions of the patient's history were reviewed and updated as appropriate:   Past Medical History:  He has a past medical history of Anxiety, Bronchitis, Concussion, Depression, Hypertension, Morbid obesity with BMI of 45 0-49 9, adult ([address] ), and Sleep apnea, obstructive  ,  _______________________________________________________________________  Medical Problems:  does not have any pertinent problems on file ,  _______________________________________________________________________  Past Surgical History:   has a past surgical history that includes No past surgeries  ,  _______________________________________________________________________  Family History:  family history is not on file  He was adopted  ,  _______________________________________________________________________  Social History:   reports that he has never smoked  He has never used smokeless tobacco  He reports current alcohol use of about 1 0 standard drink of alcohol per week  He reports that he does not use drugs  ,  _______________________________________________________________________  Allergies:  is allergic to abilify [aripiprazole], lithium, lorazepam, and seroquel [quetiapine]     _______________________________________________________________________  Current Outpatient Medications   Medication Sig Dispense Refill    al mag oxide-diphenhydramine-lidocaine viscous (MAGIC MOUTHWASH) 1:1:1 suspension Swish and spit 10 mL every 6 (six) hours as needed for mouth pain or discomfort or mucositis 90 mL 0    amoxicillin (AMOXIL) 500 mg capsule Take 2 capsules (1,000 mg total) by mouth every 12 (twelve) hours for 14 days 56 capsule 0    buPROPion (WELLBUTRIN XL) 150 mg 24 hr tablet Take 150 mg by mouth every morning        buPROPion (Wellbutrin XL) 300 mg 24 hr tablet Take 300 mg by mouth every morning        clarithromycin (BIAXIN) 500 mg tablet Take 1 tablet (500 mg total) by mouth every 12 (twelve) hours for 14 days 28 tablet 0    desvenlafaxine succinate (PRISTIQ) 50 mg 24 hr tablet Take 1 tablet (50 mg total) by mouth daily      doxepin (SINEquan) 100 mg capsule Take 100 mg by mouth in the morning      doxepin (SINEquan) 75 MG capsule Take 75 mg by mouth daily at bedtime      losartan (COZAAR) 50 mg tablet Take 1 tablet (50 mg total) by mouth daily 90 tablet 3    propranolol (INDERAL LA) 80 mg 24 hr capsule Take 1 capsule (80 mg total) by mouth every morning 90 capsule 3    topiramate (TOPAMAX) 200 MG tablet 200 mg 2 (two) times a day       ziprasidone (GEODON) 40 mg capsule Take 40 mg by mouth every morning       ziprasidone (GEODON) 80 mg capsule Take 80 mg by mouth every evening       omeprazole (PriLOSEC OTC) 20 MG tablet Take 1 tablet (20 mg total) by mouth in the morning and 1 tablet (20 mg total) before bedtime  Do all this for 14 days  (Patient not taking: No sig reported) 28 tablet 0    Omeprazole 20 MG TBEC Take 20 mg by mouth in the morning (Patient not taking: Reported on 5/24/2022)       No current facility-administered medications for this visit      _______________________________________________________________________  Review of Systems   HENT: Positive for mouth sores   Negative for facial swelling, postnasal drip, rhinorrhea, sinus pressure, sinus pain, sneezing, sore throat, trouble swallowing and voice change  Objective:  Vitals:    05/24/22 1515   BP: 110/78   BP Location: Left arm   Patient Position: Sitting   Cuff Size: Large   Pulse: 91   Temp: 97 5 °F (36 4 °C)   SpO2: 96%   Weight: (!) 147 kg (325 lb)   Height: 5' 6" (1 676 m)     Body mass index is 52 46 kg/m²  Physical Exam  Constitutional:       General: He is not in acute distress  Appearance: Normal appearance  He is not ill-appearing  HENT:      Head: Atraumatic  Nose: No mucosal edema, congestion or rhinorrhea  Mouth/Throat:      Lips: Pink  Mouth: Mucous membranes are moist  Oral lesions (erythema of oral mucosa adn tongue  small area sof healing ulcerations seen on anterior cheek) present  No injury, lacerations or angioedema  Tongue: No lesions  Palate: No mass and lesions  Pharynx: Posterior oropharyngeal erythema present  Lymphadenopathy:      Head:      Right side of head: No submental, submandibular, tonsillar, preauricular or posterior auricular adenopathy  Left side of head: No submental, submandibular, tonsillar, preauricular or posterior auricular adenopathy  Cervical: No cervical adenopathy  Neurological:      Mental Status: He is alert

## 2022-05-24 NOTE — TELEPHONE ENCOUNTER
----- Message from Carmen Laguerre PA-C sent at 5/24/2022  8:24 AM EDT -----  Oli Marquis, can you please check on his medications? Perhaps they won't cover the PPI how it was written? Thank you!  ----- Message -----  From: Sherron Pittman RD  Sent: 5/23/2022   3:34 PM EDT  To: Carmen Laguerre PA-C    Kalpana Grandchild     I saw Esaw Neighbors on Friday and his mom notes that pt was to have 3 medications  for H Pilori however only received the 2 antibiotics but not the omeprazole  I told her it was ordered and sent to [facility] at same time as the antibiotics so couldn't answer her as what happened  Any thoughts? Do you need to re-order this for pt        Thanks  [employer]

## 2022-05-24 NOTE — TELEPHONE ENCOUNTER
Called pharmacey Omeprazole was filled at the same time as the 2 antibiotics, just wasn't picked up  It is there at the pharmacy ready to go  Call patient to advise?

## 2022-05-24 NOTE — PROGRESS NOTES
Assessment/Plan:   Diagnoses and all orders for this visit:    NIMO (obstructive sleep apnea)  -     CPAP Study; Future    Morbid obesity ([facility] [address] )      discussed his diagnostic sleep study with good sleep efficiency, patient was seen in the REM stage as well as in the supine position during the study time there was moderate obstructive sleep apnea with an AHI of 16  Etiology pathogenesis of obstructive sleep apnea discussed again in detail various treatment options discussed   He would like to go in for the PAP titration   Testing procedure was discussed   Once the procedure is done will order the CPAP machine and he will start using it and the need for compliance with the CPAP machine discussed   Recommend weight loss following up with Bariatric Clinic planning for surgery he states    No follow-ups on file  All questions are answered to the patient's satisfaction and understanding  He verbalizes understanding  He is encouraged to call with any further questions or concerns  Portions of the record may have been created with voice recognition software  Occasional wrong word or "sound a like" substitutions may have occurred due to the inherent limitations of voice recognition software  Read the chart carefully and recognize, using context, where substitutions have occurred  Electronically Signed by Johann Munoz MD    ______________________________________________________________________    Chief Complaint: No chief complaint on file  Patient ID: Martine is a 23 y o  y o  male has a past medical history of Anxiety, Bronchitis, Concussion, Depression, Hypertension, Morbid obesity with BMI of 45 0-49 9, adult ([facility] ), and Sleep apnea, obstructive  5/24/2022  Patient presents today for follow-up visit  Catrachita Lozada  is a very pleasant 43-year-old gentleman, here for evaluation for obstructive sleep apnea with his dad  He he is referred by a bariatric the the clinic    He the wall is following up with the Psychiatry/Psychology, recurrent depression has been on was the medications, states that he has a history of insomnia for several years no more than 10 years he states, states he goes to bed at around will midnight or 1:00 a m , and he states it takes about an hour or sometimes 1-1/2 hours to fall asleep, and is out of bed at all noon time, occasionally has 1 nocturnal awakening for nocturia, when he is out of the bed he still tired and fatigued he states, he no history of any early morning headaches  Here with a diagnostic sleep study        Review of Systems   Constitutional: Positive for fatigue and unexpected weight change  HENT: Negative  Eyes: Negative  Respiratory: Negative  History of loud snoring witnessed apneic spells choking and gasping for air  Cardiovascular: Negative  Gastrointestinal: Negative  Endocrine: Negative  Genitourinary: Negative  Musculoskeletal: Negative  Allergic/Immunologic: Negative  Neurological: Negative  Hematological: Negative  Psychiatric/Behavioral: Positive for sleep disturbance  Negative for hallucinations  Smoking history: He reports that he has never smoked   He has never used smokeless tobacco     The following portions of the patient's history were reviewed and updated as appropriate: allergies, current medications, past family history, past medical history, past social history, past surgical history and problem list     Immunization History   Administered Date(s) Administered    BCG 05/30/1998, 08/18/2000, 04/06/2001, 07/06/2001, 02/26/2002    COVID-19 PFIZER VACCINE 0 3 ML IM 03/18/2021, 04/10/2021    DTaP 04/21/1997, 05/26/1997, 08/26/1997, 07/08/2000, 12/02/2002    H1N1, All Formulations 12/03/2009    HPV Quadrivalent 07/27/2014, 11/02/2014    Hep A, ped/adol, 2 dose 06/11/2013    Hep B, Adolescent or Pediatric 11/19/2002, 12/30/2002, 06/02/2003, 05/25/2014    Hepatitis A 06/01/2013, 06/11/2013, 07/27/2014    HiB 11/19/2002    INFLUENZA 12/05/2009, 10/07/2015    IPV 07/27/2014    Influenza, injectable, quadrivalent, preservative free 0 5 mL 10/16/2020    MMR 03/16/1998, 11/19/2002    Meningococcal MCV4P 08/17/2009    OPV 04/21/1997, 05/26/1997, 08/26/1997, 02/16/1998, 04/19/1998    Tdap 08/17/2009    Tuberculin Skin Test-PPD Intradermal 04/27/2015, 06/05/2015    Typhoid Live, oral 06/11/2013    Typhoid, Unspecified 06/11/2013    Varicella 12/02/2002, 06/21/2004, 08/17/2009     Current Outpatient Medications   Medication Sig Dispense Refill    amoxicillin (AMOXIL) 500 mg capsule Take 2 capsules (1,000 mg total) by mouth every 12 (twelve) hours for 14 days 56 capsule 0    buPROPion (WELLBUTRIN XL) 150 mg 24 hr tablet Take 150 mg by mouth every morning        buPROPion (Wellbutrin XL) 300 mg 24 hr tablet Take 300 mg by mouth every morning        clarithromycin (BIAXIN) 500 mg tablet Take 1 tablet (500 mg total) by mouth every 12 (twelve) hours for 14 days 28 tablet 0    desvenlafaxine succinate (PRISTIQ) 50 mg 24 hr tablet Take 1 tablet (50 mg total) by mouth daily      doxepin (SINEquan) 75 MG capsule Take 75 mg by mouth daily at bedtime      losartan (COZAAR) 50 mg tablet Take 1 tablet (50 mg total) by mouth daily 90 tablet 3    topiramate (TOPAMAX) 200 MG tablet 200 mg 2 (two) times a day       ziprasidone (GEODON) 40 mg capsule Take 40 mg by mouth every morning       ziprasidone (GEODON) 80 mg capsule Take 80 mg by mouth every evening       al mag oxide-diphenhydramine-lidocaine viscous (MAGIC MOUTHWASH) 1:1:1 suspension Swish and spit 10 mL every 6 (six) hours as needed for mouth pain or discomfort or mucositis 90 mL 0    doxepin (SINEquan) 100 mg capsule Take 100 mg by mouth in the morning      omeprazole (PriLOSEC OTC) 20 MG tablet Take 1 tablet (20 mg total) by mouth in the morning and 1 tablet (20 mg total) before bedtime  Do all this for 14 days   (Patient not taking: No sig reported) 28 tablet 0    Omeprazole 20 MG TBEC Take 20 mg by mouth in the morning (Patient not taking: Reported on 5/24/2022)      propranolol (INDERAL LA) 80 mg 24 hr capsule Take 1 capsule (80 mg total) by mouth every morning 90 capsule 3     No current facility-administered medications for this visit  Allergies: Abilify [aripiprazole], Lithium, Lorazepam, and Seroquel [quetiapine]    Objective:  Vitals:    05/24/22 0849   BP: 118/62   Pulse: 91   Resp: 12   Temp: 97 6 °F (36 4 °C)   TempSrc: Temporal   SpO2: 97%   Weight: (!) 147 kg (323 lb)   Height: 5' 6" (1 676 m)   Oxygen Therapy  SpO2: 97 %    Wt Readings from Last 3 Encounters:   05/24/22 (!) 147 kg (325 lb)   05/24/22 (!) 147 kg (323 lb)   05/18/22 (!) 145 kg (320 lb)     Body mass index is 52 13 kg/m²  Physical Exam  Constitutional:       Appearance: He is well-developed  HENT:      Head: Normocephalic and atraumatic  Mouth/Throat:      Comments: Crowded oropharyngeal airways  Eyes:      Pupils: Pupils are equal, round, and reactive to light  Neck:      Comments: Short and wide neck  Cardiovascular:      Rate and Rhythm: Normal rate and regular rhythm  Heart sounds: Normal heart sounds  Pulmonary:      Effort: Pulmonary effort is normal       Breath sounds: Normal breath sounds  Musculoskeletal:         General: Normal range of motion  Cervical back: Normal range of motion and neck supple  Skin:     General: Skin is warm and dry  Neurological:      Mental Status: He is alert and oriented to person, place, and time  Psychiatric:         Behavior: Behavior normal            Diagnostics:  I have personally reviewed pertinent reports      ESS: Total score: 8

## 2022-05-31 ENCOUNTER — TELEMEDICINE (OUTPATIENT)
Dept: FAMILY MEDICINE CLINIC | Facility: CLINIC | Age: 27
End: 2022-05-31
Payer: COMMERCIAL

## 2022-05-31 DIAGNOSIS — B34.9 VIRAL INFECTION, UNSPECIFIED: Primary | ICD-10-CM

## 2022-05-31 PROCEDURE — 87636 SARSCOV2 & INF A&B AMP PRB: CPT | Performed by: FAMILY MEDICINE

## 2022-05-31 PROCEDURE — 99214 OFFICE O/P EST MOD 30 MIN: CPT | Performed by: FAMILY MEDICINE

## 2022-05-31 RX ORDER — DIPHENHYDRAMINE HYDROCHLORIDE AND LIDOCAINE HYDROCHLORIDE AND ALUMINUM HYDROXIDE AND MAGNESIUM HYDRO
KIT
COMMUNITY
Start: 2022-05-24

## 2022-05-31 NOTE — PROGRESS NOTES
COVID-19 Outpatient Progress Note    Assessment/Plan:    Problem List Items Addressed This Visit    None     Visit Diagnoses     Viral infection, unspecified    -  Primary    Relevant Orders    Covid/Flu- Office Collect         Disposition:     Recommended patient to come to the office to test for COVID-19/Influenza  I have spent 10 minutes directly with the patient  Greater than 50% of this time was spent in counseling/coordination of care regarding: instructions for management  Encounter provider Ashanti Barlow DO    Provider located at [facility] [address]  [place] [address]  [phone]    Recent Visits  Date Type Provider Dept   05/24/22 Office Visit Rose Mary Roper MD [address] [place] [address] recent visits within past 7 days and meeting all other requirements  Today's Visits  Date Type Provider Dept   05/31/22 Telemedicine Ashanti Barlow DO [facility] [address] [facility]   Showing today's visits and meeting all other requirements  Future Appointments  No visits were found meeting these conditions  Showing future appointments within next 150 days and meeting all other requirements     This virtual check-in was done via [facility] and patient was informed that this is a secure, HIPAA-compliant platform  He agrees to proceed  Patient agrees to participate in a virtual check in via telephone or video visit instead of presenting to the office to address urgent/immediate medical needs  Patient is aware this is a billable service  After connecting through [facility], the patient was identified by name and date of birth  Juancarlos Zamorano was informed that this was a telemedicine visit and that the exam was being conducted confidentially over secure lines  My office door was closed  No one else was in the room   Juancarlos Zamorano acknowledged consent and understanding of privacy and security of the telemedicine visit  I informed the patient that I have reviewed his record in Epic and presented the opportunity for him to ask any questions regarding the visit today  The patient agreed to participate  Verification of patient location:  Patient is located in the following state in which I hold an active license: [place]    Subjective: Oli Crews is a 23 y o  male who is concerned about COVID-19  Patient's symptoms include fatigue, nasal congestion, rhinorrhea, sore throat, cough, myalgias and headache  Patient denies fever, chills, malaise, anosmia, loss of taste, shortness of breath, chest tightness, abdominal pain, nausea, vomiting and diarrhea  - Date of symptom onset: 5/27/2022      COVID-19 vaccination status: Fully vaccinated with booster    Exposure:   Contact with a person who is under investigation (PUI) for or who is positive for COVID-19 within the last 14 days?: No    Hospitalized recently for fever and/or lower respiratory symptoms?: No      Currently a healthcare worker that is involved in direct patient care?: No      Works in a special setting where the risk of COVID-19 transmission may be high? (this may include long-term care, correctional and alf facilities; homeless shelters; assisted-living facilities and group homes ): No      Resident in a special setting where the risk of COVID-19 transmission may be high? (this may include long-term care, correctional and alf facilities; homeless shelters; assisted-living facilities and group homes ): No      Taking Mucinex, Afrin and Tylenol  Mild improvement       Lab Results   Component Value Date    SARSCOV2 Negative 01/20/2021    SARSCOV2 Not Detected 01/11/2021     Past Medical History:   Diagnosis Date    Anxiety     Bronchitis     Concussion     Sports related    Depression     Hypertension     Morbid obesity with BMI of 45 0-49 9, adult ([facility] [address] )     Sleep apnea, obstructive      Past Surgical History: Procedure Laterality Date    NO PAST SURGERIES       Current Outpatient Medications   Medication Sig Dispense Refill    al mag oxide-diphenhydramine-lidocaine viscous (MAGIC MOUTHWASH) 1:1:1 suspension Swish and spit 10 mL every 6 (six) hours as needed for mouth pain or discomfort or mucositis 90 mL 0    buPROPion (WELLBUTRIN XL) 150 mg 24 hr tablet Take 150 mg by mouth every morning        buPROPion (Wellbutrin XL) 300 mg 24 hr tablet Take 300 mg by mouth every morning        desvenlafaxine succinate (PRISTIQ) 50 mg 24 hr tablet Take 1 tablet (50 mg total) by mouth daily      doxepin (SINEquan) 100 mg capsule Take 100 mg by mouth in the morning      doxepin (SINEquan) 75 MG capsule Take 75 mg by mouth daily at bedtime      losartan (COZAAR) 50 mg tablet Take 1 tablet (50 mg total) by mouth daily 90 tablet 3    Omeprazole 20 MG TBEC Take 20 mg by mouth in the morning      topiramate (TOPAMAX) 200 MG tablet 200 mg 2 (two) times a day       ziprasidone (GEODON) 40 mg capsule Take 40 mg by mouth every morning       ziprasidone (GEODON) 80 mg capsule Take 80 mg by mouth every evening       DPH-Lido-AlHydr-MgHydr-Simeth (First-Mouthwash BLM) SUSP SWISH AND SPIT 10 ML EVERY 6 (SIX) HOURS AS NEEDED FOR MOUTH PAIN OR DISCOMFORT OR MUCOSITIS (Patient not taking: Reported on 5/31/2022)      omeprazole (PriLOSEC OTC) 20 MG tablet Take 1 tablet (20 mg total) by mouth in the morning and 1 tablet (20 mg total) before bedtime  Do all this for 14 days  (Patient not taking: No sig reported) 28 tablet 0    propranolol (INDERAL LA) 80 mg 24 hr capsule Take 1 capsule (80 mg total) by mouth every morning 90 capsule 3     No current facility-administered medications for this visit  Allergies   Allergen Reactions    Abilify [Aripiprazole]     Lithium     Lorazepam Other (See Comments)     aggressive    Seroquel [Quetiapine]      Review of Systems   Constitutional: Positive for fatigue   Negative for chills and fever    HENT: Positive for congestion, rhinorrhea and sore throat  Respiratory: Positive for cough  Negative for chest tightness and shortness of breath  Gastrointestinal: Negative for abdominal pain, diarrhea, nausea and vomiting  Musculoskeletal: Positive for myalgias  Neurological: Positive for headaches  Objective: There were no vitals filed for this visit  Physical Exam  Vitals reviewed  Constitutional:       General: He is not in acute distress  Appearance: He is obese  HENT:      Head: Normocephalic and atraumatic  Right Ear: External ear normal       Left Ear: External ear normal       Nose: Congestion present  Mouth/Throat:      Mouth: Mucous membranes are moist    Eyes:      Extraocular Movements: Extraocular movements intact  Conjunctiva/sclera: Conjunctivae normal    Pulmonary:      Effort: Pulmonary effort is normal  No respiratory distress  Breath sounds: No wheezing  Neurological:      General: No focal deficit present  Mental Status: He is alert and oriented to person, place, and time  Mental status is at baseline  VIRTUAL VISIT DISCLAIMER    Yanely Mccloud verbally agrees to participate in [employer]  Pt is aware that [employer] could be limited without vital signs or the ability to perform a full hands-on physical Heri Víctor understands he or the provider may request at any time to terminate the video visit and request the patient to seek care or treatment in person      Augusta Bloch, DO  [facility]  5/31/2022 2:57 PM

## 2022-06-02 ENCOUNTER — TELEPHONE (OUTPATIENT)
Dept: FAMILY MEDICINE CLINIC | Facility: CLINIC | Age: 27
End: 2022-06-02

## 2022-06-02 DIAGNOSIS — B34.9 VIRAL INFECTION, UNSPECIFIED: Primary | ICD-10-CM

## 2022-06-02 LAB
FLUAV RNA RESP QL NAA+PROBE: NEGATIVE
FLUBV RNA RESP QL NAA+PROBE: NEGATIVE
SARS-COV-2 RNA RESP QL NAA+PROBE: NEGATIVE

## 2022-06-02 NOTE — TELEPHONE ENCOUNTER
T/c from pt and his mom -- pts mychart was not working and they saw his covid test results had returned -- told pt and mom that pt was negative for covid and the flu  pts mom reports pt is still suffering with the same s/s addressed at his virtual visit and they would like to know what his next step is (pt saw Dr Fred Barkley for said virtual visit)  Please advise

## 2022-06-03 RX ORDER — METHYLPREDNISOLONE 4 MG/1
TABLET ORAL
Qty: 21 EACH | Refills: 0 | Status: SHIPPED | OUTPATIENT
Start: 2022-06-03

## 2022-06-03 NOTE — TELEPHONE ENCOUNTER
Left voicemail for patient requesting a call back,Please ask patient what symptoms does he still have and is he feeling better, the same, or worst then yesterday?

## 2022-06-03 NOTE — TELEPHONE ENCOUNTER
Pt returned call back -     Says he has is sniffling through runny and congested nose, has cough and headaches  Requesting some prescription to be sent in       Please advise

## 2022-06-08 DIAGNOSIS — J31.0 RHINOSINUSITIS: Primary | ICD-10-CM

## 2022-06-08 DIAGNOSIS — J32.9 RHINOSINUSITIS: Primary | ICD-10-CM

## 2022-06-08 RX ORDER — AMOXICILLIN AND CLAVULANATE POTASSIUM 875; 125 MG/1; MG/1
1 TABLET, FILM COATED ORAL EVERY 12 HOURS SCHEDULED
Qty: 20 TABLET | Refills: 0 | Status: SHIPPED | OUTPATIENT
Start: 2022-06-08 | End: 2022-06-18

## 2022-06-09 ENCOUNTER — TELEPHONE (OUTPATIENT)
Dept: FAMILY MEDICINE CLINIC | Facility: CLINIC | Age: 27
End: 2022-06-09

## 2022-06-09 NOTE — TELEPHONE ENCOUNTER
T/c from pt mom - pt was prescribed amoxicillin-clavulanate (Augmentin) 875-125 mg per tablet - pt took med and is having 'severe stomach pains that come and go' mom would like to know Dr Jake Askew advisement

## 2022-06-13 ENCOUNTER — HOSPITAL ENCOUNTER (OUTPATIENT)
Dept: NON INVASIVE DIAGNOSTICS | Facility: CLINIC | Age: 27
Discharge: HOME | End: 2022-06-13
Payer: COMMERCIAL

## 2022-06-13 VITALS
BODY MASS INDEX: 50.62 KG/M2 | DIASTOLIC BLOOD PRESSURE: 78 MMHG | WEIGHT: 315 LBS | HEART RATE: 84 BPM | HEIGHT: 66 IN | SYSTOLIC BLOOD PRESSURE: 110 MMHG

## 2022-06-13 DIAGNOSIS — I10 PRIMARY HYPERTENSION: ICD-10-CM

## 2022-06-13 LAB
AORTIC ROOT: 2.8 CM
AV LVOT PEAK GRADIENT: 2 MMHG
AV PEAK GRADIENT: 7 MMHG
E WAVE DECELERATION TIME: 257 MS
FRACTIONAL SHORTENING: 33 % (ref 28–44)
INTERVENTRICULAR SEPTUM IN DIASTOLE (PARASTERNAL SHORT AXIS VIEW): 1.1 CM
INTERVENTRICULAR SEPTUM: 1.1 CM (ref 0.6–1.1)
LEFT ATRIUM SIZE: 3.8 CM
LEFT INTERNAL DIMENSION IN SYSTOLE: 3.3 CM (ref 2.1–4)
LEFT VENTRICULAR INTERNAL DIMENSION IN DIASTOLE: 4.9 CM (ref 3.5–6)
LEFT VENTRICULAR POSTERIOR WALL IN END DIASTOLE: 1.2 CM
LEFT VENTRICULAR STROKE VOLUME: 66 ML
LVSV (TEICH): 66 ML
MV E'TISSUE VEL-LAT: 15 CM/S
MV PEAK A VEL: 0.71 M/S
MV PEAK E VEL: 81 CM/S
MV STENOSIS PRESSURE HALF TIME: 74 MS
MV VALVE AREA P 1/2 METHOD: 2.97 CM2
SL CV PED ECHO LEFT VENTRICLE DIASTOLIC VOLUME (MOD BIPLANE) 2D: 111 ML
SL CV PED ECHO LEFT VENTRICLE SYSTOLIC VOLUME (MOD BIPLANE) 2D: 45 ML

## 2022-06-13 PROCEDURE — 93306 TTE W/DOPPLER COMPLETE: CPT

## 2022-06-13 PROCEDURE — 93306 TTE W/DOPPLER COMPLETE: CPT | Performed by: INTERNAL MEDICINE

## 2022-06-13 RX ADMIN — PERFLUTREN 0.4 ML/MIN: 6.52 INJECTION, SUSPENSION INTRAVENOUS at 09:50

## 2022-06-14 ENCOUNTER — TELEPHONE (OUTPATIENT)
Dept: CARDIOLOGY CLINIC | Facility: CLINIC | Age: 27
End: 2022-06-14

## 2022-06-14 NOTE — TELEPHONE ENCOUNTER
----- Message from Radha Rowell MD sent at 6/13/2022  7:49 PM EDT -----  Please let the patient know that there were no significant abnormalities on their echo  We can discuss further at their next appointment  Thanks

## 2022-06-16 ENCOUNTER — HOSPITAL ENCOUNTER (OUTPATIENT)
Dept: SLEEP CENTER | Facility: CLINIC | Age: 27
Discharge: HOME | End: 2022-06-16
Payer: COMMERCIAL

## 2022-06-16 DIAGNOSIS — G47.33 OSA (OBSTRUCTIVE SLEEP APNEA): ICD-10-CM

## 2022-06-16 PROCEDURE — 95811 POLYSOM 6/>YRS CPAP 4/> PARM: CPT

## 2022-06-16 PROCEDURE — 95811 POLYSOM 6/>YRS CPAP 4/> PARM: CPT | Performed by: INTERNAL MEDICINE

## 2022-06-17 ENCOUNTER — APPOINTMENT (OUTPATIENT)
Dept: LAB | Facility: HOSPITAL | Age: 27
End: 2022-06-17
Payer: COMMERCIAL

## 2022-06-17 DIAGNOSIS — E66.01 OBESITY, CLASS III, BMI 40-49.9 (MORBID OBESITY) (HCC): ICD-10-CM

## 2022-06-17 DIAGNOSIS — Z01.818 PREOP TESTING: ICD-10-CM

## 2022-06-17 LAB
ALBUMIN SERPL BCP-MCNC: 3.4 G/DL (ref 3.5–5)
ALP SERPL-CCNC: 60 U/L (ref 46–116)
ALT SERPL W P-5'-P-CCNC: 35 U/L (ref 12–78)
ANION GAP SERPL CALCULATED.3IONS-SCNC: 9 MMOL/L (ref 4–13)
AST SERPL W P-5'-P-CCNC: 24 U/L (ref 5–45)
BILIRUB SERPL-MCNC: 0.31 MG/DL (ref 0.2–1)
BUN SERPL-MCNC: 12 MG/DL (ref 5–25)
CALCIUM ALBUM COR SERPL-MCNC: 9.2 MG/DL (ref 8.3–10.1)
CALCIUM SERPL-MCNC: 8.7 MG/DL (ref 8.3–10.1)
CHLORIDE SERPL-SCNC: 107 MMOL/L (ref 100–108)
CHOLEST SERPL-MCNC: 172 MG/DL
CO2 SERPL-SCNC: 26 MMOL/L (ref 21–32)
CREAT SERPL-MCNC: 1.07 MG/DL (ref 0.6–1.3)
ERYTHROCYTE [DISTWIDTH] IN BLOOD BY AUTOMATED COUNT: 13.6 % (ref 11.6–15.1)
EST. AVERAGE GLUCOSE BLD GHB EST-MCNC: 131 MG/DL
GFR SERPL CREATININE-BSD FRML MDRD: 95 ML/MIN/1.73SQ M
GLUCOSE P FAST SERPL-MCNC: 110 MG/DL (ref 65–99)
HBA1C MFR BLD: 6.2 %
HCT VFR BLD AUTO: 44.8 % (ref 36.5–49.3)
HDLC SERPL-MCNC: 29 MG/DL
HGB BLD-MCNC: 14.1 G/DL (ref 12–17)
LDLC SERPL CALC-MCNC: 107 MG/DL (ref 0–100)
MCH RBC QN AUTO: 25.9 PG (ref 26.8–34.3)
MCHC RBC AUTO-ENTMCNC: 31.5 G/DL (ref 31.4–37.4)
MCV RBC AUTO: 82 FL (ref 82–98)
NONHDLC SERPL-MCNC: 143 MG/DL
PLATELET # BLD AUTO: 325 THOUSANDS/UL (ref 149–390)
PMV BLD AUTO: 10.3 FL (ref 8.9–12.7)
POTASSIUM SERPL-SCNC: 3.9 MMOL/L (ref 3.5–5.3)
PROT SERPL-MCNC: 7.4 G/DL (ref 6.4–8.2)
RBC # BLD AUTO: 5.44 MILLION/UL (ref 3.88–5.62)
SODIUM SERPL-SCNC: 142 MMOL/L (ref 136–145)
TRIGL SERPL-MCNC: 182 MG/DL
TSH SERPL DL<=0.05 MIU/L-ACNC: 3.94 UIU/ML (ref 0.45–4.5)
WBC # BLD AUTO: 8.8 THOUSAND/UL (ref 4.31–10.16)

## 2022-06-17 PROCEDURE — 85027 COMPLETE CBC AUTOMATED: CPT

## 2022-06-17 PROCEDURE — 36415 COLL VENOUS BLD VENIPUNCTURE: CPT

## 2022-06-17 PROCEDURE — 80053 COMPREHEN METABOLIC PANEL: CPT

## 2022-06-17 PROCEDURE — 83036 HEMOGLOBIN GLYCOSYLATED A1C: CPT

## 2022-06-17 PROCEDURE — 80061 LIPID PANEL: CPT

## 2022-06-17 PROCEDURE — 84443 ASSAY THYROID STIM HORMONE: CPT

## 2022-06-17 NOTE — PROGRESS NOTES
Sleep Study Documentation    Pre-Sleep Study       Sleep testing procedure explained to patient:YES    Patient napped prior to study:YES- more than 30 minutes  Napped after 2PM: yes    Caffeine:Dayshift worker after 12PM   Caffeine use:NO    Alcohol:Dayshift workers after 5PM: Alcohol use:NO    Typical day for patient:NO       Study Documentation    Sleep Study Indications: NIMO    Sleep Study: Treatment   Optimal PAP pressure: 14  Leak:Small  Snore:Eliminated  REM Obtained:yes  Supplemental O2: no    Minimum SaO2 86  Baseline SaO2 96  PAP mask tried (list all)F30, F20  PAP mask choice (final)F20  PAP mask type:full face  PAP pressure at which snoring was eliminated 14  Minimum SaO2 at final PAP pressure 94  Mode of Therapy:CPAP  ETCO2:No  CPAP changed to BiPAP:No    Mode of Therapy:CPAP    EKG abnormalities: no     EEG abnormalities: no    Sleep Study Recorded < 2 hours: N/A    Sleep Study Recorded > 2 hours but incomplete study: N/A    Sleep Study Recorded 6 hours but no sleep obtained: NO    Patient classification: unemployed       Post-Sleep Study    Medication used at bedtime or during sleep study:YES prescription sleep aid    Patient reports time it took to fall asleep:greater than 60 minutes    Patient reports waking up during study:1 to 2 times  Patient reports returning to sleep without difficulty  Patient reports sleeping less than 2 hours without dreaming  Patient reports sleep during study:worse than usual    Patient rated sleepiness: Somewhat sleepy or tired    PAP treatment:yes: Post PAP treatment patient reports feeling worse and  would wear PAP mask at home

## 2022-06-21 DIAGNOSIS — G47.33 OSA (OBSTRUCTIVE SLEEP APNEA): Primary | ICD-10-CM

## 2022-06-21 NOTE — PROGRESS NOTES
Pre op  Mother also present for appointment  Patient completed labs  Had sleep study 6/16 and trying to get in contact with the doctor to send in order for CPAP  Cardiac appointment 5/18 and had echo 6/13  Has been eating 3 meals per day, but reports that he is having larger portions at dinner  Enjoys the HIIT classes at the gym, but only offered 1x per week  Feels uncomfortable just doing free gym  Drinking about 24 oz of coffee and about 32 oz of water daily  Encouraged patient to increase water  Feels that he cannot control himself with sweets  Currently doing TMS to help with depression  Discussed patient's past trauma where he had to go without food may be related to overeating now  Discussed that this may not be the right time for surgery due to his past traumas may not have been completely worked through in therapy  Patient appeared to be more open to holding off on surgery for now, but mother expresses concern about patient's health if he does not move forward with the surgery  Patient to follow up with SW next month  Goals discussed: 1  CPAP 2   Support group   [employer] [place]

## 2022-06-22 ENCOUNTER — OFFICE VISIT (OUTPATIENT)
Dept: BARIATRICS | Facility: CLINIC | Age: 27
End: 2022-06-22

## 2022-06-22 VITALS — BODY MASS INDEX: 50.62 KG/M2 | WEIGHT: 315 LBS | HEIGHT: 66 IN

## 2022-06-22 DIAGNOSIS — Z71.89 ENCOUNTER FOR PRE-BARIATRIC SURGERY COUNSELING AND EDUCATION: Primary | ICD-10-CM

## 2022-06-22 PROCEDURE — 3008F BODY MASS INDEX DOCD: CPT | Performed by: FAMILY MEDICINE

## 2022-06-22 PROCEDURE — RECHECK

## 2022-06-23 ENCOUNTER — TELEPHONE (OUTPATIENT)
Dept: PULMONOLOGY | Facility: CLINIC | Age: 27
End: 2022-06-23

## 2022-07-13 LAB

## 2022-07-15 ENCOUNTER — TELEPHONE (OUTPATIENT)
Dept: NEUROLOGY | Facility: CLINIC | Age: 27
End: 2022-07-15

## 2022-07-19 ENCOUNTER — OFFICE VISIT (OUTPATIENT)
Dept: NEUROLOGY | Facility: CLINIC | Age: 27
End: 2022-07-19
Payer: COMMERCIAL

## 2022-07-19 VITALS
BODY MASS INDEX: 50.62 KG/M2 | OXYGEN SATURATION: 98 % | TEMPERATURE: 98.5 F | DIASTOLIC BLOOD PRESSURE: 70 MMHG | WEIGHT: 315 LBS | HEART RATE: 94 BPM | HEIGHT: 66 IN | SYSTOLIC BLOOD PRESSURE: 120 MMHG

## 2022-07-19 DIAGNOSIS — G43.009 MIGRAINE WITHOUT AURA AND WITHOUT STATUS MIGRAINOSUS, NOT INTRACTABLE: Primary | ICD-10-CM

## 2022-07-19 DIAGNOSIS — G47.33 OSA (OBSTRUCTIVE SLEEP APNEA): ICD-10-CM

## 2022-07-19 DIAGNOSIS — E78.2 HYPERLIPIDEMIA, MIXED: ICD-10-CM

## 2022-07-19 DIAGNOSIS — I10 ESSENTIAL HYPERTENSION: ICD-10-CM

## 2022-07-19 PROCEDURE — 3078F DIAST BP <80 MM HG: CPT | Performed by: PSYCHIATRY & NEUROLOGY

## 2022-07-19 PROCEDURE — 3074F SYST BP LT 130 MM HG: CPT | Performed by: PSYCHIATRY & NEUROLOGY

## 2022-07-19 PROCEDURE — 99214 OFFICE O/P EST MOD 30 MIN: CPT | Performed by: PSYCHIATRY & NEUROLOGY

## 2022-07-19 NOTE — PROGRESS NOTES
Tatyana Vila is a 23 y o  male  Chief Complaint   Patient presents with    Migraine without aura and without status migrainosus, not i       Assessment:  1  Migraine without aura and without status migrainosus, not intractable    2  Essential hypertension    3  NIMO (obstructive sleep apnea)    4  Hyperlipidemia, mixed        Plan:  Continue with riboflavin 200 mg a day  Continue with magnesium 200 mg a day  Avoid migraine triggers  Patient is already on Topamax being managed by the Psychiatrist   Follow-up in 6 months    Discussion:  Patient's headaches have almost resolved since he started the T MS treatment with his psychiatrist, he was advised to continue with riboflavin and magnesium, avoid migraine triggers which we discussed in detail including foods to avoid, to keep himself well hydrated, limit caffeine to 20 oz per day, sleep regularly for 8 hours, avoid excessive with the use of analgesics, to keep his blood pressure cholesterol and sugar under control, the course of the hospital if other neurologic symptoms on home med otherwise descending back in 6 months and follow-up with his other physicians  Subjective:    HPI   Patient is here in follow-up of headaches for the last several years, is accompanied with his mother, since his last visit he tells me that he has not had any headaches since he started on the T MS treatment for his depression, his mood is good, denies any suicidal or homicidal thoughts, no motor or sensory symptoms in upper or lower extremities no vision difficulties he continues to be on Topamax for his depression which is being managed by his psychiatrist he is trying to go to the gym and trying to lose weight appetite is good no other complaints      Vitals:    07/19/22 1415   BP: 120/70   BP Location: Right arm   Patient Position: Sitting   Cuff Size: Adult   Pulse: 94   Temp: 98 5 °F (36 9 °C)   TempSrc: Temporal   SpO2: 98%   Height: 5' 6" (1 676 m)       Current Medications    Current Outpatient Medications:     buPROPion (WELLBUTRIN XL) 150 mg 24 hr tablet, Take 150 mg by mouth every morning  , Disp: , Rfl:     buPROPion (Wellbutrin XL) 300 mg 24 hr tablet, Take 300 mg by mouth every morning  , Disp: , Rfl:     desvenlafaxine succinate (PRISTIQ) 50 mg 24 hr tablet, Take 1 tablet (50 mg total) by mouth daily, Disp: , Rfl:     doxepin (SINEquan) 100 mg capsule, Take 100 mg by mouth 3 (three) times daily after meals, Disp: , Rfl:     losartan (COZAAR) 50 mg tablet, Take 1 tablet (50 mg total) by mouth daily, Disp: 90 tablet, Rfl: 3    propranolol (INDERAL LA) 80 mg 24 hr capsule, Take 1 capsule (80 mg total) by mouth every morning, Disp: 90 capsule, Rfl: 3    topiramate (TOPAMAX) 200 MG tablet, 200 mg 2 (two) times a day , Disp: , Rfl:     ziprasidone (GEODON) 40 mg capsule, Take 40 mg by mouth every morning , Disp: , Rfl:     ziprasidone (GEODON) 80 mg capsule, Take 80 mg by mouth every evening , Disp: , Rfl:     al mag oxide-diphenhydramine-lidocaine viscous (MAGIC MOUTHWASH) 1:1:1 suspension, Swish and spit 10 mL every 6 (six) hours as needed for mouth pain or discomfort or mucositis (Patient not taking: Reported on 7/19/2022), Disp: 90 mL, Rfl: 0    doxepin (SINEquan) 75 MG capsule, Take 75 mg by mouth daily at bedtime (Patient not taking: Reported on 7/19/2022), Disp: , Rfl:     DPH-Lido-AlHydr-MgHydr-Simeth (First-Mouthwash BLM) SUSP, SWISH AND SPIT 10 ML EVERY 6 (SIX) HOURS AS NEEDED FOR MOUTH PAIN OR DISCOMFORT OR MUCOSITIS (Patient not taking: No sig reported), Disp: , Rfl:     methylPREDNISolone 4 MG tablet therapy pack, Use as directed on package (Patient not taking: Reported on 7/19/2022), Disp: 21 each, Rfl: 0    omeprazole (PriLOSEC OTC) 20 MG tablet, Take 1 tablet (20 mg total) by mouth in the morning and 1 tablet (20 mg total) before bedtime  Do all this for 14 days   (Patient not taking: No sig reported), Disp: 28 tablet, Rfl: 0    Omeprazole 20 MG TBEC, Take 20 mg by mouth in the morning (Patient not taking: Reported on 7/19/2022), Disp: , Rfl:       Allergies  Abilify [aripiprazole], Lithium, Lorazepam, and Seroquel [quetiapine]    Past Medical History  Past Medical History:   Diagnosis Date    Anxiety     Bronchitis     Concussion     Sports related    Depression     Hypertension     Morbid obesity with BMI of 45 0-49 9, adult ([facility] [address] )     Sleep apnea, obstructive          Past Surgical History:  Past Surgical History:   Procedure Laterality Date    NO PAST SURGERIES           Family History:  Family History   Adopted: Yes       Social History:   reports that he has never smoked  He has never used smokeless tobacco  He reports current alcohol use of about 1 0 standard drink of alcohol per week  He reports that he does not use drugs  I have reviewed the past medical history, surgical history, social and family history, current medications, allergies vitals, review of systems, and updated this information as appropriate today  Objective:    Physical Exam    Neurological Exam    GENERAL:  Cooperative in no acute distress  Well-developed and well-nourished    HEAD and NECK   Head is atraumatic normocephalic with no lesions or masses  Neck is supple with full range of motion    CARDIOVASCULAR  Carotid Arteries-no carotid bruits  NEUROLOGIC:  Mental Status-the patient is awake alert and oriented without aphasia or apraxia  Cranial Nerves: Visual fields are full to confrontation  Visual acuity is 20/20 with hand held chart  Extraocular movements are full without nystagmus  Pupils are 2-1/2 mm and reactive  Face is symmetrical to light touch  Movements of facial expression move symmetrically  Hearing is normal to finger rub bilaterally  Soft palate lifts symmetrically  Shoulder shrug is symmetrical  Tongue is midline without atrophy  Motor: No drift is noted on arm extension   Strength is full in the upper and lower extremities with normal bulk and tone    Gait reveals a normal base with symmetrical arm swing  ROS:  Review of Systems   Constitutional: Positive for appetite change  Negative for fever  HENT: Negative  Negative for hearing loss, tinnitus, trouble swallowing and voice change  Eyes: Negative  Negative for photophobia and pain  Respiratory: Negative  Negative for shortness of breath  Cardiovascular: Negative  Negative for palpitations  Gastrointestinal: Negative  Negative for nausea and vomiting  Endocrine: Negative  Negative for cold intolerance  Genitourinary: Negative  Negative for dysuria, frequency and urgency  Musculoskeletal: Negative  Negative for myalgias and neck pain  Skin: Negative  Negative for rash  Neurological: Negative for dizziness, tremors, seizures, syncope, facial asymmetry, speech difficulty, weakness, light-headedness, numbness and headaches  Hematological: Negative  Does not bruise/bleed easily  Psychiatric/Behavioral: Positive for sleep disturbance  Negative for confusion and hallucinations

## 2022-07-28 NOTE — PROGRESS NOTES
Pre op  Mother and father also present  Up 8 lbs since last month  Will Need to lose 17 lbs- up from eval   Mother reports that patient gets discouraged with weight gain  Has been going to the gym 3-4x per week doing abs and strength  Mother appears to be trying to help patient, but in the process, may be hindering the patient's progress  Is not allowing him to be independent with his food choices  Encouraged patient to make his own choices with foods rather then going through his parents in asking for foods- patient has been lying about what he has been intakeing, hiding food, and eating larger amounts of food, which appears to be in response to negative comments regarding what he is eating and foods being regulated due to feelings of shame and guilt  Discussed how making the choice on his own can be empowering and when foods are thought of as "off limits" it can make them more desirable and therefore causing a feeling of lack of self control with portion control  Got CPAP machine 7/13- using it about 5-7 hours per night  Still getting used to it  Using an jaden to track  Drinking 32-48 oz water, 12 oz coffee  Mother stated that his psychiatrist was surprised that he hadn't already had the surgery and said that he needs to have it  Continued to stress the importance of behavioral changes for success long term  Just needs support group and weight loss to be able to submit, however patient needs to continue to show improvements in lifestyle changes  SW already seeing progress and pointed this out to patient  Patient will follow up with RD next month     Alejandro Ayers LCSW

## 2022-07-29 ENCOUNTER — OFFICE VISIT (OUTPATIENT)
Dept: BARIATRICS | Facility: CLINIC | Age: 27
End: 2022-07-29

## 2022-07-29 VITALS — HEIGHT: 66 IN | BODY MASS INDEX: 50.62 KG/M2 | WEIGHT: 315 LBS

## 2022-07-29 DIAGNOSIS — Z71.89 ENCOUNTER FOR PRE-BARIATRIC SURGERY COUNSELING AND EDUCATION: Primary | ICD-10-CM

## 2022-07-29 DIAGNOSIS — F41.1 ANXIETY, GENERALIZED: ICD-10-CM

## 2022-07-29 PROCEDURE — RECHECK

## 2022-08-31 ENCOUNTER — OFFICE VISIT (OUTPATIENT)
Dept: BARIATRICS | Facility: CLINIC | Age: 27
End: 2022-08-31

## 2022-08-31 VITALS — WEIGHT: 315 LBS | BODY MASS INDEX: 53.52 KG/M2

## 2022-08-31 DIAGNOSIS — E66.01 MORBID (SEVERE) OBESITY DUE TO EXCESS CALORIES (HCC): Primary | ICD-10-CM

## 2022-08-31 PROCEDURE — RECHECK

## 2022-08-31 NOTE — PROGRESS NOTES
Bariatric Nutrition Assessment Note - Follow-up - Monthly Prep Visit    Insurance: No required weight checks    Type of surgery    Interested in Vertical sleeve gastrectomy but since talked to Dr Aileen Rivera  Surgeon: Dr Gilma Patterson on 3/17/2022    Nutrition Assessment   Syed Bitter  23 y o   male     Height: 5'6"  Weight: 331 6#  Eval Weight: 315 3#   BMI: 50 9  Wt with BMI of 25: 155#  Pre-Op Excess Wt: 160#  BMI to Qualify at 41 = 248#  PMH includes: HTN    Pt advised not to gain weight during preop process  Pt encouraged to lose weight via healthy eating and exercise  Pt may follow Liver Shrinking diet 2 weeks prior to DOS depending on BMI at time  This diet will promote weight loss  There were no vitals taken for this visit      Weight History Reason for WLS:HTN  And wants to avoid diabetes and simplify current medication   Onset of Obesity: Childhood (Teen - on Lithium, Seroquil, Ambilify- gained 60#)  Family history of obesity: Unknown Adopted from [facility] at age 12  Wt Loss Attempts: Exercise  Self Created Diets (Portion Control, Healthy Food Choices, etc ) MWM programs  Maximum Wt Lost: 5-10#    Review of History and Medications   OTC: MultiVitamin  Vitamin D  Past Medical History:   Diagnosis Date    Anxiety     Bronchitis     Concussion     Sports related    Depression     Hypertension     Morbid obesity with BMI of 45 0-49 9, adult ([address] )     Sleep apnea, obstructive      Past Surgical History:   Procedure Laterality Date    NO PAST SURGERIES       Social History     Socioeconomic History    Marital status: Single     Spouse name: Not on file    Number of children: Not on file    Years of education: Not on file    Highest education level: Not on file   Occupational History    Occupation: student    Occupation:      Employer: [employer]   Tobacco Use    Smoking status: Never Smoker    Smokeless tobacco: Never Used   Vaping Use    Vaping Use: Never used   Substance and Sexual Activity    Alcohol use:  Yes     Alcohol/week: 1 0 standard drink     Types: 1 Cans of beer per week     Comment: social    Drug use: No    Sexual activity: Not on file   Other Topics Concern    Not on file   Social History Narrative    Lives with adoptive parents     Social Determinants of Health     Financial Resource Strain: Not on file   Food Insecurity: Not on file   Transportation Needs: Not on file   Physical Activity: Not on file   Stress: Not on file   Social Connections: Not on file   Intimate Partner Violence: Not on file   Housing Stability: Not on file       Current Outpatient Medications:     al mag oxide-diphenhydramine-lidocaine viscous (MAGIC MOUTHWASH) 1:1:1 suspension, Swish and spit 10 mL every 6 (six) hours as needed for mouth pain or discomfort or mucositis (Patient not taking: Reported on 7/19/2022), Disp: 90 mL, Rfl: 0    buPROPion (WELLBUTRIN XL) 150 mg 24 hr tablet, Take 150 mg by mouth every morning  , Disp: , Rfl:     buPROPion (Wellbutrin XL) 300 mg 24 hr tablet, Take 300 mg by mouth every morning  , Disp: , Rfl:     desvenlafaxine succinate (PRISTIQ) 50 mg 24 hr tablet, Take 1 tablet (50 mg total) by mouth daily, Disp: , Rfl:     doxepin (SINEquan) 100 mg capsule, Take 100 mg by mouth 3 (three) times daily after meals, Disp: , Rfl:     doxepin (SINEquan) 75 MG capsule, Take 75 mg by mouth daily at bedtime (Patient not taking: Reported on 7/19/2022), Disp: , Rfl:     DPH-Lido-AlHydr-MgHydr-Simeth (First-Mouthwash BLM) SUSP, SWISH AND SPIT 10 ML EVERY 6 (SIX) HOURS AS NEEDED FOR MOUTH PAIN OR DISCOMFORT OR MUCOSITIS (Patient not taking: No sig reported), Disp: , Rfl:     losartan (COZAAR) 50 mg tablet, Take 1 tablet (50 mg total) by mouth daily, Disp: 90 tablet, Rfl: 3    methylPREDNISolone 4 MG tablet therapy pack, Use as directed on package (Patient not taking: Reported on 7/19/2022), Disp: 21 each, Rfl: 0    omeprazole (PriLOSEC OTC) 20 MG tablet, Take 1 tablet (20 mg total) by mouth in the morning and 1 tablet (20 mg total) before bedtime  Do all this for 14 days   (Patient not taking: No sig reported), Disp: 28 tablet, Rfl: 0    Omeprazole 20 MG TBEC, Take 20 mg by mouth in the morning (Patient not taking: Reported on 7/19/2022), Disp: , Rfl:     propranolol (INDERAL LA) 80 mg 24 hr capsule, Take 1 capsule (80 mg total) by mouth every morning, Disp: 90 capsule, Rfl: 3    topiramate (TOPAMAX) 200 MG tablet, 200 mg 2 (two) times a day , Disp: , Rfl:     ziprasidone (GEODON) 40 mg capsule, Take 40 mg by mouth every morning , Disp: , Rfl:     ziprasidone (GEODON) 80 mg capsule, Take 80 mg by mouth every evening , Disp: , Rfl:   Food Intake and Lifestyle Assessment   Food Intake Assessment completed via usual diet recall  Breakfast: Sleeps in - 12-1:00 Double serving Cereal, eggs, Waffles, Pancakes - leftovers  Snack: Chips, [place]  Lunch: 3-4 pm   Snack: Ham & Cheese SW - 2 [employer]  Dinner: Senait Vazquez (take-out) Mom cooks - lean protein, starch, vegetables - raw Double helpings  Snack: Binges at night sw, leftovers  Mom says snacks hidden   Beverage intake: water 32 oz X3, coffee 1 cup and alcohol (beer - occ)  Protein supplement: Not at present  Portions: "double:  Estimated protein intake per day: Exceeds requirements  Estimated fluid intake per day: 96 oz water  Meals eaten away from home: Some Take-out  Typical meal pattern: 3 meals per day and snacks/binges during day and nightEating Behaviors: Consumption of high calorie/ high fat foods, Consumption of high calorie beverages, Large portion sizes, Frequent snacking/ grazing, Binge eating, Mindless eating, Emotional eating and Craves sweet foods  Pt denies any food behaviors that would lead to purging though state he binges "eats until he is full"  Food allergies or intolerances: None     Intolerance to roast turkey - vomits  Allergies   Allergen Reactions    Abilify [Aripiprazole]  Lithium     Lorazepam Other (See Comments)     aggressive    Seroquel [Quetiapine]      Cultural or Samaritan considerations: None    Physical Assessment  Physical Activity sedentary - plays videos games  Types of exercise: Will exercise but dislikes  Current physical limitations: none    Psychosocial Assessment   Support systems: parent(s) - some relative had surgery  Socioeconomic factors: N/A    Nutrition Diagnosis  Diagnosis: Overweight / Obesity (NC-3 3)  Related to: Physical inactivity and Excessive energy intake  As Evidenced by: BMI >25     Nutrition Prescription: Recommend the following diet  Low fat, Low sugar, High protein and Regular    Interventions and Teaching   Discussed pre-op and post-op nutrition guidelines  Patient educated and handouts provided    Surgical changes to stomach / GI  Capacity of post-surgery stomach  Diet progression  Adequate hydration  Sugar and fat restriction to decrease "dumping syndrome"  Fat restriction to decrease steatorrhea  Expected weight loss  Weight loss plateaus/ possibility of weight regain  Exercise  Suggestions for pre-op diet  Nutrition considerations after surgery  Protein supplements  Meal planning and preparation  Appropriate carbohydrate, protein, and fat intake, and food/fluid choices to maximize safe weight loss, nutrient intake, and tolerance   Dietary and lifestyle changes  Possible problems with poor eating habits  Intuitive eating  Techniques for self monitoring and keeping daily food journal  Potential for food intolerance after surgery, and ways to deal with them including: lactose intolerance, nausea, reflux, vomiting, diarrhea, food intolerance, appetite changes, gas  Vitamin / Mineral supplementation of Multivitamin with minerals, Calcium, Vitamin B12, Iron and Vitamin D    Education provided to: patient    Barriers to learning:   Readiness to change: contemplation    Prior research on procedure: internet    Comprehension: needs reinforcement     Expected Compliance: Poor    Recommendations  Pt is not an appropriate candidate for surgery  Noted mental health disability, bingeing, lack of motivation and other food related behaviors would impair adherence to the restricive diet r/t WLS thus placing him at risk for post op complications  Pt reports that when he was in orphanage in [facility] he was told that the fastest eater get the most food so said that statement is stuck in his head so continues to follow that way of eating  He admits to eating double portions consistently and grazing continuously between meals and at night  Pt has very sedentary lifestyle as sleeps long hours and when awake, plays video games  Parents accompanied pt and very supportive  Has made numerous attempts to help manage Pino's weight including Mardell Filler  MWM and Healthy Core programs, gym memberships, etc  Pt acknowledges interest in having surgery at start of evaluation but unsure if pt would proceed after learning about program expectations and guidelines  Monthly PreOp Visit 4/14/2022  Pt states he is overwhelmed with the process and uncertain whether he wants to have the surgery  Pt became very tearful/emotional  States he knows weight loss will help his depression though not sure he can change habits and or sustain them  Pt discusses situations at [facility] that attributes to his eating pattern but has been unable to break these habits formed  Support provided during discussion  Guided pt in ways he can modify his eating habits and get better control  Pt admits needs a better sleep schedule and trying to get employment in hopes of establishing a routine  Monthly PreOp Visit 5/20/2022  Reports having 2 bouts of depression where he broke down and cried re: weight  (First on Mother's day and 2nd at the gym during spin class) Explained reason as body dysmorphia   Goes back to being bullied in high school - called "fat and ugly"  States makes him wants to have the surgery more as feels it will help with his depression and he will have more confidence; also wants to get off BP meds   Feels he still has obstacles in process including NIMO  couldn't tolerate machine in past  Discussed changes in his eating habits  Appears to make healthier choices and more mindful  For example - after mowing lawn he was hungry and ate  2 bananas, yogurt,  2 sandwiches and cucumbers  Said before he would eat chips and other "junk" food  vs chips or   Pt states he decided to join [employer]  In spin classes but more comfortable in circuit traing classes  Goes 3-4 X /week Trying different classes  Also mows the grass and walks   Pt accompanied by his mom who had numerous questions about the workflow/requirements  Workflow reviewed in detail    Monthly PreOp Visit 8/31/2022 - Accompanied by mother  Remains in pre-op process  Pt expected to be losing more  Keeping food log on paper (2 days) and going to gym 3-4X /week- usually works on machines  Working on diet and making better choices  Parents used to hide "junk" food but now doesn't purchase and has healthier foods like fruit available for snacks  Pt trying to avoid  trigger foods (chips, ice cream,etc) as he admits as he cannot control his eating  Started Bee Pollen supplements to try to curb appetite  Discussed his binge type eating and how can lead to complications post op and/or unsuccessful weight loss  Asked pt if he is 1005 sure he wants surgery and pt stated "I don't want to be fat"  Support given  Workflow reviewed  Remain concerned that surgery may  not indicated for pt at this time as question ability to comply to post op guidelines that would minimize risk of WLS      Workflow: Concerned pt turning 33 in December and will be off her insurance   Psych and/or D+A Clearance: Completed   PCP Letter: Received   Support Group: 8/15/2022 virtual Support Group   Surgeon Appt  Met w/Dr Andie CONTRERAS  Completed 4/22/22    Cardiac Risk Assessment Completed - 5/18/2022 and echo 6/13/2022   Sleep Study on 4/21/2022 had consult 4/14/2022Has CPAP and sleeping better   Blood work - 6/17/2022    Nicotine test N/A   6 Month Pre-Operative Program: N/A   Weight Loss  Advised not to gain- wt loss recommended 5%/16# but has been gaining      Evaluation / Monitoring  Dietitian to Monitor: Eating pattern as discussed Tolerance of nutrition prescription Body weight Lab values Physical activity Bowel pattern    Goals  Advised pt to select 1-2 goals to work on   Pt did not commit to any specific goals during session   Pre-Surgery guidelines  > Trial Baritastic for food logging  > Establish regular meal pattern - include fruits, vegetables and whole grains  > Focus on protein - include lean protein at each meal and snack -> Decrease portions  > Limit processed foods, fast foods and dining away from home  > Include meal prepping  > Limit snacks - healthier choices and portion; avoid grazing-bingeing  > Slow pace of eating and drinking - practice 30/60 minute rule  > Reduce/eliminate caffeine & carbonation before pre-op diet  > Maintainwater intake - 64 oz  > Increase physical activity/establish exercise regimen   > Continuet multi vitamin and additional Vitamin D 2000IU  Work on skills to cope with emotional eating/mindfull eating    Time Spent:  60 Minutes

## 2022-09-20 ENCOUNTER — OFFICE VISIT (OUTPATIENT)
Dept: PULMONOLOGY | Facility: CLINIC | Age: 27
End: 2022-09-20
Payer: COMMERCIAL

## 2022-09-20 VITALS
DIASTOLIC BLOOD PRESSURE: 74 MMHG | HEART RATE: 90 BPM | RESPIRATION RATE: 18 BRPM | WEIGHT: 315 LBS | OXYGEN SATURATION: 97 % | BODY MASS INDEX: 50.62 KG/M2 | TEMPERATURE: 97 F | HEIGHT: 66 IN | SYSTOLIC BLOOD PRESSURE: 118 MMHG

## 2022-09-20 DIAGNOSIS — E66.01 MORBID OBESITY (HCC): ICD-10-CM

## 2022-09-20 DIAGNOSIS — G47.33 OSA (OBSTRUCTIVE SLEEP APNEA): Primary | ICD-10-CM

## 2022-09-20 PROCEDURE — 99214 OFFICE O/P EST MOD 30 MIN: CPT | Performed by: INTERNAL MEDICINE

## 2022-09-20 PROCEDURE — 3074F SYST BP LT 130 MM HG: CPT | Performed by: INTERNAL MEDICINE

## 2022-09-20 PROCEDURE — 3078F DIAST BP <80 MM HG: CPT | Performed by: INTERNAL MEDICINE

## 2022-09-20 NOTE — PROGRESS NOTES
Assessment/Plan:   Diagnoses and all orders for this visit:    NIMO (obstructive sleep apnea)    Morbid obesity (HCC)        Moderate obstructive sleep apnea with AHI of 16 titrated to a CPAP of 14 centimeters of water  States he has been trying to use it most of the nights,  Reviewed download compliance with 66% compliance, acceptable residual AHI of 0 8 currently on 14 cm of water  Cleaning of the supplies and change of CPAP supplies discussed also discussed to have an appointment with the representative respiratory therapist from [facility] to help him adjust with the sending and the mask, he states still feels as if there is too much pressure in the MAC will adjust the pressure settings and try to bring down the pressure as able out desensitization will be the way to go for him will try to bring down the pressure and then slowly go up Will discuss with the respiratory therapist at his appointment  Recommend weight loss   Follow-up in 3 months or p r n  earlier as needed  Return in about 3 months (around 12/20/2022)  All questions are answered to the patient's satisfaction and understanding  He verbalizes understanding  He is encouraged to call with any further questions or concerns  Portions of the record may have been created with voice recognition software  Occasional wrong word or "sound a like" substitutions may have occurred due to the inherent limitations of voice recognition software  Read the chart carefully and recognize, using context, where substitutions have occurred      Electronically Signed by Candie Giordano MD    ______________________________________________________________________    Chief Complaint:   Chief Complaint   Patient presents with    Follow-up       Patient ID: Rosaura Olvera is a 23 y o  y o  male has a past medical history of Anxiety, Bronchitis, Concussion, Depression, Hypertension, Morbid obesity with BMI of 45 0-49 9, adult ([address] ), and Sleep apnea, obstructive  9/20/2022  Patient presents today for follow-up visit  Amari Perez  is a very pleasant 28-year-old gentleman, here for evaluation for obstructive sleep apnea with his dad        Review of Systems   Constitutional: Negative  HENT: Negative  Eyes: Negative  Respiratory: Negative  Cardiovascular: Negative  Gastrointestinal: Negative  Endocrine: Negative  Genitourinary: Negative  Musculoskeletal: Negative  Allergic/Immunologic: Negative  Neurological: Negative  Hematological: Negative  Psychiatric/Behavioral: Negative  Smoking history: He reports that he has never smoked   He has never used smokeless tobacco     The following portions of the patient's history were reviewed and updated as appropriate: allergies, current medications, past family history, past medical history, past social history, past surgical history and problem list     Immunization History   Administered Date(s) Administered    BCG 05/30/1998, 08/18/2000, 04/06/2001, 07/06/2001, 02/26/2002    COVID-19 PFIZER VACCINE 0 3 ML IM 03/18/2021, 04/10/2021    DTaP 04/21/1997, 05/26/1997, 08/26/1997, 07/08/2000, 12/02/2002    H1N1, All Formulations 12/03/2009    HPV Quadrivalent 07/27/2014, 11/02/2014    Hep A, ped/adol, 2 dose 06/11/2013    Hep B, Adolescent or Pediatric 11/19/2002, 12/30/2002, 06/02/2003, 05/25/2014    Hepatitis A 06/01/2013, 06/11/2013, 07/27/2014    HiB 11/19/2002    INFLUENZA 12/05/2009, 10/07/2015    IPV 07/27/2014    Influenza, injectable, quadrivalent, preservative free 0 5 mL 10/16/2020    MMR 03/16/1998, 11/19/2002    Meningococcal MCV4P 08/17/2009    OPV 04/21/1997, 05/26/1997, 08/26/1997, 02/16/1998, 04/19/1998    Tdap 08/17/2009    Tuberculin Skin Test-PPD Intradermal 04/27/2015, 06/05/2015    Typhoid Live, oral 06/11/2013    Typhoid, Unspecified 06/11/2013    Varicella 12/02/2002, 06/21/2004, 08/17/2009     Current Outpatient Medications   Medication Sig Dispense Refill    buPROPion (WELLBUTRIN XL) 150 mg 24 hr tablet Take 150 mg by mouth every morning        buPROPion (Wellbutrin XL) 300 mg 24 hr tablet Take 300 mg by mouth every morning        desvenlafaxine succinate (PRISTIQ) 50 mg 24 hr tablet Take 1 tablet (50 mg total) by mouth daily      doxepin (SINEquan) 100 mg capsule Take 100 mg by mouth 3 (three) times daily after meals      losartan (COZAAR) 50 mg tablet Take 1 tablet (50 mg total) by mouth daily 90 tablet 3    topiramate (TOPAMAX) 200 MG tablet 200 mg 2 (two) times a day       ziprasidone (GEODON) 40 mg capsule Take 40 mg by mouth every morning       ziprasidone (GEODON) 80 mg capsule Take 80 mg by mouth every evening       al mag oxide-diphenhydramine-lidocaine viscous (MAGIC MOUTHWASH) 1:1:1 suspension Swish and spit 10 mL every 6 (six) hours as needed for mouth pain or discomfort or mucositis 90 mL 0    doxepin (SINEquan) 75 MG capsule Take 75 mg by mouth daily at bedtime      DPH-Lido-AlHydr-MgHydr-Simeth (First-Mouthwash BLM) SUSP SWISH AND SPIT 10 ML EVERY 6 (SIX) HOURS AS NEEDED FOR MOUTH PAIN OR DISCOMFORT OR MUCOSITIS      methylPREDNISolone 4 MG tablet therapy pack Use as directed on package 21 each 0    omeprazole (PriLOSEC OTC) 20 MG tablet Take 1 tablet (20 mg total) by mouth in the morning and 1 tablet (20 mg total) before bedtime  Do all this for 14 days  28 tablet 0    Omeprazole 20 MG TBEC Take 20 mg by mouth in the morning      propranolol (INDERAL LA) 80 mg 24 hr capsule Take 1 capsule (80 mg total) by mouth every morning 90 capsule 3     No current facility-administered medications for this visit       Allergies: Abilify [aripiprazole], Lithium, Lorazepam, and Seroquel [quetiapine]    Objective:  Vitals:    09/20/22 1033 09/20/22 1036   BP: 118/74    BP Location: Left arm    Patient Position: Sitting    Cuff Size: Large    Pulse: 90    Resp: 18    Temp: (!) 97 °F (36 1 °C)    TempSrc: Tympanic    SpO2: 97% 97% Weight: (!) 150 kg (331 lb)    Height: 5' 6" (1 676 m)    Oxygen Therapy  SpO2: 97 %  Oxygen Therapy: None (Room air)    Wt Readings from Last 3 Encounters:   09/20/22 (!) 150 kg (331 lb)   08/31/22 (!) 150 kg (331 lb 9 6 oz)   07/29/22 (!) 151 kg (333 lb)     Body mass index is 53 42 kg/m²  Physical Exam  Constitutional:       Appearance: He is well-developed  HENT:      Head: Normocephalic and atraumatic  Eyes:      Pupils: Pupils are equal, round, and reactive to light  Neck:      Comments: Short and wide neck  Cardiovascular:      Rate and Rhythm: Normal rate and regular rhythm  Heart sounds: Normal heart sounds  Pulmonary:      Effort: Pulmonary effort is normal       Breath sounds: Normal breath sounds  Musculoskeletal:         General: Normal range of motion  Cervical back: Normal range of motion and neck supple  Skin:     General: Skin is warm and dry  Neurological:      Mental Status: He is alert and oriented to person, place, and time  Psychiatric:         Behavior: Behavior normal            Diagnostics:  I have personally reviewed pertinent reports      ESS: Total score: 10

## 2022-09-23 ENCOUNTER — TELEPHONE (OUTPATIENT)
Dept: PULMONOLOGY | Facility: CLINIC | Age: 27
End: 2022-09-23

## 2022-09-23 NOTE — TELEPHONE ENCOUNTER
Jocelyne Rowe was having trouble with too much moisture in the mask and the air was too warm   We adjusted both and I reviewed how to adjust hem at home if need be

## 2022-09-27 ENCOUNTER — OFFICE VISIT (OUTPATIENT)
Dept: BARIATRICS | Facility: CLINIC | Age: 27
End: 2022-09-27

## 2022-09-27 VITALS — WEIGHT: 315 LBS | BODY MASS INDEX: 53.26 KG/M2

## 2022-09-27 DIAGNOSIS — E66.01 MORBID (SEVERE) OBESITY DUE TO EXCESS CALORIES (HCC): Primary | ICD-10-CM

## 2022-09-27 PROCEDURE — RECHECK

## 2022-09-27 NOTE — PROGRESS NOTES
Patient presents for pre-op weight check, current weight 330 4lbs  Eating behaviors/food choices: Patient and mother met with SW, patient's mother offered most of the details during the visit including food choices and eating habits patient has been working on  He is getting in three meals a day but doing some snacking especially during the day when he's bored  Mom reports hiding the higher calorie snack foods that she and patient's father enjoy but he may find them at times  Mindful eating practices and tuning into head versus stomach hunger was encouraged along with find non-food coping skills to combat bored eating  Activity/Exercise:  Goes to the gym 3-4 days a week with his parents taking various classes with them  He is also taking walks around the [facility], doing one loop which is about a quarter of a mile  Patient's mother is pushing for him to do four laps a day to get in a mile, SW encouraged patient to try to do some type of activity each day to use up time he is sedentary and to avoid bored eating  Sleep/Rest:  Patient using CPAP machine, no sleep disruption reported  Mental Health/Wellness: Patient is following up with his mental health treatment to manage his depression and anxiety  He recently completely Transcranial Magnetic Stimulation and is with now doing maintenance which they have seen an improvement in his depression  He does do some bored eating during the day, encouraged to find non-food coping skills to fill his time  His mother reports that he is not able to get a job due to an event that they didn't expand upon, SW recommended volunteering or work from home opportunities instead      Goals:    - decrease bored eating with effective non-food coping skills  - be mindful of food choices, head versus stomach hunger  - continue physical activity for weight management and as a coping skill     Next Appointment:  With FRANCES on 10/27

## 2022-10-21 NOTE — PROGRESS NOTES
Assessment/Plan:     Diagnoses and all orders for this visit:    NIMO (obstructive sleep apnea)  -     Diagnostic Sleep Study; Future    Daytime sleepiness    Snoring  -     Ambulatory referral to Pulmonology    Obesity (BMI 35 0-39 9 without comorbidity)      Excessive daytime sleepiness, with an [place] sleepiness score of 13  Likely secondary to his obstructive sleep apnea  He does have signs and symptoms of obstructive sleep apnea  Etiology pathogenesis off obstructive sleep apnea discussed in detail  Testing procedure was discussed in detail  Consequences of untreated sleep apnea discussed  Recommend weight loss  Cautioned against driving when sleepy  He will undergo an all-night polysomnogram and if that is evidence of abnormal nocturnal breathing he will undergo a CPAP titration study for maximal benefit  Answered patient's as well as the patient's mother's questions with regards to the treatment options for obstructive sleep apnea including weight loss, mandibular advancement devices, uvulopharyngoplasty and CPAP titration understands and verbalizes    Return in about 6 weeks (around 5/29/2018)  All questions are answered to the patient's satisfaction and understanding  He verbalizes understanding  He is encouraged to call with any further questions or concerns  Portions of the record may have been created with voice recognition software  Occasional wrong word or "sound a like" substitutions may have occurred due to the inherent limitations of voice recognition software  Read the chart carefully and recognize, using context, where substitutions have occurred  ______________________________________________________________________    Chief Complaint:   Chief Complaint   Patient presents with    Sleep Apnea        Patient ID: Mary Barakat is a 25 y o  y o  male has a past medical history of Bronchitis      4/17/2018  Patient is a very pleasant 61-year-old gentleman, who was diagnosed with obstructive sleep apnea about 4-5 years ago, also had a CPAP titration study done after which she did not going in and get the CPAP machine as per the patient's mom who is accompanied this office visit  Patient was then 14years old, and has currently gained around 50 pounds since then, the snoring has gotten worse, with excessive daytime sleepiness as per the patient's mom, and the patient is planning for going to school in fall and the mom is worried that he might not be awake during the classes  Patient goes to bed at around midnight, falls asleep in an hour he states, and he is out of bed at 9 AM  Does not feel refreshed when he wakes up in the morning takes another nap after breakfast   There is history of very loud snoring, no witnessed apneic spells      Occupational/Exposure history:  Travel history:  Review of Systems   Constitutional: Positive for fatigue and unexpected weight change  Negative for appetite change, chills, diaphoresis and fever  HENT: Negative for congestion, ear discharge, ear pain, nosebleeds, postnasal drip, rhinorrhea, sinus pain, sore throat and voice change  Eyes: Negative for pain, discharge and visual disturbance  Respiratory: Negative for apnea, cough, choking, chest tightness, shortness of breath, wheezing and stridor  Cardiovascular: Negative for chest pain, palpitations and leg swelling  Gastrointestinal: Negative for abdominal pain, blood in stool, constipation, diarrhea and vomiting  Endocrine: Negative for cold intolerance, heat intolerance, polydipsia, polyphagia and polyuria  Genitourinary: Negative for difficulty urinating and dysuria  Musculoskeletal: Negative for arthralgias and neck pain  Skin: Negative for pallor and rash  Allergic/Immunologic: Negative for environmental allergies and food allergies  Neurological: Negative for dizziness, speech difficulty, weakness and light-headedness  Hematological: Negative for adenopathy   Does not bruise/bleed easily  Psychiatric/Behavioral: Positive for sleep disturbance  Negative for agitation and confusion  The patient is nervous/anxious  Social history: He reports that he has never smoked  He has never used smokeless tobacco  He reports that he drinks about 0 6 oz of alcohol per week   Past surgical history: No past surgical history on file  Family history: No family history on file  Immunization History   Administered Date(s) Administered    Tdap 08/17/2009     Current Outpatient Prescriptions   Medication Sig Dispense Refill    clonazePAM (KlonoPIN) 0 5 mg tablet Take by mouth      topiramate (TOPAMAX) 200 MG tablet       ziprasidone (GEODON) 80 mg capsule       fluvoxaMINE (LUVOX) 100 mg tablet Take by mouth       No current facility-administered medications for this visit  Allergies: Abilify [aripiprazole]; Lithium; and Seroquel [quetiapine]    Objective:  Vitals:    04/17/18 1221   BP: 126/100   Pulse: 104   SpO2: 97%   Weight: 111 kg (245 lb)   Height: 5' 5" (1 651 m)   Oxygen Therapy  SpO2: 97 %    Wt Readings from Last 3 Encounters:   04/17/18 111 kg (245 lb)   10/30/17 107 kg (236 lb)   04/11/17 107 kg (236 lb)     Body mass index is 40 77 kg/m²  Physical Exam   Constitutional: He is oriented to person, place, and time  He appears well-developed and well-nourished  HENT:   Head: Normocephalic and atraumatic  Crowded oropharyngeal airways, Mallampati score of 4  Presence  of redundant mucosa   Eyes: Conjunctivae are normal  Pupils are equal, round, and reactive to light  Neck: Normal range of motion  Neck supple  No JVD present  No thyromegaly present  Short and wide neck   Cardiovascular: Normal rate, regular rhythm and normal heart sounds  Exam reveals no gallop and no friction rub  No murmur heard  Pulmonary/Chest: Effort normal and breath sounds normal  No respiratory distress  He has no wheezes  He has no rales  He exhibits no tenderness  Abdominal: Soft  Bowel sounds are normal    Musculoskeletal: Normal range of motion  He exhibits no edema, tenderness or deformity  Lymphadenopathy:     He has no cervical adenopathy  Neurological: He is alert and oriented to person, place, and time  Skin: Skin is warm and dry  Psychiatric: He has a normal mood and affect  Nursing note and vitals reviewed             ESS: Total score: 13 Noted under abdominal pannus and in creases of back. Wound care consulted, appreciate recs.

## 2022-10-27 ENCOUNTER — OFFICE VISIT (OUTPATIENT)
Dept: BARIATRICS | Facility: CLINIC | Age: 27
End: 2022-10-27

## 2022-10-27 VITALS — WEIGHT: 315 LBS | BODY MASS INDEX: 53.52 KG/M2

## 2022-10-27 DIAGNOSIS — E66.01 MORBID (SEVERE) OBESITY DUE TO EXCESS CALORIES (HCC): Primary | ICD-10-CM

## 2022-10-27 NOTE — PROGRESS NOTES
Bariatric Nutrition Assessment Note - Follow-up - Monthly PreOp Visit    Insurance: No required weight checks    Type of surgery    Interested in Vertical sleeve gastrectomy but since talked to Dr Vy Espino  Surgeon: Dr Anabela Mahoney on 3/17/2022    Nutrition Assessment   Dominique Villareal  23 y o   male     Height: 5'6"  Weight: 331 6#  Eval Weight: 315 3#   BMI: 50 9  Wt with BMI of 25: 155#  Pre-Op Excess Wt: 160#  BMI to Qualify at 40 = 248#  PMH includes: HTN    Pt advised not to gain weight during preop process  Pt encouraged to lose weight via healthy eating and exercise  Pt may follow Liver Shrinking diet 2 weeks prior to DOS depending on BMI at time  This diet will promote weight loss  There were no vitals taken for this visit      Weight History Reason for WLS:HTN  And wants to avoid diabetes and simplify current medication   Onset of Obesity: Childhood (Teen - on Lithium, Seroquil, Ambilify- gained 60#)  Family history of obesity: Unknown Adopted from [facility] at age 12  Wt Loss Attempts: Exercise  Self Created Diets (Portion Control, Healthy Food Choices, etc ) MWM programs  Maximum Wt Lost: 5-10#    Review of History and Medications   OTC: MultiVitamin  Vitamin D  Past Medical History:   Diagnosis Date   • Anxiety    • Bronchitis    • Concussion     Sports related   • Depression    • Hypertension    • Morbid obesity with BMI of 45 0-49 9, adult ([address] )    • Sleep apnea, obstructive      Past Surgical History:   Procedure Laterality Date   • NO PAST SURGERIES       Social History     Socioeconomic History   • Marital status: Single     Spouse name: Not on file   • Number of children: Not on file   • Years of education: Not on file   • Highest education level: Not on file   Occupational History   • Occupation: student   • Occupation:      Employer: [employer]   Tobacco Use   • Smoking status: Never Smoker   • Smokeless tobacco: Never Used   Vaping Use   • Vaping Use: Never used   Substance and Sexual Activity   • Alcohol use: Yes     Alcohol/week: 1 0 standard drink     Types: 1 Cans of beer per week     Comment: social   • Drug use: No   • Sexual activity: Not on file   Other Topics Concern   • Not on file   Social History Narrative    Lives with adoptive parents     Social Determinants of Health     Financial Resource Strain: Not on file   Food Insecurity: Not on file   Transportation Needs: Not on file   Physical Activity: Not on file   Stress: Not on file   Social Connections: Not on file   Intimate Partner Violence: Not on file   Housing Stability: Not on file       Current Outpatient Medications:   •  al mag oxide-diphenhydramine-lidocaine viscous (MAGIC MOUTHWASH) 1:1:1 suspension, Swish and spit 10 mL every 6 (six) hours as needed for mouth pain or discomfort or mucositis, Disp: 90 mL, Rfl: 0  •  buPROPion (WELLBUTRIN XL) 150 mg 24 hr tablet, Take 150 mg by mouth every morning  , Disp: , Rfl:   •  buPROPion (Wellbutrin XL) 300 mg 24 hr tablet, Take 300 mg by mouth every morning  , Disp: , Rfl:   •  desvenlafaxine succinate (PRISTIQ) 50 mg 24 hr tablet, Take 1 tablet (50 mg total) by mouth daily, Disp: , Rfl:   •  doxepin (SINEquan) 100 mg capsule, Take 100 mg by mouth 3 (three) times daily after meals, Disp: , Rfl:   •  doxepin (SINEquan) 75 MG capsule, Take 75 mg by mouth daily at bedtime, Disp: , Rfl:   •  DPH-Lido-AlHydr-MgHydr-Simeth (First-Mouthwash BLM) SUSP, SWISH AND SPIT 10 ML EVERY 6 (SIX) HOURS AS NEEDED FOR MOUTH PAIN OR DISCOMFORT OR MUCOSITIS, Disp: , Rfl:   •  losartan (COZAAR) 50 mg tablet, Take 1 tablet (50 mg total) by mouth daily, Disp: 90 tablet, Rfl: 3  •  methylPREDNISolone 4 MG tablet therapy pack, Use as directed on package, Disp: 21 each, Rfl: 0  •  omeprazole (PriLOSEC OTC) 20 MG tablet, Take 1 tablet (20 mg total) by mouth in the morning and 1 tablet (20 mg total) before bedtime  Do all this for 14 days  , Disp: 28 tablet, Rfl: 0  •  Omeprazole 20 MG TBEC, Take 20 mg by mouth in the morning, Disp: , Rfl:   •  propranolol (INDERAL LA) 80 mg 24 hr capsule, Take 1 capsule (80 mg total) by mouth every morning, Disp: 90 capsule, Rfl: 3  •  topiramate (TOPAMAX) 200 MG tablet, 200 mg 2 (two) times a day , Disp: , Rfl:   •  ziprasidone (GEODON) 40 mg capsule, Take 40 mg by mouth every morning , Disp: , Rfl:   •  ziprasidone (GEODON) 80 mg capsule, Take 80 mg by mouth every evening , Disp: , Rfl:   Food Intake and Lifestyle Assessment   Food Intake Assessment completed via usual diet recall  Breakfast: Sleeps in - 12-1:00 Double serving Cereal, eggs, Waffles, Pancakes - leftovers  Snack: Chips, Belvitas  Lunch: 3-4 pm   Snack: Ham & Cheese SW - 2 [employer]  Dinner: Amrit Wilhelm (take-out) Mom cooks - lean protein, starch, vegetables - raw Double helpings  Snack: Binges at night sw, leftovers  Mom says snacks hidden   Beverage intake: water 32 oz X3, coffee 1 cup and alcohol (beer - occ)  Protein supplement: Not at present  Portions: "double:  Estimated protein intake per day: Exceeds requirements  Estimated fluid intake per day: 96 oz water  Meals eaten away from home: Some Take-out  Typical meal pattern: 3 meals per day and snacks/binges during day and nightEating Behaviors: Consumption of high calorie/ high fat foods, Consumption of high calorie beverages, Large portion sizes, Frequent snacking/ grazing, Binge eating, Mindless eating, Emotional eating and Craves sweet foods  Pt denies any food behaviors that would lead to purging though state he binges "eats until he is full"  Food allergies or intolerances: None     Intolerance to roast turkey - vomits  Allergies   Allergen Reactions   • Abilify [Aripiprazole]    • Lithium    • Lorazepam Other (See Comments)     aggressive   • Seroquel [Quetiapine]      Cultural or Yazidism considerations: None    Physical Assessment  Physical Activity sedentary - plays videos games  Types of exercise:  Will exercise but dislikes  Current physical limitations: none    Psychosocial Assessment   Support systems: parent(s) - some relative had surgery  Socioeconomic factors: N/A    Nutrition Diagnosis  Diagnosis: Overweight / Obesity (NC-3 3)  Related to: Physical inactivity and Excessive energy intake  As Evidenced by: BMI >25     Nutrition Prescription: Recommend the following diet  Low fat, Low sugar, High protein and Regular    Interventions and Teaching   Discussed pre-op and post-op nutrition guidelines  Patient educated and handouts provided  Surgical changes to stomach / GI  Capacity of post-surgery stomach  Diet progression  Adequate hydration  Sugar and fat restriction to decrease "dumping syndrome"  Fat restriction to decrease steatorrhea  Expected weight loss  Weight loss plateaus/ possibility of weight regain  Exercise  Suggestions for pre-op diet  Nutrition considerations after surgery  Protein supplements  Meal planning and preparation  Appropriate carbohydrate, protein, and fat intake, and food/fluid choices to maximize safe weight loss, nutrient intake, and tolerance   Dietary and lifestyle changes  Possible problems with poor eating habits  Intuitive eating  Techniques for self monitoring and keeping daily food journal  Potential for food intolerance after surgery, and ways to deal with them including: lactose intolerance, nausea, reflux, vomiting, diarrhea, food intolerance, appetite changes, gas  Vitamin / Mineral supplementation of Multivitamin with minerals, Calcium, Vitamin B12, Iron and Vitamin D    Education provided to: patient    Barriers to learning:   Readiness to change: contemplation    Prior research on procedure: internet    Comprehension: needs reinforcement     Expected Compliance: Poor      Recommendations  Pt is not an appropriate candidate for surgery   Noted mental health disability, bingeing, lack of motivation and other food related behaviors would impair adherence to the restricive diet r/t WLS thus placing him at risk for post op complications  Pt reports that when he was in orphanage in [facility] he was told that the fastest eater get the most food so said that statement is stuck in his head so continues to follow that way of eating  He admits to eating double portions consistently and grazing continuously between meals and at night  Pt has very sedentary lifestyle as sleeps long hours and when awake, plays video games  Parents accompanied pt and very supportive  Has made numerous attempts to help manage Pino's weight including [employer] and [place] programs, gym memberships, etc  Pt acknowledges interest in having surgery at start of evaluation but unsure if pt would proceed after learning about program expectations and guidelines  Evaluation / Monitoring  Dietitian to Monitor: Eating pattern as discussed Tolerance of nutrition prescription Body weight Lab values Physical activity Bowel pattern      Monthly PreOp Visit 4/14/2022  Pt states he is overwhelmed with the process and uncertain whether he wants to have the surgery  Pt became very tearful/emotional  States he knows weight loss will help his depression though not sure he can change habits and or sustain them  Pt discusses situations at [facility] that attributes to his eating pattern but has been unable to break these habits formed  Support provided during discussion  Guided pt in ways he can modify his eating habits and get better control  Pt admits needs a better sleep schedule and trying to get employment in hopes of establishing a routine  Monthly PreOp Visit 5/20/2022  Reports having 2 bouts of depression where he broke down and cried re: weight  (First on Mother's day and 2nd at the gym during spin class) Explained reason as body dysmorphia   Goes back to being bullied in high school - called "fat and ugly"  States makes him wants to have the surgery more as feels it will help with his depression and he will have more confidence; also wants to get off BP meds   Feels he still has obstacles in process including NIMO  couldn't tolerate machine in past  Discussed changes in his eating habits  Appears to make healthier choices and more mindful  For example - after mowing lawn he was hungry and ate  2 bananas, yogurt,  2 sandwiches and cucumbers  Said before he would eat chips and other "junk" food  vs chips or   Pt states he decided to join [employer]  In spin classes but more comfortable in circuit traing classes  Goes 3-4 X /week Trying different classes  Also mows the grass and walks   Pt accompanied by his mom who had numerous questions about the workflow/requirements  Workflow reviewed in detail    Monthly PreOp Visit 8/31/2022 - Accompanied by mother  Remains in pre-op process  Pt expected to be losing more  Keeping food log on paper (2 days) and going to gym 3-4X /week- usually works on machines  Working on diet and making better choices  Parents used to hide "junk" food but now doesn't purchase and has healthier foods like fruit available for snacks  Pt trying to avoid  trigger foods (chips, ice cream,etc) as he admits as he cannot control his eating  Started Bee Pollen supplements to try to curb appetite  Discussed his binge type eating and how can lead to complications post op and/or unsuccessful weight loss  Asked pt if he is 100% sure he wants surgery and pt stated "I don't want to be fat"  Support given  Workflow reviewed  Remain concerned that surgery may  not indicated for pt at this time as question ability to comply to post op guidelines that would minimize risk of WLS  Monthly PreOp Visit 10/27/2022 - Accompanied by mother  [facility] [id number] [facility]  Pt needs to take more initiative on his own and lose weight gained during process  Asked what pt doing for himself and pt reports he is going to the gym  Goes on treadmill 10-20 minutes (2 3-2 5  level)  Pt feels if increases level - knees give out  Mom feels he should be doing more "sweat"  and pt gets upsets with mom when she has expectations greater than what he feels he can do  Diet recall: eats in middle of night - ie : 3 hotdogs (leftovers from dinner)  Gets up at 12:00 noon - oatmeal or cereal with fruit (strawberries) and  1/2 of sausages leftover                    Snack: Carrots or Bevitas (2 pkg)  Gym (5:30) Protein shake (powder, almond milk, banana, lots of PB)                     Dinner: 8:30 - 9:00 [place], salad, baked fries - dessert - cookie, fun size candy bars             Continues to snacks through out night -2-3  Ham &  Cheese sandwiches  Strawberries  Pt states can't control appetite and eats out of boredom Knows he has a disease but can't change his excess caloric intake  Asked pt if he wants to have surgery and pt responds "Have no idea" - Mother questions what are pt's options  Pt has tried various programs including [facility] Discussed meeting with Benedicto Garvey and explained how she specializes in obesity medicine  Pt receptive to meeting with Dr Sarmad Rubio and has scheduled appt in December 2022  Pt wants to remain in pre-op process until able to meet with Dr Sarmad Rubio  Appt made with SW in November  Workflow: Mother looking into state insurance for pt  • Psych and/or D+A Clearance: Completed  • PCP Letter: Received  • Support Group: 8/15/2022 virtual Support Group  • Surgeon Appt  Met w/Dr Tatianna Mustafa  • EGD  Completed 4/22/22   • Cardiac Risk Assessment Completed - 5/18/2022 and echo 6/13/2022  • Sleep Study on 4/21/2022 had consult 4/14/2022Has CPAP and sleeping better  • Blood work - 6/17/2022   • Nicotine test N/A  • 6 Month Pre-Operative Program: N/A  • Weight Loss  Advised not to gain- wt loss recommended 5%/16# but has been gaining        Goals  Advised pt to select 1-2 goals to work on   Pt did not commit to any specific goals during session   Pre-Surgery guidelines  > Trial Baritastic for food logging  > Establish regular meal pattern - include fruits, vegetables and whole grains  > Focus on protein - include lean protein at each meal and snack -> Decrease portions  > Limit processed foods, fast foods and dining away from home  > Include meal prepping  > Limit snacks - healthier choices and portion; avoid grazing-bingeing  > Slow pace of eating and drinking - practice 30/60 minute rule  > Reduce/eliminate caffeine & carbonation before pre-op diet  > Maintainwater intake - 64 oz  > Increase physical activity/establish exercise regimen   > Continuet multi vitamin and additional Vitamin D 2000IU  Work on skills to cope with emotional eating/mindfull eating      Time Spent:  60 Minutes

## 2022-11-07 ENCOUNTER — TELEPHONE (OUTPATIENT)
Dept: BARIATRICS | Facility: CLINIC | Age: 27
End: 2022-11-07

## 2022-11-07 NOTE — TELEPHONE ENCOUNTER
Pt contacted and was informed that his surgical process will be halted at this time    Pt to pursue MWM and has appt scheduled with Dr Eleuterio Torrez in December 2022

## 2022-12-05 ENCOUNTER — CONSULT (OUTPATIENT)
Dept: BARIATRICS | Facility: CLINIC | Age: 27
End: 2022-12-05

## 2022-12-05 ENCOUNTER — APPOINTMENT (OUTPATIENT)
Dept: LAB | Facility: HOSPITAL | Age: 27
End: 2022-12-05

## 2022-12-05 VITALS
BODY MASS INDEX: 50.62 KG/M2 | WEIGHT: 315 LBS | RESPIRATION RATE: 16 BRPM | HEIGHT: 66 IN | SYSTOLIC BLOOD PRESSURE: 120 MMHG | DIASTOLIC BLOOD PRESSURE: 80 MMHG | HEART RATE: 102 BPM | TEMPERATURE: 96.3 F

## 2022-12-05 DIAGNOSIS — R73.03 PREDIABETES: Primary | ICD-10-CM

## 2022-12-05 DIAGNOSIS — F50.89 OTHER DISORDER OF EATING: ICD-10-CM

## 2022-12-05 DIAGNOSIS — E66.01 MORBID OBESITY WITH BMI OF 50.0-59.9, ADULT (HCC): ICD-10-CM

## 2022-12-05 DIAGNOSIS — R73.03 PREDIABETES: ICD-10-CM

## 2022-12-05 PROBLEM — F50.9 EATING DISORDER: Status: ACTIVE | Noted: 2022-12-05

## 2022-12-05 NOTE — PROGRESS NOTES
Assessment/Plan:  Silas Cm was seen today for consult  Diagnoses and all orders for this visit:    Prediabetes  Discussed Metformin as well [facility] is more efficient in addressing metabolism   -     liraglutide (SAXENDA) injection; INJECT [address]  -WEEK 1: 0 6mg daily -if tolerated WEEK 2: 1 2mg daily -if tolerated WEEK 3: 1 8mg daily -if tolerated WEEK 4: 2 4mg daily -if tolerated WEEK 5: 3mg daily -IF NAUSEA/VOMITING DEVELOP STOP MEDICATION FOR A FEW DAYS AND DECREASE TO PREVIOUSLY TOLERATED DOSE  STAY HYDRATED  -IF YOU DEVELOP SEVERE ABDOMINAL PAIN WHICH MAY RADIATE TO THE BACK, SOMETIMES ASSOCIATED WITH FEVER, AND VOMITING, STOP MEDICATION AND SEEK URGENT CARE AS THIS MAY BE A SIGN OF PANCREATITIS   -     HEMOGLOBIN A1C W/ EAG ESTIMATION; Future  - Patient denies personal history of pancreatitis  Patient DOES NOT know personal and family history, was adopted, family and himself aware of risks  of medullary thyroid cancer and multiple endocrine neoplasia type 2 (MEN 2 tumor)  Patient and family understand these major side effects and agrees to start the medication  Morbid obesity with BMI of 50 0-59 9, adult ([facility])  -     liraglutide (SAXENDA) injection; INJECT SAXENDA DAILY SUBCUTANEOUSLY  -WEEK 1: 0 6mg daily -if tolerated WEEK 2: 1 2mg daily -if tolerated WEEK 3: 1 8mg daily -if tolerated WEEK 4: 2 4mg daily -if tolerated WEEK 5: 3mg daily -IF NAUSEA/VOMITING DEVELOP STOP MEDICATION FOR A FEW DAYS AND DECREASE TO PREVIOUSLY TOLERATED DOSE  STAY HYDRATED  -IF YOU DEVELOP SEVERE ABDOMINAL PAIN WHICH MAY RADIATE TO THE BACK, SOMETIMES ASSOCIATED WITH FEVER, AND VOMITING, STOP MEDICATION AND SEEK URGENT CARE AS THIS MAY BE A SIGN OF PANCREATITIS  Other disorder of eating  Encouraged mindful eating    Counseled patient on diet behavior and exercise modification for weight loss    Motivational interview performed, behavioral changes discussed  Start with going to bed earlier no later than 12 am and wear the cpap  NIMO  Encouraged patient to use CPAP - he does not use it  Obesity: scheduled meal plan offered and start a food journal written by hand  He does not want to count calories but given 1800kcal max per day to track  Handouts with 500kcal meal plan given  Snack list given    Weight not at goal and patient tried more than 6 months to lose weight and was not able to achieve a meaningful weight loss above 5%  Lready seen by surgical dietician and SW  - Weight loss can improve patient's co-morbid conditions and/or prevent weight-related complications  Nutrition prescription:  • Calorie goal: 1800kjcal with scheduled meal plan during 12 hours day  • Motivational interview performed and patient noted changes to work on until next visit  • Hydration: 64oz fluid, no sugary drinks  • Goal 3 meals per day  • Food log (ie ) www [email or web address] Post, lose it or preferred jaden  • Increase physical activity by 10 minutes daily  Has behavioral problems that hinder his weight loss success  Total time spent: 60 min, with >50% face-to-face time spent counseling patient on nonsurgical interventions for the treatment of excess weight  Discussed in detail nonsurgical options including intensive lifestyle intervention program, very low-calorie diet program and conservative program   Discussed the role of weight loss medications  Return in 8 weeks    Subjective:   Chief Complaint   Patient presents with   • Consult     Initial visit with Rafal Koch Pt diagnosed with Sleep Apnea  Patient ID: Randikeyshawn Jo  is a 23 y o  male with excess weight/obesity here to pursue weight management  Previous notes and records have been reviewed          HPI  Wt Readings from Last 20 Encounters:   12/05/22 (!) 148 kg (327 lb)   10/27/22 (!) 150 kg (331 lb 9 6 oz)   09/27/22 (!) 150 kg (330 lb)   09/20/22 (!) 150 kg (331 lb)   08/31/22 (!) 150 kg (331 lb 9 6 oz)   07/29/22 (!) 151 kg (333 lb)   07/19/22 (!) 147 kg (325 lb)   06/22/22 (!) 147 kg (325 lb)   06/13/22 (!) 147 kg (325 lb)   05/24/22 (!) 147 kg (325 lb)   05/24/22 (!) 147 kg (323 lb)   05/18/22 (!) 145 kg (320 lb)   04/22/22 (!) 145 kg (319 lb 10 7 oz)   04/14/22 (!) 144 kg (318 lb)   04/14/22 (!) 144 kg (317 lb)   03/17/22 (!) 143 kg (314 lb 9 6 oz)   03/15/22 (!) 143 kg (314 lb 9 6 oz)   02/28/22 (!) 143 kg (316 lb)   01/04/22 (!) 143 kg (315 lb 6 4 oz)   08/23/21 136 kg (299 lb 9 6 oz)      Has been in surgical programs for a while but not deem a[propriate due to behavioral   B-wakes up at 12pm goes to bed at 12am  S-  L-first meal of day sandwich ham and cheese pr oatmeal or eggs   S-chocolate or belvita crackers  3-4 pm 3-4 sandwiches   D-9pm pasta with meat sauce and mushroom   S-banana and fruit  Hydration:water and fruit drink with artifical flavors  Alcohol: socially  Smoking:none  Exercise: gym 3-4 times a week classes  Occupation:  Plays video games, 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  Sleep: well  STOP bang: does not use the CPAP as he should    Past Medical History:   Diagnosis Date   • Anxiety    • Bronchitis    • Concussion     Sports related   • Depression    • Hypertension    • Morbid obesity with BMI of 45 0-49 9, adult ([facility] [address] )    • Sleep apnea, obstructive      Past Surgical History:   Procedure Laterality Date   • NO PAST SURGERIES       The following portions of the patient's history were reviewed and updated as appropriate: allergies, current medications, past family history, past medical history, past social history, past surgical history, and problem list     ROS:  Review of Systems   Constitutional: Negative for activity change  Fatigue  HENT: Negative for trouble swallowing  Respiratory: Negative for shortness of breath      Cardiovascular: Negative for chest pain, edema  Gastrointestinal: Negative for abdominal pain, nausea and vomiting, acid reflux, constipation/diarrhea  Endocrine: negative for heat Wallie Abhi intolerance  Genitourinary: Negative for difficulty urinating  Musculoskeletal: Negative for gait problem and myalgias  Psychiatric/Behavioral: + for behavioral problems including anxiety /depression, boredom eating   Objective:  /80 (BP Location: Right arm, Cuff Size: Large)   Pulse 102   Temp (!) 96 3 °F (35 7 °C)   Resp 16   Ht 5' 6" (1 676 m)   Wt (!) 148 kg (327 lb)   BMI 52 78 kg/m²   Constitutional: Well-developed, well-nourished and Obese Body mass index is 52 78 kg/m²  Carla Mcnamara Alert, cooperative  HEENT: No conjunctival injection  No thyroid masses  Pulmonary: No increased work of breathing or signs of respiratory distress  Clear respiratory sounds  CV: Well-perfused, Regular rate and rhythm, no murmurs  Vascular: no peripheral edema  GI: Abdomen obese, Non-distended  Not tender  MSK: no sarcopenia noted   Neuro: Oriented to person, place and time  Normal Speech  Normal gait  Psych: Abnromal affect and mood  Normal thought process, no delusions , behavioral abnormalities    Labs and Imaging  Recent labs and imaging have been personally reviewed    Lab Results   Component Value Date    WBC 8 80 06/17/2022    HGB 14 1 06/17/2022    HCT 44 8 06/17/2022    MCV 82 06/17/2022     06/17/2022     Lab Results   Component Value Date     09/01/2017    SODIUM 142 06/17/2022    K 3 9 06/17/2022     06/17/2022    CO2 26 06/17/2022    AGAP 9 06/17/2022    BUN 12 06/17/2022    CREATININE 1 07 06/17/2022    GLUF 110 (H) 06/17/2022    CALCIUM 8 7 06/17/2022    AST 24 06/17/2022    ALT 35 06/17/2022    ALKPHOS 60 06/17/2022    PROT 7 5 09/01/2017    TP 7 4 06/17/2022    BILITOT 0 9 09/01/2017    TBILI 0 31 06/17/2022    EGFR 95 06/17/2022     Lab Results   Component Value Date    HGBA1C 6 2 (H) 06/17/2022     Lab Results   Component Value Date    AKE6KIOKVWLW 3 941 06/17/2022     Lab Results   Component Value Date    CHOLESTEROL 172 06/17/2022     Lab Results   Component Value Date    HDL 29 (L) 06/17/2022     Lab Results   Component Value Date    TRIG 182 (H) 06/17/2022     Lab Results   Component Value Date    LDLCALC 107 (H) 06/17/2022

## 2022-12-06 LAB
EST. AVERAGE GLUCOSE BLD GHB EST-MCNC: 123 MG/DL
HBA1C MFR BLD: 5.9 %

## 2022-12-07 ENCOUNTER — OFFICE VISIT (OUTPATIENT)
Dept: FAMILY MEDICINE CLINIC | Facility: CLINIC | Age: 27
End: 2022-12-07

## 2022-12-07 VITALS
WEIGHT: 315 LBS | SYSTOLIC BLOOD PRESSURE: 124 MMHG | BODY MASS INDEX: 50.62 KG/M2 | HEIGHT: 66 IN | TEMPERATURE: 97.9 F | HEART RATE: 86 BPM | DIASTOLIC BLOOD PRESSURE: 68 MMHG | OXYGEN SATURATION: 98 %

## 2022-12-07 DIAGNOSIS — I10 ESSENTIAL HYPERTENSION: ICD-10-CM

## 2022-12-07 DIAGNOSIS — Z23 ENCOUNTER FOR IMMUNIZATION: ICD-10-CM

## 2022-12-07 DIAGNOSIS — E78.2 HYPERLIPIDEMIA, MIXED: ICD-10-CM

## 2022-12-07 DIAGNOSIS — R73.03 PREDIABETES: Primary | ICD-10-CM

## 2022-12-07 NOTE — ASSESSMENT & PLAN NOTE
Encouraged weight loss, he is seeing bariatric medicine, hoping to start [address] ending insurance approval   With weight loss his cholesterol should certainly improve

## 2022-12-07 NOTE — PROGRESS NOTES
Name: Reuben Fuchs      : 1995      MRN: [id number]  Encounter Provider: Alina Mann DO  Encounter Date: 2022   Encounter department: [facility] [address] [facility] [address]     1  Prediabetes  Assessment & Plan:  Stable, encouraged weight loss, currently seeing bariatric medicine, repeat CMP, hemoglobin A1c  Orders:  -     Comprehensive metabolic panel; Future  -     Hemoglobin A1C; Future    2  Hyperlipidemia, mixed  Assessment & Plan:  Encouraged weight loss, he is seeing bariatric medicine, hoping to start Simonne Bettina ending insurance approval   With weight loss his cholesterol should certainly improve  Orders:  -     Lipid panel; Future    3  Essential hypertension  Assessment & Plan:  Well-controlled, continue losartan recheck his blood pressure new office in 6 months      4  Encounter for immunization  -     influenza vaccine, quadrivalent, 0 5 mL, preservative-free, for adult and pediatric patients 6 mos+ (AFLURIA, FLUARIX, FLULAVAL, FLUZONE)           Subjective      Patient is in today for checkup, he is seeing bariatric medicine, he was prescribed Saxenda  He has not started this as of yet as he is waiting insurance approval   Otherwise he is doing well      Review of Systems    Current Outpatient Medications on File Prior to Visit   Medication Sig   • buPROPion (WELLBUTRIN XL) 150 mg 24 hr tablet Take 150 mg by mouth every morning     • buPROPion (Wellbutrin XL) 300 mg 24 hr tablet Take 300 mg by mouth every morning     • desvenlafaxine succinate (PRISTIQ) 50 mg 24 hr tablet Take 1 tablet (50 mg total) by mouth daily   • doxepin (SINEquan) 100 mg capsule Take 100 mg by mouth 3 (three) times daily after meals   • doxepin (SINEquan) 75 MG capsule Take 75 mg by mouth daily at bedtime   • DPH-Lido-AlHydr-MgHydr-Simeth (First-Mouthwash BLM) SUSP SWISH AND SPIT 10 ML EVERY 6 (SIX) HOURS AS NEEDED FOR MOUTH PAIN OR DISCOMFORT OR MUCOSITIS   • liraglutide (SAXENDA) injection INJECT SAXENDA DAILY SUBCUTANEOUSLY  -WEEK 1: 0 6mg daily -if tolerated WEEK 2: 1 2mg daily -if tolerated WEEK 3: 1 8mg daily -if tolerated WEEK 4: 2 4mg daily -if tolerated WEEK 5: 3mg daily -IF NAUSEA/VOMITING DEVELOP STOP MEDICATION FOR A FEW DAYS AND DECREASE TO PREVIOUSLY TOLERATED DOSE  STAY HYDRATED  -IF YOU DEVELOP SEVERE ABDOMINAL PAIN WHICH MAY RADIATE TO THE BACK, SOMETIMES ASSOCIATED WITH FEVER, AND VOMITING, STOP MEDICATION AND SEEK URGENT CARE AS THIS MAY BE A SIGN OF PANCREATITIS  • losartan (COZAAR) 50 mg tablet Take 1 tablet (50 mg total) by mouth daily   • Omeprazole 20 MG TBEC Take 20 mg by mouth in the morning   • topiramate (TOPAMAX) 200 MG tablet 200 mg 2 (two) times a day    • ziprasidone (GEODON) 40 mg capsule Take 40 mg by mouth every morning    • ziprasidone (GEODON) 80 mg capsule Take 80 mg by mouth every evening    • omeprazole (PriLOSEC OTC) 20 MG tablet Take 1 tablet (20 mg total) by mouth in the morning and 1 tablet (20 mg total) before bedtime  Do all this for 14 days     • propranolol (INDERAL LA) 80 mg 24 hr capsule Take 1 capsule (80 mg total) by mouth every morning   • [DISCONTINUED] al mag oxide-diphenhydramine-lidocaine viscous (MAGIC MOUTHWASH) 1:1:1 suspension Swish and spit 10 mL every 6 (six) hours as needed for mouth pain or discomfort or mucositis (Patient not taking: Reported on 12/7/2022)   • [DISCONTINUED] methylPREDNISolone 4 MG tablet therapy pack Use as directed on package (Patient not taking: Reported on 12/7/2022)       Objective     /68 (BP Location: Left arm, Patient Position: Sitting, Cuff Size: Large)   Pulse 86   Temp 97 9 °F (36 6 °C)   Ht 5' 6" (1 676 m)   Wt (!) 149 kg (328 lb)   SpO2 98%   BMI 52 94 kg/m²     Physical Exam  Oumar Banuelos DO

## 2022-12-19 ENCOUNTER — TELEPHONE (OUTPATIENT)
Dept: BARIATRICS | Facility: CLINIC | Age: 27
End: 2022-12-19

## 2022-12-19 NOTE — TELEPHONE ENCOUNTER
Pt mom called about the status of the saxenda injections authorizations  Please call her with the status of it

## 2022-12-21 DIAGNOSIS — R73.03 PREDIABETES: Primary | ICD-10-CM

## 2022-12-27 ENCOUNTER — TELEPHONE (OUTPATIENT)
Dept: PULMONOLOGY | Facility: CLINIC | Age: 27
End: 2022-12-27

## 2022-12-27 ENCOUNTER — TELEPHONE (OUTPATIENT)
Dept: BARIATRICS | Facility: CLINIC | Age: 27
End: 2022-12-27

## 2022-12-27 NOTE — TELEPHONE ENCOUNTER
Spoke to Dr Juan Alberto Solis  She will see patient in a 15 minute slot to demo the medication  She wants patient to view video beforehand on how to do the injection  She said to put visit as Nurse visit, but she will see the patient  I left patient messages on both phone #'s to contact our office back to schedule

## 2022-12-27 NOTE — TELEPHONE ENCOUNTER
Patient's mom called in because they were able to get the saxenda, but the instructions say to contact ane see provider to learn how to use    Please reach out to patient to discuss

## 2022-12-29 ENCOUNTER — OFFICE VISIT (OUTPATIENT)
Dept: BARIATRICS | Facility: CLINIC | Age: 27
End: 2022-12-29

## 2022-12-29 DIAGNOSIS — E66.9 OBESITY, CLASS I, BMI 30-34.9: Primary | ICD-10-CM

## 2023-01-01 DIAGNOSIS — I10 ESSENTIAL HYPERTENSION: ICD-10-CM

## 2023-01-01 DIAGNOSIS — G43.009 MIGRAINE WITHOUT AURA AND WITHOUT STATUS MIGRAINOSUS, NOT INTRACTABLE: ICD-10-CM

## 2023-01-01 RX ORDER — PROPRANOLOL HYDROCHLORIDE 80 MG/1
80 CAPSULE, EXTENDED RELEASE ORAL EVERY MORNING
Qty: 90 CAPSULE | Refills: 3 | Status: SHIPPED | OUTPATIENT
Start: 2023-01-01 | End: 2023-01-31

## 2023-01-26 DIAGNOSIS — R73.03 PREDIABETES: ICD-10-CM

## 2023-01-26 RX ORDER — PEN NEEDLE, DIABETIC 31 GX5/16"
NEEDLE, DISPOSABLE MISCELLANEOUS
Qty: 30 EACH | Refills: 1 | Status: SHIPPED | OUTPATIENT
Start: 2023-01-26

## 2023-02-06 ENCOUNTER — OFFICE VISIT (OUTPATIENT)
Dept: BARIATRICS | Facility: CLINIC | Age: 28
End: 2023-02-06

## 2023-02-06 VITALS
BODY MASS INDEX: 53.1 KG/M2 | DIASTOLIC BLOOD PRESSURE: 80 MMHG | WEIGHT: 315 LBS | HEART RATE: 85 BPM | SYSTOLIC BLOOD PRESSURE: 118 MMHG | TEMPERATURE: 95.8 F

## 2023-02-06 DIAGNOSIS — R73.03 PREDIABETES: ICD-10-CM

## 2023-02-06 DIAGNOSIS — F33.9 DEPRESSION, RECURRENT (HCC): ICD-10-CM

## 2023-02-06 DIAGNOSIS — E88.81 METABOLIC SYNDROME: ICD-10-CM

## 2023-02-06 DIAGNOSIS — E66.01 MORBID OBESITY WITH BMI OF 50.0-59.9, ADULT (HCC): Primary | ICD-10-CM

## 2023-02-06 DIAGNOSIS — F50.89 OTHER DISORDER OF EATING: ICD-10-CM

## 2023-02-06 NOTE — PROGRESS NOTES
Assessment/Plan:  Jeancarlos Funk was seen today for consult  Diagnoses and all orders for this visit:    Prediabetes  Discussed Metformin as well will try Wegovy since it is more efficient in addressing metabolism   Lab Results   Component Value Date    HGBA1C 5 9 (H) 12/05/2022   · Montey Card did not work gave him a lot of gas  · Behavioral problems to be addressed with counselor  · Barriers to weight loss; behavioral problems- advised focus on CBT  · discussed medications will not help if behavior not addressed- has boredom eating  - Patient denies personal history of pancreatitis  Patient DOES NOT know personal and family history, was adopted, family and himself aware of risks  of medullary thyroid cancer and multiple endocrine neoplasia type 2 (MEN 2 tumor)  Patient and family understand these major side effects and agrees to start the medication  Morbid obesity with BMI of 50 0-59 9, adult ([address] )  Other disorder of eating  Encouraged mindful eating    Counseled patient on diet behavior and exercise modification for weight loss  Motivational interview performed, behavioral changes discussed  Start with going to bed earlier no later than 12 am and wear the cpap  NIMO  Encouraged patient to use CPAP - states today that he is suing the machine  Obesity:   He does not want to count calories but given 1800kcal max per day to track  Handouts with 500kcal meal plan given  Snack list given  Nutrition prescription:  • Calorie goal: 1800kcal with scheduled meal plan during 12 hours day  • Motivational interview performed and patient noted changes to work on until next visit  • Hydration: 64oz fluid, no sugary drinks  • Goal 3 meals per day  • Food log (ie ) www [email or web address], lose it or preferred jaden  • Increase physical activity by 10 minutes daily       Has behavioral problems that hinder his weight loss success      Return in 8 weeks    Subjective:   Chief Complaint   Patient presents with   • Follow-up Patient presents for f/u offers c/o dry mouth and gassy  Patient ID: Deonna Cifuentes  is a 33 y o  male with excess weight/obesity here to pursue weight management  Previous notes and records have been reviewed          HPI  Wt Readings from Last 20 Encounters:   02/06/23 (!) 149 kg (329 lb)   12/07/22 (!) 149 kg (328 lb)   12/05/22 (!) 148 kg (327 lb)   10/27/22 (!) 150 kg (331 lb 9 6 oz)   09/27/22 (!) 150 kg (330 lb)   09/20/22 (!) 150 kg (331 lb)   08/31/22 (!) 150 kg (331 lb 9 6 oz)   07/29/22 (!) 151 kg (333 lb)   07/19/22 (!) 147 kg (325 lb)   06/22/22 (!) 147 kg (325 lb)   06/13/22 (!) 147 kg (325 lb)   05/24/22 (!) 147 kg (325 lb)   05/24/22 (!) 147 kg (323 lb)   05/18/22 (!) 145 kg (320 lb)   04/22/22 (!) 145 kg (319 lb 10 7 oz)   04/14/22 (!) 144 kg (318 lb)   04/14/22 (!) 144 kg (317 lb)   03/17/22 (!) 143 kg (314 lb 9 6 oz)   03/15/22 (!) 143 kg (314 lb 9 6 oz)   02/28/22 (!) 143 kg (316 lb)   First OV 12/05/2023 327lbs  Current weight 329lbs      Has been in surgical programs for a while but not deem apropriate due to behavioral problems  More depressed because he did not have his treatments yet  Started Saxenda and gets dry mouth , at 3mg now  Side effects : lots of gas   B-wakes up at 12pm goes to bed at 12am  S-  L-first meal of day sandwich ham and cheese pr oatmeal or eggs   S-chocolate or belvita crackers  3-4 pm 3-4 sandwiches   D-9pm pasta with meat sauce and mushroom   S-banana and fruit  Hydration:water and fruit drink with artifical flavors  Alcohol: socially  Smoking:none  Exercise: gym 3-4 times a week classes  Occupation:  Plays video games, 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  Sleep: well  STOP bang: does not use the CPAP as he should    Past Medical History:   Diagnosis Date   • Anxiety    • Bronchitis    • Concussion     Sports related   • Depression    • Hypertension    • Morbid obesity with BMI of 45 0-49 9, adult ([facility] [address] )    • Sleep apnea, obstructive      Past Surgical History:   Procedure Laterality Date   • NO PAST SURGERIES       The following portions of the patient's history were reviewed and updated as appropriate: allergies, current medications, past family history, past medical history, past social history, past surgical history, and problem list     ROS:  Review of Systems   Constitutional: Negative for activity change  Fatigue  HENT: Negative for trouble swallowing  Respiratory: Negative for shortness of breath  Cardiovascular: Negative for chest pain, edema  Gastrointestinal: Negative for abdominal pain, nausea and vomiting, acid reflux, constipation/diarrhea  Endocrine: negative for heat /cold intolerance  Genitourinary: Negative for difficulty urinating  Musculoskeletal: Negative for gait problem and myalgias  Psychiatric/Behavioral: + for behavioral problems including anxiety /depression, boredom eating   Objective:  /80 (BP Location: Left arm, Patient Position: Sitting, Cuff Size: Large)   Pulse 85   Temp (!) 95 8 °F (35 4 °C)   Wt (!) 149 kg (329 lb)   BMI 53 10 kg/m²   Constitutional: Well-developed, well-nourished and Obese Body mass index is 53 1 kg/m²  Ivonneliliya Donnelly Alert, cooperative  HEENT: No conjunctival injection  No thyroid masses  Pulmonary: No increased work of breathing or signs of respiratory distress  Clear respiratory sounds  CV: Well-perfused, Regular rate and rhythm, no murmurs  Vascular: no peripheral edema  GI: Abdomen obese, Non-distended  Not tender  MSK: no sarcopenia noted   Neuro: Oriented to person, place and time  Normal Speech  Normal gait  Psych: Abnormal affect and mood  Normal thought process, no delusions , behavioral abnormalities    Labs and Imaging  Recent labs and imaging have been personally reviewed    Lab Results   Component Value Date    WBC 8 80 06/17/2022    HGB 14 1 06/17/2022    HCT 44 8 06/17/2022    MCV 82 06/17/2022     06/17/2022     Lab Results   Component Value Date     09/01/2017    SODIUM 142 06/17/2022    K 3 9 06/17/2022     06/17/2022    CO2 26 06/17/2022    AGAP 9 06/17/2022    BUN 12 06/17/2022    CREATININE 1 07 06/17/2022    GLUF 110 (H) 06/17/2022    CALCIUM 8 7 06/17/2022    AST 24 06/17/2022    ALT 35 06/17/2022    ALKPHOS 60 06/17/2022    PROT 7 5 09/01/2017    TP 7 4 06/17/2022    BILITOT 0 9 09/01/2017    TBILI 0 31 06/17/2022    EGFR 95 06/17/2022     Lab Results   Component Value Date    HGBA1C 5 9 (H) 12/05/2022     Lab Results   Component Value Date    ESE6EXBPYQZE 3 941 06/17/2022     Lab Results   Component Value Date    CHOLESTEROL 172 06/17/2022     Lab Results   Component Value Date    HDL 29 (L) 06/17/2022     Lab Results   Component Value Date    TRIG 182 (H) 06/17/2022     Lab Results   Component Value Date    LDLCALC 107 (H) 06/17/2022

## 2023-02-09 ENCOUNTER — TELEPHONE (OUTPATIENT)
Dept: BARIATRICS | Facility: CLINIC | Age: 28
End: 2023-02-09

## 2023-03-01 DIAGNOSIS — F33.9 DEPRESSION, RECURRENT (HCC): Primary | ICD-10-CM

## 2023-03-01 RX ORDER — DOXEPIN HYDROCHLORIDE 100 MG/1
100 CAPSULE ORAL
Qty: 90 CAPSULE | Refills: 0 | Status: SHIPPED | OUTPATIENT
Start: 2023-03-01

## 2023-03-07 ENCOUNTER — OFFICE VISIT (OUTPATIENT)
Dept: PULMONOLOGY | Facility: CLINIC | Age: 28
End: 2023-03-07

## 2023-03-07 VITALS
SYSTOLIC BLOOD PRESSURE: 122 MMHG | BODY MASS INDEX: 50.62 KG/M2 | RESPIRATION RATE: 20 BRPM | OXYGEN SATURATION: 96 % | TEMPERATURE: 98.2 F | HEART RATE: 88 BPM | HEIGHT: 66 IN | DIASTOLIC BLOOD PRESSURE: 78 MMHG | WEIGHT: 315 LBS

## 2023-03-07 DIAGNOSIS — E66.01 MORBID OBESITY WITH BMI OF 50.0-59.9, ADULT (HCC): ICD-10-CM

## 2023-03-07 DIAGNOSIS — G47.33 OSA (OBSTRUCTIVE SLEEP APNEA): Primary | ICD-10-CM

## 2023-03-08 NOTE — PROGRESS NOTES
Assessment/Plan:   Diagnoses and all orders for this visit:    NIMO (obstructive sleep apnea)    Morbid obesity with BMI of 50 0-59 9, adult [facility])    Patient is here today for follow-up on his sleep apnea  Reviewed compliance which shows residual AHI of 0 5, on the compliance that we do have which ends February 20 there is significant leak  Patient reports that there was a leak in his tubing and he has since received new supplies and is sleeping well  He generally feels well rested during the day  He has been attempting the use of the CPAP and will continue at the current settings  He is following with bariatrics for weight loss, medications were switched about 4 weeks ago and he is hoping to lose some weight  He will follow-up with us in about 6 months or sooner if necessary  Return in about 6 months (around 9/7/2023)  All questions are answered to the patient's satisfaction and understanding  He verbalizes understanding  He is encouraged to call with any further questions or concerns  Portions of the record may have been created with voice recognition software  Occasional wrong word or "sound a like" substitutions may have occurred due to the inherent limitations of voice recognition software  Read the chart carefully and recognize, using context, where substitutions have occurred  Electronically Signed by Mike Spring PA-C    ______________________________________________________________________    Chief Complaint:   Chief Complaint   Patient presents with   • Follow-up       Patient ID: Dante Cardenas is a 33 y o  y o  male has a past medical history of Anxiety, Bronchitis, Concussion, Depression, Hypertension, Morbid obesity with BMI of 45 0-49 9, adult ([address] ), and Sleep apnea, obstructive  3/7/2023  Patient presents today for follow-up visit  Patient is a 31-year-old male with past medical history of moderate obstructive sleep apnea, depression/anxiety, prediabetes, hypertension      He is here today for follow-up on his sleep apnea  He continues with his CPAP  Has been more consistent since receiving his new supplies, there was a leak in his tubing with his prior supplies  He does feel that he gets a good night of sleep and generally feels well rested during the day  Review of Systems   Constitutional: Negative  HENT: Negative  Respiratory: Negative  Cardiovascular: Negative  Gastrointestinal: Negative  Genitourinary: Negative  Musculoskeletal: Negative  Skin: Negative  Allergic/Immunologic: Negative  Neurological: Negative  Psychiatric/Behavioral: Negative  Smoking history: He reports that he has never smoked   He has never used smokeless tobacco     The following portions of the patient's history were reviewed and updated as appropriate: allergies, current medications, past family history, past medical history, past social history, past surgical history and problem list     Immunization History   Administered Date(s) Administered   • BCG 05/30/1998, 08/18/2000, 04/06/2001, 07/06/2001, 02/26/2002   • COVID-19 PFIZER VACCINE 0 3 ML IM 03/18/2021, 04/10/2021   • DTaP 04/21/1997, 05/26/1997, 08/26/1997, 07/08/2000, 12/02/2002   • H1N1, All Formulations 12/03/2009   • HPV Quadrivalent 07/27/2014, 11/02/2014   • Hep A, ped/adol, 2 dose 06/11/2013   • Hep B, Adolescent or Pediatric 11/19/2002, 12/30/2002, 06/02/2003, 05/25/2014   • Hepatitis A 06/01/2013, 06/11/2013, 07/27/2014   • HiB 11/19/2002   • INFLUENZA 12/05/2009, 10/07/2015, 12/07/2022   • IPV 07/27/2014   • Influenza, injectable, quadrivalent, preservative free 0 5 mL 10/16/2020, 12/07/2022   • MMR 03/16/1998, 11/19/2002   • Meningococcal MCV4P 08/17/2009   • OPV 04/21/1997, 05/26/1997, 08/26/1997, 02/16/1998, 04/19/1998   • Tdap 08/17/2009   • Tuberculin Skin Test-PPD Intradermal 04/27/2015, 06/05/2015   • Typhoid Live, oral 06/11/2013   • Typhoid, Unspecified 06/11/2013   • Varicella 12/02/2002, 06/21/2004, 08/17/2009     Current Outpatient Medications   Medication Sig Dispense Refill   • B-D ULTRAFINE III SHORT PEN 31G X 8 MM MISC USE DAILY AS DIRECTED 30 each 1   • buPROPion (WELLBUTRIN XL) 150 mg 24 hr tablet Take 150 mg by mouth every morning       • buPROPion (Wellbutrin XL) 300 mg 24 hr tablet Take 300 mg by mouth every morning       • desvenlafaxine succinate (PRISTIQ) 50 mg 24 hr tablet Take 1 tablet (50 mg total) by mouth daily     • doxepin (SINEquan) 100 mg capsule Take 1 capsule (100 mg total) by mouth 3 (three) times daily after meals 90 capsule 0   • DPH-Lido-AlHydr-MgHydr-Simeth (First-Mouthwash BLM) SUSP SWISH AND SPIT 10 ML EVERY 6 (SIX) HOURS AS NEEDED FOR MOUTH PAIN OR DISCOMFORT OR MUCOSITIS     • losartan (COZAAR) 50 mg tablet Take 1 tablet (50 mg total) by mouth daily 90 tablet 3   • Omeprazole 20 MG TBEC Take 20 mg by mouth in the morning     • Semaglutide-Weight Management (WEGOVY) 0 25 MG/0 5ML Inject 0 5 mL (0 25 mg total) under the skin once a week 2 mL 1   • topiramate (TOPAMAX) 200 MG tablet 200 mg 2 (two) times a day      • ziprasidone (GEODON) 40 mg capsule Take 40 mg by mouth every morning      • ziprasidone (GEODON) 80 mg capsule Take 80 mg by mouth every evening      • doxepin (SINEquan) 75 MG capsule Take 75 mg by mouth daily at bedtime (Patient not taking: Reported on 2/6/2023)     • omeprazole (PriLOSEC OTC) 20 MG tablet Take 1 tablet (20 mg total) by mouth in the morning and 1 tablet (20 mg total) before bedtime  Do all this for 14 days  28 tablet 0   • propranolol (INDERAL LA) 80 mg 24 hr capsule TAKE 1 CAPSULE (80 MG TOTAL) BY MOUTH EVERY MORNING 90 capsule 3     No current facility-administered medications for this visit       Allergies: Abilify [aripiprazole], Lithium, Lorazepam, and Seroquel [quetiapine]    Objective:  Vitals:    03/07/23 1414 03/07/23 1418   BP: 122/78    BP Location: Left arm    Patient Position: Sitting    Cuff Size: Large    Pulse: 88    Resp: 20    Temp: 98 2 °F (36 8 °C)    SpO2: 96% 96%   Weight: (!) 151 kg (332 lb)    Height: 5' 6" (1 676 m)    Oxygen Therapy  SpO2: 96 %  Oxygen Therapy: None (Room air)    Wt Readings from Last 3 Encounters:   03/07/23 (!) 151 kg (332 lb)   02/06/23 (!) 149 kg (329 lb)   12/07/22 (!) 149 kg (328 lb)     Body mass index is 53 59 kg/m²  Physical Exam  Vitals reviewed  Constitutional:       General: He is not in acute distress  Appearance: Normal appearance  He is obese  He is not ill-appearing  HENT:      Head: Normocephalic and atraumatic  Mouth/Throat:      Pharynx: Oropharynx is clear  Eyes:      Conjunctiva/sclera: Conjunctivae normal    Cardiovascular:      Rate and Rhythm: Normal rate and regular rhythm  Pulmonary:      Effort: Pulmonary effort is normal  No respiratory distress  Breath sounds: Normal breath sounds  No decreased breath sounds, wheezing, rhonchi or rales  Abdominal:      General: Abdomen is flat  There is no distension  Musculoskeletal:         General: Normal range of motion  Cervical back: Normal range of motion  Right lower leg: No edema  Left lower leg: No edema  Skin:     General: Skin is warm and dry  Neurological:      Mental Status: He is alert and oriented to person, place, and time  Psychiatric:         Mood and Affect: Mood normal          Behavior: Behavior normal          Lab Review:   Lab Results   Component Value Date     09/01/2017    K 3 9 06/17/2022    K 3 9 09/01/2017     06/17/2022     09/01/2017    CO2 26 06/17/2022    CO2 20 09/01/2017    BUN 12 06/17/2022    BUN 19 09/01/2017    CREATININE 1 07 06/17/2022    CREATININE 1 08 09/01/2017    CALCIUM 8 7 06/17/2022    CALCIUM 9 5 09/01/2017     Lab Results   Component Value Date    WBC 8 80 06/17/2022    HGB 14 1 06/17/2022    HCT 44 8 06/17/2022    MCV 82 06/17/2022     06/17/2022       Diagnostics:  I have personally reviewed pertinent reports  Reviewed CPAP compliance  Office Spirometry Results:     ESS:    No results found

## 2023-03-09 DIAGNOSIS — I10 ESSENTIAL HYPERTENSION: ICD-10-CM

## 2023-03-09 RX ORDER — LOSARTAN POTASSIUM 50 MG/1
TABLET ORAL
Qty: 90 TABLET | Refills: 3 | Status: SHIPPED | OUTPATIENT
Start: 2023-03-09

## 2023-03-14 NOTE — ASSESSMENT & PLAN NOTE
Depression and anxiety  Taking zoloft,geodon, ambien and klonopin  -continue management with prescribing provider negative

## 2023-03-25 DIAGNOSIS — F33.9 DEPRESSION, RECURRENT (HCC): ICD-10-CM

## 2023-03-26 RX ORDER — DOXEPIN HYDROCHLORIDE 100 MG/1
100 CAPSULE ORAL
Qty: 270 CAPSULE | Refills: 1 | Status: SHIPPED | OUTPATIENT
Start: 2023-03-26

## 2023-03-29 DIAGNOSIS — E66.01 MORBID OBESITY (HCC): Primary | ICD-10-CM

## 2023-03-29 DIAGNOSIS — E66.01 MORBID OBESITY WITH BMI OF 50.0-59.9, ADULT (HCC): ICD-10-CM

## 2023-03-29 NOTE — TELEPHONE ENCOUNTER
Patient called and requested refill on Wegovy  Not sure if it should be same strength as ordered previously

## 2023-04-06 ENCOUNTER — OFFICE VISIT (OUTPATIENT)
Dept: FAMILY MEDICINE CLINIC | Facility: CLINIC | Age: 28
End: 2023-04-06

## 2023-04-06 VITALS
WEIGHT: 315 LBS | HEART RATE: 88 BPM | SYSTOLIC BLOOD PRESSURE: 136 MMHG | BODY MASS INDEX: 50.62 KG/M2 | OXYGEN SATURATION: 98 % | TEMPERATURE: 98 F | HEIGHT: 66 IN | DIASTOLIC BLOOD PRESSURE: 72 MMHG

## 2023-04-06 DIAGNOSIS — B34.9 VIRAL INFECTION, UNSPECIFIED: Primary | ICD-10-CM

## 2023-04-06 DIAGNOSIS — J06.9 VIRAL UPPER RESPIRATORY TRACT INFECTION: ICD-10-CM

## 2023-04-06 RX ORDER — BENZONATATE 200 MG/1
200 CAPSULE ORAL 3 TIMES DAILY PRN
Qty: 20 CAPSULE | Refills: 0 | Status: SHIPPED | OUTPATIENT
Start: 2023-04-06

## 2023-04-06 NOTE — PROGRESS NOTES
Name: Maral Swan      : 1995      MRN: [id number]  Encounter Provider: Mary Brooks DO  Encounter Date: 2023   Encounter department: [facility] [address] [address]     1  Viral infection, unspecified  -     COVID Only- Office Collect    2  Viral upper respiratory tract infection  -     benzonatate (TESSALON) 200 MG capsule; Take 1 capsule (200 mg total) by mouth 3 (three) times a day as needed for cough      If no improvement in 3-5 days, consider ABX  Subjective      HPI     Patient presents to the office for sick visit  He started feeling sick about 2 days ago  Denies fever or chills  Notes headaches, nasal congestion, coughing, sore throat, sinus pressure/pain  Denies body aches  Denies GI symptoms  Denies urinary symptoms  Denies ear pain/pressure  Notes PND and hoarse voice  Denies SOB or chest tightness  No COVID test previously  Has had COVID vaccines, has not had COVID previously  Mom and Dad are sick as well  Has been using a nasal saline spray  No other medications OTC for illness  Has used cough syrup       Review of Systems    Current Outpatient Medications on File Prior to Visit   Medication Sig   • B-D ULTRAFINE III SHORT PEN 31G X 8 MM MISC USE DAILY AS DIRECTED   • buPROPion (WELLBUTRIN XL) 150 mg 24 hr tablet Take 150 mg by mouth every morning     • buPROPion (Wellbutrin XL) 300 mg 24 hr tablet Take 300 mg by mouth every morning     • desvenlafaxine succinate (PRISTIQ) 50 mg 24 hr tablet Take 1 tablet (50 mg total) by mouth daily   • doxepin (SINEquan) 100 mg capsule TAKE 1 CAPSULE (100 MG TOTAL) BY MOUTH 3 (THREE) TIMES DAILY AFTER MEALS   • losartan (COZAAR) 50 mg tablet TAKE 1 TABLET BY MOUTH EVERY DAY   • Omeprazole 20 MG TBEC Take 20 mg by mouth in the morning   • Semaglutide-Weight Management (WEGOVY) 0 5 MG/0 5ML Inject 0 5 mL (0 5 mg total) under the skin once a week   • topiramate (TOPAMAX) 200 MG tablet "200 mg 2 (two) times a day    • ziprasidone (GEODON) 40 mg capsule Take 40 mg by mouth every morning    • ziprasidone (GEODON) 80 mg capsule Take 80 mg by mouth every evening    • doxepin (SINEquan) 75 MG capsule Take 75 mg by mouth daily at bedtime (Patient not taking: Reported on 2/6/2023)   • DPH-Lido-AlHydr-MgHydr-Simeth (First-Mouthwash BLM) SUSP SWISH AND SPIT 10 ML EVERY 6 (SIX) HOURS AS NEEDED FOR MOUTH PAIN OR DISCOMFORT OR MUCOSITIS (Patient not taking: Reported on 4/6/2023)   • omeprazole (PriLOSEC OTC) 20 MG tablet Take 1 tablet (20 mg total) by mouth in the morning and 1 tablet (20 mg total) before bedtime  Do all this for 14 days  • propranolol (INDERAL LA) 80 mg 24 hr capsule TAKE 1 CAPSULE (80 MG TOTAL) BY MOUTH EVERY MORNING     Objective     /72 (BP Location: Left arm, Patient Position: Sitting, Cuff Size: Large)   Pulse 88   Temp 98 °F (36 7 °C) (Tympanic)   Ht 5' 6\" (1 676 m)   Wt (!) 149 kg (328 lb)   SpO2 98%   BMI 52 94 kg/m²     Physical Exam  Vitals reviewed  Constitutional:       General: He is not in acute distress  Appearance: Normal appearance  He is obese  HENT:      Head: Normocephalic and atraumatic  Right Ear: Tympanic membrane, ear canal and external ear normal       Left Ear: Tympanic membrane, ear canal and external ear normal       Nose: Congestion present  Mouth/Throat:      Mouth: Mucous membranes are moist    Eyes:      Extraocular Movements: Extraocular movements intact  Conjunctiva/sclera: Conjunctivae normal       Pupils: Pupils are equal, round, and reactive to light  Cardiovascular:      Rate and Rhythm: Normal rate and regular rhythm  Heart sounds: Normal heart sounds  Pulmonary:      Breath sounds: Normal breath sounds  No wheezing, rhonchi or rales  Abdominal:      General: Bowel sounds are normal  There is no distension  Palpations: Abdomen is soft  Tenderness: There is no abdominal tenderness   " Musculoskeletal:      Right lower leg: No edema  Left lower leg: No edema  Skin:     General: Skin is warm  Capillary Refill: Capillary refill takes less than 2 seconds  Neurological:      Mental Status: He is alert  Mental status is at baseline          Roslyn Dominguez DO

## 2023-04-07 ENCOUNTER — TELEPHONE (OUTPATIENT)
Dept: FAMILY MEDICINE CLINIC | Facility: CLINIC | Age: 28
End: 2023-04-07

## 2023-04-07 ENCOUNTER — NURSE TRIAGE (OUTPATIENT)
Dept: OTHER | Facility: OTHER | Age: 28
End: 2023-04-07

## 2023-04-07 LAB — SARS-COV-2 RNA RESP QL NAA+PROBE: POSITIVE

## 2023-04-07 NOTE — TELEPHONE ENCOUNTER
T/c from pt mom chacho - states the pill that was prescribed for pt coughing is not helping he has been taking his moms promethazine and that is helping him - she would like this prescribed for him instead of the pill      Call back chacho

## 2023-04-08 NOTE — TELEPHONE ENCOUNTER
"  Reason for Disposition  • [1] [id number] diagnosed by positive lab test (e g , PCR, rapid self-test kit) AND [2] mild symptoms (e g , cough, fever, others) AND [9] no complications or SOB    Additional Information  • Negative: [1] COVID-19 diagnosed by positive lab test (e g , PCR, rapid self-test kit) AND [2] NO symptoms (e g , cough, fever, others)    Answer Assessment - Initial Assessment Questions  1  COVID-19 DIAGNOSIS: \"Who made your COVID-19 diagnosis? \" \"Was it confirmed by a positive lab test or self-test?\" If not diagnosed by a doctor (or NP/PA), ask \"Are there lots of cases (community spread) where you live? \" Note: See public health department website, if unsure  Covid swab  2  COVID-19 EXPOSURE: \"Was there any known exposure to COVID before the symptoms began? \" CDC Definition of close contact: within 6 feet (2 meters) for a total of 15 minutes or more over a 24-hour period  Unknown  3  ONSET: \"When did the COVID-19 symptoms start? \"       Monday  4  WORST SYMPTOM: \"What is your worst symptom? \" (e g , cough, fever, shortness of breath, muscle aches)      Sore throat  5  COUGH: \"Do you have a cough? \" If Yes, ask: \"How bad is the cough? \"        Dry cough, raspy voice  6  FEVER: \"Do you have a fever? \" If Yes, ask: \"What is your temperature, how was it measured, and when did it start? \"      Denies  7  RESPIRATORY STATUS: \"Describe your breathing? \" (e g , shortness of breath, wheezing, unable to speak)       Denies  8  BETTER-SAME-WORSE: Geoffrey Willoughby you getting better, staying the same or getting worse compared to yesterday? \"  If getting worse, ask, \"In what way? \"      Better  9  HIGH RISK DISEASE: \"Do you have any chronic medical problems? \" (e g , asthma, heart or lung disease, weak immune system, obesity, etc )      Obesity  10  VACCINE: \"Have you had the COVID-19 vaccine? \" If Yes, ask: \"Which one, how many shots, when did you get it? \"      Vaccinated with 2 additional boosters    13   OTHER SYMPTOMS: \"Do " "you have any other symptoms? \"  (e g , chills, fatigue, headache, loss of smell or taste, muscle pain, sore throat)        Headache    Protocols used: CORONAVIRUS (COVID-19) DIAGNOSED OR SUSPECTED-ADULT-AH    "

## 2023-04-08 NOTE — TELEPHONE ENCOUNTER
"Regarding: covid positive - medical advice  ----- Message from Leonel Gandhi sent at 4/7/2023  8:12 PM EDT -----  \"his covid results came back positive, what should we do? He is complaining of a sore throat, cough, and congestion   He is not feeling great and his voice is raspy\"    "

## 2023-04-10 NOTE — TELEPHONE ENCOUNTER
I spoke with pt to follow up with him  Pt states that he is still experiencing a cough and sore throat  Pt states that he hadn't started the tessalon perles but he will today  Pt has also been advised that he can try OTC sore throat medication liek Chloraseptic spray or lozenges  Pt expressed understanding

## 2023-04-11 NOTE — TELEPHONE ENCOUNTER
He should not be taking promethazine if it has dextromethorphan in it  This interacts with his Wellbutrin       Jo Moore DO  [facility]  4/11/2023 8:10 AM

## 2023-04-24 ENCOUNTER — TELEPHONE (OUTPATIENT)
Dept: BARIATRICS | Facility: CLINIC | Age: 28
End: 2023-04-24

## 2023-04-24 DIAGNOSIS — E66.01 MORBID OBESITY WITH BMI OF 50.0-59.9, ADULT (HCC): Primary | ICD-10-CM

## 2023-04-24 NOTE — TELEPHONE ENCOUNTER
Good Day Dr Rick La,    Patient's Father calls for refill of  Patient's Larayne Bernheim   notified to make a Nurse Visit for weight check  Next visit with you is June 28, 2023  Please advise  Thank you

## 2023-04-24 NOTE — TELEPHONE ENCOUNTER
[facility] Clerical,    Please call and schedule a Nurse Visit for Weight Check  Requesting WEGOVY refill and need to document weight loss in chart  Thank you

## 2023-04-28 ENCOUNTER — CLINICAL SUPPORT (OUTPATIENT)
Dept: BARIATRICS | Facility: CLINIC | Age: 28
End: 2023-04-28

## 2023-04-28 VITALS
HEART RATE: 88 BPM | DIASTOLIC BLOOD PRESSURE: 66 MMHG | WEIGHT: 315 LBS | SYSTOLIC BLOOD PRESSURE: 104 MMHG | HEIGHT: 66 IN | OXYGEN SATURATION: 97 % | BODY MASS INDEX: 50.62 KG/M2 | RESPIRATION RATE: 18 BRPM

## 2023-04-28 DIAGNOSIS — E66.01 MORBIDLY OBESE (HCC): Primary | ICD-10-CM

## 2023-04-28 NOTE — PROGRESS NOTES
Feliciano Waggoner presents in office today, with his Mother, for Weight Check  Insurance requires episodic Nurse Visits to record patient's progress in weight reduction  Patient has no complaints or concerns in regard to JEREMY WANG  No negative side effects during initial weeks of injecting  Some time spent educating patient on proper injection technique  Patient stated is going to [employer]  Patient has currently lost 10 pounds from starting weight on 02/06/23

## 2023-05-22 DIAGNOSIS — E66.01 MORBID OBESITY WITH BMI OF 50.0-59.9, ADULT (HCC): ICD-10-CM

## 2023-05-22 NOTE — TELEPHONE ENCOUNTER
Patient's mother called and requested Rose Sibley for him  Patient came for a weight check last 4/28  Please advice, thanks

## 2023-06-05 ENCOUNTER — RA CDI HCC (OUTPATIENT)
Dept: OTHER | Facility: HOSPITAL | Age: 28
End: 2023-06-05

## 2023-06-06 ENCOUNTER — TELEPHONE (OUTPATIENT)
Dept: BARIATRICS | Facility: CLINIC | Age: 28
End: 2023-06-06

## 2023-06-06 NOTE — TELEPHONE ENCOUNTER
Patient's father called in to let us know that they are unable to get Cummings  He asked pharmacy if 1 mg was available and even that dose isn't available  He wants to know what they should do at this point  Yanira Mustafa has an appointment with you this Thursday 6/8/23

## 2023-06-06 NOTE — TELEPHONE ENCOUNTER
Spoke to patient's father to let him know this will be discussed at Thursday's visit  He verbalized understanding

## 2023-06-08 ENCOUNTER — OFFICE VISIT (OUTPATIENT)
Dept: BARIATRICS | Facility: CLINIC | Age: 28
End: 2023-06-08
Payer: COMMERCIAL

## 2023-06-08 VITALS
HEIGHT: 66 IN | WEIGHT: 315 LBS | OXYGEN SATURATION: 96 % | RESPIRATION RATE: 18 BRPM | HEART RATE: 80 BPM | BODY MASS INDEX: 50.62 KG/M2 | DIASTOLIC BLOOD PRESSURE: 62 MMHG | SYSTOLIC BLOOD PRESSURE: 108 MMHG

## 2023-06-08 DIAGNOSIS — F50.89 OTHER DISORDER OF EATING: ICD-10-CM

## 2023-06-08 DIAGNOSIS — E88.81 METABOLIC SYNDROME: ICD-10-CM

## 2023-06-08 DIAGNOSIS — E66.01 MORBID OBESITY WITH BMI OF 50.0-59.9, ADULT (HCC): Primary | ICD-10-CM

## 2023-06-08 DIAGNOSIS — K21.9 GASTROESOPHAGEAL REFLUX DISEASE, UNSPECIFIED WHETHER ESOPHAGITIS PRESENT: ICD-10-CM

## 2023-06-08 DIAGNOSIS — F33.9 DEPRESSION, RECURRENT (HCC): ICD-10-CM

## 2023-06-08 DIAGNOSIS — R73.03 PREDIABETES: ICD-10-CM

## 2023-06-08 PROCEDURE — 99214 OFFICE O/P EST MOD 30 MIN: CPT | Performed by: FAMILY MEDICINE

## 2023-06-08 NOTE — PROGRESS NOTES
Assessment/Plan:  Jill Hernandez was seen today for consult  Diagnoses and all orders for this visit:  1  Morbid obesity with BMI of 50 0-59 9, adult ([facility] )   off Wegovy for 3 weeks due to no availability  If he cannot find higher dose will consider Saxenda, side effects profile discussed with parents and himself  - Semaglutide-Weight Management (WEGOVY) 1 7 MG/0 75ML; Inject 0 75 mL (1 7 mg total) under the skin once a week  Dispense: 3 mL; Refill: 0  He declines bariatric surgery  2  Prediabetes  Glp-1 will help  3  Metabolic syndrome   weight loss is necessary  4  Other disorder of eating   difficult to control behavioral, he is trying to make a difference but not able  Glp-1 helped    5  Depression, recurrent ([facility] )   mom noticed he does not want to come out of hte room and lost motivation, thinks [facility] helped    Discussed Metformin as well will try [facility] since it is more efficient in addressing metabolism   Lab Results   Component Value Date    HGBA1C 5 9 (H) 12/05/2022   · Joesph Caller did not work gave him a lot of gas  · Behavioral problems to be addressed with counselor  · Barriers to weight loss; behavioral problems- advised focus on CBT  He does not want to count calories  · discussed medications will not help if behavior not addressed- has boredom eating  - Patient denies personal history of pancreatitis  Patient DOES NOT know personal and family history, was adopted, family and himself aware of risks  of medullary thyroid cancer and multiple endocrine neoplasia type 2 (MEN 2 tumor)  Patient and family understand these major side effects and agrees to start the medication  Increase physical activity by 10 minutes daily  Has behavioral problems that hinder his weight loss success    6   GERD   Advised PPI , Peptobismol  Return in 3 mo with me and monthly weight checks with staff  Subjective:   Chief Complaint   Patient presents with   • Follow-up     2 month follow up       Patient ID: Chris   is a 33 y o  male with excess weight/obesity here to pursue weight management  Previous notes and records have been reviewed          HPI  Wt Readings from Last 20 Encounters:   06/08/23 (!) 147 kg (323 lb)   04/28/23 (!) 145 kg (319 lb 9 6 oz)   04/20/23 (!) 146 kg (322 lb)   04/06/23 (!) 149 kg (328 lb)   03/07/23 (!) 151 kg (332 lb)   02/06/23 (!) 149 kg (329 lb)   12/07/22 (!) 149 kg (328 lb)   12/05/22 (!) 148 kg (327 lb)   10/27/22 (!) 150 kg (331 lb 9 6 oz)   09/27/22 (!) 150 kg (330 lb)   09/20/22 (!) 150 kg (331 lb)   08/31/22 (!) 150 kg (331 lb 9 6 oz)   07/29/22 (!) 151 kg (333 lb)   07/19/22 (!) 147 kg (325 lb)   06/22/22 (!) 147 kg (325 lb)   06/13/22 (!) 147 kg (325 lb)   05/24/22 (!) 147 kg (325 lb)   05/24/22 (!) 147 kg (323 lb)   05/18/22 (!) 145 kg (320 lb)   04/22/22 (!) 145 kg (319 lb 10 7 oz)   First OV 12/05/2023 327lbs  Last weight 329lbs   Current 323lbs     Has been in surgical programs for a while but not deem apropriate due to behavioral problems  More depressed because he did not have his treatments yet  B-wakes up at 12pm goes to bed at 12am  S-  L-first meal of day sandwich ham and cheese pr oatmeal or eggs   S-chocolate or belvita crackers  3-4 pm 3-4 sandwiches   D-9pm pasta with meat sauce and mushroom   S-banana and fruit  Hydration:water and fruit drink with artifical flavors  Alcohol: socially  Smoking:none  Exercise: gym 3-4 times a week classes  Occupation:  Plays video games, 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  Sleep: well  STOP bang: does not use the CPAP as he should    Past Medical History:   Diagnosis Date   • Anxiety    • Bronchitis    • Concussion     Sports related   • Depression    • Hypertension    • Morbid obesity with BMI of 45 0-49 9, adult ([facility] )    • Sleep apnea, obstructive      Past Surgical History:   Procedure Laterality Date   • NO PAST SURGERIES       The following portions of the patient's history were reviewed and updated as appropriate: allergies, current medications, past family "history, past medical history, past social history, past surgical history, and problem list     ROS:  Review of Systems   Constitutional: Negative for activity change  Fatigue  HENT: Negative for trouble swallowing  Respiratory: Negative for shortness of breath  Cardiovascular: Negative for chest pain, edema  Gastrointestinal: Negative for abdominal pain, nausea and vomiting,+ acid reflux, constipation/diarrhea   Psychiatric/Behavioral: + for behavioral problems including anxiety /depression, boredom eating   Objective:  /62 (BP Location: Left arm, Patient Position: Sitting, Cuff Size: Adult)   Pulse 80   Resp 18   Ht 5' 6\" (1 676 m)   Wt (!) 147 kg (323 lb)   SpO2 96%   BMI 52 13 kg/m²   Constitutional: Well-developed, well-nourished and Obese Body mass index is 52 13 kg/m²  Milas Karla Alert, cooperative  HEENT: No conjunctival injection  No thyroid masses  Pulmonary: No increased work of breathing or signs of respiratory distress  Clear respiratory sounds  CV: Well-perfused, Regular rate and rhythm, no murmurs  Vascular: no peripheral edema  GI: Abdomen obese, Non-distended  Not tender  MSK: no sarcopenia noted   Neuro: Oriented to person, place and time  Normal Speech  Normal gait  Psych: Abnormal affect and mood  Normal thought process, no delusions , behavioral abnormalities    Labs and Imaging  Recent labs and imaging have been personally reviewed    Lab Results   Component Value Date    HCT 44 8 06/17/2022    HGB 14 1 06/17/2022    MCV 82 06/17/2022     06/17/2022    WBC 8 80 06/17/2022     Lab Results   Component Value Date    AGAP 9 06/17/2022    ALKPHOS 60 06/17/2022    ALT 35 06/17/2022    AST 24 06/17/2022    BILITOT 0 9 09/01/2017    BUN 12 06/17/2022    CALCIUM 8 7 06/17/2022     06/17/2022    CO2 26 06/17/2022    CREATININE 1 07 06/17/2022    EGFR 95 06/17/2022    GLUF 110 (H) 06/17/2022    K 3 9 06/17/2022     09/01/2017    PROT 7 5 09/01/2017    SODIUM 142 06/17/2022    " TBILI 0 31 06/17/2022    TP 7 4 06/17/2022     Lab Results   Component Value Date    HGBA1C 5 9 (H) 12/05/2022     Lab Results   Component Value Date    TBN2ZVFGPZOY 3 941 06/17/2022     Lab Results   Component Value Date    CHOLESTEROL 172 06/17/2022     Lab Results   Component Value Date    HDL 29 (L) 06/17/2022     Lab Results   Component Value Date    TRIG 182 (H) 06/17/2022     Lab Results   Component Value Date    LDLCALC 107 (H) 06/17/2022

## 2023-06-14 ENCOUNTER — OFFICE VISIT (OUTPATIENT)
Dept: FAMILY MEDICINE CLINIC | Facility: CLINIC | Age: 28
End: 2023-06-14
Payer: COMMERCIAL

## 2023-06-14 VITALS
OXYGEN SATURATION: 97 % | BODY MASS INDEX: 50.62 KG/M2 | DIASTOLIC BLOOD PRESSURE: 78 MMHG | TEMPERATURE: 97.3 F | SYSTOLIC BLOOD PRESSURE: 116 MMHG | HEART RATE: 95 BPM | HEIGHT: 66 IN | WEIGHT: 315 LBS

## 2023-06-14 DIAGNOSIS — Z00.00 ANNUAL PHYSICAL EXAM: Primary | ICD-10-CM

## 2023-06-14 PROCEDURE — 99395 PREV VISIT EST AGE 18-39: CPT | Performed by: FAMILY MEDICINE

## 2023-06-14 NOTE — PROGRESS NOTES
[address]     NAME: Superiorrehan Devine  AGE: 33 y o  SEX: male  : 1995     DATE: 2023     Assessment and Plan:     Problem List Items Addressed This Visit    None  Visit Diagnoses     Annual physical exam    -  Primary          Immunizations and preventive care screenings were discussed with patient today  Appropriate education was printed on patient's after visit summary  Counseling:  Dental Health: discussed importance of regular tooth brushing, flossing, and dental visits  Return in 6 months (on 2023)  Chief Complaint:     Chief Complaint   Patient presents with   • Physical Exam     No concerns       History of Present Illness:     Adult Annual Physical   Patient here for a comprehensive physical exam  The patient reports no problems  Diet and Physical Activity  Diet/Nutrition: well balanced diet  Exercise: moderate cardiovascular exercise  Depression Screening  PHQ-2/9 Depression Screening    Little interest or pleasure in doing things: 1 - several days  Feeling down, depressed, or hopeless: 1 - several days  Trouble falling or staying asleep, or sleeping too much: 0 - not at all  Feeling tired or having little energy: 0 - not at all  Poor appetite or overeatin - not at all  Feeling bad about yourself - or that you are a failure or have let yourself or your family down: 0 - not at all  Trouble concentrating on things, such as reading the newspaper or watching television: 0 - not at all  Moving or speaking so slowly that other people could have noticed   Or the opposite - being so fidgety or restless that you have been moving around a lot more than usual: 0 - not at all  Thoughts that you would be better off dead, or of hurting yourself in some way: 0 - not at all  PHQ-9 Score: 2   PHQ-9 Interpretation: No or Minimal depression        General Health  Sleep: sleeps well  Hearing: normal - bilateral   Vision: no vision problems  Dental: regular dental visits   Health  History of STDs?: no      Review of Systems:     Review of Systems   Constitutional: Negative for chills, fatigue and fever  HENT: Negative for congestion, ear pain, hearing loss, postnasal drip, rhinorrhea and sore throat  Eyes: Negative for pain and visual disturbance  Respiratory: Negative for chest tightness, shortness of breath and wheezing  Cardiovascular: Negative for chest pain and leg swelling  Gastrointestinal: Negative for abdominal distention, abdominal pain, constipation, diarrhea and vomiting  Endocrine: Negative for cold intolerance and heat intolerance  Genitourinary: Negative for difficulty urinating, frequency and urgency  Musculoskeletal: Negative for arthralgias and gait problem  Skin: Negative for color change  Neurological: Negative for dizziness, tremors, syncope, numbness and headaches  Hematological: Negative for adenopathy  Psychiatric/Behavioral: Negative for agitation, confusion and sleep disturbance  The patient is not nervous/anxious         Past Medical History:     Past Medical History:   Diagnosis Date   • Anxiety    • Bronchitis    • Concussion     Sports related   • Depression    • Hypertension    • Morbid obesity with BMI of 45 0-49 9, adult ([facility] )    • Sleep apnea, obstructive       Past Surgical History:     Past Surgical History:   Procedure Laterality Date   • NO PAST SURGERIES        Social History:     Social History     Socioeconomic History   • Marital status: Single     Spouse name: None   • Number of children: None   • Years of education: None   • Highest education level: None   Occupational History   • Occupation: student   • Occupation:      Employer: [employer]   Tobacco Use   • Smoking status: Never   • Smokeless tobacco: Never   Vaping Use   • Vaping Use: Never used   Substance and Sexual Activity   • Alcohol use: Yes     Alcohol/week: 1 0 standard drink of alcohol     Types: 1 Cans of beer per week     Comment: social   • Drug use: No   • Sexual activity: None   Other Topics Concern   • None   Social History Narrative    Lives with adoptive parents     Social Determinants of Health     Financial Resource Strain: Not on file   Food Insecurity: Not on file   Transportation Needs: Not on file   Physical Activity: Not on file   Stress: Not on file   Social Connections: Not on file   Intimate Partner Violence: Not on file   Housing Stability: Not on file      Family History:     Family History   Adopted: Yes      Current Medications:     Current Outpatient Medications   Medication Sig Dispense Refill   • buPROPion (Wellbutrin XL) 300 mg 24 hr tablet Take 300 mg by mouth every morning       • doxepin (SINEquan) 100 mg capsule TAKE 1 CAPSULE (100 MG TOTAL) BY MOUTH 3 (THREE) TIMES DAILY AFTER MEALS 270 capsule 1   • losartan (COZAAR) 50 mg tablet TAKE 1 TABLET BY MOUTH EVERY DAY 90 tablet 3   • Semaglutide-Weight Management (WEGOVY) 1 7 MG/0 75ML Inject 0 75 mL (1 7 mg total) under the skin once a week 3 mL 0   • topiramate (TOPAMAX) 200 MG tablet 200 mg 2 (two) times a day      • ziprasidone (GEODON) 40 mg capsule Take 40 mg by mouth every morning      • ziprasidone (GEODON) 80 mg capsule Take 80 mg by mouth every evening      • desvenlafaxine succinate (PRISTIQ) 50 mg 24 hr tablet Take 1 tablet (50 mg total) by mouth daily (Patient not taking: Reported on 6/14/2023)     • omeprazole (PriLOSEC OTC) 20 MG tablet Take 1 tablet (20 mg total) by mouth in the morning and 1 tablet (20 mg total) before bedtime  Do all this for 14 days  28 tablet 0   • propranolol (INDERAL LA) 80 mg 24 hr capsule TAKE 1 CAPSULE (80 MG TOTAL) BY MOUTH EVERY MORNING 90 capsule 3     No current facility-administered medications for this visit  Allergies:      Allergies   Allergen Reactions   • Abilify [Aripiprazole]    • Lithium    • "Lorazepam Other (See Comments)     aggressive   • Seroquel [Quetiapine]       Physical Exam:     /78 (BP Location: Left arm, Patient Position: Sitting, Cuff Size: Large)   Pulse 95   Temp (!) 97 3 °F (36 3 °C)   Ht 5' 6\" (1 676 m)   Wt (!) 145 kg (320 lb 6 4 oz)   SpO2 97%   BMI 51 71 kg/m²     Physical Exam  Constitutional:       Appearance: He is well-developed  He is obese  HENT:      Head: Normocephalic  Right Ear: External ear normal       Left Ear: External ear normal       Nose: Nose normal    Eyes:      Conjunctiva/sclera: Conjunctivae normal       Pupils: Pupils are equal, round, and reactive to light  Neck:      Thyroid: No thyromegaly  Cardiovascular:      Rate and Rhythm: Normal rate and regular rhythm  Heart sounds: Normal heart sounds  Pulmonary:      Effort: Pulmonary effort is normal       Breath sounds: Normal breath sounds  Abdominal:      General: Bowel sounds are normal       Palpations: Abdomen is soft  Musculoskeletal:         General: Normal range of motion  Cervical back: Normal range of motion  Skin:     General: Skin is warm and dry  Neurological:      Mental Status: He is alert and oriented to person, place, and time     Psychiatric:         Behavior: Behavior normal           DO Amanda Chau [phone]  "

## 2023-06-15 DIAGNOSIS — A04.8 H. PYLORI INFECTION: ICD-10-CM

## 2023-06-15 RX ORDER — OMEPRAZOLE 20 MG/1
20 TABLET, DELAYED RELEASE ORAL 2 TIMES DAILY
Qty: 60 TABLET | Refills: 3 | Status: SHIPPED | OUTPATIENT
Start: 2023-06-15 | End: 2023-07-15

## 2023-06-30 DIAGNOSIS — F33.9 DEPRESSION, RECURRENT (HCC): Primary | ICD-10-CM

## 2023-06-30 RX ORDER — ZIPRASIDONE HYDROCHLORIDE 40 MG/1
40 CAPSULE ORAL EVERY MORNING
Qty: 90 CAPSULE | Refills: 0 | Status: SHIPPED | OUTPATIENT
Start: 2023-06-30

## 2023-07-03 ENCOUNTER — TELEPHONE (OUTPATIENT)
Dept: FAMILY MEDICINE CLINIC | Facility: CLINIC | Age: 28
End: 2023-07-03

## 2023-07-03 NOTE — TELEPHONE ENCOUNTER
Call placed to [facility]) as we received a refill request for pt's Topirimate. It appears that this medication has never been prescribed by our practice. We need to know who has been managing this prescription for pt and confirm the dose and directions.

## 2023-07-05 ENCOUNTER — CLINICAL SUPPORT (OUTPATIENT)
Dept: BARIATRICS | Facility: CLINIC | Age: 28
End: 2023-07-05

## 2023-07-05 VITALS
HEIGHT: 66 IN | DIASTOLIC BLOOD PRESSURE: 84 MMHG | HEART RATE: 92 BPM | SYSTOLIC BLOOD PRESSURE: 132 MMHG | WEIGHT: 315 LBS | RESPIRATION RATE: 16 BRPM | BODY MASS INDEX: 50.62 KG/M2

## 2023-07-05 DIAGNOSIS — E66.01 MORBID OBESITY WITH BMI OF 50.0-59.9, ADULT (HCC): ICD-10-CM

## 2023-07-05 PROCEDURE — RECHECK

## 2023-07-05 RX ORDER — NICOTINE POLACRILEX 4 MG/1
1 GUM, CHEWING ORAL 2 TIMES DAILY
COMMUNITY
Start: 2023-06-17

## 2023-07-05 NOTE — Clinical Note
Patient was in for [facility] weight check. Patient states has some nausea a couple hours after getting weekly dose of Wegovy, but is able to tolerate. Patient denies any other side effects. Patient needs new prescription for [facility].   Wt Readings from Last 3 Encounters: 07/05/23 : (!) 148 kg (325 lb 6.4 oz) 06/08/23 : (!) 147 kg (323 lb) 04/28/23:                   (319.6 lbs)

## 2023-07-05 NOTE — TELEPHONE ENCOUNTER
Pt returned call --    Med was previously prescribed by Dr Shivam Kiran @ [facility] [place]. Dose - 200mg    Directions: take 1 tablet by mouth 2 times a day.

## 2023-07-06 DIAGNOSIS — F33.9 DEPRESSION, RECURRENT (HCC): Primary | ICD-10-CM

## 2023-08-02 ENCOUNTER — TELEPHONE (OUTPATIENT)
Dept: BARIATRICS | Facility: CLINIC | Age: 28
End: 2023-08-02

## 2023-08-02 ENCOUNTER — CLINICAL SUPPORT (OUTPATIENT)
Dept: BARIATRICS | Facility: CLINIC | Age: 28
End: 2023-08-02

## 2023-08-02 VITALS
RESPIRATION RATE: 16 BRPM | HEIGHT: 66 IN | DIASTOLIC BLOOD PRESSURE: 84 MMHG | BODY MASS INDEX: 50.62 KG/M2 | WEIGHT: 315 LBS | SYSTOLIC BLOOD PRESSURE: 120 MMHG | HEART RATE: 74 BPM

## 2023-08-02 DIAGNOSIS — E66.9 OBESITY, CLASS I, BMI 30-34.9: Primary | ICD-10-CM

## 2023-08-02 DIAGNOSIS — E66.01 MORBID OBESITY WITH BMI OF 50.0-59.9, ADULT (HCC): ICD-10-CM

## 2023-08-02 PROCEDURE — RECHECK

## 2023-08-02 NOTE — TELEPHONE ENCOUNTER
----- Message from Ritesh Frederick sent at 8/2/2023 11:58 AM EDT -----  Patient in office for Wegovy/Topamax weight check. Patient denies any side effects. Patient requests refill on [facility] as they are leaving for vacation on Saturday.     Wt Readings from Last 3 Encounters:  08/02/23 : (!) 143 kg (315 lb 6.4 oz)  07/05/23 : (!) 148 kg (325 lb 6.4 oz)  06/14/23 : (!) 145 kg (320 lb 6.4 oz)

## 2023-08-04 DIAGNOSIS — F33.9 DEPRESSION, RECURRENT (HCC): Primary | ICD-10-CM

## 2023-08-04 DIAGNOSIS — F41.1 ANXIETY, GENERALIZED: ICD-10-CM

## 2023-08-04 RX ORDER — ZIPRASIDONE HYDROCHLORIDE 40 MG/1
40 CAPSULE ORAL EVERY MORNING
Qty: 90 CAPSULE | Refills: 0 | Status: SHIPPED | OUTPATIENT
Start: 2023-08-04

## 2023-08-04 RX ORDER — DESVENLAFAXINE SUCCINATE 50 MG/1
50 TABLET, EXTENDED RELEASE ORAL DAILY
Qty: 90 TABLET | Refills: 0 | Status: SHIPPED | OUTPATIENT
Start: 2023-08-04

## 2023-08-04 RX ORDER — ZIPRASIDONE HYDROCHLORIDE 80 MG/1
80 CAPSULE ORAL EVERY EVENING
Qty: 90 CAPSULE | Refills: 0 | Status: SHIPPED | OUTPATIENT
Start: 2023-08-04

## 2023-08-04 NOTE — TELEPHONE ENCOUNTER
T/c from pts mom -- requesting attached refill. Would like medication asap, as they are leaving for vacation on Monday.

## 2023-08-31 ENCOUNTER — TELEPHONE (OUTPATIENT)
Dept: BARIATRICS | Facility: CLINIC | Age: 28
End: 2023-08-31

## 2023-08-31 NOTE — TELEPHONE ENCOUNTER
Father's patient called and requested, prior-aut for Kalpesh Marie be done today. Patient will not have coverage starting tomorrow. Prior-aut was done today and Mr Donna Kaufman advised, and verbally understood.

## 2023-09-01 ENCOUNTER — TELEPHONE (OUTPATIENT)
Dept: BARIATRICS | Facility: CLINIC | Age: 28
End: 2023-09-01

## 2023-09-01 NOTE — TELEPHONE ENCOUNTER
[place] from [facility] left a message on our vm last evening to advise that the auth for the patient's wegovy is still in review. If there are any questions, she said to call them at [phone].

## 2023-09-13 ENCOUNTER — TELEPHONE (OUTPATIENT)
Age: 28
End: 2023-09-13

## 2023-09-13 ENCOUNTER — OFFICE VISIT (OUTPATIENT)
Age: 28
End: 2023-09-13
Payer: COMMERCIAL

## 2023-09-13 ENCOUNTER — OFFICE VISIT (OUTPATIENT)
Dept: BARIATRICS | Facility: CLINIC | Age: 28
End: 2023-09-13
Payer: COMMERCIAL

## 2023-09-13 VITALS
DIASTOLIC BLOOD PRESSURE: 80 MMHG | WEIGHT: 315 LBS | RESPIRATION RATE: 14 BRPM | SYSTOLIC BLOOD PRESSURE: 130 MMHG | BODY MASS INDEX: 50.62 KG/M2 | HEART RATE: 82 BPM | HEIGHT: 66 IN

## 2023-09-13 VITALS
HEIGHT: 66 IN | WEIGHT: 315 LBS | RESPIRATION RATE: 18 BRPM | OXYGEN SATURATION: 96 % | SYSTOLIC BLOOD PRESSURE: 131 MMHG | DIASTOLIC BLOOD PRESSURE: 80 MMHG | HEART RATE: 90 BPM | BODY MASS INDEX: 50.62 KG/M2

## 2023-09-13 DIAGNOSIS — E66.01 MORBID OBESITY WITH BMI OF 50.0-59.9, ADULT (HCC): Primary | ICD-10-CM

## 2023-09-13 DIAGNOSIS — E66.01 MORBID OBESITY WITH BMI OF 50.0-59.9, ADULT (HCC): ICD-10-CM

## 2023-09-13 DIAGNOSIS — G47.33 OSA (OBSTRUCTIVE SLEEP APNEA): Primary | ICD-10-CM

## 2023-09-13 DIAGNOSIS — F50.9 EATING DISORDER: ICD-10-CM

## 2023-09-13 DIAGNOSIS — R73.03 PREDIABETES: ICD-10-CM

## 2023-09-13 PROCEDURE — 99214 OFFICE O/P EST MOD 30 MIN: CPT

## 2023-09-13 PROCEDURE — 99214 OFFICE O/P EST MOD 30 MIN: CPT | Performed by: FAMILY MEDICINE

## 2023-09-13 NOTE — PROGRESS NOTES
Assessment/Plan:  Maurice Caban was seen today for consult. Diagnoses and all orders for this visit:  1. Morbid obesity with BMI of 50.0-59.9, adult ([address])   off Wegovy for 3 weeks due to insurance issues, he changed insurance and starting this month will be approved  Will restart same dose    Silvia Racheal gave him gas   they are aware of side effects profile   He is more open to bariatric surgery - was deemed not a candidate due to his behavior but that might change if he gets more focused  He has been doing good changes   Better with water intake and less fruit intake - mom involved and helping him a lot  Will come for monthly weigh in and see him in 3 mo  2. Prediabetes   Glp-1 will help  3. Metabolic syndrome   weight loss is necessary  4. Other disorder of eating   difficult to control behavioral, he is trying to make a difference, Wegovy helped him a lot       · Behavioral problems to be addressed with counselor  · Barriers to weight loss; behavioral problems- advised focus on CBT. He does not want to count calories    - Patient denies personal history of pancreatitis. Patient DOES NOT know personal and family history, was adopted, family and himself aware of risks  of medullary thyroid cancer and multiple endocrine neoplasia type 2 (MEN 2 tumor). Patient and family understand these major side effects and agrees to start the medication. Increase physical activity by 10 minutes daily. 6. GERD   Advised PPI , Peptobismol  Return in 3 mo with me and monthly weight checks with staff  Subjective:   Chief Complaint   Patient presents with   • Follow-up     Patient presents for f/u. He t       Patient ID: Jessica Rios  is a 33 y.o. male with excess weight/obesity here to pursue weight management. Previous notes and records have been reviewed.         HPI  Wt Readings from Last 20 Encounters:   09/13/23 (!) 148 kg (326 lb)   09/13/23 (!) 147 kg (325 lb)   08/02/23 (!) 143 kg (315 lb 6.4 oz)   07/05/23 (!) 148 kg (325 lb 6.4 oz)   06/14/23 (!) 145 kg (320 lb 6.4 oz)   06/08/23 (!) 147 kg (323 lb)   04/28/23 (!) 145 kg (319 lb 9.6 oz)   04/20/23 (!) 146 kg (322 lb)   04/06/23 (!) 149 kg (328 lb)   03/07/23 (!) 151 kg (332 lb)   02/06/23 (!) 149 kg (329 lb)   12/07/22 (!) 149 kg (328 lb)   12/05/22 (!) 148 kg (327 lb)   10/27/22 (!) 150 kg (331 lb 9.6 oz)   09/27/22 (!) 150 kg (330 lb)   09/20/22 (!) 150 kg (331 lb)   08/31/22 (!) 150 kg (331 lb 9.6 oz)   07/29/22 (!) 151 kg (333 lb)   07/19/22 (!) 147 kg (325 lb)   06/22/22 (!) 147 kg (325 lb)   First OV 12/05/2023 327lbs  Last weight 326bs was down to 315lbs   Current 323lbs  Off Wegovy because insurance last mo did not cover the medicine, has been 2 weeks off it and gained weight back up   Has been in surgical programs for a while but not deem apropriate due to behavioral problems  More depressed because he did not have his treatments yet  B-wakes up at 12pm goes to bed at 12am  S-  L-first meal of day sandwich ham and cheese pr oatmeal or eggs   S-chocolate or belvita crackers  3-4 pm 3-4 sandwiches   D-9pm pasta with meat sauce and mushroom   S-banana and fruit  Hydration:water and fruit drink with artifical flavors  Alcohol: socially  Smoking:none  Exercise: gym 3-4 times a week classes  Occupation:  Plays video games, 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  Sleep: well  STOP bang: does not use the CPAP as he should    Past Medical History:   Diagnosis Date   • Anxiety    • Bronchitis    • Concussion     Sports related   • Depression    • Hypertension    • Morbid obesity with BMI of 45.0-49.9, adult ([address])    • Sleep apnea, obstructive      Past Surgical History:   Procedure Laterality Date   • NO PAST SURGERIES       The following portions of the patient's history were reviewed and updated as appropriate: allergies, current medications, past family history, past medical history, past social history, past surgical history, and problem list.    ROS:  Review of Systems   Constitutional: Negative for activity change. Fatigue  HENT: Negative for trouble swallowing. Respiratory: Negative for shortness of breath. Cardiovascular: Negative for chest pain, edema  Gastrointestinal: Negative for abdominal pain, nausea and vomiting,+ acid reflux, +diarrhea 3 stools per day formed  Psychiatric/Behavioral: + for behavioral problems including anxiety /depression, boredom eating   Objective:  /80 (BP Location: Left arm, Patient Position: Sitting, Cuff Size: Large)   Pulse 82   Resp 14   Ht 5' 6" (1.676 m)   Wt (!) 148 kg (326 lb)   BMI 52.62 kg/m²   Constitutional: Well-developed, well-nourished and Obese Body mass index is 52.62 kg/m². Carly Arteagack Alert, cooperative. HEENT: No conjunctival pallor or jaundice  Pulmonary: No increased work of breathing or signs of respiratory distress. CV: Well-perfused, Normal rate    Vascular: no peripheral edema  GI: Abdomen obese, Non-distended  MSK: no sarcopenia noted   Neuro: Oriented to person, place and time. Normal Speech  Psych:Normal mood. Normal thought process, no delusions , boredom eating  Labs and Imaging  Recent labs and imaging have been personally reviewed.   Lab Results   Component Value Date    WBC 8.80 06/17/2022    HGB 14.1 06/17/2022    HCT 44.8 06/17/2022    MCV 82 06/17/2022     06/17/2022     Lab Results   Component Value Date     09/01/2017    SODIUM 142 06/17/2022    K 3.9 06/17/2022     06/17/2022    CO2 26 06/17/2022    AGAP 9 06/17/2022    BUN 12 06/17/2022    CREATININE 1.07 06/17/2022    GLUF 110 (H) 06/17/2022    CALCIUM 8.7 06/17/2022    AST 24 06/17/2022    ALT 35 06/17/2022    ALKPHOS 60 06/17/2022    PROT 7.5 09/01/2017    TP 7.4 06/17/2022    BILITOT 0.9 09/01/2017    TBILI 0.31 06/17/2022    EGFR 95 06/17/2022     Lab Results   Component Value Date    HGBA1C 5.9 (H) 12/05/2022     Lab Results   Component Value Date    IXA1AFVGXGUN 3.941 06/17/2022     Lab Results   Component Value Date    CHOLESTEROL 172 06/17/2022 Lab Results   Component Value Date    HDL 29 (L) 06/17/2022     Lab Results   Component Value Date    TRIG 182 (H) 06/17/2022     Lab Results   Component Value Date    LDLCALC 107 (H) 06/17/2022

## 2023-09-13 NOTE — ASSESSMENT & PLAN NOTE
Reviewed compliance report with patient today. Patient has been wearing his CPAP set at 14 cm H2O 67 out of the past 90 days. Average use greater than 4 hours only 49%. Average nightly use 4 hours 55 minutes. Residual AHI 0.5. Patient still having significant leaks with an average 62.3 L/min. Patient states he is sleeping well and feels better rested during the day than he used to. Still takes occasional nap during the day. Adrian score 7 today. Unclear etiology of large mask leak. Patient reports replacing mask and tubing since his last office visit. We will send patient for mask fitting appointment. Also have patient reach out to DME company to help troubleshoot. Patient states his CPAP occasionally turns on/runs by itself in the evening. We will follow-up again in 3 months to review compliance report. I encouraged patient to increase hours of nightly use.

## 2023-09-13 NOTE — ASSESSMENT & PLAN NOTE
No significant weight loss. Prescribed Wegovy, but has been off recently due to insurance coverage. He has follow-up appoint with weight management today.

## 2023-09-13 NOTE — PROGRESS NOTES
Pulmonary Follow Up Note  Jodi Brennan 33 y.o. male MRN: [id number]  9/13/2023    Assessment:    NIMO (obstructive sleep apnea)  Reviewed compliance report with patient today. Patient has been wearing his CPAP set at 14 cm H2O 67 out of the past 90 days. Average use greater than 4 hours only 49%. Average nightly use 4 hours 55 minutes. Residual AHI 0.5. Patient still having significant leaks with an average 62.3 L/min. Patient states he is sleeping well and feels better rested during the day than he used to. Still takes occasional nap during the day. Sinclair score 7 today. Unclear etiology of large mask leak. Patient reports replacing mask and tubing since his last office visit. We will send patient for mask fitting appointment. Also have patient reach out to DME company to help troubleshoot. Patient states his CPAP occasionally turns on/runs by itself in the evening. We will follow-up again in 3 months to review compliance report. I encouraged patient to increase hours of nightly use. Morbid obesity with BMI of 50.0-59.9, adult ([address])  No significant weight loss. Prescribed Wegovy, but has been off recently due to insurance coverage. He has follow-up appoint with weight management today. Plan:    Diagnoses and all orders for this visit:    NIMO (obstructive sleep apnea)  -     Mask fitting only; Future    Morbid obesity with BMI of 50.0-59.9, adult ([address])            Return in about 3 months (around 12/13/2023). History of Present Illness     Chief Complaint:   Chief Complaint   Patient presents with   • Follow-up       Patient ID: Zoya Robison is a 33 y.o. y.o. male has a past medical history of Anxiety, Bronchitis, Concussion, Depression, Hypertension, Morbid obesity with BMI of 45.0-49.9, adult ([address]), and Sleep apnea, obstructive.       9/13/2023  HPI: Jodi Brennan is a 33 y.o. male With a past medical history significant for moderate obstructive sleep apnea, hypertension, prediabetes, depression/anxiety, and obesity. He is here today for a routine follow-up visit. He was previously seen in the office in March. Was maintained on CPAP nightly. At that time, he was experiencing a significant leak in his tubing. Obtained new supplies. Was working on weight loss and following with weight management. Review of Systems   Constitutional: Negative for activity change, chills, fever and unexpected weight change. HENT: Negative for congestion, postnasal drip, rhinorrhea, sore throat and trouble swallowing. Respiratory: Negative for cough, chest tightness, shortness of breath and wheezing. Cardiovascular: Negative for chest pain, palpitations and leg swelling. Allergic/Immunologic: Negative. Psychiatric/Behavioral: Positive for sleep disturbance. Historical Information   Past Medical History:   Diagnosis Date   • Anxiety    • Bronchitis    • Concussion     Sports related   • Depression    • Hypertension    • Morbid obesity with BMI of 45.0-49.9, adult ([address])    • Sleep apnea, obstructive      Past Surgical History:   Procedure Laterality Date   • NO PAST SURGERIES       Family History   Adopted: Yes       Smoking history: He reports that he has never smoked.  He has never used smokeless tobacco.    Occupational History:     Immunization History   Administered Date(s) Administered   • BCG 05/30/1998, 08/18/2000, 04/06/2001, 07/06/2001, 02/26/2002   • COVID-19 PFIZER VACCINE 0.3 ML IM 03/18/2021, 04/10/2021   • DTaP 04/21/1997, 05/26/1997, 08/26/1997, 07/08/2000, 12/02/2002   • H1N1, All Formulations 12/03/2009   • HPV Quadrivalent 07/27/2014, 11/02/2014   • Hep A, ped/adol, 2 dose 06/11/2013   • Hep B, Adolescent or Pediatric 11/19/2002, 12/30/2002, 06/02/2003, 05/25/2014   • Hepatitis A 06/01/2013, 06/11/2013, 07/27/2014   • HiB 11/19/2002   • INFLUENZA 12/05/2009, 10/07/2015, 12/07/2022   • IPV 07/27/2014   • Influenza, injectable, quadrivalent, preservative free 0.5 mL 10/16/2020, 12/07/2022   • MMR 03/16/1998, 11/19/2002   • Meningococcal MCV4P 08/17/2009   • OPV 04/21/1997, 05/26/1997, 08/26/1997, 02/16/1998, 04/19/1998   • Tdap 08/17/2009   • Tuberculin Skin Test-PPD Intradermal 04/27/2015, 06/05/2015   • Typhoid Live, oral 06/11/2013   • Typhoid, Unspecified 06/11/2013   • Varicella 12/02/2002, 06/21/2004, 08/17/2009       Meds/Allergies     Current Outpatient Medications:   •  buPROPion (Wellbutrin XL) 300 mg 24 hr tablet, Take 300 mg by mouth every morning  , Disp: , Rfl:   •  desvenlafaxine succinate (PRISTIQ) 50 mg 24 hr tablet, Take 1 tablet (50 mg total) by mouth daily, Disp: 90 tablet, Rfl: 0  •  doxepin (SINEquan) 100 mg capsule, TAKE 1 CAPSULE (100 MG TOTAL) BY MOUTH 3 (THREE) TIMES DAILY AFTER MEALS, Disp: 270 capsule, Rfl: 1  •  losartan (COZAAR) 50 mg tablet, TAKE 1 TABLET BY MOUTH EVERY DAY, Disp: 90 tablet, Rfl: 3  •  Omeprazole 20 MG TBEC, Take 1 tablet by mouth 2 (two) times a day, Disp: , Rfl:   •  Semaglutide-Weight Management (WEGOVY) 2.4 MG/0.75ML, Inject 0.75 mL (2.4 mg total) under the skin once a week, Disp: 3 mL, Rfl: 2  •  topiramate (TOPAMAX) 200 MG tablet, Take 1 tablet (200 mg total) by mouth 2 (two) times a day, Disp: 180 tablet, Rfl: 1  •  ziprasidone (GEODON) 40 mg capsule, Take 1 capsule (40 mg total) by mouth every morning, Disp: 90 capsule, Rfl: 0  •  ziprasidone (GEODON) 80 mg capsule, Take 1 capsule (80 mg total) by mouth every evening, Disp: 90 capsule, Rfl: 0  •  omeprazole (PriLOSEC OTC) 20 MG tablet, Take 1 tablet (20 mg total) by mouth 2 (two) times a day, Disp: 60 tablet, Rfl: 3  •  propranolol (INDERAL LA) 80 mg 24 hr capsule, TAKE 1 CAPSULE (80 MG TOTAL) BY MOUTH EVERY MORNING, Disp: 90 capsule, Rfl: 3  Allergies:    Allergies   Allergen Reactions   • Abilify [Aripiprazole]    • Lithium    • Lorazepam Other (See Comments)     aggressive   • Seroquel [Quetiapine]          Vitals:  Vitals:    09/13/23 1124 09/13/23 1125   BP: 131/80 BP Location: Right arm    Patient Position: Sitting    Cuff Size: Large    Pulse: 90    Resp: 18    SpO2: 96% 96%   Weight: (!) 147 kg (325 lb)    Height: 5' 6" (1.676 m)    Oxygen Therapy  SpO2: 96 %  Oxygen Therapy: None (Room air)  . Wt Readings from Last 3 Encounters:   09/13/23 (!) 147 kg (325 lb)   08/02/23 (!) 143 kg (315 lb 6.4 oz)   07/05/23 (!) 148 kg (325 lb 6.4 oz)     Body mass index is 52.46 kg/m². Physical Exam  Vitals and nursing note reviewed. Constitutional:       General: He is not in acute distress. Appearance: He is morbidly obese. Cardiovascular:      Rate and Rhythm: Normal rate and regular rhythm. Heart sounds: Normal heart sounds. No murmur heard. Pulmonary:      Effort: Pulmonary effort is normal. No respiratory distress. Breath sounds: Normal breath sounds. No decreased breath sounds, wheezing, rhonchi or rales. Musculoskeletal:         General: No swelling. Right lower leg: No edema. Left lower leg: No edema. Psychiatric:         Mood and Affect: Mood and affect normal.         Behavior: Behavior normal. Behavior is cooperative. Labs: I have personally reviewed pertinent lab results. Lab Results   Component Value Date    WBC 8.80 06/17/2022    HGB 14.1 06/17/2022    HCT 44.8 06/17/2022    MCV 82 06/17/2022     06/17/2022     Lab Results   Component Value Date    CALCIUM 8.7 06/17/2022     09/01/2017    K 3.9 06/17/2022    CO2 26 06/17/2022     06/17/2022    BUN 12 06/17/2022    CREATININE 1.07 06/17/2022     No results found for: "IGE"  Lab Results   Component Value Date    ALT 35 06/17/2022    AST 24 06/17/2022    ALKPHOS 60 06/17/2022    BILITOT 0.9 09/01/2017       Imaging and other studies: no new imaging or diagnostic studies to review today.      ESS: Total score: 7

## 2023-09-13 NOTE — TELEPHONE ENCOUNTER
Pt presented in person for check in for his appt   Pt was called 24 hours prior to appt scheduled to confirm appt date time and location as well as billing information   Pt stated will bring information for appt    Pt did not know information    Pt arrived for check in with mother and mother of pt stated she did not have the insurance card yet and was waiting for the information   Pt mother was explained that since there is no billing information to document on file pt would be deemed currently as self pay and to pay $30 now to hold the 50% discount for self paying pts    Pt mother paid the $30 at check in and was reminded to call billing office asap with billing information for future appts

## 2023-09-14 ENCOUNTER — TELEPHONE (OUTPATIENT)
Dept: BARIATRICS | Facility: CLINIC | Age: 28
End: 2023-09-14

## 2023-09-14 NOTE — TELEPHONE ENCOUNTER
Prior authorization for [facility] CYNTHIA SARGENT 2.4 submitted through [employer]. Key# [id number].

## 2023-09-14 NOTE — TELEPHONE ENCOUNTER
Pt pharmacy advised him he needs preauth for the wegovy. Please contact patient once the Juan Pair is actually received so they know to go and get the script.

## 2023-09-18 NOTE — TELEPHONE ENCOUNTER
Prior authorization approved 9/13/23 through 3/11/24. Pharmacy was notified. They do have medication on order. Notified patient via [address] [place] [address].

## 2023-09-19 ENCOUNTER — TELEPHONE (OUTPATIENT)
Dept: FAMILY MEDICINE CLINIC | Facility: CLINIC | Age: 28
End: 2023-09-19

## 2023-09-19 NOTE — TELEPHONE ENCOUNTER
Pt's insurance requires that Omeprazole DR 20MG be sent in capsule form for insurance approval. Keven send new rx for Omeprazole capsules

## 2023-09-19 NOTE — TELEPHONE ENCOUNTER
PA submitted through [employer] portal. We are awaiting a determination of coverage from pt's plan. Torrie Quick (Justice: [id number]) - [id number]  Need help? Call us at [phone]    Status   [facility]   The plan will fax you a determination, typically within 1 to 5 business days.

## 2023-09-19 NOTE — TELEPHONE ENCOUNTER
Received notification from [employer] that omeprazole requires PA for pt.     Key: [id number]

## 2023-09-20 DIAGNOSIS — F33.9 DEPRESSION, RECURRENT (HCC): Primary | ICD-10-CM

## 2023-09-20 DIAGNOSIS — K21.9 GASTROESOPHAGEAL REFLUX DISEASE WITHOUT ESOPHAGITIS: Primary | ICD-10-CM

## 2023-09-20 RX ORDER — BUPROPION HYDROCHLORIDE 300 MG/1
300 TABLET ORAL EVERY MORNING
Qty: 90 TABLET | Refills: 0 | Status: SHIPPED | OUTPATIENT
Start: 2023-09-20

## 2023-09-20 RX ORDER — NICOTINE POLACRILEX 4 MG/1
1 GUM, CHEWING ORAL 2 TIMES DAILY
Qty: 180 TABLET | Refills: 1 | Status: SHIPPED | OUTPATIENT
Start: 2023-09-20

## 2023-09-20 NOTE — TELEPHONE ENCOUNTER
Medication Refill Request     Name Bupropion    Dose/Frequency 300 mg, take 1 tab Q AM  Quantity 90  Verified pharmacy   [x]  Verified ordering Provider   [x]  Does patient have enough for the next 3 days?  Yes [] No [x]

## 2023-09-21 ENCOUNTER — TELEPHONE (OUTPATIENT)
Dept: PULMONOLOGY | Facility: CLINIC | Age: 28
End: 2023-09-21

## 2023-09-21 NOTE — TELEPHONE ENCOUNTER
Patient called stating that he was supposed to be called for a mask fitting appointment but no one has reached out to him yet. Please advise.

## 2023-09-30 DIAGNOSIS — F33.9 DEPRESSION, RECURRENT (HCC): ICD-10-CM

## 2023-10-02 RX ORDER — DOXEPIN HYDROCHLORIDE 100 MG/1
100 CAPSULE ORAL
Qty: 270 CAPSULE | Refills: 1 | Status: SHIPPED | OUTPATIENT
Start: 2023-10-02

## 2023-10-06 LAB
DME PARACHUTE DELIVERY DATE ACTUAL: NORMAL
DME PARACHUTE DELIVERY DATE ACTUAL: NORMAL
DME PARACHUTE DELIVERY DATE REQUESTED: NORMAL
DME PARACHUTE DELIVERY DATE REQUESTED: NORMAL
DME PARACHUTE DELIVERY NOTE: NORMAL
DME PARACHUTE ITEM DESCRIPTION: NORMAL
DME PARACHUTE ORDER STATUS: NORMAL
DME PARACHUTE ORDER STATUS: NORMAL
DME PARACHUTE SUPPLIER NAME: NORMAL
DME PARACHUTE SUPPLIER NAME: NORMAL
DME PARACHUTE SUPPLIER PHONE: NORMAL
DME PARACHUTE SUPPLIER PHONE: NORMAL

## 2023-10-16 ENCOUNTER — OFFICE VISIT (OUTPATIENT)
Dept: BARIATRICS | Facility: CLINIC | Age: 28
End: 2023-10-16
Payer: COMMERCIAL

## 2023-10-16 VITALS
BODY MASS INDEX: 50.62 KG/M2 | HEART RATE: 78 BPM | WEIGHT: 315 LBS | SYSTOLIC BLOOD PRESSURE: 128 MMHG | RESPIRATION RATE: 16 BRPM | DIASTOLIC BLOOD PRESSURE: 80 MMHG | HEIGHT: 66 IN

## 2023-10-16 DIAGNOSIS — F50.9 EATING DISORDER: ICD-10-CM

## 2023-10-16 DIAGNOSIS — E66.01 MORBID OBESITY WITH BMI OF 50.0-59.9, ADULT (HCC): Primary | ICD-10-CM

## 2023-10-16 DIAGNOSIS — E88.810 METABOLIC SYNDROME: ICD-10-CM

## 2023-10-16 DIAGNOSIS — R73.03 PREDIABETES: ICD-10-CM

## 2023-10-16 PROCEDURE — 99214 OFFICE O/P EST MOD 30 MIN: CPT | Performed by: FAMILY MEDICINE

## 2023-10-16 NOTE — PROGRESS NOTES
Assessment/Plan:  Pk Mcintosh was seen today for consult. Diagnoses and all orders for this visit:  1. Morbid obesity with BMI of 50.0-59.9, adult [facility])    Wegovy gives him side effects 2-3 stools per day , advised to add Metamucil 3 times a day  Will cont same dose - plan to continue for next 3 months    Koki Small gave him gas   they are aware of side effects profile   He is more open to bariatric surgery - was deemed not a candidate due to his behavior but that might change if he gets more focused  He has been doing good changes , struggles with getting second portions but tries his best  Better with water intake and less fruit intake - mom involved and helping him a lot   Mom cooks sweets , takes him to the gym , he hates it but accepts to use the treadmill  Return monthly for weight check     2. Prediabetes   Glp-1 will help  3. Metabolic syndrome   weight loss is necessary  4. Other disorder of eating   difficult to control behavioral, he is trying to make a difference, Wegovy helped him a lot       Behavioral problems to be addressed with counselor  Barriers to weight loss; behavioral problems- advised focus on CBT. He does not want to count calories    - Patient denies personal history of pancreatitis. Patient DOES NOT know personal and family history, was adopted, family and himself aware of risks  of medullary thyroid cancer and multiple endocrine neoplasia type 2 (MEN 2 tumor). Patient and family understand these major side effects and agrees to start the medication. Increase physical activity by 10 minutes daily. Return in 3 mo with me and monthly weight checks with staff  Subjective:   Chief Complaint   Patient presents with    Follow-up     Patient presents for f/u. He t       Patient ID: Brandon Jauregui  is a 33 y.o. male with excess weight/obesity here to pursue weight management. Previous notes and records have been reviewed.         HPI  Wt Readings from Last 20 Encounters:   09/13/23 (!) 148 kg (326 lb)   09/13/23 (!) 147 kg (325 lb)   08/02/23 (!) 143 kg (315 lb 6.4 oz)   07/05/23 (!) 148 kg (325 lb 6.4 oz)   06/14/23 (!) 145 kg (320 lb 6.4 oz)   06/08/23 (!) 147 kg (323 lb)   04/28/23 (!) 145 kg (319 lb 9.6 oz)   04/20/23 (!) 146 kg (322 lb)   04/06/23 (!) 149 kg (328 lb)   03/07/23 (!) 151 kg (332 lb)   02/06/23 (!) 149 kg (329 lb)   12/07/22 (!) 149 kg (328 lb)   12/05/22 (!) 148 kg (327 lb)   10/27/22 (!) 150 kg (331 lb 9.6 oz)   09/27/22 (!) 150 kg (330 lb)   09/20/22 (!) 150 kg (331 lb)   08/31/22 (!) 150 kg (331 lb 9.6 oz)   07/29/22 (!) 151 kg (333 lb)   07/19/22 (!) 147 kg (325 lb)   06/22/22 (!) 147 kg (325 lb)   First OV 12/05/2023 327lbs  was down to 315lbs   On Wegovy , he does not like because he has 3 stools per day   Last weight 323lbs  Current 326lbs was down to 215lbs but regained due to behavioral issues  Off Wegovy because insurance last mo did not cover the medicine, has been 2 weeks off it and gained weight back up  Has been in surgical programs for a while but not deem apropriate due to behavioral problems  More depressed because he regained some weight     B-wakes up at 12pm goes to bed at 12am  S-  L-first meal of day sandwich ham and cheese pr oatmeal or eggs   S-chocolate or belvita crackers  3-4 pm 3-4 sandwiches   D-9pm sweet potatoes and sweet potato    S-banana and fruit  Hydration:water and fruit drink with artifical flavors  Alcohol: socially  Smoking:none  Exercise: gym 3-4 times a week classes  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, 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, got a puppy that he loves   Sleep: well  STOP bang: does not use the CPAP as he should    Past Medical History:   Diagnosis Date    Anxiety     Bronchitis     Concussion     Sports related    Depression     Hypertension     Morbid obesity with BMI of 45.0-49.9, adult ([address])     Sleep apnea, obstructive      Past Surgical History:   Procedure Laterality Date    NO PAST SURGERIES       The following portions of the patient's history were reviewed and updated as appropriate: allergies, current medications, past family history, past medical history, past social history, past surgical history, and problem list.    ROS:  Review of Systems   Constitutional: Negative for activity change. Fatigue  HENT: Negative for trouble swallowing. Respiratory: Negative for shortness of breath. Cardiovascular: Negative for chest pain, edema  Gastrointestinal: Negative for abdominal pain, nausea and vomiting,+ acid reflux, +diarrhea 3 stools per day formed  Psychiatric/Behavioral: + for behavioral problems including anxiety /depression, boredom eating   Objective:  /80 (BP Location: Left arm, Patient Position: Sitting, Cuff Size: Large)   Pulse 82   Resp 14   Ht 5' 6" (1.676 m)   Wt (!) 148 kg (326 lb)   BMI 52.62 kg/m²   Constitutional: Well-developed, well-nourished and Obese Body mass index is 52.62 kg/m². Brianne Inch Alert, cooperative. HEENT: No conjunctival pallor or jaundice  Pulmonary: No increased work of breathing or signs of respiratory distress. CV: Well-perfused, Normal rate    Vascular: no peripheral edema  GI: Abdomen obese, Non-distended  MSK: no sarcopenia noted   Neuro: Oriented to person, place and time. Normal Speech  Psych:Normal mood. Normal thought process, no delusions , boredom eating  Labs and Imaging  Recent labs and imaging have been personally reviewed.   Lab Results   Component Value Date    WBC 8.80 06/17/2022    HGB 14.1 06/17/2022    HCT 44.8 06/17/2022    MCV 82 06/17/2022     06/17/2022     Lab Results   Component Value Date     09/01/2017    SODIUM 142 06/17/2022    K 3.9 06/17/2022     06/17/2022    CO2 26 06/17/2022    AGAP 9 06/17/2022    BUN 12 06/17/2022    CREATININE 1.07 06/17/2022    GLUF 110 (H) 06/17/2022    CALCIUM 8.7 06/17/2022    AST 24 06/17/2022    ALT 35 06/17/2022    ALKPHOS 60 06/17/2022    PROT 7.5 09/01/2017    TP 7.4 06/17/2022    BILITOT 0.9 09/01/2017    TBILI 0.31 06/17/2022 EGFR 95 06/17/2022     Lab Results   Component Value Date    HGBA1C 5.9 (H) 12/05/2022     Lab Results   Component Value Date    DPU3QIJHLBKQ 3.941 06/17/2022     Lab Results   Component Value Date    CHOLESTEROL 172 06/17/2022     Lab Results   Component Value Date    HDL 29 (L) 06/17/2022     Lab Results   Component Value Date    TRIG 182 (H) 06/17/2022     Lab Results   Component Value Date    LDLCALC 107 (H) 06/17/2022

## 2023-10-17 ENCOUNTER — TELEPHONE (OUTPATIENT)
Dept: BARIATRICS | Facility: CLINIC | Age: 28
End: 2023-10-17

## 2023-10-17 NOTE — TELEPHONE ENCOUNTER
Prior Authorization has been started for [place] with [employer], [address] [place] [address]. We had only received the request for authorization yesterday, 10/16/23. I am waiting for [facility]'s response to see if they need any additional information. Thank you for your patience.

## 2023-10-17 NOTE — TELEPHONE ENCOUNTER
Patient's mom called to check on the authorization for the wegovy. She said [facility] requires it. Please advise patient.

## 2023-10-17 NOTE — TELEPHONE ENCOUNTER
Prior Authorization has been started for [place] with "[employer] [address] [place] [address]. We had only received the request for authorization yesterday, 10/16/23. I am waiting for their response to see if they need any additional information.

## 2023-10-20 ENCOUNTER — TELEPHONE (OUTPATIENT)
Dept: BARIATRICS | Facility: CLINIC | Age: 28
End: 2023-10-20

## 2023-10-30 DIAGNOSIS — F33.9 DEPRESSION, RECURRENT (HCC): ICD-10-CM

## 2023-10-30 RX ORDER — ZIPRASIDONE HYDROCHLORIDE 80 MG/1
80 CAPSULE ORAL EVERY EVENING
Qty: 90 CAPSULE | Refills: 0 | Status: SHIPPED | OUTPATIENT
Start: 2023-10-30

## 2023-11-13 ENCOUNTER — TELEPHONE (OUTPATIENT)
Dept: BARIATRICS | Facility: CLINIC | Age: 28
End: 2023-11-13

## 2023-11-13 ENCOUNTER — CLINICAL SUPPORT (OUTPATIENT)
Dept: BARIATRICS | Facility: CLINIC | Age: 28
End: 2023-11-13

## 2023-11-13 VITALS
BODY MASS INDEX: 50.62 KG/M2 | HEART RATE: 100 BPM | WEIGHT: 315 LBS | OXYGEN SATURATION: 97 % | SYSTOLIC BLOOD PRESSURE: 140 MMHG | DIASTOLIC BLOOD PRESSURE: 92 MMHG | RESPIRATION RATE: 18 BRPM | HEIGHT: 66 IN

## 2023-11-13 DIAGNOSIS — E66.01 OBESITY, CLASS III, BMI 40-49.9 (MORBID OBESITY) (HCC): Primary | ICD-10-CM

## 2023-11-13 PROCEDURE — RECHECK

## 2023-11-13 NOTE — PROGRESS NOTES
Patient here for weight check. Today weighed 327.0 pounds. Last visit on 10/18/2023 weighed 327.0 pounds. No change in weight. Patient discussed trying intermittent fasting and discussed his family eats dinner late in day. (Around 8 - 9 pm)  Has stopped habit of eating just prior to sleep. Will discuss in detail with Bariatrician on 12/20/23 visit. Currently on Wegovy 2.4 mg weekly. Patient stated no side effects with medication regimen.

## 2023-11-17 DIAGNOSIS — L73.9 FOLLICULITIS: Primary | ICD-10-CM

## 2023-12-11 ENCOUNTER — OFFICE VISIT (OUTPATIENT)
Age: 28
End: 2023-12-11
Payer: COMMERCIAL

## 2023-12-11 VITALS
WEIGHT: 315 LBS | OXYGEN SATURATION: 96 % | BODY MASS INDEX: 50.62 KG/M2 | TEMPERATURE: 98.1 F | HEIGHT: 66 IN | SYSTOLIC BLOOD PRESSURE: 139 MMHG | HEART RATE: 111 BPM | RESPIRATION RATE: 16 BRPM | DIASTOLIC BLOOD PRESSURE: 81 MMHG

## 2023-12-11 DIAGNOSIS — E66.01 MORBID OBESITY WITH BMI OF 50.0-59.9, ADULT (HCC): ICD-10-CM

## 2023-12-11 DIAGNOSIS — G47.33 OSA (OBSTRUCTIVE SLEEP APNEA): Primary | ICD-10-CM

## 2023-12-11 PROCEDURE — 99213 OFFICE O/P EST LOW 20 MIN: CPT

## 2023-12-11 NOTE — ASSESSMENT & PLAN NOTE
-Patient had mask fitting and received new mask  -Reviewed compliance data with the patient today. Over the past 30 days, he has been 80% compliant with an average nightly use of 5 hours and 34 minutes. Residual AHI 0.5. Continues to have high mask leakage of 57.9 L/min, however, improved with new mask and does not notice any mask leakage.    -He is sleeping well and feels better rested during the day. Still takes an occasional nap during the day. Attributes his residual daytime fatigue to his excess weight.   He is following with bariatrics for this.  -Encourage patient to continue wearing nightly with current mask at current settings  -Reviewed maintenance of mask and machine  -We will follow-up with patient in 1 year or sooner if needed

## 2023-12-11 NOTE — PROGRESS NOTES
Pulmonary Follow Up Note  Velna Eisenmenger 33 y.o. male MRN: [id number]  12/11/2023    Assessment:    NIMO (obstructive sleep apnea)  -Patient had mask fitting and received new mask  -Reviewed compliance data with the patient today. Over the past 30 days, he has been 80% compliant with an average nightly use of 5 hours and 34 minutes. Residual AHI 0.5. Continues to have high mask leakage of 57.9 L/min, however, improved with new mask and does not notice any mask leakage.    -He is sleeping well and feels better rested during the day. Still takes an occasional nap during the day. Attributes his residual daytime fatigue to his excess weight. He is following with bariatrics for this.  -Encourage patient to continue wearing nightly with current mask at current settings  -Reviewed maintenance of mask and machine  -We will follow-up with patient in 1 year or sooner if needed    Morbid obesity with BMI of 50.0-59.9, adult ([address])  -Patient working on reducing weight  -Continue to follow-up with bariatrics      Plan:    Diagnoses and all orders for this visit:    NIMO (obstructive sleep apnea)    Morbid obesity with BMI of 50.0-59.9, adult ([address])        Return in about 1 year (around 12/11/2024). History of Present Illness     Chief Complaint: No chief complaint on file. Patient ID: Tiffany Celeste is a 33 y.o. y.o. male has a past medical history of Anxiety, Bronchitis, Concussion, Depression, Hypertension, Morbid obesity with BMI of 45.0-49.9, adult ([address]), and Sleep apnea, obstructive. 12/11/2023  HPI: Velna Eisenmenger is a 33 y.o. male who presents to the office today for a follow-up visit for his NIMO. He was previously seen in the office 3 months ago and on his compliance noticed to have a very large mask leakage. He was sent for mask fitting and also encouraged to increase use of CPAP. Patient returns today stating he received new mask which is working better than the previous one.   He has also increased use of his CPAP. He is sleeping better and feeling more rested than he was previously. Still taking at least 1 nap per day, however he attributes this to his excess weight. He is following with bariatrics and is trying to lose weight. Review of Systems   Constitutional:  Negative for appetite change, chills, fatigue and unexpected weight change. Respiratory:  Negative for cough, shortness of breath and wheezing. Cardiovascular:  Negative for chest pain and palpitations. Neurological: Negative. Psychiatric/Behavioral:  Negative for sleep disturbance. Historical Information   Past Medical History:   Diagnosis Date    Anxiety     Bronchitis     Concussion     Sports related    Depression     Hypertension     Morbid obesity with BMI of 45.0-49.9, adult (HCC)     Sleep apnea, obstructive      Past Surgical History:   Procedure Laterality Date    NO PAST SURGERIES       Family History   Adopted: Yes       Smoking history: He reports that he has never smoked.  He has never used smokeless tobacco.    Occupational History:     Immunization History   Administered Date(s) Administered    BCG 05/30/1998, 08/18/2000, 04/06/2001, 07/06/2001, 02/26/2002    COVID-19 PFIZER VACCINE 0.3 ML IM 03/18/2021, 04/10/2021    DTaP 04/21/1997, 05/26/1997, 08/26/1997, 07/08/2000, 12/02/2002    H1N1, All Formulations 12/03/2009    HPV Quadrivalent 07/27/2014, 11/02/2014    Hep A, ped/adol, 2 dose 06/11/2013    Hep B, Adolescent or Pediatric 11/19/2002, 12/30/2002, 06/02/2003, 05/25/2014    Hepatitis A 06/01/2013, 06/11/2013, 07/27/2014    HiB 11/19/2002    INFLUENZA 12/05/2009, 10/07/2015, 12/07/2022    IPV 07/27/2014    Influenza, injectable, quadrivalent, preservative free 0.5 mL 10/16/2020, 12/07/2022    MMR 03/16/1998, 11/19/2002    Meningococcal MCV4P 08/17/2009    OPV 04/21/1997, 05/26/1997, 08/26/1997, 02/16/1998, 04/19/1998    Tdap 08/17/2009    Tuberculin Skin Test-PPD Intradermal 04/27/2015, 06/05/2015    Typhoid Live, oral 06/11/2013    Typhoid, Unspecified 06/11/2013    Varicella 12/02/2002, 06/21/2004, 08/17/2009       Meds/Allergies     Current Outpatient Medications:     buPROPion (Wellbutrin XL) 300 mg 24 hr tablet, Take 1 tablet (300 mg total) by mouth every morning, Disp: 90 tablet, Rfl: 0    desvenlafaxine succinate (PRISTIQ) 50 mg 24 hr tablet, TAKE 1 TABLET BY MOUTH EVERY DAY, Disp: 90 tablet, Rfl: 0    doxepin (SINEquan) 100 mg capsule, TAKE 1 CAPSULE (100 MG TOTAL) BY MOUTH 3 (THREE) TIMES DAILY AFTER MEALS, Disp: 270 capsule, Rfl: 1    losartan (COZAAR) 50 mg tablet, TAKE 1 TABLET BY MOUTH EVERY DAY, Disp: 90 tablet, Rfl: 3    mupirocin (BACTROBAN) 2 % ointment, Apply topically 3 (three) times a day, Disp: 30 g, Rfl: 0    Semaglutide-Weight Management (WEGOVY) 2.4 MG/0.75ML, Inject 0.75 mL (2.4 mg total) under the skin once a week, Disp: 3 mL, Rfl: 2    topiramate (TOPAMAX) 200 MG tablet, Take 1 tablet (200 mg total) by mouth 2 (two) times a day, Disp: 180 tablet, Rfl: 1    ziprasidone (GEODON) 40 mg capsule, Take 1 capsule (40 mg total) by mouth every morning, Disp: 90 capsule, Rfl: 0    ziprasidone (GEODON) 80 mg capsule, TAKE 1 CAPSULE (80 MG TOTAL) BY MOUTH EVERY EVENING, Disp: 90 capsule, Rfl: 0    Omeprazole 20 MG TBEC, Take 1 tablet (20 mg total) by mouth 2 (two) times a day (Patient not taking: Reported on 11/13/2023), Disp: 180 tablet, Rfl: 1    propranolol (INDERAL LA) 80 mg 24 hr capsule, TAKE 1 CAPSULE (80 MG TOTAL) BY MOUTH EVERY MORNING, Disp: 90 capsule, Rfl: 3  Allergies:    Allergies   Allergen Reactions    Abilify [Aripiprazole]     Lithium     Lorazepam Other (See Comments)     aggressive    Seroquel [Quetiapine]          Vitals:  Vitals:    12/11/23 1347 12/11/23 1351   BP: 139/81    BP Location: Right arm    Patient Position: Sitting    Cuff Size: Large    Pulse: (!) 111    Resp: 16    Temp: 98.1 °F (36.7 °C)    SpO2: 96% 96%   Weight: (!) 148 kg (326 lb)    Height: 5' 6" (1.676 m)    Oxygen Therapy  SpO2: 96 %  Oxygen Therapy: None (Room air)  . Wt Readings from Last 3 Encounters:   12/11/23 (!) 148 kg (326 lb)   11/13/23 (!) 148 kg (327 lb)   10/16/23 (!) 148 kg (327 lb)     Body mass index is 52.62 kg/m². Physical Exam  Vitals and nursing note reviewed. Constitutional:       General: He is not in acute distress. Appearance: Normal appearance. He is well-developed. He is morbidly obese. Cardiovascular:      Rate and Rhythm: Normal rate and regular rhythm. Heart sounds: Normal heart sounds. No murmur heard. Pulmonary:      Effort: Pulmonary effort is normal. No respiratory distress. Breath sounds: Normal breath sounds. No decreased breath sounds, wheezing, rhonchi or rales. Musculoskeletal:         General: No swelling. Right lower leg: No edema. Left lower leg: No edema. Psychiatric:         Mood and Affect: Mood and affect normal.         Behavior: Behavior normal. Behavior is cooperative. Labs: I have personally reviewed pertinent lab results.   Lab Results   Component Value Date    WBC 8.80 06/17/2022    HGB 14.1 06/17/2022    HCT 44.8 06/17/2022    MCV 82 06/17/2022     06/17/2022     Lab Results   Component Value Date    CALCIUM 8.7 06/17/2022     09/01/2017    K 3.9 06/17/2022    CO2 26 06/17/2022     06/17/2022    BUN 12 06/17/2022    CREATININE 1.07 06/17/2022     No results found for: "IGE"  Lab Results   Component Value Date    ALT 35 06/17/2022    AST 24 06/17/2022    ALKPHOS 60 06/17/2022    BILITOT 0.9 09/01/2017

## 2023-12-15 ENCOUNTER — OFFICE VISIT (OUTPATIENT)
Dept: FAMILY MEDICINE CLINIC | Facility: CLINIC | Age: 28
End: 2023-12-15
Payer: COMMERCIAL

## 2023-12-15 VITALS
BODY MASS INDEX: 50.62 KG/M2 | HEIGHT: 66 IN | HEART RATE: 115 BPM | SYSTOLIC BLOOD PRESSURE: 112 MMHG | WEIGHT: 315 LBS | DIASTOLIC BLOOD PRESSURE: 62 MMHG | TEMPERATURE: 98 F | OXYGEN SATURATION: 97 %

## 2023-12-15 DIAGNOSIS — E78.2 HYPERLIPIDEMIA, MIXED: ICD-10-CM

## 2023-12-15 DIAGNOSIS — F33.9 DEPRESSION, RECURRENT (HCC): ICD-10-CM

## 2023-12-15 DIAGNOSIS — I10 ESSENTIAL HYPERTENSION: Primary | ICD-10-CM

## 2023-12-15 DIAGNOSIS — G43.009 MIGRAINE WITHOUT AURA AND WITHOUT STATUS MIGRAINOSUS, NOT INTRACTABLE: ICD-10-CM

## 2023-12-15 DIAGNOSIS — R73.03 PREDIABETES: ICD-10-CM

## 2023-12-15 PROCEDURE — 99214 OFFICE O/P EST MOD 30 MIN: CPT | Performed by: FAMILY MEDICINE

## 2023-12-15 RX ORDER — ZIPRASIDONE HYDROCHLORIDE 40 MG/1
40 CAPSULE ORAL EVERY MORNING
Qty: 90 CAPSULE | Refills: 0 | Status: SHIPPED | OUTPATIENT
Start: 2023-12-15

## 2023-12-15 NOTE — PROGRESS NOTES
Name: Clyde Waters      : 1995      MRN: [id number]  Encounter Provider: Xavier Fields DO  Encounter Date: 12/15/2023   Encounter department: [address] [place] [address] [address] [facility]     1. Essential hypertension  Assessment & Plan:  Well-controlled, continue the losartan    Orders:  -     Comprehensive metabolic panel; Future    2. Prediabetes  Assessment & Plan:  Check CMP, hemoglobin A1c, encouraged to lose weight. Orders:  -     Hemoglobin A1C; Future    3. Hyperlipidemia, mixed  Assessment & Plan:  Check lipid profile, lose weight. Orders:  -     CBC; Future  -     Lipid panel; Future    4. Depression, recurrent ([address])    5. Migraine without aura and without status migrainosus, not intractable  Assessment & Plan:  Continue the Topamax, propranolol, he wishes to try CBD oil, he is to let us know in a month if this is not successful we may consider changing his blood pressure medicine from the losartan for additional migraine prophylaxis. Subjective      Patient comes in today for checkup, does complain of more frequent headaches. He was seeing neurology in the past.  He is currently taking his Topamax, riboflavin and magnesium as recommended by the neurologist.      Review of Systems   Constitutional:  Negative for chills, fatigue and fever. HENT:  Negative for congestion, ear pain, hearing loss, postnasal drip, rhinorrhea and sore throat. Eyes:  Negative for pain and visual disturbance. Respiratory:  Negative for chest tightness, shortness of breath and wheezing. Cardiovascular:  Negative for chest pain and leg swelling. Gastrointestinal:  Negative for abdominal distention, abdominal pain, constipation, diarrhea and vomiting. Endocrine: Negative for cold intolerance and heat intolerance. Genitourinary:  Negative for difficulty urinating, frequency and urgency. Musculoskeletal:  Negative for arthralgias and gait problem. Skin:  Negative for color change. Neurological:  Positive for headaches. Negative for dizziness, tremors, syncope and numbness. Hematological:  Negative for adenopathy. Psychiatric/Behavioral:  Negative for agitation, confusion and sleep disturbance. The patient is not nervous/anxious. Current Outpatient Medications on File Prior to Visit   Medication Sig   • buPROPion (Wellbutrin XL) 300 mg 24 hr tablet Take 1 tablet (300 mg total) by mouth every morning   • desvenlafaxine succinate (PRISTIQ) 50 mg 24 hr tablet TAKE 1 TABLET BY MOUTH EVERY DAY   • doxepin (SINEquan) 100 mg capsule TAKE 1 CAPSULE (100 MG TOTAL) BY MOUTH 3 (THREE) TIMES DAILY AFTER MEALS   • losartan (COZAAR) 50 mg tablet TAKE 1 TABLET BY MOUTH EVERY DAY   • Omeprazole 20 MG TBEC Take 1 tablet (20 mg total) by mouth 2 (two) times a day   • propranolol (INDERAL LA) 80 mg 24 hr capsule TAKE 1 CAPSULE (80 MG TOTAL) BY MOUTH EVERY MORNING   • Semaglutide-Weight Management (WEGOVY) 2.4 MG/0.75ML Inject 0.75 mL (2.4 mg total) under the skin once a week   • topiramate (TOPAMAX) 200 MG tablet Take 1 tablet (200 mg total) by mouth 2 (two) times a day   • ziprasidone (GEODON) 80 mg capsule TAKE 1 CAPSULE (80 MG TOTAL) BY MOUTH EVERY EVENING   • mupirocin (BACTROBAN) 2 % ointment Apply topically 3 (three) times a day   • [DISCONTINUED] ziprasidone (GEODON) 40 mg capsule Take 1 capsule (40 mg total) by mouth every morning       Objective     /62   Pulse (!) 115   Temp 98 °F (36.7 °C)   Ht 5' 6" (1.676 m)   Wt (!) 149 kg (329 lb)   SpO2 97%   BMI 53.10 kg/m²     Physical Exam  Vitals and nursing note reviewed. Constitutional:       General: He is not in acute distress. Appearance: Normal appearance. He is well-developed. He is obese. HENT:      Head: Normocephalic. Eyes:      General: No scleral icterus. Conjunctiva/sclera: Conjunctivae normal.   Neck:      Thyroid: No thyromegaly.    Cardiovascular:      Rate and Rhythm: Normal rate and regular rhythm. Heart sounds: Normal heart sounds. No murmur heard. Pulmonary:      Effort: Pulmonary effort is normal. No respiratory distress. Breath sounds: Normal breath sounds. No wheezing. Abdominal:      General: Bowel sounds are normal. There is no distension. Palpations: Abdomen is soft. Tenderness: There is no abdominal tenderness. Musculoskeletal:         General: No tenderness. Normal range of motion. Cervical back: Normal range of motion. Right lower leg: No edema. Left lower leg: No edema. Lymphadenopathy:      Cervical: No cervical adenopathy. Skin:     General: Skin is warm and dry. Coloration: Skin is not pale. Findings: No rash. Neurological:      Mental Status: He is alert and oriented to person, place, and time. Cranial Nerves: No cranial nerve deficit.    Psychiatric:         Behavior: Behavior normal.       Debra Forman DO

## 2023-12-15 NOTE — ASSESSMENT & PLAN NOTE
Continue the Topamax, propranolol, he wishes to try CBD oil, he is to let us know in a month if this is not successful we may consider changing his blood pressure medicine from the losartan for additional migraine prophylaxis.

## 2023-12-17 DIAGNOSIS — E66.01 MORBID OBESITY WITH BMI OF 50.0-59.9, ADULT (HCC): ICD-10-CM

## 2023-12-17 DIAGNOSIS — R73.03 PREDIABETES: ICD-10-CM

## 2023-12-18 RX ORDER — SEMAGLUTIDE 2.4 MG/.75ML
2.4 INJECTION, SOLUTION SUBCUTANEOUS WEEKLY
Qty: 3 ML | Refills: 2 | Status: SHIPPED | OUTPATIENT
Start: 2023-12-18

## 2023-12-20 ENCOUNTER — OFFICE VISIT (OUTPATIENT)
Dept: BARIATRICS | Facility: CLINIC | Age: 28
End: 2023-12-20
Payer: COMMERCIAL

## 2023-12-20 VITALS
SYSTOLIC BLOOD PRESSURE: 108 MMHG | DIASTOLIC BLOOD PRESSURE: 68 MMHG | WEIGHT: 315 LBS | HEIGHT: 66 IN | BODY MASS INDEX: 50.62 KG/M2 | RESPIRATION RATE: 18 BRPM | OXYGEN SATURATION: 96 % | HEART RATE: 108 BPM

## 2023-12-20 DIAGNOSIS — F50.9 EATING DISORDER: ICD-10-CM

## 2023-12-20 DIAGNOSIS — E66.01 MORBID OBESITY WITH BMI OF 50.0-59.9, ADULT (HCC): Primary | ICD-10-CM

## 2023-12-20 DIAGNOSIS — R73.03 PREDIABETES: ICD-10-CM

## 2023-12-20 DIAGNOSIS — F33.9 DEPRESSION, RECURRENT (HCC): ICD-10-CM

## 2023-12-20 PROCEDURE — 99214 OFFICE O/P EST MOD 30 MIN: CPT | Performed by: FAMILY MEDICINE

## 2023-12-20 RX ORDER — BUPROPION HYDROCHLORIDE 300 MG/1
300 TABLET ORAL EVERY MORNING
Qty: 90 TABLET | Refills: 0 | Status: SHIPPED | OUTPATIENT
Start: 2023-12-20

## 2023-12-20 NOTE — PROGRESS NOTES
Assessment/Plan:  James was seen today for consult.    Diagnoses and all orders for this visit:  1. Morbid obesity with BMI of 50.0-59.9, adult (HCC)    Wegovy gives him side effects 2-3 stools per day , advised to add Metamucil 3 times a day  Will cont same dose - plan to continue for next 3 months    Roderick gave him gas   they are aware of side effects profile   He is more open to bariatric surgery - was deemed not a candidate due to his behavior but that might change if he gets more focused  He has been doing good changes , struggles with getting second portions but tries his best  Better with water intake and less fruit intake - mom involved and helping him a lot   Mom cooks sweets , takes him to the gym , he hates it but accepts to use the treadmill  Return monthly for weight check     2. Prediabetes   Glp-1 will help  3. Metabolic syndrome   weight loss is necessary  4. Other disorder of eating   difficult to control behavioral, he is trying to make a difference, Wegovy helped him a lot       Behavioral problems to be addressed with counselor  Barriers to weight loss; behavioral problems- advised focus on CBT.He does not want to count calories    - Patient denies personal history of pancreatitis. Patient DOES NOT know personal and family history, was adopted, family and himself aware of risks  of medullary thyroid cancer and multiple endocrine neoplasia type 2 (MEN 2 tumor). Patient and family understand these major side effects and agrees to start the medication.  Increase physical activity by 10 minutes daily.    Return in 3 mo with me and monthly weight checks with staff  Subjective:   Chief Complaint   Patient presents with    Follow-up     Patient presents for f/u. He t       Patient ID: James Marrero  is a 27 y.o. male with excess weight/obesity here to pursue weight management.  Previous notes and records have been reviewed.        HPI  Wt Readings from Last 3 Encounters:   12/20/23 (!) 145 kg  (319 lb 3.2 oz)   12/15/23 (!) 149 kg (329 lb)   12/11/23 (!) 148 kg (326 lb)      First OV 12/05/2023 327lbs  was down to 315lbs   On Wegovy , he does not like because he has 3 stools per day   Current 326lbs was down to 215lbs but regained due to behavioral issues  Off Wegovy because insurance last mo did not cover the medicine, has been 2 weeks off it and gained weight back up  Has been in surgical programs for a while but not deem apropriate due to behavioral problems  Started CBD one tab daily    Last weight 326lbs  continues Wegovy  Current 319lbs better snack choices and portion control  B-wakes up at 12pm goes to bed at 12am    L-first meal of day sandwich ham and cheese pr oatmeal or eggs   S-chocolate or belvita crackers  3-4 pm 3-4 sandwiches   D-9pm sweet potatoes and sweet potato    S-banana and fruit  Hydration:water and fruit drink with artifical flavors, using now 45 min at a time  Alcohol: socially  Smoking:none  Exercise: gym 3-4 times a week classes  Occupation:  Plays video games, 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, got a puppy that he loves   Sleep: well  STOP bang: does not use the CPAP as he should    Past Medical History:   Diagnosis Date    Anxiety     Bronchitis     Concussion     Sports related    Depression     Hypertension     Morbid obesity with BMI of 45.0-49.9, adult (HCC)     Sleep apnea, obstructive      Past Surgical History:   Procedure Laterality Date    NO PAST SURGERIES       The following portions of the patient's history were reviewed and updated as appropriate: allergies, current medications, past family history, past medical history, past social history, past surgical history, and problem list.    ROS:  Review of Systems   Constitutional: Negative for activity change. Fatigue  HENT: Negative for trouble swallowing.    Respiratory: Negative for shortness of breath.    Cardiovascular: Negative for chest pain, edema  Gastrointestinal: Negative for abdominal pain, nausea and vomiting,+ acid  "reflux, +diarrhea 3 stools per day formed  Psychiatric/Behavioral: + for behavioral problems including anxiety /depression, boredom eating   Objective:  /80 (BP Location: Left arm, Patient Position: Sitting, Cuff Size: Large)   Pulse 82   Resp 14   Ht 5' 6\" (1.676 m)   Wt (!) 148 kg (326 lb)   BMI 52.62 kg/m²   Constitutional: Well-developed, well-nourished and Obese Body mass index is 52.62 kg/m².. Alert, cooperative.  HEENT: No conjunctival pallor or jaundice  Pulmonary: No increased work of breathing or signs of respiratory distress.  CV: Well-perfused, Normal rate    Vascular: no peripheral edema  GI: Abdomen obese, Non-distended  MSK: no sarcopenia noted   Neuro: Oriented to person, place and time. Normal Speech  Psych:Normal mood. Normal thought process, no delusions , boredom eating  Labs and Imaging  Recent labs and imaging have been personally reviewed.  Lab Results   Component Value Date    WBC 8.80 06/17/2022    HGB 14.1 06/17/2022    HCT 44.8 06/17/2022    MCV 82 06/17/2022     06/17/2022     Lab Results   Component Value Date     09/01/2017    SODIUM 142 06/17/2022    K 3.9 06/17/2022     06/17/2022    CO2 26 06/17/2022    AGAP 9 06/17/2022    BUN 12 06/17/2022    CREATININE 1.07 06/17/2022    GLUF 110 (H) 06/17/2022    CALCIUM 8.7 06/17/2022    AST 24 06/17/2022    ALT 35 06/17/2022    ALKPHOS 60 06/17/2022    PROT 7.5 09/01/2017    TP 7.4 06/17/2022    BILITOT 0.9 09/01/2017    TBILI 0.31 06/17/2022    EGFR 95 06/17/2022     Lab Results   Component Value Date    HGBA1C 5.9 (H) 12/05/2022     Lab Results   Component Value Date    AEP4EVNPLLFG 3.941 06/17/2022     Lab Results   Component Value Date    CHOLESTEROL 172 06/17/2022     Lab Results   Component Value Date    HDL 29 (L) 06/17/2022     Lab Results   Component Value Date    TRIG 182 (H) 06/17/2022     Lab Results   Component Value Date    LDLCALC 107 (H) 06/17/2022     "

## 2024-01-06 ENCOUNTER — APPOINTMENT (OUTPATIENT)
Dept: LAB | Facility: HOSPITAL | Age: 29
End: 2024-01-06
Payer: COMMERCIAL

## 2024-01-06 DIAGNOSIS — I10 ESSENTIAL HYPERTENSION: ICD-10-CM

## 2024-01-06 DIAGNOSIS — R73.03 PREDIABETES: ICD-10-CM

## 2024-01-06 DIAGNOSIS — E78.2 HYPERLIPIDEMIA, MIXED: ICD-10-CM

## 2024-01-06 LAB
ALBUMIN SERPL BCP-MCNC: 3.9 G/DL (ref 3.5–5)
ALP SERPL-CCNC: 51 U/L (ref 34–104)
ALT SERPL W P-5'-P-CCNC: 22 U/L (ref 7–52)
ANION GAP SERPL CALCULATED.3IONS-SCNC: 6 MMOL/L
AST SERPL W P-5'-P-CCNC: 17 U/L (ref 13–39)
BILIRUB SERPL-MCNC: 0.34 MG/DL (ref 0.2–1)
BUN SERPL-MCNC: 13 MG/DL (ref 5–25)
CALCIUM SERPL-MCNC: 9 MG/DL (ref 8.4–10.2)
CHLORIDE SERPL-SCNC: 110 MMOL/L (ref 96–108)
CHOLEST SERPL-MCNC: 163 MG/DL
CO2 SERPL-SCNC: 21 MMOL/L (ref 21–32)
CREAT SERPL-MCNC: 0.9 MG/DL (ref 0.6–1.3)
ERYTHROCYTE [DISTWIDTH] IN BLOOD BY AUTOMATED COUNT: 13.3 % (ref 11.6–15.1)
GFR SERPL CREATININE-BSD FRML MDRD: 116 ML/MIN/1.73SQ M
GLUCOSE P FAST SERPL-MCNC: 114 MG/DL (ref 65–99)
HCT VFR BLD AUTO: 42 % (ref 36.5–49.3)
HDLC SERPL-MCNC: 29 MG/DL
HGB BLD-MCNC: 13.9 G/DL (ref 12–17)
LDLC SERPL CALC-MCNC: 95 MG/DL (ref 0–100)
MCH RBC QN AUTO: 26.2 PG (ref 26.8–34.3)
MCHC RBC AUTO-ENTMCNC: 33.1 G/DL (ref 31.4–37.4)
MCV RBC AUTO: 79 FL (ref 82–98)
NONHDLC SERPL-MCNC: 134 MG/DL
PLATELET # BLD AUTO: 316 THOUSANDS/UL (ref 149–390)
PMV BLD AUTO: 9.6 FL (ref 8.9–12.7)
POTASSIUM SERPL-SCNC: 3.7 MMOL/L (ref 3.5–5.3)
PROT SERPL-MCNC: 7 G/DL (ref 6.4–8.4)
RBC # BLD AUTO: 5.3 MILLION/UL (ref 3.88–5.62)
SODIUM SERPL-SCNC: 137 MMOL/L (ref 135–147)
TRIGL SERPL-MCNC: 197 MG/DL
WBC # BLD AUTO: 7.27 THOUSAND/UL (ref 4.31–10.16)

## 2024-01-06 PROCEDURE — 80053 COMPREHEN METABOLIC PANEL: CPT

## 2024-01-06 PROCEDURE — 36415 COLL VENOUS BLD VENIPUNCTURE: CPT

## 2024-01-06 PROCEDURE — 85027 COMPLETE CBC AUTOMATED: CPT

## 2024-01-06 PROCEDURE — 80061 LIPID PANEL: CPT

## 2024-01-06 PROCEDURE — 83036 HEMOGLOBIN GLYCOSYLATED A1C: CPT

## 2024-01-07 LAB
EST. AVERAGE GLUCOSE BLD GHB EST-MCNC: 126 MG/DL
HBA1C MFR BLD: 6 %

## 2024-01-12 DIAGNOSIS — F33.9 DEPRESSION, RECURRENT (HCC): ICD-10-CM

## 2024-01-19 ENCOUNTER — CLINICAL SUPPORT (OUTPATIENT)
Dept: BARIATRICS | Facility: CLINIC | Age: 29
End: 2024-01-19

## 2024-01-19 VITALS
SYSTOLIC BLOOD PRESSURE: 114 MMHG | OXYGEN SATURATION: 97 % | HEART RATE: 91 BPM | WEIGHT: 315 LBS | BODY MASS INDEX: 50.62 KG/M2 | DIASTOLIC BLOOD PRESSURE: 62 MMHG | RESPIRATION RATE: 18 BRPM | HEIGHT: 66 IN

## 2024-01-19 DIAGNOSIS — E66.01 MORBID (SEVERE) OBESITY DUE TO EXCESS CALORIES (HCC): Primary | ICD-10-CM

## 2024-01-19 PROCEDURE — RECHECK

## 2024-01-19 RX ORDER — SODIUM FLUORIDE 6.1 MG/ML
PASTE, DENTIFRICE DENTAL
COMMUNITY
Start: 2023-12-22

## 2024-01-19 NOTE — PROGRESS NOTES
"Patient presents in office today for weight check. Today weighs in at 326.4 pounds. Previously on 12/20/23 weighed 219.2 pounds. Total of 7.2 pound gain.  Patient admits [employer] sweets and admits to eating candy more lately and finishing a bag of \"kisses\" within 2 days. Patient is playing video games and unconsciously snaking. Promises to practice more mindful eating habits. Currently on Wegovy with no complaint of side effects.  "

## 2024-01-23 DIAGNOSIS — F41.1 ANXIETY, GENERALIZED: ICD-10-CM

## 2024-01-23 RX ORDER — DESVENLAFAXINE SUCCINATE 50 MG/1
50 TABLET, EXTENDED RELEASE ORAL DAILY
Qty: 90 TABLET | Refills: 0 | Status: SHIPPED | OUTPATIENT
Start: 2024-01-23

## 2024-01-28 DIAGNOSIS — I10 ESSENTIAL HYPERTENSION: ICD-10-CM

## 2024-01-28 DIAGNOSIS — G43.009 MIGRAINE WITHOUT AURA AND WITHOUT STATUS MIGRAINOSUS, NOT INTRACTABLE: ICD-10-CM

## 2024-01-29 RX ORDER — PROPRANOLOL HYDROCHLORIDE 80 MG/1
80 CAPSULE, EXTENDED RELEASE ORAL EVERY MORNING
Qty: 90 CAPSULE | Refills: 3 | Status: SHIPPED | OUTPATIENT
Start: 2024-01-29 | End: 2024-02-28

## 2024-02-19 ENCOUNTER — CLINICAL SUPPORT (OUTPATIENT)
Dept: BARIATRICS | Facility: CLINIC | Age: 29
End: 2024-02-19

## 2024-02-19 ENCOUNTER — TELEPHONE (OUTPATIENT)
Dept: BARIATRICS | Facility: CLINIC | Age: 29
End: 2024-02-19

## 2024-02-19 VITALS
RESPIRATION RATE: 18 BRPM | OXYGEN SATURATION: 98 % | WEIGHT: 315 LBS | DIASTOLIC BLOOD PRESSURE: 78 MMHG | BODY MASS INDEX: 50.62 KG/M2 | HEIGHT: 66 IN | SYSTOLIC BLOOD PRESSURE: 116 MMHG | HEART RATE: 87 BPM

## 2024-02-19 DIAGNOSIS — R73.03 PREDIABETES: ICD-10-CM

## 2024-02-19 DIAGNOSIS — E66.01 MORBID OBESITY WITH BMI OF 50.0-59.9, ADULT (HCC): ICD-10-CM

## 2024-02-19 DIAGNOSIS — E66.01 CLASS 3 SEVERE OBESITY DUE TO EXCESS CALORIES WITH BODY MASS INDEX (BMI) OF 50.0 TO 59.9 IN ADULT, UNSPECIFIED WHETHER SERIOUS COMORBIDITY PRESENT (HCC): Primary | ICD-10-CM

## 2024-02-19 PROCEDURE — RECHECK

## 2024-02-19 RX ORDER — SEMAGLUTIDE 2.4 MG/.75ML
2.4 INJECTION, SOLUTION SUBCUTANEOUS WEEKLY
Qty: 3 ML | Refills: 5 | Status: SHIPPED | OUTPATIENT
Start: 2024-02-19

## 2024-02-19 NOTE — PROGRESS NOTES
Patient here today for weight check. Last visit on 01/19/2024 weighed 326.4 pounds. Today is 322.6 pounds. He is feeling fine on Wegovy and expresses no side effects. Asked if he is feeling less hungry on Wegovy patient says can't tell, Father says he's has noted patient not snacking as often.

## 2024-02-19 NOTE — TELEPHONE ENCOUNTER
Good Day,    Patient here today for weight check. Last visit on 01/19/2024 weighed 326.4 pounds. Today is 322.6 pounds. He is feeling fine on Wegovy and expresses no side effects. Asked if he is feeling less hungry on Wegovy patient says can't tell, Father says he's has noted patient not snacking as often.     Thank you

## 2024-02-20 DIAGNOSIS — F33.9 DEPRESSION, RECURRENT (HCC): ICD-10-CM

## 2024-02-20 RX ORDER — ZIPRASIDONE HYDROCHLORIDE 40 MG/1
40 CAPSULE ORAL EVERY MORNING
Qty: 90 CAPSULE | Refills: 0 | Status: SHIPPED | OUTPATIENT
Start: 2024-02-20

## 2024-02-21 RX ORDER — ZIPRASIDONE HYDROCHLORIDE 80 MG/1
80 CAPSULE ORAL EVERY EVENING
Qty: 90 CAPSULE | Refills: 0 | Status: SHIPPED | OUTPATIENT
Start: 2024-02-21

## 2024-02-26 ENCOUNTER — TELEPHONE (OUTPATIENT)
Dept: FAMILY MEDICINE CLINIC | Facility: CLINIC | Age: 29
End: 2024-02-26

## 2024-02-26 NOTE — TELEPHONE ENCOUNTER
Patient's mother dropped off paperwork to be completed by Dr. Dennis Page. Paperwork given to Marisa MATHIAS

## 2024-03-19 ENCOUNTER — CLINICAL SUPPORT (OUTPATIENT)
Dept: BARIATRICS | Facility: CLINIC | Age: 29
End: 2024-03-19

## 2024-03-19 ENCOUNTER — TELEPHONE (OUTPATIENT)
Dept: BARIATRICS | Facility: CLINIC | Age: 29
End: 2024-03-19

## 2024-03-19 VITALS
RESPIRATION RATE: 18 BRPM | WEIGHT: 315 LBS | HEART RATE: 89 BPM | DIASTOLIC BLOOD PRESSURE: 60 MMHG | BODY MASS INDEX: 50.62 KG/M2 | OXYGEN SATURATION: 95 % | SYSTOLIC BLOOD PRESSURE: 96 MMHG | HEIGHT: 66 IN

## 2024-03-19 DIAGNOSIS — E66.01 CLASS 3 SEVERE OBESITY DUE TO EXCESS CALORIES WITHOUT SERIOUS COMORBIDITY WITH BODY MASS INDEX (BMI) OF 50.0 TO 59.9 IN ADULT (HCC): Primary | ICD-10-CM

## 2024-03-19 PROCEDURE — RECHECK

## 2024-03-19 NOTE — PROGRESS NOTES
Patient in office today accompanied with Mom. Has gained 1.4 pounds since 2/19/24 visit, weighing today at 324.0 pounds. Admits to staying up late, (3:30am) snacking on Pop-corn. Boredom triggers most of his poor eating habits. Does go with parents to gym 3 - 4 times a week. Currently on Wegovy 2.4mg weekly and patient has no complaint of side effects.

## 2024-03-19 NOTE — TELEPHONE ENCOUNTER
Good Day Dr. Francisco,    Patient in office today accompanied with Mom. Has gained 1.4 pounds since 2/19/24 visit, weighing today at 324.0 pounds. Admits to staying up late, (3:30am) snacking on Pop-corn. Boredom triggers most of his poor eating habits. Does go with parents to gym 3 - 4 times a week. Currently on Wegovy 2.4mg weekly and patient has no complaint of side effects.    Thank you

## 2024-04-11 ENCOUNTER — OFFICE VISIT (OUTPATIENT)
Dept: BARIATRICS | Facility: CLINIC | Age: 29
End: 2024-04-11
Payer: COMMERCIAL

## 2024-04-11 VITALS
OXYGEN SATURATION: 96 % | HEART RATE: 84 BPM | HEIGHT: 66 IN | DIASTOLIC BLOOD PRESSURE: 60 MMHG | BODY MASS INDEX: 50.62 KG/M2 | RESPIRATION RATE: 18 BRPM | SYSTOLIC BLOOD PRESSURE: 96 MMHG | WEIGHT: 315 LBS

## 2024-04-11 DIAGNOSIS — F50.89 OVEREATING DISORDER: ICD-10-CM

## 2024-04-11 DIAGNOSIS — E66.01 MORBID OBESITY WITH BMI OF 50.0-59.9, ADULT (HCC): Primary | ICD-10-CM

## 2024-04-11 DIAGNOSIS — R73.03 PREDIABETES: ICD-10-CM

## 2024-04-11 PROCEDURE — 99213 OFFICE O/P EST LOW 20 MIN: CPT | Performed by: FAMILY MEDICINE

## 2024-04-11 NOTE — PROGRESS NOTES
Assessment/Plan:  James was seen today for consult.    Diagnoses and all orders for this visit:  1. Morbid obesity with BMI of 50.0-59.9, adult (HCC)    Wegovy :tolerates it well now at max dose , helped with behavior and binging control  Prediabetic state and metabolic sdr- necessary to cont GLP-1   Saxenda gave him gas   He is more open to bariatric surgery - he was halted due to gaining weight but now he is in much better place with his mindset, will revisit the surgeon, he asks for it and mom is hopeful he will be accepted  Better with water intake     He started helping with gardening   Return monthly for weight check     2. Prediabetes   Glp-1 will help  Component  Ref Range & Units 1/6/24 11:43 AM 12/5/22 12:21 PM 6/17/22  7:29 AM 7/17/19  3:03 PM   Hemoglobin A1C  Normal 4.0-5.6%; PreDiabetic 5.7-6.4%; Diabetic >=6.5%; Glycemic control for adults with diabetes <7.0% % 6.0 High  5.9 High  R 6.2 High         3. Metabolic syndrome   weight loss is necessary  4. Other disorder of eating   he is trying to make a difference, Wegovy helped him a lot   Behavioral problems to be addressed with counselor  Barriers to weight loss; behavioral problems- advised focus on CBT.  - Patient denies personal history of pancreatitis. Patient DOES NOT know personal and family history, was adopted, family and himself aware of risks  of medullary thyroid cancer and multiple endocrine neoplasia type 2 (MEN 2 tumor). Patient and family understand these major side effects and agrees to continue the medication.       Subjective:   Chief Complaint   Patient presents with    Follow-up     Patient presents for f/u. He t       Patient ID: James Marrero  is a 27 y.o. male with excess weight/obesity here to pursue weight management.  Previous notes and records have been reviewed.        HPI  Wt Readings from Last 3 Encounters:   04/11/24 (!) 146 kg (321 lb 3.2 oz)   03/19/24 (!) 147 kg (324 lb)   02/19/24 (!) 146 kg (322 lb 9.6 oz)     "  First OV 12/05/2023 327lbs  was down to 315lbs   On Wegovy   Last OV 319lbs was down to 315lbs but regained due to behavioral issues  Started CBD one tab daily    Current 321lbs better snack choices and portion control  B-wakes up at 12pm goes to bed at 12am  L-first meal of day varies now, had 2 waffles limits the amount   S-chocolate or belvita crackers  3-4 pm 3-4 sandwiches   D-9pm sweet potatoes and sweet potato    S-banana and fruit  Hydration:water and fruit drink with artifical flavors, using now 45 min at a time  Alcohol: socially  Smoking:none  Exercise: gym 3-4 times a week classes  Occupation:  Plays video games, 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, got a puppy that he loves   Sleep: well  STOP bang: does not use the CPAP as he should    Past Medical History:   Diagnosis Date    Anxiety     Bronchitis     Concussion     Sports related    Depression     Hypertension     Morbid obesity with BMI of 45.0-49.9, adult (HCC)     Sleep apnea, obstructive      Past Surgical History:   Procedure Laterality Date    NO PAST SURGERIES       The following portions of the patient's history were reviewed and updated as appropriate: allergies, current medications, past family history, past medical history, past social history, past surgical history, and problem list.    ROS:  Review of Systems   Constitutional: Negative for activity change. Fatigue  Gastrointestinal: Negative for abdominal pain, nausea and vomiting,+ acid reflux,  Psychiatric/Behavioral: + for behavioral problems including anxiety /depression, boredom eating   Objective:  /80 (BP Location: Left arm, Patient Position: Sitting, Cuff Size: Large)   Pulse 82   Resp 14   Ht 5' 6\" (1.676 m)   Wt (!) 148 kg (326 lb)   BMI 52.62 kg/m²   Constitutional: Well-developed, well-nourished and Obese Body mass index is 52.62 kg/m².. Alert, cooperative.  HEENT: No conjunctival pallor or jaundice  Pulmonary: No increased work of breathing or signs of respiratory " distress.  CV: Well-perfused, Normal rate    Vascular: no peripheral edema  GI: Abdomen obese, Non-distended  MSK: no sarcopenia noted   Neuro: Oriented to person, place and time. Normal Speech  Psych:Normal mood. Normal thought process, no delusions , boredom eating  Labs and Imaging  Recent labs and imaging have been personally reviewed.  Lab Results   Component Value Date    WBC 8.80 06/17/2022    HGB 14.1 06/17/2022    HCT 44.8 06/17/2022    MCV 82 06/17/2022     06/17/2022     Lab Results   Component Value Date     09/01/2017    SODIUM 142 06/17/2022    K 3.9 06/17/2022     06/17/2022    CO2 26 06/17/2022    AGAP 9 06/17/2022    BUN 12 06/17/2022    CREATININE 1.07 06/17/2022    GLUF 110 (H) 06/17/2022    CALCIUM 8.7 06/17/2022    AST 24 06/17/2022    ALT 35 06/17/2022    ALKPHOS 60 06/17/2022    PROT 7.5 09/01/2017    TP 7.4 06/17/2022    BILITOT 0.9 09/01/2017    TBILI 0.31 06/17/2022    EGFR 95 06/17/2022     Lab Results   Component Value Date    HGBA1C 5.9 (H) 12/05/2022     Lab Results   Component Value Date    IZF0VSYDRRFJ 3.941 06/17/2022     Lab Results   Component Value Date    CHOLESTEROL 172 06/17/2022     Lab Results   Component Value Date    HDL 29 (L) 06/17/2022     Lab Results   Component Value Date    TRIG 182 (H) 06/17/2022     Lab Results   Component Value Date    LDLCALC 107 (H) 06/17/2022

## 2024-04-15 DIAGNOSIS — I10 ESSENTIAL HYPERTENSION: ICD-10-CM

## 2024-04-15 RX ORDER — LOSARTAN POTASSIUM 50 MG/1
50 TABLET ORAL DAILY
Qty: 90 TABLET | Refills: 3 | Status: SHIPPED | OUTPATIENT
Start: 2024-04-15

## 2024-04-18 ENCOUNTER — OFFICE VISIT (OUTPATIENT)
Dept: FAMILY MEDICINE CLINIC | Facility: CLINIC | Age: 29
End: 2024-04-18
Payer: COMMERCIAL

## 2024-04-18 VITALS
SYSTOLIC BLOOD PRESSURE: 110 MMHG | HEART RATE: 102 BPM | TEMPERATURE: 98 F | WEIGHT: 315 LBS | DIASTOLIC BLOOD PRESSURE: 80 MMHG | RESPIRATION RATE: 16 BRPM | HEIGHT: 66 IN | OXYGEN SATURATION: 97 % | BODY MASS INDEX: 50.62 KG/M2

## 2024-04-18 DIAGNOSIS — R05.1 ACUTE COUGH: Primary | ICD-10-CM

## 2024-04-18 DIAGNOSIS — J01.10 ACUTE NON-RECURRENT FRONTAL SINUSITIS: ICD-10-CM

## 2024-04-18 LAB
S PYO AG THROAT QL: NEGATIVE
SARS-COV-2 AG UPPER RESP QL IA: NEGATIVE
VALID CONTROL: NORMAL

## 2024-04-18 PROCEDURE — 99214 OFFICE O/P EST MOD 30 MIN: CPT | Performed by: STUDENT IN AN ORGANIZED HEALTH CARE EDUCATION/TRAINING PROGRAM

## 2024-04-18 PROCEDURE — 87880 STREP A ASSAY W/OPTIC: CPT | Performed by: STUDENT IN AN ORGANIZED HEALTH CARE EDUCATION/TRAINING PROGRAM

## 2024-04-18 PROCEDURE — 87811 SARS-COV-2 COVID19 W/OPTIC: CPT | Performed by: STUDENT IN AN ORGANIZED HEALTH CARE EDUCATION/TRAINING PROGRAM

## 2024-04-18 RX ORDER — AMOXICILLIN 500 MG/1
500 CAPSULE ORAL EVERY 12 HOURS SCHEDULED
Qty: 14 CAPSULE | Refills: 0 | Status: SHIPPED | OUTPATIENT
Start: 2024-04-18 | End: 2024-04-25

## 2024-04-18 NOTE — PROGRESS NOTES
Assessment/Plan:         Problem List Items Addressed This Visit    None  Visit Diagnoses     Acute cough    -  Primary    Relevant Medications    amoxicillin (AMOXIL) 500 mg capsule    Other Relevant Orders    POCT Rapid Covid Ag (Completed)    POCT rapid ANTIGEN strepA (Completed)    Acute non-recurrent frontal sinusitis        Relevant Medications    amoxicillin (AMOXIL) 500 mg capsule            Subjective:      Patient ID: James Marrero is a 28 y.o. male.    URI   Associated symptoms include coughing, headaches, rhinorrhea and sinus pain. Pertinent negatives include no chest pain, congestion, nausea, sore throat or vomiting.   Cough  Associated symptoms include headaches, postnasal drip and rhinorrhea. Pertinent negatives include no chest pain, fever, sore throat or shortness of breath.   Headache      Couple days of headaches, feel different from migraines. Sneezing, simnus pressure, rhinorrhea, sore throat.,    The following portions of the patient's history were reviewed and updated as appropriate:   Past Medical History:  He has a past medical history of Anxiety, Bronchitis, Concussion, Depression, Hypertension, Morbid obesity with BMI of 45.0-49.9, adult ([facility]), and Sleep apnea, obstructive.,  _______________________________________________________________________  Medical Problems:  does not have any pertinent problems on file.,  _______________________________________________________________________  Past Surgical History:   has a past surgical history that includes No past surgeries.,  _______________________________________________________________________  Family History:  family history is not on file. He was adopted.,  _______________________________________________________________________  Social History:   reports that he has never smoked. He has never used smokeless tobacco. He reports current alcohol use of about 1.0 standard drink of alcohol per week. He reports that he does not use  drugs.,  _______________________________________________________________________  Allergies:  is allergic to abilify [aripiprazole], lithium, lorazepam, and seroquel [quetiapine]..  _______________________________________________________________________  Current Outpatient Medications   Medication Sig Dispense Refill   • amoxicillin (AMOXIL) 500 mg capsule Take 1 capsule (500 mg total) by mouth every 12 (twelve) hours for 7 days 14 capsule 0   • buPROPion (WELLBUTRIN XL) 300 mg 24 hr tablet TAKE 1 TABLET (300 MG TOTAL) BY MOUTH EVERY MORNING. 90 tablet 0   • desvenlafaxine succinate (PRISTIQ) 50 mg 24 hr tablet TAKE 1 TABLET BY MOUTH EVERY DAY 90 tablet 0   • doxepin (SINEquan) 100 mg capsule TAKE 1 CAPSULE (100 MG TOTAL) BY MOUTH 3 (THREE) TIMES DAILY AFTER MEALS (Patient taking differently: Take 300 mg by mouth daily at bedtime 300 at bedtime) 270 capsule 1   • losartan (COZAAR) 50 mg tablet Take 1 tablet (50 mg total) by mouth daily 90 tablet 3   • mupirocin (BACTROBAN) 2 % ointment Apply topically 3 (three) times a day (Patient taking differently: Apply topically if needed) 30 g 0   • Omeprazole 20 MG TBEC Take 1 tablet (20 mg total) by mouth 2 (two) times a day 180 tablet 1   • Semaglutide-Weight Management (Wegovy) 2.4 MG/0.75ML Inject 0.75 mL (2.4 mg total) under the skin once a week 3 mL 5   • topiramate (TOPAMAX) 200 MG tablet TAKE 1 TABLET (200 MG TOTAL) BY MOUTH 2 TIMES A DAY. 60 tablet 5   • ziprasidone (GEODON) 40 mg capsule Take 1 capsule (40 mg total) by mouth every morning 90 capsule 0   • ziprasidone (GEODON) 80 mg capsule Take 1 capsule (80 mg total) by mouth every evening 90 capsule 0   • propranolol (INDERAL LA) 80 mg 24 hr capsule TAKE 1 CAPSULE (80 MG TOTAL) BY MOUTH EVERY MORNING 90 capsule 3   • Sodium Fluoride 5000 PPM 1.1 % GEL BRUSH ON NIGHTLY AND SPIT OUT EXCESS       No current facility-administered medications for this visit.  "    _______________________________________________________________________  Review of Systems   Constitutional:  Negative for activity change, appetite change, fatigue and fever.   HENT:  Positive for postnasal drip, rhinorrhea, sinus pressure and sinus pain. Negative for congestion and sore throat.    Eyes:  Negative for visual disturbance.   Respiratory:  Positive for cough. Negative for shortness of breath.    Cardiovascular:  Negative for chest pain.   Gastrointestinal:  Negative for nausea and vomiting.   Neurological:  Positive for headaches.         Objective:  Vitals:    04/18/24 1108   BP: 110/80   Pulse: 102   Resp: 16   Temp: 98 °F (36.7 °C)   SpO2: 97%   Weight: (!) 146 kg (321 lb 3.2 oz)   Height: 5' 6\" (1.676 m)     Body mass index is 51.84 kg/m².     Physical Exam  Constitutional:       General: He is not in acute distress.     Appearance: Normal appearance. He is obese. He is not ill-appearing.   HENT:      Head: Normocephalic and atraumatic.      Right Ear: Hearing, tympanic membrane, ear canal and external ear normal.      Left Ear: Hearing, tympanic membrane, ear canal and external ear normal.      Nose: Mucosal edema, congestion and rhinorrhea present. Rhinorrhea is clear.   Pulmonary:      Effort: Pulmonary effort is normal. No respiratory distress.   Neurological:      Mental Status: He is alert and oriented to person, place, and time. Mental status is at baseline.   Psychiatric:         Mood and Affect: Mood normal.         Behavior: Behavior normal.       "

## 2024-04-19 ENCOUNTER — CONSULT (OUTPATIENT)
Dept: BARIATRICS | Facility: CLINIC | Age: 29
End: 2024-04-19
Payer: COMMERCIAL

## 2024-04-19 VITALS
WEIGHT: 315 LBS | SYSTOLIC BLOOD PRESSURE: 128 MMHG | HEART RATE: 81 BPM | HEIGHT: 66 IN | RESPIRATION RATE: 16 BRPM | BODY MASS INDEX: 50.62 KG/M2 | DIASTOLIC BLOOD PRESSURE: 80 MMHG

## 2024-04-19 DIAGNOSIS — I10 ESSENTIAL HYPERTENSION: ICD-10-CM

## 2024-04-19 DIAGNOSIS — R73.03 PREDIABETES: ICD-10-CM

## 2024-04-19 DIAGNOSIS — K21.9 GASTROESOPHAGEAL REFLUX DISEASE, UNSPECIFIED WHETHER ESOPHAGITIS PRESENT: ICD-10-CM

## 2024-04-19 DIAGNOSIS — F41.1 ANXIETY, GENERALIZED: ICD-10-CM

## 2024-04-19 DIAGNOSIS — G43.009 MIGRAINE WITHOUT AURA AND WITHOUT STATUS MIGRAINOSUS, NOT INTRACTABLE: ICD-10-CM

## 2024-04-19 DIAGNOSIS — E66.01 MORBID OBESITY WITH BMI OF 50.0-59.9, ADULT (HCC): ICD-10-CM

## 2024-04-19 DIAGNOSIS — G47.33 OSA (OBSTRUCTIVE SLEEP APNEA): ICD-10-CM

## 2024-04-19 DIAGNOSIS — E66.01 MORBID (SEVERE) OBESITY DUE TO EXCESS CALORIES (HCC): ICD-10-CM

## 2024-04-19 DIAGNOSIS — Z01.818 ENCOUNTER FOR OTHER PREPROCEDURAL EXAMINATION: Primary | ICD-10-CM

## 2024-04-19 PROCEDURE — 99213 OFFICE O/P EST LOW 20 MIN: CPT | Performed by: SURGERY

## 2024-04-19 NOTE — PROGRESS NOTES
BARIATRIC FOLLOW-UP - BARIATRIC SURGERY    James Marrero 28 y.o. male MRN: [id number]  Unit/Bed#:  Encounter: [id number]      HPI:  James Marrero is a 28 y.o. male who presents with a longstanding history of morbid obesity and inability to sustain a meaningful weight loss.  He is present with his mother. He has been following with obesity medicine. He is committed to improving his health. He is eating mindfully. He is going to the gym. He has stopped eating seconds. He is not drinking his calories. He is doing MARISABEL classes and walking on the treadmill.  Here today to discuss bariatric options.    Visit type: follow up-routine clinic     Symptoms: excess weight    Associated Symptoms: anxiety    Associated Conditions: glucose intolerance, low HDL, and elevated triglycerides  Disease Complications: hypertension and sleep apnea  Weight Loss Interest: high    Exercise Frequency:daily  Types of Exercise: aerobic conditioning, strength training, and MARISABEL    Review of Systems   Respiratory:  Positive for shortness of breath (w/ exertion).    All other systems reviewed and are negative.   is negative except as noted above    Historical Information   Past Medical History:   Diagnosis Date    Anxiety     Bronchitis     Concussion     Sports related    Depression     Hypertension     Morbid obesity with BMI of 45.0-49.9, adult (HCC)     Sleep apnea, obstructive      Past Surgical History:   Procedure Laterality Date    NO PAST SURGERIES       Social History   Social History     Substance and Sexual Activity   Alcohol Use Yes    Alcohol/week: 1.0 standard drink of alcohol    Types: 1 Cans of beer per week    Comment: social     Social History     Substance and Sexual Activity   Drug Use No     Social History     Tobacco Use   Smoking Status Never   Smokeless Tobacco Never     Family History: non-contributory    Meds/Allergies   all medications and allergies reviewed  Allergies   Allergen Reactions    Abilify  "[Aripiprazole]     Lithium     Lorazepam Other (See Comments)     aggressive    Seroquel [Quetiapine]        Objective     Current Vitals:   /80 (BP Location: Left arm, Patient Position: Sitting, Cuff Size: Large)   Pulse 81   Resp 16   Ht 5' 6\" (1.676 m)   Wt (!) 146 kg (321 lb)   BMI 51.81 kg/m²     Invasive Devices       None                   Physical Exam  Constitutional:       Appearance: Normal appearance.   HENT:      Head: Atraumatic.      Nose: No rhinorrhea.   Eyes:      Extraocular Movements: Extraocular movements intact.   Cardiovascular:      Rate and Rhythm: Normal rate.   Pulmonary:      Effort: Pulmonary effort is normal. No respiratory distress.   Abdominal:      General: Abdomen is flat. There is no distension.   Musculoskeletal:         General: Normal range of motion.      Cervical back: Normal range of motion.   Skin:     General: Skin is warm and dry.   Neurological:      General: No focal deficit present.      Mental Status: He is alert and oriented to person, place, and time.   Psychiatric:         Mood and Affect: Mood normal.         Behavior: Behavior normal.         Lab Results: I have personally reviewed pertinent lab results.    Imaging: I have personally reviewed pertinent reports.    EKG, Pathology, and Other Studies: I have personally reviewed pertinent reports.        Assessment/PLAN:    28 y.o. yo male with a long standing h/o of obesity and inability to sustain any meaningful weight loss on his own despite several attempts.    He is interested in the Laparoscopic sleeve gastrectomy.    Patient has been counseled about the risk of developing gastroesophageal reflux disease (GERD), worsening of current GERD and/or silent reflux. Patient has also been counseled on the risk of developing Smart's esophagus (18%). As a result the patient may require treatment with medications, further interventions and possibly additional surgery. Patient will require routine endoscopic " surveillance to monitor for these possible complications.     As a part of his pre op evaluation, he will be referred to a cardiologist and for a sleep evaluation and consult after successfully completing an evaluation with our pre-certification/, registered dietician and licensed clinical .     He needs an EGD to evaluate the anatomy of his GI tract.    I have spent over 45 minutes with him face to face in the office today discussing his options and details of the surgery. Over 50% of this was coordinating care.    I have discussed and educated the patient with regards to the components of our multidisciplinary program and the importance of compliance and follow up in the post operative period.    He was given the opportunity to ask questions and I have answered all of them.    The patient was also instructed with regards to the importance of behavior modification, nutritional counseling, support meeting attendance and lifestyle changes that are important to ensure success.    Although there is a great statistical chance of improvement or even resolution of most of his associated comorbidities, the results vary from patient to patient and they largely depend on his commitment and compliance.         Chey Sam MD  4/19/2024  2:00 PM

## 2024-04-20 DIAGNOSIS — F41.1 ANXIETY, GENERALIZED: ICD-10-CM

## 2024-04-22 DIAGNOSIS — F33.9 DEPRESSION, RECURRENT (HCC): ICD-10-CM

## 2024-04-22 RX ORDER — DOXEPIN HYDROCHLORIDE 100 MG/1
100 CAPSULE ORAL
Qty: 270 CAPSULE | Refills: 1 | Status: SHIPPED | OUTPATIENT
Start: 2024-04-22

## 2024-04-22 RX ORDER — DESVENLAFAXINE SUCCINATE 50 MG/1
50 TABLET, EXTENDED RELEASE ORAL DAILY
Qty: 90 TABLET | Refills: 0 | Status: SHIPPED | OUTPATIENT
Start: 2024-04-22

## 2024-05-06 ENCOUNTER — CLINICAL SUPPORT (OUTPATIENT)
Dept: BARIATRICS | Facility: CLINIC | Age: 29
End: 2024-05-06

## 2024-05-06 DIAGNOSIS — Z98.84 BARIATRIC SURGERY STATUS: Primary | ICD-10-CM

## 2024-05-06 PROCEDURE — RECHECK

## 2024-05-07 ENCOUNTER — TELEPHONE (OUTPATIENT)
Age: 29
End: 2024-05-07

## 2024-05-07 ENCOUNTER — TELEPHONE (OUTPATIENT)
Dept: FAMILY MEDICINE CLINIC | Facility: CLINIC | Age: 29
End: 2024-05-07

## 2024-05-07 DIAGNOSIS — J01.10 ACUTE NON-RECURRENT FRONTAL SINUSITIS: Primary | ICD-10-CM

## 2024-05-07 RX ORDER — AZITHROMYCIN 250 MG/1
TABLET, FILM COATED ORAL
Qty: 6 TABLET | Refills: 0 | Status: SHIPPED | OUTPATIENT
Start: 2024-05-07 | End: 2024-05-11

## 2024-05-07 NOTE — TELEPHONE ENCOUNTER
Mom calling to r/s Nurse visit. Wants to move appt to an earlier spot. Was told will receive a call back from practice.

## 2024-05-08 ENCOUNTER — TELEPHONE (OUTPATIENT)
Age: 29
End: 2024-05-08

## 2024-05-08 NOTE — TELEPHONE ENCOUNTER
Reason for call:   [] Refill   [x] Prior Auth  [x] Other: Pharmacy is requesting PA for this medication    Office:   [] PCP/Provider -   [x] Specialty/Provider -  Mela Francisco MD     Medication: (Wegovy) 2.4 MG/0.75ML       Pharmacy: [facility]/pharmacy #[id number] - [place] - [address] [place] [address]

## 2024-05-09 ENCOUNTER — CLINICAL SUPPORT (OUTPATIENT)
Dept: BARIATRICS | Facility: CLINIC | Age: 29
End: 2024-05-09

## 2024-05-09 VITALS
OXYGEN SATURATION: 96 % | HEIGHT: 66 IN | WEIGHT: 315 LBS | RESPIRATION RATE: 18 BRPM | BODY MASS INDEX: 50.62 KG/M2 | HEART RATE: 84 BPM

## 2024-05-09 DIAGNOSIS — E66.9 OBESITY, UNSPECIFIED: Primary | ICD-10-CM

## 2024-05-09 PROCEDURE — RECHECK

## 2024-05-09 NOTE — PROGRESS NOTES
Patient here today for weight check. 320.0 pounds today. Down 1 pound since last visit on 04/19/24. Currently on Wegovy 2.4 and has no complaints of side effects.
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
## 2024-05-14 ENCOUNTER — CLINICAL SUPPORT (OUTPATIENT)
Dept: BARIATRICS | Facility: CLINIC | Age: 29
End: 2024-05-14

## 2024-05-14 VITALS — WEIGHT: 315 LBS | BODY MASS INDEX: 50.62 KG/M2 | HEIGHT: 66 IN

## 2024-05-14 DIAGNOSIS — Z71.89 ENCOUNTER FOR PRE-BARIATRIC SURGERY COUNSELING AND EDUCATION: Primary | ICD-10-CM

## 2024-05-14 DIAGNOSIS — E66.01 MORBID (SEVERE) OBESITY DUE TO EXCESS CALORIES (HCC): Primary | ICD-10-CM

## 2024-05-14 PROCEDURE — RECHECK

## 2024-05-14 NOTE — TELEPHONE ENCOUNTER
PA for Wegovy    Submitted via    [x]CMM-KEY [id number]  [][employer]-Case ID #   []Faxed to plan   []Other website   []Phone call Case ID #     Office notes sent, clinical questions answered. Awaiting determination    Turnaround time for your insurance to make a decision on your Prior Authorization can take 7-21 business days.

## 2024-05-15 DIAGNOSIS — F33.9 DEPRESSION, RECURRENT (HCC): ICD-10-CM

## 2024-05-15 RX ORDER — ZIPRASIDONE HYDROCHLORIDE 80 MG/1
80 CAPSULE ORAL EVERY EVENING
Qty: 90 CAPSULE | Refills: 0 | Status: SHIPPED | OUTPATIENT
Start: 2024-05-15

## 2024-05-15 NOTE — TELEPHONE ENCOUNTER
Requested Prescriptions     Pending Prescriptions Disp Refills    ziprasidone (GEODON) 80 mg capsule 90 capsule 0     Sig: Take 1 capsule (80 mg total) by mouth every evening

## 2024-05-20 ENCOUNTER — CLINICAL SUPPORT (OUTPATIENT)
Dept: BARIATRICS | Facility: CLINIC | Age: 29
End: 2024-05-20

## 2024-05-20 VITALS — BODY MASS INDEX: 51.81 KG/M2 | WEIGHT: 315 LBS

## 2024-05-20 DIAGNOSIS — Z71.89 ENCOUNTER FOR PRE-BARIATRIC SURGERY COUNSELING AND EDUCATION: Primary | ICD-10-CM

## 2024-05-20 PROCEDURE — RECHECK

## 2024-05-20 NOTE — PROGRESS NOTES
2/6 weight check. Mom also present. Started a MVI and 2000 IU vitamin D.   Has been using baritastic- some foods he hasn't been able to find in the jaden. Reports that he has been reducing his night snacking since last week, has been helping with eating more during the day. Has been still struggling with his sleep routine- going to bed at 2am, and then waking up at 6am and then multiple times waking up until he eventually gets up at 12pm. Encouraged patient to try to move his bed time up by an hour gradually. Patient has been making a lot of changes, however reports feeling overwhelmed with it. Encouraged small changes for lasting change. Also discussed with mom and patient the importance of patient taking the initiative to be making changes on his own.  Workflow reviewed:   Psych and/or D+A Clearance: N/A  PCP Letter: Needs- has appointment 6/17  Support Group: No longer required, strongly encouraged  Surgeon Appt: 4/19/24  EGD: Deferred  Cardiac Risk Assessment: scheduled 7/23/24  Sleep Studies: Uses CPAP  Blood work: Needs to complete- August  Nicotine test: N/A  Weight loss meds: Wegovy  Required weight checks: 6 months required  Weight Loss: Not required, encouraged positive lifestyle changes.  Arabella Ramires LCSW

## 2024-06-03 ENCOUNTER — TELEPHONE (OUTPATIENT)
Age: 29
End: 2024-06-03

## 2024-06-03 ENCOUNTER — OFFICE VISIT (OUTPATIENT)
Dept: FAMILY MEDICINE CLINIC | Facility: CLINIC | Age: 29
End: 2024-06-03
Payer: COMMERCIAL

## 2024-06-03 VITALS
TEMPERATURE: 95.3 F | OXYGEN SATURATION: 98 % | HEIGHT: 66 IN | SYSTOLIC BLOOD PRESSURE: 122 MMHG | DIASTOLIC BLOOD PRESSURE: 82 MMHG | WEIGHT: 315 LBS | HEART RATE: 89 BPM | BODY MASS INDEX: 50.62 KG/M2

## 2024-06-03 DIAGNOSIS — J30.1 SEASONAL ALLERGIC RHINITIS DUE TO POLLEN: Primary | ICD-10-CM

## 2024-06-03 PROCEDURE — 99213 OFFICE O/P EST LOW 20 MIN: CPT | Performed by: STUDENT IN AN ORGANIZED HEALTH CARE EDUCATION/TRAINING PROGRAM

## 2024-06-03 RX ORDER — FLUTICASONE PROPIONATE 50 MCG
1 SPRAY, SUSPENSION (ML) NASAL DAILY
Qty: 9.9 ML | Refills: 0 | Status: SHIPPED | OUTPATIENT
Start: 2024-06-03

## 2024-06-03 RX ORDER — AZELASTINE 1 MG/ML
2 SPRAY, METERED NASAL 2 TIMES DAILY
Qty: 1 ML | Refills: 0 | Status: SHIPPED | OUTPATIENT
Start: 2024-06-03

## 2024-06-03 NOTE — TELEPHONE ENCOUNTER
It appears that these medications need prior auths. Second call received from the pod and connected to [id number]. Mom has been advised that these medications can be purchased OTC.

## 2024-06-03 NOTE — PROGRESS NOTES
Assessment/Plan:         Problem List Items Addressed This Visit    None  Visit Diagnoses     Seasonal allergic rhinitis due to pollen    -  Primary    Relevant Medications    fluticasone (FLONASE) 50 mcg/act nasal spray    azelastine (ASTELIN) 0.1 % nasal spray              Subjective:      Patient ID: James Marrero is a 28 y.o. male.    HPI    Dealing with allergy type symptoms    Sneezing the last few days, sinus congestion.    Took zyrtec x 1 without much relief.    The following portions of the patient's history were reviewed and updated as appropriate:   Past Medical History:  He has a past medical history of Anxiety, Bronchitis, Concussion, Depression, Hypertension, Morbid obesity with BMI of 45.0-49.9, adult (HCC), and Sleep apnea, obstructive.,  _______________________________________________________________________  Medical Problems:  does not have any pertinent problems on file.,  _______________________________________________________________________  Past Surgical History:   has a past surgical history that includes No past surgeries.,  _______________________________________________________________________  Family History:  family history is not on file. He was adopted.,  _______________________________________________________________________  Social History:   reports that he has never smoked. He has never used smokeless tobacco. He reports current alcohol use of about 1.0 standard drink of alcohol per week. He reports that he does not use drugs.,  _______________________________________________________________________  Allergies:  is allergic to abilify [aripiprazole], lithium, lorazepam, and seroquel [quetiapine]..  _______________________________________________________________________  Current Outpatient Medications   Medication Sig Dispense Refill   • azelastine (ASTELIN) 0.1 % nasal spray 2 sprays into each nostril 2 (two) times a day Use in each nostril as directed 1 mL 0   • buPROPion  (WELLBUTRIN XL) 300 mg 24 hr tablet Take 1 tablet (300 mg total) by mouth every morning 90 tablet 0   • desvenlafaxine succinate (PRISTIQ) 50 mg 24 hr tablet TAKE 1 TABLET BY MOUTH EVERY DAY 90 tablet 0   • doxepin (SINEquan) 100 mg capsule Take 1 capsule (100 mg total) by mouth 3 (three) times daily after meals 270 capsule 1   • fluticasone (FLONASE) 50 mcg/act nasal spray 1 spray into each nostril daily 9.9 mL 0   • losartan (COZAAR) 50 mg tablet Take 1 tablet (50 mg total) by mouth daily 90 tablet 3   • mupirocin (BACTROBAN) 2 % ointment Apply topically 3 (three) times a day (Patient taking differently: Apply topically if needed) 30 g 0   • Omeprazole 20 MG TBEC Take 1 tablet (20 mg total) by mouth 2 (two) times a day 180 tablet 1   • Semaglutide-Weight Management (Wegovy) 2.4 MG/0.75ML Inject 0.75 mL (2.4 mg total) under the skin once a week 3 mL 5   • Sodium Fluoride 5000 PPM 1.1 % GEL BRUSH ON NIGHTLY AND SPIT OUT EXCESS     • topiramate (TOPAMAX) 200 MG tablet TAKE 1 TABLET (200 MG TOTAL) BY MOUTH 2 TIMES A DAY. 60 tablet 5   • ziprasidone (GEODON) 40 mg capsule Take 1 capsule (40 mg total) by mouth every morning 90 capsule 0   • ziprasidone (GEODON) 80 mg capsule Take 1 capsule (80 mg total) by mouth every evening 90 capsule 0   • propranolol (INDERAL LA) 80 mg 24 hr capsule TAKE 1 CAPSULE (80 MG TOTAL) BY MOUTH EVERY MORNING 90 capsule 3     No current facility-administered medications for this visit.     _______________________________________________________________________  Review of Systems   Constitutional:  Negative for activity change, appetite change, fatigue and fever.   HENT:  Positive for rhinorrhea. Negative for congestion and sore throat.    Eyes:  Negative for visual disturbance.   Respiratory:  Negative for cough and shortness of breath.    Cardiovascular:  Negative for chest pain.   Gastrointestinal:  Negative for nausea and vomiting.         Objective:  Vitals:    06/03/24 1010   BP: 122/82  "  BP Location: Left arm   Patient Position: Sitting   Cuff Size: Standard   Pulse: 89   Temp: (!) 95.3 °F (35.2 °C)   TempSrc: Tympanic   SpO2: 98%   Weight: (!) 145 kg (320 lb)   Height: 5' 6\" (1.676 m)     Body mass index is 51.65 kg/m².     Physical Exam  Constitutional:       General: He is not in acute distress.     Appearance: Normal appearance.   HENT:      Head: Normocephalic and atraumatic.      Nose: Congestion and rhinorrhea present.   Pulmonary:      Effort: Pulmonary effort is normal. No respiratory distress.   Neurological:      Mental Status: He is alert and oriented to person, place, and time. Mental status is at baseline.   Psychiatric:         Mood and Affect: Mood normal.         Behavior: Behavior normal.         "

## 2024-06-03 NOTE — TELEPHONE ENCOUNTER
"Patient was seen this morning and prescriptions were sent in to treat allergies.  However, the prescription never made it to the pharmacy. Says \"This order has not been released to its destination.\"  Mom says that he is really suffering and needs these meds.  Please look into this.  Thank you.   "

## 2024-06-03 NOTE — TELEPHONE ENCOUNTER
Pt's mother called twice for pt's prescriptions sent by Dr. Espinoza. As per [facility] it states these were not release to its destination. Spoke to office and it looks like both do require a prior authorization.     Advised pt's mother flonase can be bought OTC. She expressed understanding but also asked if PA can be submitted for both     fluticasone (FLONASE) 50 mcg/act nasal spray     azelastine (ASTELIN) 0.1 % nasal spray     Advised pt's mother this can take anywhere from 7-21 business days. Pt's mother expressed understanding..    Please revise   Thank you

## 2024-06-08 DIAGNOSIS — F33.9 DEPRESSION, RECURRENT (HCC): ICD-10-CM

## 2024-06-10 RX ORDER — ZIPRASIDONE HYDROCHLORIDE 40 MG/1
40 CAPSULE ORAL EVERY MORNING
Qty: 90 CAPSULE | Refills: 0 | Status: SHIPPED | OUTPATIENT
Start: 2024-06-10

## 2024-06-11 NOTE — TELEPHONE ENCOUNTER
PA for fluticasone (FLONASE) 50 mcg/act nasal spray     Submitted via    []CMM-KEY   [][employer]-Case ID #   []Faxed to plan   [x]Other website [id number]: [id number]  []Phone call Case ID #     Office notes sent, clinical questions answered. Awaiting determination    Turnaround time for your insurance to make a decision on your Prior Authorization can take 7-21 business days.

## 2024-06-11 NOTE — TELEPHONE ENCOUNTER
PA for azelastine (ASTELIN) 0.1 % nasal spray     Submitted via    []CMM-KEY   [][employer]-Case ID #   []Faxed to plan   [x]Other website [id number]: [id number]  []Phone call Case ID #     Office notes sent, clinical questions answered. Awaiting determination    Turnaround time for your insurance to make a decision on your Prior Authorization can take 7-21 business days.

## 2024-06-17 ENCOUNTER — OFFICE VISIT (OUTPATIENT)
Dept: FAMILY MEDICINE CLINIC | Facility: CLINIC | Age: 29
End: 2024-06-17
Payer: COMMERCIAL

## 2024-06-17 VITALS
RESPIRATION RATE: 18 BRPM | SYSTOLIC BLOOD PRESSURE: 124 MMHG | WEIGHT: 315 LBS | DIASTOLIC BLOOD PRESSURE: 80 MMHG | OXYGEN SATURATION: 97 % | BODY MASS INDEX: 50.62 KG/M2 | HEART RATE: 87 BPM | HEIGHT: 66 IN

## 2024-06-17 DIAGNOSIS — R73.03 PREDIABETES: ICD-10-CM

## 2024-06-17 DIAGNOSIS — I10 ESSENTIAL HYPERTENSION: ICD-10-CM

## 2024-06-17 DIAGNOSIS — G47.33 OSA (OBSTRUCTIVE SLEEP APNEA): ICD-10-CM

## 2024-06-17 DIAGNOSIS — G43.009 MIGRAINE WITHOUT AURA AND WITHOUT STATUS MIGRAINOSUS, NOT INTRACTABLE: Primary | ICD-10-CM

## 2024-06-17 PROCEDURE — 99214 OFFICE O/P EST MOD 30 MIN: CPT | Performed by: FAMILY MEDICINE

## 2024-06-17 RX ORDER — GABAPENTIN 100 MG/1
200 CAPSULE ORAL 2 TIMES DAILY
Qty: 120 CAPSULE | Refills: 5 | Status: SHIPPED | OUTPATIENT
Start: 2024-06-17

## 2024-06-17 NOTE — ASSESSMENT & PLAN NOTE
Continue to monitor his blood sugar.  He is going to get blood work done from the bariatric team.

## 2024-06-17 NOTE — ASSESSMENT & PLAN NOTE
Trial of gabapentin 100 mg twice daily increased to 200 mg twice daily after 2 weeks  Orders:    gabapentin (NEURONTIN) 100 mg capsule; Take 2 capsules (200 mg total) by mouth 2 (two) times a day

## 2024-06-17 NOTE — PROGRESS NOTES
Ambulatory Visit  Name: James Marrero      : 1995      MRN: [id number]  Encounter Provider: Dennis Page DO  Encounter Date: 2024   Encounter department: [facility] [address]    Assessment & Plan   Assessment & Plan  Migraine without aura and without status migrainosus, not intractable  Trial of gabapentin 100 mg twice daily increased to 200 mg twice daily after 2 weeks  Orders:    gabapentin (NEURONTIN) 100 mg capsule; Take 2 capsules (200 mg total) by mouth 2 (two) times a day    Essential hypertension  Controlled, continue his losartan, propranolol       NIMO (obstructive sleep apnea)  Continue the CPAP, hopefully this will improve with weight loss       Prediabetes  Continue to monitor his blood sugar.  He is going to get blood work done from the bariatric team.               History of Present Illness     Patient comes in today for checkup, states that he is bothered by almost daily migraines.  He is not able to see his neurologist until November.  He has started the process for gastric surgery.        Review of Systems   Constitutional:  Positive for fatigue. Negative for chills and fever.   HENT:  Negative for congestion, ear pain, hearing loss, postnasal drip, rhinorrhea and sore throat.    Eyes:  Negative for pain and visual disturbance.   Respiratory:  Negative for chest tightness, shortness of breath and wheezing.    Cardiovascular:  Negative for chest pain and leg swelling.   Gastrointestinal:  Negative for abdominal distention, abdominal pain, constipation, diarrhea and vomiting.   Endocrine: Negative for cold intolerance and heat intolerance.   Genitourinary:  Negative for difficulty urinating, frequency and urgency.   Musculoskeletal:  Negative for arthralgias and gait problem.   Skin:  Negative for color change.   Neurological:  Positive for headaches. Negative for dizziness, tremors, syncope and numbness.   Hematological:  Negative for adenopathy.  "  Psychiatric/Behavioral:  Negative for agitation, confusion and sleep disturbance. The patient is not nervous/anxious.        Objective     /80 (BP Location: Left arm, Patient Position: Sitting)   Pulse 87   Resp 18   Ht 5' 6\" (1.676 m)   Wt (!) 144 kg (317 lb)   SpO2 97%   BMI 51.17 kg/m²     Physical Exam  Constitutional:       Appearance: He is well-developed.   HENT:      Head: Normocephalic.      Right Ear: External ear normal.      Left Ear: External ear normal.      Nose: Nose normal.   Eyes:      Extraocular Movements: Extraocular movements intact.      Conjunctiva/sclera: Conjunctivae normal.      Pupils: Pupils are equal, round, and reactive to light.   Neck:      Thyroid: No thyromegaly.   Cardiovascular:      Rate and Rhythm: Normal rate and regular rhythm.      Heart sounds: Normal heart sounds.   Pulmonary:      Effort: Pulmonary effort is normal.      Breath sounds: Normal breath sounds.   Abdominal:      General: Bowel sounds are normal.      Palpations: Abdomen is soft.   Musculoskeletal:         General: Normal range of motion.      Cervical back: Normal range of motion.   Skin:     General: Skin is warm and dry.   Neurological:      Mental Status: He is alert and oriented to person, place, and time.   Psychiatric:         Mood and Affect: Mood normal.         Behavior: Behavior normal.       Administrative Statements           "

## 2024-06-20 ENCOUNTER — CLINICAL SUPPORT (OUTPATIENT)
Dept: BARIATRICS | Facility: CLINIC | Age: 29
End: 2024-06-20

## 2024-06-20 VITALS — HEIGHT: 66 IN | WEIGHT: 315 LBS | BODY MASS INDEX: 50.62 KG/M2

## 2024-06-20 DIAGNOSIS — R73.03 PREDIABETES: ICD-10-CM

## 2024-06-20 DIAGNOSIS — I10 ESSENTIAL HYPERTENSION: Primary | ICD-10-CM

## 2024-06-20 DIAGNOSIS — Z01.818 PRE-OP TESTING: ICD-10-CM

## 2024-06-20 DIAGNOSIS — E78.2 HYPERLIPIDEMIA, MIXED: ICD-10-CM

## 2024-06-20 DIAGNOSIS — E66.01 MORBID OBESITY WITH BMI OF 50.0-59.9, ADULT (HCC): ICD-10-CM

## 2024-06-20 PROCEDURE — RECHECK

## 2024-06-20 NOTE — PROGRESS NOTES
"Bariatric Nutrition Assessment Note    Type of surgery    Preop (6 months of wt checks)--3 of 6 wt check   Surgery Date: TBD--leaning toward sleeve  Surgeon: Dr Pires (consult was 4/19/24)    Nutrition Assessment   James Marrero  28 y.o.  male     Wt with BMI of 25: 155.6#  Pre-Op Excess Wt: 167#  Weight:  322#  Height: 5' 6\"  BMI: Body mass index is 51.17 kg/m².    [place] Equation:      Estimated calories for weight loss 5706-2644 ( 1-2# per wk wt loss - sedentary )  Estimated protein needs #(1.0-1.5 gms/kg IBW )   Estimated fluid needs 2100-2450ml (30-35 ml/kg IBW )      Weight History   Onset of Obesity: Adult, started when taking the psych meds  Family history of obesity: unknown  Wt Loss Attempts: Exercise  Self Created Diets (Portion Control, Healthy Food Choices, etc.)  Patient has tried the above for 6 months or more with insufficient weight loss or weight regain, which is why patient has requested to be evaluated for weight loss surgery today  Maximum Wt Lost: only lost when was in treatment where they controlled the portions.    Review of History and Medications   Wegovy, starting with Align  Starting Gabapetin  Past Medical History:   Diagnosis Date    Anxiety     Bronchitis     Concussion     Sports related    Depression     Hypertension     Morbid obesity with BMI of 45.0-49.9, adult (HCC)     Sleep apnea, obstructive      Past Surgical History:   Procedure Laterality Date    NO PAST SURGERIES       Social History     Socioeconomic History    Marital status: Single     Spouse name: Not on file    Number of children: Not on file    Years of education: Not on file    Highest education level: Not on file   Occupational History    Occupation: student    Occupation:      Employer: [employer]   Tobacco Use    Smoking status: Never    Smokeless tobacco: Never   Vaping Use    Vaping status: Never Used   Substance and Sexual Activity    Alcohol use: Yes     Alcohol/week: 1.0 " standard drink of alcohol     Types: 1 Cans of beer per week     Comment: social    Drug use: No    Sexual activity: Not on file   Other Topics Concern    Not on file   Social History Narrative    Lives with adoptive parents     Social Determinants of Health     Financial Resource Strain: Not on file   Food Insecurity: Not on file   Transportation Needs: Not on file   Physical Activity: Not on file   Stress: Not on file   Social Connections: Unknown (6/18/2024)    Received from [facility]    Social BitInstant     How often do you feel lonely or isolated from those around you? (Adult - for ages 18 years and over): Not on file   Intimate Partner Violence: Not on file   Housing Stability: Not on file       Current Outpatient Medications:     azelastine (ASTELIN) 0.1 % nasal spray, 2 sprays into each nostril 2 (two) times a day Use in each nostril as directed, Disp: 1 mL, Rfl: 0    buPROPion (WELLBUTRIN XL) 300 mg 24 hr tablet, Take 1 tablet (300 mg total) by mouth every morning, Disp: 90 tablet, Rfl: 0    desvenlafaxine succinate (PRISTIQ) 50 mg 24 hr tablet, TAKE 1 TABLET BY MOUTH EVERY DAY, Disp: 90 tablet, Rfl: 0    doxepin (SINEquan) 100 mg capsule, Take 1 capsule (100 mg total) by mouth 3 (three) times daily after meals, Disp: 270 capsule, Rfl: 1    fluticasone (FLONASE) 50 mcg/act nasal spray, 1 spray into each nostril daily, Disp: 9.9 mL, Rfl: 0    gabapentin (NEURONTIN) 100 mg capsule, Take 2 capsules (200 mg total) by mouth 2 (two) times a day, Disp: 120 capsule, Rfl: 5    losartan (COZAAR) 50 mg tablet, Take 1 tablet (50 mg total) by mouth daily, Disp: 90 tablet, Rfl: 3    mupirocin (BACTROBAN) 2 % ointment, Apply topically 3 (three) times a day (Patient taking differently: Apply topically if needed), Disp: 30 g, Rfl: 0    Omeprazole 20 MG TBEC, Take 1 tablet (20 mg total) by mouth 2 (two) times a day, Disp: 180 tablet, Rfl: 1    propranolol (INDERAL LA) 80 mg 24 hr capsule, TAKE 1 CAPSULE (80 MG TOTAL) BY  MOUTH EVERY MORNING, Disp: 90 capsule, Rfl: 3    Semaglutide-Weight Management (Wegovy) 2.4 MG/0.75ML, Inject 0.75 mL (2.4 mg total) under the skin once a week, Disp: 3 mL, Rfl: 5    Sodium Fluoride 5000 PPM 1.1 % GEL, BRUSH ON NIGHTLY AND SPIT OUT EXCESS, Disp: , Rfl:     topiramate (TOPAMAX) 200 MG tablet, TAKE 1 TABLET (200 MG TOTAL) BY MOUTH 2 TIMES A DAY., Disp: 60 tablet, Rfl: 5    ziprasidone (GEODON) 40 mg capsule, TAKE 1 CAPSULE (40 MG TOTAL) BY MOUTH EVERY MORNING., Disp: 90 capsule, Rfl: 0    ziprasidone (GEODON) 80 mg capsule, Take 1 capsule (80 mg total) by mouth every evening, Disp: 90 capsule, Rfl: 0    Food Intake and Lifestyle Assessment   Food Intake Assessment completed via usual diet recall    1/2-1 cup starch, Fit bit, more veggies, walking   Wake:  8a-12p  Breakfast: today smoothie with unsweet almond milk, banana, body fortress whey protein and PB (~1tbsp--suggested PB2 for less cals) + 2 waffles  Snack: chips at times OR fruit (banana, oranges, apples--more than 1) OR chobani greek yogurt (2)   Lunch: late lunch today will taco salad with lean meat  Snack: has been going with oatmeal with protein shake and or fruit--2 pouches + protein powder, put asked if can add banana to the oatmeal (advised to avoid the banana if doing 2 pouches of oatmeal)  Dinner: 8-9pm:  Last night taco salad with 2 crumbled taco shell w/90/10 ground beef, taco sauce,, light sour cream, cheese OR Jersey Mikes--1/4 of a Giant sandwich (ham, prav, salami with lettuce, tomato an delight clarke--looked it up and 1/4 is 413 cals, 16gm protein) + bake lays    Snack: pop corn or sf outshine bars  Bed: 11:30pm to 3am  **portions are the issue and night snacking**    Beverage intake: water, coffee/tea, and 100mg caffeine energy drink (5 cals), sugarfree drink  Protein supplement: has a protein powder, but doesn't use often.  Estimated protein intake per day: 80-100gm  Estimated fluid intake per day: 48-64oz water per day, 1  "energy drink (100mg caffeine), 12oz coffee with sugar and fat free half and half  Meals eaten away from home: Jersey Bruce once a week, pizza once a week (usually 3 slices)  Typical meal pattern: 2-3 meals per day and grazes on snacks per day  Eating Behaviors: Consumption of high calorie/ high fat foods, Consumption of high calorie beverages, Large portion sizes, Frequent snacking/ grazing, Mindless eating, Emotional eating, Craves sweet foods, and Craves salty foods    Food allergies or intolerances:   Allergies   Allergen Reactions    Abilify [Aripiprazole]     Lithium     Lorazepam Other (See Comments)     aggressive    Seroquel [Quetiapine]      Cultural or Protestant considerations: -    Physical Assessment  Physical Activity  Types of exercise: gym 4x per week: treadmill 2 x per week for 20-30mins, 2 classes per week (strength and a HIIT class)--has been doing this regimen for over 1 year, but hasn't been to the gym this week.   Current physical limitations: -    Psychosocial Assessment   Support systems: parent(s)  Socioeconomic factors: lives with parents--mom does both cooking and food shopping    Nutrition Diagnosis  Diagnosis: Overweight / Obesity (NC-3.3)  Related to: Physical inactivity and Excessive energy intake  As Evidenced by: BMI >25     Nutrition Prescription: Recommend the following diet  Regular    Interventions and Teaching   Discussed pre-op and post-op nutrition guidelines.       Patient educated and handouts provided.  Surgical changes to stomach / GI  Capacity of post-surgery stomach  Diet progression  Adequate hydration  Sugar and fat restriction to decrease \"dumping syndrome\"  Fat restriction to decrease steatorrhea  Expected weight loss  Weight loss plateaus/ possibility of weight regain  Exercise  Suggestions for pre-op diet  Nutrition considerations after surgery  Protein supplements  Meal planning and preparation  Appropriate carbohydrate, protein, and fat intake, and food/fluid " choices to maximize safe weight loss, nutrient intake, and tolerance   Dietary and lifestyle changes  Possible problems with poor eating habits  Intuitive eating  Techniques for self monitoring and keeping daily food journal  Potential for food intolerance after surgery, and ways to deal with them including: lactose intolerance, nausea, reflux, vomiting, diarrhea, food intolerance, appetite changes, gas  Vitamin / Mineral supplementation of Multivitamin with minerals and Vitamin D    Education provided to: patient    Barriers to learning: No barriers identified    Readiness to change: preparation    Prior research on procedure: books, internet, discussed with provider, and friends or family    Comprehension: verbalizes understanding     Expected Compliance: good    Recommendations  Pt is an appropriate candidate for surgery, if can show lifestyle changes over weight checks.    Evaluation / Monitoring  Dietitian to Monitor: Eating pattern as discussed Tolerance of nutrition prescription Body weight Lab values Physical activity    Pre-op weight goals:  Do NOT Gain  Can go to BMI of 35:   217#  Encouraged weight loss w/ diet and lifestyle changes  Will be started on a 2 week liver shrinking diet, possibly shorter/longer as per the discretion of the surgeon/team, directly prior to surgery    Pt presents today for 3 of 6 wt check with 4# weight loss in the past month. He has started making some good changes.  He has been working on eating more veggies, eating off a smaller plate, going back for seconds less often and has a fit bit to track his steps. He tried using the Tagwhat jaden but struggled with that when foods he scanned where not recognized in the jaden.  Reviewed again with pt but also suggested he can write it down in a notebook.  Pt is also taking the Wegovy that he says at times give him an upset stomach, likely contributing to weight loss. Pt did start on Gabapentin the other day due to migraines.  Pt  "continues to work on increasing his fluid intake to 64-80oz.  This month he wants to work on increasing his activity. Suggested to start with 15mins per day (walking outside or in their pool) and work up to 30 mins per day as tolerated.  One of his goals from last month was trying to go to bed earlier; he is still struggling with that. He reports he went in bed at 11pm last night but didn't go to sleep until about 1-2am since he is scrolling on his phone. Suggested to set an alarm on his phone for 15mins so he has a reminder to put down the phone. He will try and maybe even read a book.    Goals  Continue to work on avoiding sugar sweetened beverages  Food journal via baritastic or in a notebook 3-4 days per week  Exercise 30 minutes 5 times per week--continue with gym, increase activity of daily living  Complete lesson plans 1-6  Eat 3 meals per day with protein at each meal  Eat or protein shake within one hour of waking  Eliminate mindless snacking  Use \"plate method\" with when setting up meals  Continue to work on going to bed earlier--to avoid playing on phone therefore suggested to put alarm for 15mins as a reminder to stop playing and put phone away  More walking --start with 15 mins per day of walking outside or in pool and increase to 30 mins per day as tolerated  Entered pre-op labs today, pt will likely go this weekend  F/U with SW in July and RD in Aug    Time Spent:   30 mins  "

## 2024-07-06 ENCOUNTER — APPOINTMENT (OUTPATIENT)
Dept: LAB | Facility: HOSPITAL | Age: 29
End: 2024-07-06
Payer: COMMERCIAL

## 2024-07-06 DIAGNOSIS — E66.01 MORBID OBESITY WITH BMI OF 50.0-59.9, ADULT (HCC): ICD-10-CM

## 2024-07-06 DIAGNOSIS — R73.03 PREDIABETES: ICD-10-CM

## 2024-07-06 DIAGNOSIS — Z01.818 PRE-OP TESTING: ICD-10-CM

## 2024-07-06 DIAGNOSIS — I10 ESSENTIAL HYPERTENSION: ICD-10-CM

## 2024-07-06 DIAGNOSIS — E78.2 HYPERLIPIDEMIA, MIXED: ICD-10-CM

## 2024-07-06 LAB
ALBUMIN SERPL BCG-MCNC: 4 G/DL (ref 3.5–5)
ALP SERPL-CCNC: 51 U/L (ref 34–104)
ALT SERPL W P-5'-P-CCNC: 20 U/L (ref 7–52)
ANION GAP SERPL CALCULATED.3IONS-SCNC: 7 MMOL/L (ref 4–13)
AST SERPL W P-5'-P-CCNC: 15 U/L (ref 13–39)
BILIRUB SERPL-MCNC: 0.37 MG/DL (ref 0.2–1)
BUN SERPL-MCNC: 11 MG/DL (ref 5–25)
CALCIUM SERPL-MCNC: 9.2 MG/DL (ref 8.4–10.2)
CHLORIDE SERPL-SCNC: 106 MMOL/L (ref 96–108)
CHOLEST SERPL-MCNC: 155 MG/DL
CO2 SERPL-SCNC: 24 MMOL/L (ref 21–32)
CREAT SERPL-MCNC: 0.94 MG/DL (ref 0.6–1.3)
ERYTHROCYTE [DISTWIDTH] IN BLOOD BY AUTOMATED COUNT: 13.5 % (ref 11.6–15.1)
EST. AVERAGE GLUCOSE BLD GHB EST-MCNC: 123 MG/DL
GFR SERPL CREATININE-BSD FRML MDRD: 110 ML/MIN/1.73SQ M
GLUCOSE P FAST SERPL-MCNC: 97 MG/DL (ref 65–99)
HBA1C MFR BLD: 5.9 %
HCT VFR BLD AUTO: 43.9 % (ref 36.5–49.3)
HDLC SERPL-MCNC: 29 MG/DL
HGB BLD-MCNC: 13.8 G/DL (ref 12–17)
LDLC SERPL CALC-MCNC: 83 MG/DL (ref 0–100)
MCH RBC QN AUTO: 25.2 PG (ref 26.8–34.3)
MCHC RBC AUTO-ENTMCNC: 31.4 G/DL (ref 31.4–37.4)
MCV RBC AUTO: 80 FL (ref 82–98)
NONHDLC SERPL-MCNC: 126 MG/DL
PLATELET # BLD AUTO: 304 THOUSANDS/UL (ref 149–390)
PMV BLD AUTO: 10.2 FL (ref 8.9–12.7)
POTASSIUM SERPL-SCNC: 3.8 MMOL/L (ref 3.5–5.3)
PROT SERPL-MCNC: 7.4 G/DL (ref 6.4–8.4)
RBC # BLD AUTO: 5.47 MILLION/UL (ref 3.88–5.62)
SODIUM SERPL-SCNC: 137 MMOL/L (ref 135–147)
TRIGL SERPL-MCNC: 214 MG/DL
TSH SERPL DL<=0.05 MIU/L-ACNC: 2.13 UIU/ML (ref 0.45–4.5)
WBC # BLD AUTO: 8.72 THOUSAND/UL (ref 4.31–10.16)

## 2024-07-06 PROCEDURE — 85027 COMPLETE CBC AUTOMATED: CPT

## 2024-07-06 PROCEDURE — 80053 COMPREHEN METABOLIC PANEL: CPT

## 2024-07-06 PROCEDURE — 83036 HEMOGLOBIN GLYCOSYLATED A1C: CPT

## 2024-07-06 PROCEDURE — 80061 LIPID PANEL: CPT

## 2024-07-06 PROCEDURE — 84443 ASSAY THYROID STIM HORMONE: CPT

## 2024-07-06 PROCEDURE — 36415 COLL VENOUS BLD VENIPUNCTURE: CPT

## 2024-07-08 DIAGNOSIS — R79.89 ABNORMAL CBC: Primary | ICD-10-CM

## 2024-07-16 ENCOUNTER — VBI (OUTPATIENT)
Dept: ADMINISTRATIVE | Facility: OTHER | Age: 29
End: 2024-07-16

## 2024-07-16 NOTE — PROGRESS NOTES
4/6 weight check. Patient lost another 8 lbs this month. Making a lot of changes. Participating more in visits. Has been working on reducing portions and more mindful of labels. Has been weighing himself daily- encouraged him to switch to weekly.  Drinking 16 oz unsweetened iced tea, 48 oz water- working on increasing. Has been going in the pool, but doesn't like to get wet. Going to the beach at the beginning of August. Plans on bringing healthy options with him and planning ahead when going out to eat.  Workflow reviewed:   Psych and/or D+A Clearance: N/A  PCP Letter: Needs- has appointment 6/17  Support Group: No longer required, strongly encouraged  Surgeon Appt: 4/19/24  EGD: To schedule today  Cardiac Risk Assessment: scheduled 7/23/24  Sleep Studies: Uses CPAP  Blood work: completed 7/6/24- iron panel to be completed due to abnormal CBC  Nicotine test: N/A  Weight loss meds: Wegovy  Required weight checks: 6 months required  Weight Loss: Not required, encouraged positive lifestyle changes.  Arabella Ramires LCSW

## 2024-07-17 ENCOUNTER — CLINICAL SUPPORT (OUTPATIENT)
Dept: BARIATRICS | Facility: CLINIC | Age: 29
End: 2024-07-17

## 2024-07-17 ENCOUNTER — PREP FOR PROCEDURE (OUTPATIENT)
Dept: BARIATRICS | Facility: CLINIC | Age: 29
End: 2024-07-17

## 2024-07-17 ENCOUNTER — LAB (OUTPATIENT)
Dept: LAB | Facility: HOSPITAL | Age: 29
End: 2024-07-17
Payer: COMMERCIAL

## 2024-07-17 DIAGNOSIS — Z71.89 ENCOUNTER FOR PRE-BARIATRIC SURGERY COUNSELING AND EDUCATION: Primary | ICD-10-CM

## 2024-07-17 DIAGNOSIS — E66.01 MORBID OBESITY (HCC): Primary | ICD-10-CM

## 2024-07-17 DIAGNOSIS — R79.89 ABNORMAL CBC: ICD-10-CM

## 2024-07-17 LAB
FERRITIN SERPL-MCNC: 74 NG/ML (ref 24–336)
IRON SATN MFR SERPL: 12 % (ref 15–50)
IRON SERPL-MCNC: 42 UG/DL (ref 50–212)
TIBC SERPL-MCNC: 359 UG/DL (ref 250–450)
UIBC SERPL-MCNC: 317 UG/DL (ref 155–355)

## 2024-07-17 PROCEDURE — 82728 ASSAY OF FERRITIN: CPT

## 2024-07-17 PROCEDURE — 36415 COLL VENOUS BLD VENIPUNCTURE: CPT

## 2024-07-17 PROCEDURE — 83540 ASSAY OF IRON: CPT

## 2024-07-17 PROCEDURE — RECHECK

## 2024-07-17 PROCEDURE — 83550 IRON BINDING TEST: CPT

## 2024-07-20 DIAGNOSIS — F41.1 ANXIETY, GENERALIZED: ICD-10-CM

## 2024-07-20 RX ORDER — DESVENLAFAXINE SUCCINATE 50 MG/1
50 TABLET, EXTENDED RELEASE ORAL DAILY
Qty: 90 TABLET | Refills: 1 | Status: SHIPPED | OUTPATIENT
Start: 2024-07-20

## 2024-07-23 ENCOUNTER — OFFICE VISIT (OUTPATIENT)
Dept: CARDIOLOGY CLINIC | Facility: CLINIC | Age: 29
End: 2024-07-23
Payer: COMMERCIAL

## 2024-07-23 VITALS
HEART RATE: 91 BPM | RESPIRATION RATE: 16 BRPM | DIASTOLIC BLOOD PRESSURE: 64 MMHG | OXYGEN SATURATION: 97 % | SYSTOLIC BLOOD PRESSURE: 110 MMHG | WEIGHT: 260 LBS | BODY MASS INDEX: 41.78 KG/M2 | HEIGHT: 66 IN

## 2024-07-23 DIAGNOSIS — Z98.84 BARIATRIC SURGERY STATUS: ICD-10-CM

## 2024-07-23 PROCEDURE — 93000 ELECTROCARDIOGRAM COMPLETE: CPT | Performed by: INTERNAL MEDICINE

## 2024-07-23 PROCEDURE — 99213 OFFICE O/P EST LOW 20 MIN: CPT | Performed by: INTERNAL MEDICINE

## 2024-07-23 NOTE — PROGRESS NOTES
Cardiology Follow Up    James Marrero  1995  [id number]  [facility] [place]  [address]  [place] [facility] [place] [address] [phone]    1. Bariatric surgery status  -     Ambulatory referral to Cardiology        Interval History: 28-year-old man currently unemployed who is planning gastric sleeve surgery.    He has a history of high blood pressure but the blood pressures have been controlled.  He is on losartan.    He is reasonably physically active going to the gym and walking a mile and a half on the treadmill.  He does not get exertionally related chest discomfort relieved by rest.  He does not smoke.  LDL cholesterol is 83.    Patient Active Problem List   Diagnosis    Anxiety, generalized    Depression, recurrent (HCC)    Hyperlipidemia, mixed    PPD positive    Snoring    Excessive daytime sleepiness    Morbid obesity with BMI of 50.0-59.9, adult (HCC)    Essential hypertension    Migraine without aura and without status migrainosus, not intractable    Morbid (severe) obesity due to excess calories (HCC)    Prediabetes    Metabolic syndrome    NIMO (obstructive sleep apnea)    Eating disorder    Gastroesophageal reflux disease     Past Medical History:   Diagnosis Date    Anxiety     Bronchitis     Concussion     Sports related    Depression     Hypertension     Morbid obesity with BMI of 45.0-49.9, adult (HCC)     Sleep apnea, obstructive      Social History     Socioeconomic History    Marital status: Single     Spouse name: Not on file    Number of children: Not on file    Years of education: Not on file    Highest education level: Not on file   Occupational History    Occupation: student    Occupation:      Employer: [employer]   Tobacco Use    Smoking status: Never    Smokeless tobacco: Never   Vaping Use    Vaping status: Never Used   Substance and Sexual Activity    Alcohol use:  Yes     Alcohol/week: 1.0 standard drink of alcohol     Types: 1 Cans of beer per week     Comment: social    Drug use: No    Sexual activity: Not on file   Other Topics Concern    Not on file   Social History Narrative    Lives with adoptive parents     Social Determinants of Health     Financial Resource Strain: Not on file   Food Insecurity: Not on file   Transportation Needs: Not on file   Physical Activity: Not on file   Stress: Not on file   Social Connections: Unknown (6/18/2024)    Received from [employer]     How often do you feel lonely or isolated from those around you? (Adult - for ages 18 years and over): Not on file   Intimate Partner Violence: Not on file   Housing Stability: Not on file      Family History   Adopted: Yes     Past Surgical History:   Procedure Laterality Date    NO PAST SURGERIES         Current Outpatient Medications:     azelastine (ASTELIN) 0.1 % nasal spray, 2 sprays into each nostril 2 (two) times a day Use in each nostril as directed, Disp: 1 mL, Rfl: 0    buPROPion (WELLBUTRIN XL) 300 mg 24 hr tablet, Take 1 tablet (300 mg total) by mouth every morning, Disp: 90 tablet, Rfl: 0    desvenlafaxine succinate (PRISTIQ) 50 mg 24 hr tablet, TAKE 1 TABLET BY MOUTH EVERY DAY, Disp: 90 tablet, Rfl: 1    doxepin (SINEquan) 100 mg capsule, Take 1 capsule (100 mg total) by mouth 3 (three) times daily after meals, Disp: 270 capsule, Rfl: 1    fluticasone (FLONASE) 50 mcg/act nasal spray, 1 spray into each nostril daily, Disp: 9.9 mL, Rfl: 0    gabapentin (NEURONTIN) 100 mg capsule, Take 2 capsules (200 mg total) by mouth 2 (two) times a day, Disp: 120 capsule, Rfl: 5    losartan (COZAAR) 50 mg tablet, Take 1 tablet (50 mg total) by mouth daily, Disp: 90 tablet, Rfl: 3    mupirocin (BACTROBAN) 2 % ointment, Apply topically 3 (three) times a day (Patient taking differently: Apply topically if needed), Disp: 30 g, Rfl: 0    Omeprazole 20 MG TBEC, Take 1 tablet (20 mg total)  by mouth 2 (two) times a day, Disp: 180 tablet, Rfl: 1    propranolol (INDERAL LA) 80 mg 24 hr capsule, TAKE 1 CAPSULE (80 MG TOTAL) BY MOUTH EVERY MORNING, Disp: 90 capsule, Rfl: 3    Semaglutide-Weight Management (Wegovy) 2.4 MG/0.75ML, Inject 0.75 mL (2.4 mg total) under the skin once a week, Disp: 3 mL, Rfl: 5    Sodium Fluoride 5000 PPM 1.1 % GEL, BRUSH ON NIGHTLY AND SPIT OUT EXCESS, Disp: , Rfl:     topiramate (TOPAMAX) 200 MG tablet, TAKE 1 TABLET (200 MG TOTAL) BY MOUTH 2 TIMES A DAY., Disp: 60 tablet, Rfl: 5    ziprasidone (GEODON) 40 mg capsule, TAKE 1 CAPSULE (40 MG TOTAL) BY MOUTH EVERY MORNING., Disp: 90 capsule, Rfl: 0    ziprasidone (GEODON) 80 mg capsule, Take 1 capsule (80 mg total) by mouth every evening, Disp: 90 capsule, Rfl: 0  Allergies   Allergen Reactions    Abilify [Aripiprazole]     Lithium     Lorazepam Other (See Comments)     aggressive    Seroquel [Quetiapine]        Labs:  Lab on 07/17/2024   Component Date Value    Iron Saturation 07/17/2024 12 (L)     TIBC 07/17/2024 359     Iron 07/17/2024 42 (L)     UIBC 07/17/2024 317     Ferritin 07/17/2024 74    Appointment on 07/06/2024   Component Date Value    WBC 07/06/2024 8.72     RBC 07/06/2024 5.47     Hemoglobin 07/06/2024 13.8     Hematocrit 07/06/2024 43.9     MCV 07/06/2024 80 (L)     MCH 07/06/2024 25.2 (L)     MCHC 07/06/2024 31.4     RDW 07/06/2024 13.5     Platelets 07/06/2024 304     MPV 07/06/2024 10.2     Sodium 07/06/2024 137     Potassium 07/06/2024 3.8     Chloride 07/06/2024 106     CO2 07/06/2024 24     ANION GAP 07/06/2024 7     BUN 07/06/2024 11     Creatinine 07/06/2024 0.94     Glucose, Fasting 07/06/2024 97     Calcium 07/06/2024 9.2     AST 07/06/2024 15     ALT 07/06/2024 20     Alkaline Phosphatase 07/06/2024 51     Total Protein 07/06/2024 7.4     Albumin 07/06/2024 4.0     Total Bilirubin 07/06/2024 0.37     eGFR 07/06/2024 110     Hemoglobin A1C 07/06/2024 5.9 (H)     EAG 07/06/2024 123     Cholesterol  07/06/2024 155     Triglycerides 07/06/2024 214 (H)     HDL, Direct 07/06/2024 29 (L)     LDL Calculated 07/06/2024 83     Non-HDL-Chol (CHOL-HDL) 07/06/2024 126     TSH 3RD GENERATON 07/06/2024 2.127      Imaging: No results found.    Review of Systems:  Review of Systems    Physical Exam:  Morbidly obese.  Blood pressure 110/64.  Heart rate 90 and regular.  Lungs clear.  No carotid bruits.  Regular rhythm.  No murmurs or gallops.  No organomegaly.  No edema.    Discussion/Summary:    1.  Morbid obesity  2.  History of high blood pressure-controlled    Recommendations:    1.  He is cleared for gastric sleeve surgery  2.      Arian Gibson MD

## 2024-07-26 DIAGNOSIS — F33.9 DEPRESSION, RECURRENT (HCC): ICD-10-CM

## 2024-07-26 DIAGNOSIS — J30.1 SEASONAL ALLERGIC RHINITIS DUE TO POLLEN: ICD-10-CM

## 2024-07-26 RX ORDER — BUPROPION HYDROCHLORIDE 300 MG/1
300 TABLET ORAL EVERY MORNING
Qty: 90 TABLET | Refills: 1 | Status: SHIPPED | OUTPATIENT
Start: 2024-07-26

## 2024-07-26 RX ORDER — AZELASTINE HYDROCHLORIDE 137 UG/1
SPRAY, METERED NASAL
Qty: 90 ML | Refills: 1 | Status: SHIPPED | OUTPATIENT
Start: 2024-07-26

## 2024-07-31 DIAGNOSIS — J30.1 SEASONAL ALLERGIC RHINITIS DUE TO POLLEN: ICD-10-CM

## 2024-08-01 RX ORDER — FLUTICASONE PROPIONATE 50 MCG
SPRAY, SUSPENSION (ML) NASAL
Qty: 24 ML | Refills: 1 | Status: SHIPPED | OUTPATIENT
Start: 2024-08-01

## 2024-08-10 DIAGNOSIS — F33.9 DEPRESSION, RECURRENT (HCC): ICD-10-CM

## 2024-08-11 RX ORDER — TOPIRAMATE 200 MG/1
TABLET, FILM COATED ORAL
Qty: 60 TABLET | Refills: 5 | Status: SHIPPED | OUTPATIENT
Start: 2024-08-11

## 2024-08-13 ENCOUNTER — TELEPHONE (OUTPATIENT)
Age: 29
End: 2024-08-13

## 2024-08-13 DIAGNOSIS — F33.9 DEPRESSION, RECURRENT (HCC): ICD-10-CM

## 2024-08-13 RX ORDER — ZIPRASIDONE HYDROCHLORIDE 80 MG/1
80 CAPSULE ORAL EVERY EVENING
Qty: 90 CAPSULE | Refills: 0 | Status: SHIPPED | OUTPATIENT
Start: 2024-08-13

## 2024-08-13 NOTE — TELEPHONE ENCOUNTER
Patient called in to reschedule EGD from 8/23 with Dr Sam, please call him at  to reschedule.  Thanks.

## 2024-08-13 NOTE — TELEPHONE ENCOUNTER
Patient called in to reschedule EGD. Patient is scheduled with WM [facility]. Patient was provider with number and transferred to correct department to r/s appt.

## 2024-08-14 ENCOUNTER — PREP FOR PROCEDURE (OUTPATIENT)
Dept: BARIATRICS | Facility: CLINIC | Age: 29
End: 2024-08-14

## 2024-08-14 ENCOUNTER — TELEPHONE (OUTPATIENT)
Dept: BARIATRICS | Facility: CLINIC | Age: 29
End: 2024-08-14

## 2024-08-14 DIAGNOSIS — E66.01 MORBID OBESITY (HCC): Primary | ICD-10-CM

## 2024-08-20 ENCOUNTER — TELEPHONE (OUTPATIENT)
Age: 29
End: 2024-08-20

## 2024-08-20 DIAGNOSIS — F33.9 DEPRESSION, RECURRENT (HCC): ICD-10-CM

## 2024-08-20 NOTE — TELEPHONE ENCOUNTER
Charles calling in and asking to reschedule EGD with Dr. Pires     Please call patient back and reschedule

## 2024-08-21 RX ORDER — DOXEPIN HYDROCHLORIDE 100 MG/1
100 CAPSULE ORAL
Qty: 270 CAPSULE | Refills: 1 | Status: SHIPPED | OUTPATIENT
Start: 2024-08-21

## 2024-08-22 ENCOUNTER — CLINICAL SUPPORT (OUTPATIENT)
Dept: BARIATRICS | Facility: CLINIC | Age: 29
End: 2024-08-22

## 2024-08-22 DIAGNOSIS — E66.01 MORBID (SEVERE) OBESITY DUE TO EXCESS CALORIES (HCC): Primary | ICD-10-CM

## 2024-08-22 PROCEDURE — RECHECK

## 2024-08-22 NOTE — PROGRESS NOTES
"Bariatric Nutrition Assessment Note    Type of surgery    Preop (6 months of wt checks)--5 of 6 wt check   Surgery Date: TBD--leaning toward sleeve  Surgeon: Dr Pires (consult was 4/19/24)    Nutrition Assessment   James Marrero  28 y.o.  male       Height: 5'6\"  Current Weight: 312#  BMI: 50.36  ReEval Weight:  322#   BMI: 52#  Wt with BMI of 25: 155.6#  Pre-Op Excess Wt: 167#    BMI: Body mass index is 50.36 kg/m².    [place]- [place] Equation:      Estimated calories for weight loss 3078-5432 ( 1-2# per wk wt loss - sedentary )  Estimated protein needs #(1.0-1.5 gms/kg IBW )   Estimated fluid needs 2100-2450ml (30-35 ml/kg IBW )      Weight History   Onset of Obesity: Adult, started when taking the psych meds  Family history of obesity: unknown  Wt Loss Attempts: Exercise  Self Created Diets (Portion Control, Healthy Food Choices, etc.)  Patient has tried the above for 6 months or more with insufficient weight loss or weight regain, which is why patient has requested to be evaluated for weight loss surgery today  Maximum Wt Lost: only lost when was in treatment where they controlled the portions.    Review of History and Medications   Wegovy, starting with Align  Starting Gabapetin  Past Medical History:   Diagnosis Date    Anxiety     Bronchitis     Concussion     Sports related    Depression     Hypertension     Morbid obesity with BMI of 45.0-49.9, adult (HCC)     Sleep apnea, obstructive      Past Surgical History:   Procedure Laterality Date    NO PAST SURGERIES       Social History     Socioeconomic History    Marital status: Single     Spouse name: Not on file    Number of children: Not on file    Years of education: Not on file    Highest education level: Not on file   Occupational History    Occupation: student    Occupation:      Employer: [employer]   Tobacco Use    Smoking status: Never    Smokeless tobacco: Never   Vaping Use    Vaping status: Never Used   Substance and " Sexual Activity    Alcohol use: Yes     Alcohol/week: 1.0 standard drink of alcohol     Types: 1 Cans of beer per week     Comment: social    Drug use: No    Sexual activity: Not on file   Other Topics Concern    Not on file   Social History Narrative    Lives with adoptive parents     Social Determinants of Health     Financial Resource Strain: Not on file   Food Insecurity: Not on file   Transportation Needs: Not on file   Physical Activity: Not on file   Stress: Not on file   Social Connections: Unknown (6/18/2024)    Received from [employer]     How often do you feel lonely or isolated from those around you? (Adult - for ages 18 years and over): Not on file   Intimate Partner Violence: Not on file   Housing Stability: Not on file       Current Outpatient Medications:     Azelastine HCl 137 MCG/SPRAY SOLN, SPRAY 2 SPRAYS INTO EACH NOSTRIL 2 TIMES A DAY USE IN EACH NOSTRIL AS DIRECTED, Disp: 90 mL, Rfl: 1    buPROPion (WELLBUTRIN XL) 300 mg 24 hr tablet, TAKE 1 TABLET (300 MG TOTAL) BY MOUTH EVERY MORNING., Disp: 90 tablet, Rfl: 1    desvenlafaxine succinate (PRISTIQ) 50 mg 24 hr tablet, TAKE 1 TABLET BY MOUTH EVERY DAY, Disp: 90 tablet, Rfl: 1    doxepin (SINEquan) 100 mg capsule, TAKE 1 CAPSULE (100 MG TOTAL) BY MOUTH 3 (THREE) TIMES DAILY AFTER MEALS, Disp: 270 capsule, Rfl: 1    fluticasone (FLONASE) 50 mcg/act nasal spray, SPRAY 1 SPRAY INTO EACH NOSTRIL EVERY DAY, Disp: 24 mL, Rfl: 1    gabapentin (NEURONTIN) 100 mg capsule, Take 2 capsules (200 mg total) by mouth 2 (two) times a day, Disp: 120 capsule, Rfl: 5    losartan (COZAAR) 50 mg tablet, Take 1 tablet (50 mg total) by mouth daily, Disp: 90 tablet, Rfl: 3    mupirocin (BACTROBAN) 2 % ointment, Apply topically 3 (three) times a day (Patient taking differently: Apply topically if needed), Disp: 30 g, Rfl: 0    Omeprazole 20 MG TBEC, Take 1 tablet (20 mg total) by mouth 2 (two) times a day, Disp: 180 tablet, Rfl: 1    propranolol  (INDERAL LA) 80 mg 24 hr capsule, TAKE 1 CAPSULE (80 MG TOTAL) BY MOUTH EVERY MORNING, Disp: 90 capsule, Rfl: 3    Semaglutide-Weight Management (Wegovy) 2.4 MG/0.75ML, Inject 0.75 mL (2.4 mg total) under the skin once a week, Disp: 3 mL, Rfl: 5    Sodium Fluoride 5000 PPM 1.1 % GEL, BRUSH ON NIGHTLY AND SPIT OUT EXCESS, Disp: , Rfl:     topiramate (TOPAMAX) 200 MG tablet, TAKE 1 TABLET (200 MG TOTAL) BY MOUTH 2 TIMES A DAY., Disp: 60 tablet, Rfl: 5    ziprasidone (GEODON) 40 mg capsule, TAKE 1 CAPSULE (40 MG TOTAL) BY MOUTH EVERY MORNING., Disp: 90 capsule, Rfl: 0    ziprasidone (GEODON) 80 mg capsule, TAKE 1 CAPSULE (80 MG TOTAL) BY MOUTH EVERY EVENING, Disp: 90 capsule, Rfl: 0    Food Intake and Lifestyle Assessment   Food Intake Assessment completed via usual diet recall    1/2-1 cup starch, Fit bit, more veggies, walking   Wake:  8a-12p  Breakfast: today smoothie with unsweet almond milk, banana, body fortress whey protein and PB (~1tbsp--suggested PB2 for less cals) + 2 waffles  Snack: chips at times OR fruit (banana, oranges, apples--more than 1) OR chobani greek yogurt (2)   Lunch: late lunch today will taco salad with lean meat  Snack: has been going with oatmeal with protein shake and or fruit--2 pouches + protein powder, put asked if can add banana to the oatmeal (advised to avoid the banana if doing 2 pouches of oatmeal)  Dinner: 8-9pm:  Last night taco salad with 2 crumbled taco shell w/90/10 ground beef, taco sauce,, light sour cream, cheese OR Jersey Mikes--1/4 of a Giant sandwich (ham, prav, salami with lettuce, tomato an delight clarke--looked it up and 1/4 is 413 cals, 16gm protein) + bake lays    Snack: pop corn or sf outshine bars  Bed: 11:30pm to 3am  **portions are the issue and night snacking**    Beverage intake: water, coffee/tea, and 100mg caffeine energy drink (5 cals), sugarfree drink  Protein supplement: has a protein powder, but doesn't use often.  Estimated protein intake per day:  "80-100gm  Estimated fluid intake per day: 48-64oz water per day, 1 energy drink (100mg caffeine), 12oz coffee with sugar and fat free half and half  Meals eaten away from home: Jersey Bruce once a week, pizza once a week (usually 3 slices)  Typical meal pattern: 2-3 meals per day and grazes on snacks per day  Eating Behaviors: Consumption of high calorie/ high fat foods, Consumption of high calorie beverages, Large portion sizes, Frequent snacking/ grazing, Mindless eating, Emotional eating, Craves sweet foods, and Craves salty foods    Food allergies or intolerances:   Allergies   Allergen Reactions    Abilify [Aripiprazole]     Lithium     Lorazepam Other (See Comments)     aggressive    Seroquel [Quetiapine]      Cultural or Taoism considerations: -    Physical Assessment  Physical Activity  Types of exercise: gym 4x per week: treadmill 2 x per week for 20-30mins, 2 classes per week (strength and a HIIT class)--has been doing this regimen for over 1 year, but hasn't been to the gym this week.   Current physical limitations: -    Psychosocial Assessment   Support systems: parent(s)  Socioeconomic factors: lives with parents--mom does both cooking and food shopping    Nutrition Diagnosis  Diagnosis: Overweight / Obesity (NC-3.3)  Related to: Physical inactivity and Excessive energy intake  As Evidenced by: BMI >25     Nutrition Prescription: Recommend the following diet  Regular    Interventions and Teaching   Discussed pre-op and post-op nutrition guidelines.       Patient educated and handouts provided.  Surgical changes to stomach / GI  Capacity of post-surgery stomach  Diet progression  Adequate hydration  Sugar and fat restriction to decrease \"dumping syndrome\"  Fat restriction to decrease steatorrhea  Expected weight loss  Weight loss plateaus/ possibility of weight regain  Exercise  Suggestions for pre-op diet  Nutrition considerations after surgery  Protein supplements  Meal planning and " preparation  Appropriate carbohydrate, protein, and fat intake, and food/fluid choices to maximize safe weight loss, nutrient intake, and tolerance   Dietary and lifestyle changes  Possible problems with poor eating habits  Intuitive eating  Techniques for self monitoring and keeping daily food journal  Potential for food intolerance after surgery, and ways to deal with them including: lactose intolerance, nausea, reflux, vomiting, diarrhea, food intolerance, appetite changes, gas  Vitamin / Mineral supplementation of Multivitamin with minerals and Vitamin D    Education provided to: patient    Barriers to learning: No barriers identified    Readiness to change: preparation    Prior research on procedure: books, internet, discussed with provider, and friends or family    Comprehension: verbalizes understanding     Expected Compliance: good    Recommendations  Pt is an appropriate candidate for surgery, if can show lifestyle changes over weight checks.    Evaluation / Monitoring  Dietitian to Monitor: Eating pattern as discussed Tolerance of nutrition prescription Body weight Lab values Physical activity    5/6 Weight Check Visit Summary 8/22/2024  Continues to prepare for weight loss surgery. Pt pleased with progress he has made. Reports changes to diet includes: stopped second portions, less carbs (ie: will remove bun on hamburgers, etc), snacks at night but better choice (fruit, popcorn, fruit ice ) and reading labels to make decisions on better food choice. [employer] jaden for tracking but better with paper journal. Better with 30/60 rule.  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 Wearing CPAP. - but did stop while on vacation. Goes to gym but limited past month per vacations taken  of pt and his . Does use  treadmill 30 minutes on his own. Pt also got 's Permit and looking for employment. 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 Pt worked with Dr rFancisco as  "advised and now better prepared to pursue WLS. Pt  taking Wegovy. Discussed lab results. Advised to take iron po per low level. Questions answered during session. Pt receptive. Pt accompanied my his mother.   Workflow reviewed:   Psych and/or D+A Clearance: N/A  PCP Letter: Needs- has appointment 6/17  Surgeon Appt: 4/19/24  EGD: R/S to 9/13/2024  Cardiac Risk Assessment: 7/23/24  Sleep Studies: Uses CPAP  Blood work: Complete- 7/6/2024- advised to start iron (Vitron C)  Nicotine test: N/A  Weight loss meds: Wegovy  Required weight checks: 6 months required  5/6 today  Weight Loss: Not required, encouraged positive lifestyle changes.    Pre-op weight goals:  Do NOT Gain  Can go to BMI of 35:   217#  Encouraged weight loss w/ diet and lifestyle changes  Will be started on a 2 week liver shrinking diet, possibly shorter/longer as per the discretion of the surgeon/team, directly prior to surgery  Goals  Continue to work on avoiding sugar sweetened beverages  Food journal via baritastic or in a notebook 3-4 days per week  Exercise 30 minutes 5 times per week--continue with gym, increase activity of daily living  Complete lesson plans 1-6  Eat 3 meals per day with protein at each meal  Eat or protein shake within one hour of waking  Eliminate mindless snacking  Use \"plate method\" with when setting up meals  Continue to work on going to bed earlier--to avoid playing on phone therefore suggested to put alarm for 15mins as a reminder to stop playing and put phone away  More walking --start with 15 mins per day of walking outside or in pool and increase to 30 mins per day as tolerated  F/U with bariatric providers monthly    Time Spent:   40 mins  "

## 2024-08-23 VITALS — WEIGHT: 312 LBS | BODY MASS INDEX: 50.14 KG/M2 | HEIGHT: 66 IN

## 2024-09-11 ENCOUNTER — OFFICE VISIT (OUTPATIENT)
Dept: FAMILY MEDICINE CLINIC | Facility: CLINIC | Age: 29
End: 2024-09-11
Payer: COMMERCIAL

## 2024-09-11 VITALS
HEIGHT: 66 IN | OXYGEN SATURATION: 98 % | TEMPERATURE: 97.5 F | BODY MASS INDEX: 49.5 KG/M2 | DIASTOLIC BLOOD PRESSURE: 68 MMHG | HEART RATE: 87 BPM | WEIGHT: 308 LBS | SYSTOLIC BLOOD PRESSURE: 116 MMHG

## 2024-09-11 DIAGNOSIS — R19.4 CHANGE IN BOWEL HABITS: Primary | ICD-10-CM

## 2024-09-11 DIAGNOSIS — R19.7 DIARRHEA, UNSPECIFIED TYPE: ICD-10-CM

## 2024-09-11 PROCEDURE — 99214 OFFICE O/P EST MOD 30 MIN: CPT | Performed by: STUDENT IN AN ORGANIZED HEALTH CARE EDUCATION/TRAINING PROGRAM

## 2024-09-11 RX ORDER — LEVOFLOXACIN 500 MG/1
500 TABLET, FILM COATED ORAL EVERY 24 HOURS
Qty: 5 TABLET | Refills: 0 | Status: SHIPPED | OUTPATIENT
Start: 2024-09-11 | End: 2024-09-16

## 2024-09-11 NOTE — PROGRESS NOTES
Assessment/Plan:         Problem List Items Addressed This Visit    None        Subjective:      Patient ID: James Marrero is a 28 y.o. male.    HPI    1.5 weeks of diarrhea. Occurs 3-4x a day, states it can be red chunks and brown but not a blood color. No pain with BM's. Small hard balls come out when he goes along with water (bristol stool chart #5). Is taking imodium as needed which provides relief. Having increase gas as well. Is trying to follow a BRAT diet (lots of bananas and rice).    No sick contacts, had a bad food from [employer] over the weekend but had the symptoms before. No travel    The following portions of the patient's history were reviewed and updated as appropriate:   Past Medical History:  He has a past medical history of Anxiety, Bronchitis, Concussion, Depression, Hypertension, Morbid obesity with BMI of 45.0-49.9, adult (HCC), and Sleep apnea, obstructive.,  _______________________________________________________________________  Medical Problems:  does not have any pertinent problems on file.,  _______________________________________________________________________  Past Surgical History:   has a past surgical history that includes No past surgeries.,  _______________________________________________________________________  Family History:  family history is not on file. He was adopted.,  _______________________________________________________________________  Social History:   reports that he has never smoked. He has never used smokeless tobacco. He reports current alcohol use of about 1.0 standard drink of alcohol per week. He reports that he does not use drugs.,  _______________________________________________________________________  Allergies:  is allergic to abilify [aripiprazole], lithium, lorazepam, and seroquel [quetiapine]..  _______________________________________________________________________  Current Outpatient Medications   Medication Sig Dispense Refill    Azelastine HCl 137  MCG/SPRAY SOLN SPRAY 2 SPRAYS INTO EACH NOSTRIL 2 TIMES A DAY USE IN EACH NOSTRIL AS DIRECTED 90 mL 1    buPROPion (WELLBUTRIN XL) 300 mg 24 hr tablet TAKE 1 TABLET (300 MG TOTAL) BY MOUTH EVERY MORNING. 90 tablet 1    desvenlafaxine succinate (PRISTIQ) 50 mg 24 hr tablet TAKE 1 TABLET BY MOUTH EVERY DAY 90 tablet 1    doxepin (SINEquan) 100 mg capsule TAKE 1 CAPSULE (100 MG TOTAL) BY MOUTH 3 (THREE) TIMES DAILY AFTER MEALS 270 capsule 1    fluticasone (FLONASE) 50 mcg/act nasal spray SPRAY 1 SPRAY INTO EACH NOSTRIL EVERY DAY 24 mL 1    gabapentin (NEURONTIN) 100 mg capsule Take 2 capsules (200 mg total) by mouth 2 (two) times a day 120 capsule 5    losartan (COZAAR) 50 mg tablet Take 1 tablet (50 mg total) by mouth daily 90 tablet 3    mupirocin (BACTROBAN) 2 % ointment Apply topically 3 (three) times a day (Patient taking differently: Apply topically if needed) 30 g 0    Omeprazole 20 MG TBEC Take 1 tablet (20 mg total) by mouth 2 (two) times a day 180 tablet 1    Semaglutide-Weight Management (Wegovy) 2.4 MG/0.75ML Inject 0.75 mL (2.4 mg total) under the skin once a week 3 mL 5    Sodium Fluoride 5000 PPM 1.1 % GEL BRUSH ON NIGHTLY AND SPIT OUT EXCESS      topiramate (TOPAMAX) 200 MG tablet TAKE 1 TABLET (200 MG TOTAL) BY MOUTH 2 TIMES A DAY. 60 tablet 5    ziprasidone (GEODON) 40 mg capsule TAKE 1 CAPSULE (40 MG TOTAL) BY MOUTH EVERY MORNING. 90 capsule 0    ziprasidone (GEODON) 80 mg capsule TAKE 1 CAPSULE (80 MG TOTAL) BY MOUTH EVERY EVENING 90 capsule 0    propranolol (INDERAL LA) 80 mg 24 hr capsule TAKE 1 CAPSULE (80 MG TOTAL) BY MOUTH EVERY MORNING 90 capsule 3     No current facility-administered medications for this visit.     _______________________________________________________________________  Review of Systems   Constitutional:  Negative for chills, fatigue and fever.   HENT:  Negative for rhinorrhea and sore throat.    Eyes:  Negative for visual disturbance.   Respiratory:  Negative for cough and  "shortness of breath.    Cardiovascular:  Negative for chest pain and palpitations.   Gastrointestinal:  Positive for diarrhea. Negative for abdominal pain, constipation, nausea and vomiting.   Genitourinary:  Negative for difficulty urinating, dysuria and frequency.   Musculoskeletal:  Negative for arthralgias and myalgias.   Skin:  Negative for color change and rash.   Neurological:  Negative for weakness and headaches.         Objective:  Vitals:    09/11/24 1011   BP: 116/68   Pulse: 87   Temp: 97.5 °F (36.4 °C)   SpO2: 98%   Weight: (!) 140 kg (308 lb)   Height: 5' 6\" (1.676 m)     Body mass index is 49.71 kg/m².     Physical Exam  Constitutional:       General: He is not in acute distress.     Appearance: He is not ill-appearing.   HENT:      Head: Normocephalic and atraumatic.      Right Ear: External ear normal.      Left Ear: External ear normal.      Nose: Nose normal. No congestion or rhinorrhea.      Mouth/Throat:      Mouth: Mucous membranes are moist.      Pharynx: Oropharynx is clear. No oropharyngeal exudate or posterior oropharyngeal erythema.   Eyes:      Extraocular Movements: Extraocular movements intact.      Conjunctiva/sclera: Conjunctivae normal.      Pupils: Pupils are equal, round, and reactive to light.   Cardiovascular:      Rate and Rhythm: Normal rate and regular rhythm.      Pulses: Normal pulses.      Heart sounds: No murmur heard.  Pulmonary:      Effort: Pulmonary effort is normal. No respiratory distress.      Breath sounds: Normal breath sounds. No wheezing.   Chest:      Chest wall: No tenderness.   Abdominal:      General: Bowel sounds are increased.      Palpations: Abdomen is soft.      Tenderness: There is no abdominal tenderness.   Musculoskeletal:         General: Normal range of motion.      Cervical back: Normal range of motion.   Skin:     General: Skin is warm and dry.      Capillary Refill: Capillary refill takes less than 2 seconds.      Findings: No rash. "   Neurological:      General: No focal deficit present.      Mental Status: He is alert. Mental status is at baseline.

## 2024-09-12 ENCOUNTER — CLINICAL SUPPORT (OUTPATIENT)
Dept: BARIATRICS | Facility: CLINIC | Age: 29
End: 2024-09-12

## 2024-09-12 DIAGNOSIS — F33.9 DEPRESSION, RECURRENT (HCC): ICD-10-CM

## 2024-09-12 DIAGNOSIS — Z71.89 ENCOUNTER FOR PRE-BARIATRIC SURGERY COUNSELING AND EDUCATION: Primary | ICD-10-CM

## 2024-09-12 PROCEDURE — RECHECK

## 2024-09-12 RX ORDER — ZIPRASIDONE HYDROCHLORIDE 40 MG/1
40 CAPSULE ORAL EVERY MORNING
Qty: 90 CAPSULE | Refills: 2 | Status: SHIPPED | OUTPATIENT
Start: 2024-09-12

## 2024-09-12 NOTE — PROGRESS NOTES
6/6 weight check. Mom tore her rotator cuff and has been having to drive her everywhere. Has been having diarrhea for the past 2 weeks. Saw his family doctor yesterday- prescribed an antibiotic. Has been drinking 48 oz of water. Sometimes sleeping in and missing breakfast. Started vitron C about a month ago. Praised patient for all the positive changes that he has made recently. Patient just needs to complete his EGD in order to submit. Will check with Whit if iron panel needs to be completed.  Workflow reviewed:   Psych and/or D+A Clearance: N/A  PCP Letter: Needs- has appointment 6/17  Support Group: No longer required, strongly encouraged  Surgeon Appt: 4/19/24  EGD: scheduled 9/13  Cardiac Risk Assessment: completed 7/23/24  Sleep Studies: Uses CPAP  Blood work: completed 7/6/24- iron panel to be completed due to abnormal CBC  Nicotine test: N/A  Weight loss meds: Wegovy  Required weight checks: 6 months required  Weight Loss: Not required, encouraged positive lifestyle changes.  Arabella Ramires LCSW

## 2024-09-13 ENCOUNTER — HOSPITAL ENCOUNTER (OUTPATIENT)
Dept: GASTROENTEROLOGY | Facility: HOSPITAL | Age: 29
Setting detail: OUTPATIENT SURGERY
End: 2024-09-13
Attending: SURGERY
Payer: COMMERCIAL

## 2024-09-13 ENCOUNTER — ANESTHESIA (OUTPATIENT)
Dept: GASTROENTEROLOGY | Facility: HOSPITAL | Age: 29
End: 2024-09-13
Payer: COMMERCIAL

## 2024-09-13 ENCOUNTER — PREP FOR PROCEDURE (OUTPATIENT)
Dept: BARIATRICS | Facility: CLINIC | Age: 29
End: 2024-09-13

## 2024-09-13 ENCOUNTER — ANESTHESIA EVENT (OUTPATIENT)
Dept: GASTROENTEROLOGY | Facility: HOSPITAL | Age: 29
End: 2024-09-13
Payer: COMMERCIAL

## 2024-09-13 VITALS
HEIGHT: 66 IN | SYSTOLIC BLOOD PRESSURE: 130 MMHG | TEMPERATURE: 97.6 F | HEART RATE: 80 BPM | OXYGEN SATURATION: 97 % | DIASTOLIC BLOOD PRESSURE: 59 MMHG | WEIGHT: 311.73 LBS | RESPIRATION RATE: 16 BRPM | BODY MASS INDEX: 50.1 KG/M2

## 2024-09-13 DIAGNOSIS — E66.01 MORBID OBESITY (HCC): Primary | ICD-10-CM

## 2024-09-13 DIAGNOSIS — E66.01 MORBID OBESITY (HCC): ICD-10-CM

## 2024-09-13 PROCEDURE — 43235 EGD DIAGNOSTIC BRUSH WASH: CPT | Performed by: SURGERY

## 2024-09-13 RX ORDER — SODIUM CHLORIDE, SODIUM LACTATE, POTASSIUM CHLORIDE, CALCIUM CHLORIDE 600; 310; 30; 20 MG/100ML; MG/100ML; MG/100ML; MG/100ML
125 INJECTION, SOLUTION INTRAVENOUS CONTINUOUS
Status: CANCELLED | OUTPATIENT
Start: 2024-09-13

## 2024-09-13 RX ORDER — PROPOFOL 10 MG/ML
INJECTION, EMULSION INTRAVENOUS AS NEEDED
Status: DISCONTINUED | OUTPATIENT
Start: 2024-09-13 | End: 2024-09-13

## 2024-09-13 RX ORDER — LIDOCAINE HYDROCHLORIDE 20 MG/ML
INJECTION, SOLUTION EPIDURAL; INFILTRATION; INTRACAUDAL; PERINEURAL AS NEEDED
Status: DISCONTINUED | OUTPATIENT
Start: 2024-09-13 | End: 2024-09-13

## 2024-09-13 RX ORDER — GLYCOPYRROLATE 0.2 MG/ML
INJECTION INTRAMUSCULAR; INTRAVENOUS AS NEEDED
Status: DISCONTINUED | OUTPATIENT
Start: 2024-09-13 | End: 2024-09-13

## 2024-09-13 RX ORDER — KETAMINE HCL IN NACL, ISO-OSM 100MG/10ML
SYRINGE (ML) INJECTION AS NEEDED
Status: DISCONTINUED | OUTPATIENT
Start: 2024-09-13 | End: 2024-09-13

## 2024-09-13 RX ORDER — SODIUM CHLORIDE, SODIUM LACTATE, POTASSIUM CHLORIDE, CALCIUM CHLORIDE 600; 310; 30; 20 MG/100ML; MG/100ML; MG/100ML; MG/100ML
125 INJECTION, SOLUTION INTRAVENOUS CONTINUOUS
Status: DISCONTINUED | OUTPATIENT
Start: 2024-09-13 | End: 2024-09-17 | Stop reason: HOSPADM

## 2024-09-13 RX ADMIN — GLYCOPYRROLATE 0.2 MG: 0.2 INJECTION, SOLUTION INTRAMUSCULAR; INTRAVENOUS at 08:57

## 2024-09-13 RX ADMIN — PROPOFOL 100 MG: 10 INJECTION, EMULSION INTRAVENOUS at 09:02

## 2024-09-13 RX ADMIN — LIDOCAINE HYDROCHLORIDE 60 MG: 20 INJECTION, SOLUTION EPIDURAL; INFILTRATION; INTRACAUDAL; PERINEURAL at 09:02

## 2024-09-13 RX ADMIN — SODIUM CHLORIDE, SODIUM LACTATE, POTASSIUM CHLORIDE, AND CALCIUM CHLORIDE: .6; .31; .03; .02 INJECTION, SOLUTION INTRAVENOUS at 08:51

## 2024-09-13 RX ADMIN — SODIUM CHLORIDE, SODIUM LACTATE, POTASSIUM CHLORIDE, AND CALCIUM CHLORIDE: .6; .31; .03; .02 INJECTION, SOLUTION INTRAVENOUS at 08:49

## 2024-09-13 RX ADMIN — Medication 30 MG: at 09:02

## 2024-09-13 NOTE — ANESTHESIA POSTPROCEDURE EVALUATION
Post-Op Assessment Note    CV Status:  Stable  Pain Score: 0    Pain management: adequate       Mental Status:  Alert and awake   Hydration Status:  Euvolemic and stable   PONV Controlled:  None   Airway Patency:  Patent     Post Op Vitals Reviewed: Yes    No anethesia notable event occurred.    Staff: Anesthesiologist, with CRNAs               /74 (09/13/24 0908)    Temp 97.6 °F (36.4 °C) (09/13/24 0908)    Pulse 80 (09/13/24 0908)   Resp 18 (09/13/24 0908)    SpO2 99 % (09/13/24 0908)

## 2024-09-13 NOTE — ANESTHESIA PREPROCEDURE EVALUATION
Procedure:  EGD    Relevant Problems   CARDIO   (+) Essential hypertension   (+) Hyperlipidemia, mixed   (+) Migraine without aura and without status migrainosus, not intractable      GI/HEPATIC   (+) Gastroesophageal reflux disease      NEURO/PSYCH   (+) Anxiety, generalized   (+) Depression, recurrent (HCC)   (+) Migraine without aura and without status migrainosus, not intractable      PULMONARY   (+) NIMO (obstructive sleep apnea)        Physical Exam    Airway    Mallampati score: III  TM Distance: >3 FB  Neck ROM: full     Dental   No notable dental hx     Cardiovascular      Pulmonary      Other Findings        Anesthesia Plan  ASA Score- 3     Anesthesia Type- IV sedation with anesthesia with ASA Monitors.         Additional Monitors:     Airway Plan:            Plan Factors-Exercise tolerance (METS): >4 METS.    Chart reviewed.    Patient summary reviewed.    Patient is not a current smoker.      There is medical exclusion for perioperative obstructive sleep apnea risk education.        Induction- intravenous.    Postoperative Plan-         Informed Consent- Anesthetic plan and risks discussed with patient.  I personally reviewed this patient with the CRNA. Discussed and agreed on the Anesthesia Plan with the CRNA..

## 2024-09-13 NOTE — H&P
"This is a 28 y.o. male with a history of morbid obesity and Body mass index is 50.31 kg/m².  Here for an EGD to evaluate the anatomy of the GI tract and to rule out the presence of H. pylori.    Physical Exam    /58   Pulse 80   Temp (!) 97 °F (36.1 °C) (Temporal)   Resp 15   Ht 5' 6\" (1.676 m)   Wt (!) 141 kg (311 lb 11.7 oz)   SpO2 97%   BMI 50.31 kg/m²    AAOx3  RRR  CTA B  Abdomen obese. Benign.  Extremities warm and dry       A/P:    This is a 28 y.o. male with a history of morbid obesity and Body mass index is 50.31 kg/m²..    Will proceed with the EGD and biopsies.      hCey Sam MD  9/13/2024  8:35 AM         "

## 2024-09-25 DIAGNOSIS — J30.1 SEASONAL ALLERGIC RHINITIS DUE TO POLLEN: ICD-10-CM

## 2024-09-25 RX ORDER — FLUTICASONE PROPIONATE 50 MCG
SPRAY, SUSPENSION (ML) NASAL
Qty: 24 ML | Refills: 1 | Status: SHIPPED | OUTPATIENT
Start: 2024-09-25

## 2024-09-26 ENCOUNTER — TELEPHONE (OUTPATIENT)
Dept: BARIATRICS | Facility: CLINIC | Age: 29
End: 2024-09-26

## 2024-09-26 NOTE — TELEPHONE ENCOUNTER
Patient called to ask how to prep for upcoming EGD. 10/11/24. Dietician & I guided patient to have only liquids, Protein shakes 24 hours prior to procedure. Emphasized no solids, including chocolates, chips and cookies. Patient acknowledges understanding.

## 2024-09-30 DIAGNOSIS — E66.01 MORBID OBESITY WITH BMI OF 50.0-59.9, ADULT (HCC): ICD-10-CM

## 2024-09-30 DIAGNOSIS — R73.03 PREDIABETES: ICD-10-CM

## 2024-09-30 NOTE — TELEPHONE ENCOUNTER
Reason for call:   [x] Refill   [] Prior Auth  [] Other:     Office:   [] PCP/Provider -   [x] Specialty/Provider - Weight    Medication: Semaglutide-Weight Management (Wegovy) 2.4 MG/0.75ML     Dose/Frequency: Inject 0.75 mL (2.4 mg total) under the skin once a week     Quantity: 3 ml    Pharmacy: [facility]/pharm[facility] #[id number]  [place]DA [place] - [address]     Does the patient have enough for 3 days?   [] Yes   [x] No - Send as HP to POD

## 2024-10-02 RX ORDER — SEMAGLUTIDE 2.4 MG/.75ML
2.4 INJECTION, SOLUTION SUBCUTANEOUS WEEKLY
Qty: 3 ML | Refills: 0 | Status: SHIPPED | OUTPATIENT
Start: 2024-10-02

## 2024-10-02 NOTE — TELEPHONE ENCOUNTER
Looks like he is pursuing bariatric surgery. Would benefit from a touch point with a medical provider as she has not been seen since April. Please schedule when can.

## 2024-10-07 ENCOUNTER — OFFICE VISIT (OUTPATIENT)
Dept: FAMILY MEDICINE CLINIC | Facility: CLINIC | Age: 29
End: 2024-10-07
Payer: COMMERCIAL

## 2024-10-07 ENCOUNTER — TELEPHONE (OUTPATIENT)
Age: 29
End: 2024-10-07

## 2024-10-07 VITALS
HEIGHT: 66 IN | RESPIRATION RATE: 16 BRPM | SYSTOLIC BLOOD PRESSURE: 100 MMHG | WEIGHT: 309.8 LBS | DIASTOLIC BLOOD PRESSURE: 80 MMHG | TEMPERATURE: 97 F | BODY MASS INDEX: 49.79 KG/M2 | OXYGEN SATURATION: 99 % | HEART RATE: 88 BPM

## 2024-10-07 DIAGNOSIS — U07.1 COVID: Primary | ICD-10-CM

## 2024-10-07 LAB
SARS-COV-2 AG UPPER RESP QL IA: POSITIVE
VALID CONTROL: ABNORMAL

## 2024-10-07 PROCEDURE — 99213 OFFICE O/P EST LOW 20 MIN: CPT | Performed by: FAMILY MEDICINE

## 2024-10-07 PROCEDURE — 87811 SARS-COV-2 COVID19 W/OPTIC: CPT | Performed by: FAMILY MEDICINE

## 2024-10-07 RX ORDER — BENZONATATE 200 MG/1
200 CAPSULE ORAL 3 TIMES DAILY PRN
Qty: 20 CAPSULE | Refills: 0 | Status: SHIPPED | OUTPATIENT
Start: 2024-10-07

## 2024-10-07 NOTE — PROGRESS NOTES
"Ambulatory Visit  Name: James Marrero      : 1995      MRN: [id number]  Encounter Provider: Dennis Page DO  Encounter Date: 10/7/2024   Encounter department: [facility] [address] [facility]      Assessment & Plan  COVID  Discussed Paxlovid however since his symptoms are mild we are going to just treat symptomatically.  Orders:    POCT Rapid Covid Ag    benzonatate (TESSALON) 200 MG capsule; Take 1 capsule (200 mg total) by mouth 3 (three) times a day as needed for cough         History of Present Illness     Patient comes in today with a 3-day history of cough, congestion.  His mom was recently diagnosed with COVID.  He denies any shortness of breath.      Review of Systems   Constitutional:  Negative for chills, fatigue and fever.   HENT:  Positive for congestion. Negative for ear pain, hearing loss, postnasal drip, rhinorrhea and sore throat.    Eyes:  Negative for pain and visual disturbance.   Respiratory:  Positive for cough. Negative for chest tightness, shortness of breath and wheezing.    Cardiovascular:  Negative for chest pain and leg swelling.   Gastrointestinal:  Negative for abdominal distention, abdominal pain, constipation, diarrhea and vomiting.   Endocrine: Negative for cold intolerance and heat intolerance.   Genitourinary:  Negative for difficulty urinating, frequency and urgency.   Musculoskeletal:  Negative for arthralgias and gait problem.   Skin:  Negative for color change.   Neurological:  Negative for dizziness, tremors, syncope, numbness and headaches.   Hematological:  Negative for adenopathy.   Psychiatric/Behavioral:  Negative for agitation, confusion and sleep disturbance. The patient is not nervous/anxious.      Objective     /80   Pulse 88   Temp (!) 97 °F (36.1 °C)   Resp 16   Ht 5' 6\" (1.676 m)   Wt (!) 141 kg (309 lb 12.8 oz)   SpO2 99%   BMI 50.00 kg/m²     Physical Exam  Constitutional:       Appearance: He is well-developed. "   HENT:      Head: Normocephalic.      Right Ear: External ear normal.      Left Ear: External ear normal.      Nose: Nose normal.   Eyes:      Extraocular Movements: Extraocular movements intact.      Conjunctiva/sclera: Conjunctivae normal.      Pupils: Pupils are equal, round, and reactive to light.   Neck:      Thyroid: No thyromegaly.   Cardiovascular:      Rate and Rhythm: Normal rate and regular rhythm.      Heart sounds: Normal heart sounds.   Pulmonary:      Effort: Pulmonary effort is normal.      Breath sounds: Normal breath sounds.   Abdominal:      General: Bowel sounds are normal.      Palpations: Abdomen is soft.   Musculoskeletal:         General: Normal range of motion.      Cervical back: Normal range of motion.   Skin:     General: Skin is warm and dry.   Neurological:      Mental Status: He is alert and oriented to person, place, and time.   Psychiatric:         Mood and Affect: Mood normal.         Behavior: Behavior normal.         Dennis Page DO

## 2024-10-07 NOTE — TELEPHONE ENCOUNTER
Pt needs to reschedule his EGD, he has Covid.  I tried the office but they were busy so I told the pt I would have someone give him a call back

## 2024-10-14 NOTE — PROGRESS NOTES
"Bariatric Nutrition Assessment Note    Type of surgery    Preop (6 months of wt checks)--  Surgery Date: TBD--leaning toward sleeve  Surgeon: Dr Pires (consult was 4/19/24)    Nutrition Assessment   James Marrero  28 y.o.  male       Height: 5'6\"  Current Weight: 306.4#  BMI: 50.36  ReEval Weight:  322#   BMI: 52#  Wt with BMI of 25: 155.6#  Pre-Op Excess Wt: 167#    BMI: Body mass index is 49.45 kg/m².    [place]- [place] Equation:      Estimated calories for weight loss 2784-2367 ( 1-2# per wk wt loss - sedentary )  Estimated protein needs #(1.0-1.5 gms/kg IBW )   Estimated fluid needs 2100-2450ml (30-35 ml/kg IBW )      Weight History   Onset of Obesity: Adult, started when taking the psych meds  Family history of obesity: unknown  Wt Loss Attempts: Exercise  Self Created Diets (Portion Control, Healthy Food Choices, etc.)  Patient has tried the above for 6 months or more with insufficient weight loss or weight regain, which is why patient has requested to be evaluated for weight loss surgery today  Maximum Wt Lost: only lost when was in treatment where they controlled the portions.    Review of History and Medications   Wegovy, starting with Align  Starting Gabapetin  Past Medical History:   Diagnosis Date    Anxiety     Bronchitis     Concussion     Sports related    Depression     Hypertension     Morbid obesity with BMI of 45.0-49.9, adult (HCC)     Sleep apnea, obstructive      Past Surgical History:   Procedure Laterality Date    NO PAST SURGERIES       Social History     Socioeconomic History    Marital status: Single     Spouse name: Not on file    Number of children: Not on file    Years of education: Not on file    Highest education level: Not on file   Occupational History    Occupation: student    Occupation:      Employer: [employer]   Tobacco Use    Smoking status: Never    Smokeless tobacco: Never   Vaping Use    Vaping status: Never Used   Substance and Sexual Activity "    Alcohol use: Yes     Alcohol/week: 1.0 standard drink of alcohol     Types: 1 Cans of beer per week     Comment: social    Drug use: No    Sexual activity: Not on file   Other Topics Concern    Not on file   Social History Narrative    Lives with adoptive parents     Social Determinants of Health     Financial Resource Strain: Not on file   Food Insecurity: Not on file   Transportation Needs: Not on file   Physical Activity: Not on file   Stress: Not on file   Social Connections: Unknown (6/18/2024)    Received from [employer]     How often do you feel lonely or isolated from those around you? (Adult - for ages 18 years and over): Not on file   Intimate Partner Violence: Not on file   Housing Stability: Not on file       Current Outpatient Medications:     Azelastine HCl 137 MCG/SPRAY SOLN, SPRAY 2 SPRAYS INTO EACH NOSTRIL 2 TIMES A DAY USE IN EACH NOSTRIL AS DIRECTED (Patient not taking: Reported on 9/25/2024), Disp: 90 mL, Rfl: 1    benzonatate (TESSALON) 200 MG capsule, Take 1 capsule (200 mg total) by mouth 3 (three) times a day as needed for cough, Disp: 20 capsule, Rfl: 0    buPROPion (WELLBUTRIN XL) 300 mg 24 hr tablet, TAKE 1 TABLET (300 MG TOTAL) BY MOUTH EVERY MORNING., Disp: 90 tablet, Rfl: 1    desvenlafaxine succinate (PRISTIQ) 50 mg 24 hr tablet, TAKE 1 TABLET BY MOUTH EVERY DAY, Disp: 90 tablet, Rfl: 1    doxepin (SINEquan) 100 mg capsule, TAKE 1 CAPSULE (100 MG TOTAL) BY MOUTH 3 (THREE) TIMES DAILY AFTER MEALS, Disp: 270 capsule, Rfl: 1    fluticasone (FLONASE) 50 mcg/act nasal spray, SPRAY 1 SPRAY INTO EACH NOSTRIL EVERY DAY, Disp: 24 mL, Rfl: 1    gabapentin (NEURONTIN) 100 mg capsule, Take 2 capsules (200 mg total) by mouth 2 (two) times a day, Disp: 120 capsule, Rfl: 5    losartan (COZAAR) 50 mg tablet, Take 1 tablet (50 mg total) by mouth daily, Disp: 90 tablet, Rfl: 3    mupirocin (BACTROBAN) 2 % ointment, Apply topically 3 (three) times a day (Patient taking  differently: Apply topically if needed), Disp: 30 g, Rfl: 0    Omeprazole 20 MG TBEC, Take 1 tablet (20 mg total) by mouth 2 (two) times a day, Disp: 180 tablet, Rfl: 1    propranolol (INDERAL LA) 80 mg 24 hr capsule, TAKE 1 CAPSULE (80 MG TOTAL) BY MOUTH EVERY MORNING, Disp: 90 capsule, Rfl: 3    Semaglutide-Weight Management (Wegovy) 2.4 MG/0.75ML, Inject 0.75 mL (2.4 mg total) under the skin once a week, Disp: 3 mL, Rfl: 0    Sodium Fluoride 5000 PPM 1.1 % GEL, BRUSH ON NIGHTLY AND SPIT OUT EXCESS, Disp: , Rfl:     topiramate (TOPAMAX) 200 MG tablet, TAKE 1 TABLET (200 MG TOTAL) BY MOUTH 2 TIMES A DAY., Disp: 60 tablet, Rfl: 5    ziprasidone (GEODON) 40 mg capsule, TAKE 1 CAPSULE (40 MG TOTAL) BY MOUTH EVERY MORNING., Disp: 90 capsule, Rfl: 2    ziprasidone (GEODON) 80 mg capsule, TAKE 1 CAPSULE (80 MG TOTAL) BY MOUTH EVERY EVENING, Disp: 90 capsule, Rfl: 0    Food Intake and Lifestyle Assessment   Food Intake Assessment completed via usual diet recall    1/2-1 cup starch, Fit bit, more veggies, walking   Wake:  8a-12p  Breakfast: today smoothie with unsweet almond milk, banana, body fortress whey protein and PB (~1tbsp--suggested PB2 for less cals) + 2 waffles  Snack: chips at times OR fruit (banana, oranges, apples--more than 1) OR chobani greek yogurt (2)   Lunch: late lunch today will taco salad with lean meat  Snack: has been going with oatmeal with protein shake and or fruit--2 pouches + protein powder, put asked if can add banana to the oatmeal (advised to avoid the banana if doing 2 pouches of oatmeal)  Dinner: 8-9pm:  Last night taco salad with 2 crumbled taco shell w/90/10 ground beef, taco sauce,, light sour cream, cheese OR Jersey Mikes--1/4 of a Giant sandwich (ham, prav, salami with lettuce, tomato an delight clarke--looked it up and 1/4 is 413 cals, 16gm protein) + bake lays    Snack: pop corn or sf outshine bars  Bed: 11:30pm to 3am  **portions are the issue and night snacking**    Beverage intake:  "water, coffee/tea, and 100mg caffeine energy drink (5 cals), sugarfree drink  Protein supplement: has a protein powder, but doesn't use often.  Estimated protein intake per day: 80-100gm  Estimated fluid intake per day: 48-64oz water per day, 1 energy drink (100mg caffeine), 12oz coffee with sugar and fat free half and half  Meals eaten away from home: Jersey Bruce once a week, pizza once a week (usually 3 slices)  Typical meal pattern: 2-3 meals per day and grazes on snacks per day  Eating Behaviors: Consumption of high calorie/ high fat foods, Consumption of high calorie beverages, Large portion sizes, Frequent snacking/ grazing, Mindless eating, Emotional eating, Craves sweet foods, and Craves salty foods    Food allergies or intolerances:   Allergies   Allergen Reactions    Abilify [Aripiprazole]     Lithium     Lorazepam Other (See Comments)     aggressive    Seroquel [Quetiapine]      Cultural or Christian considerations: -    Physical Assessment  Physical Activity  Types of exercise: gym 4x per week: treadmill 2 x per week for 20-30mins, 2 classes per week (strength and a HIIT class)--has been doing this regimen for over 1 year, but hasn't been to the gym this week.   Current physical limitations: -    Psychosocial Assessment   Support systems: parent(s)  Socioeconomic factors: lives with parents--mom does both cooking and food shopping    Nutrition Diagnosis  Diagnosis: Overweight / Obesity (NC-3.3)  Related to: Physical inactivity and Excessive energy intake  As Evidenced by: BMI >25     Nutrition Prescription: Recommend the following diet  Regular    Interventions and Teaching   Discussed pre-op and post-op nutrition guidelines.       Patient educated and handouts provided.  Surgical changes to stomach / GI  Capacity of post-surgery stomach  Diet progression  Adequate hydration  Sugar and fat restriction to decrease \"dumping syndrome\"  Fat restriction to decrease steatorrhea  Expected weight loss  Weight " loss plateaus/ possibility of weight regain  Exercise  Suggestions for pre-op diet  Nutrition considerations after surgery  Protein supplements  Meal planning and preparation  Appropriate carbohydrate, protein, and fat intake, and food/fluid choices to maximize safe weight loss, nutrient intake, and tolerance   Dietary and lifestyle changes  Possible problems with poor eating habits  Intuitive eating  Techniques for self monitoring and keeping daily food journal  Potential for food intolerance after surgery, and ways to deal with them including: lactose intolerance, nausea, reflux, vomiting, diarrhea, food intolerance, appetite changes, gas  Vitamin / Mineral supplementation of Multivitamin with minerals and Vitamin D    Education provided to: patient    Barriers to learning: No barriers identified    Readiness to change: preparation    Prior research on procedure: books, internet, discussed with provider, and friends or family    Comprehension: verbalizes understanding     Expected Compliance: good    Recommendations  Pt is an appropriate candidate for surgery, if can show lifestyle changes over weight checks.    Evaluation / Monitoring  Dietitian to Monitor: Eating pattern as discussed Tolerance of nutrition prescription Body weight Lab values Physical activity    5/6 Weight Check Visit Summary 8/22/2024  Continues to prepare for weight loss surgery. Pt pleased with progress he has made. Reports changes to diet includes: stopped second portions, less carbs (ie: will remove bun on hamburgers, etc), snacks at night but better choice (fruit, popcorn, fruit ice ) and reading labels to make decisions on better food choice. Tri10X10 Room jaden for tracking but better with paper journal. Better with 30/60 rule.  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 Wearing CPAP. - but did stop while on vacation. Goes to gym but limited past month per vacations taken  of pt and his . Does use   treadmill 30 minutes on his own. Pt also got 's Permit and looking for employment. 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 Pt worked with Dr Francisco as advised and now better prepared to pursue WLS. Pt  taking Wegovy. Discussed lab results. Advised to take iron po per low level. Questions answered during session. Pt receptive. Pt accompanied my his mother.     PreOp Visit Summary 10/16/2024  Near completion of workflow - Had to r/s EGD per dx of Covid. Continues to lose weight via sustaining positive changes to diet. Still struggles with cravings, large portions but overall improved in choice and amount . Had discussion to address concern.  Hasn't been able to go to the gym d/t recovering from Covid. .-Near completion of workflow - Had to r/s EGD per dx of Covid. Discussed prep for procedure - no solids. Advised Protein drink and gave samples of Ensure Max. Questions answered during session. Pt receptive. Pt accompanied my his mother.    Workflow reviewed: Would like surgery before end of year  Psych and/or D+A Clearance: N/A  PCP Letter: Needs- has appointment 6/17  Surgeon Appt: 4/19/24  EGD: R/S to 9/13/2024 R/S to 11/8/2024  Cardiac Risk Assessment: 7/23/24  Sleep Studies: Uses CPAP  Blood work: Complete- 7/6/2024- advised to start iron (Vitron C)  Nicotine test: N/A  Weight loss meds: Wegovy  Required weight checks: 6 months required - completed  Weight Loss: Not required, encouraged positive lifestyle changes.    Pre-op weight goals:  Do NOT Gain  Can go to BMI of 35:   217#  Encouraged weight loss w/ diet and lifestyle changes  Will be started on a 2 week liver shrinking diet, possibly shorter/longer as per the discretion of the surgeon/team, directly prior to surgery  Goals  Continue to work on avoiding sugar sweetened beverages  Food journal via baritastic or in a notebook 3-4 days per week  Exercise 30 minutes 5 times per week--continue with gym, increase activity of daily living  Complete  "lesson plans 1-6  Eat 3 meals per day with protein at each meal  Eat or protein shake within one hour of waking  Eliminate mindless snacking  Use \"plate method\" with when setting up meals  Continue to work on going to bed earlier--to avoid playing on phone therefore suggested to put alarm for 15mins as a reminder to stop playing and put phone away  More walking --start with 15 mins per day of walking outside or in pool and increase to 30 mins per day as tolerated  F/U with bariatric providers monthly    Time Spent:   40 mins  "

## 2024-10-16 ENCOUNTER — CLINICAL SUPPORT (OUTPATIENT)
Dept: BARIATRICS | Facility: CLINIC | Age: 29
End: 2024-10-16

## 2024-10-16 VITALS — WEIGHT: 306.4 LBS | BODY MASS INDEX: 49.45 KG/M2

## 2024-10-16 DIAGNOSIS — E66.01 OBESITY, CLASS III, BMI 40-49.9 (MORBID OBESITY) (HCC): Primary | ICD-10-CM

## 2024-10-16 PROCEDURE — RECHECK

## 2024-11-07 ENCOUNTER — TELEPHONE (OUTPATIENT)
Age: 29
End: 2024-11-07

## 2024-11-07 ENCOUNTER — OFFICE VISIT (OUTPATIENT)
Dept: NEUROLOGY | Facility: CLINIC | Age: 29
End: 2024-11-07
Payer: COMMERCIAL

## 2024-11-07 VITALS
DIASTOLIC BLOOD PRESSURE: 76 MMHG | HEIGHT: 66 IN | OXYGEN SATURATION: 93 % | WEIGHT: 309.8 LBS | BODY MASS INDEX: 49.79 KG/M2 | HEART RATE: 83 BPM | SYSTOLIC BLOOD PRESSURE: 116 MMHG

## 2024-11-07 DIAGNOSIS — G47.33 OSA (OBSTRUCTIVE SLEEP APNEA): ICD-10-CM

## 2024-11-07 DIAGNOSIS — G43.009 MIGRAINE WITHOUT AURA AND WITHOUT STATUS MIGRAINOSUS, NOT INTRACTABLE: Primary | ICD-10-CM

## 2024-11-07 PROCEDURE — 99214 OFFICE O/P EST MOD 30 MIN: CPT | Performed by: PSYCHIATRY & NEUROLOGY

## 2024-11-07 RX ORDER — GABAPENTIN 100 MG/1
CAPSULE ORAL
Qty: 180 CAPSULE | Refills: 5 | Status: SHIPPED | OUTPATIENT
Start: 2024-11-07

## 2024-11-07 NOTE — PROGRESS NOTES
Neurology Ambulatory Visit  Name: James Marrero       : 1995       MRN: [id number]   Encounter Provider: Bonifacio Moss MD   Encounter Date: 2024  Enc[facility]: [facility]      James Marrero is a 28 y.o. male.       Assessment & Plan  Migraine without aura and without status migrainosus, not intractable    Orders:    Lyme Total AB W Reflex to IGM/IGG; Future    MRI brain without contrast; Future    gabapentin (NEURONTIN) 100 mg capsule; Take 3 tablets twice a day    Differential diagnosis discussed with the patient probably a combination of migraine headaches with analgesic rebound headache, he is on Topamax 200 mg twice a day which is being managed by his psychiatrist and recently was started on gabapentin 200 mg twice a day by his family physician still continues to have headaches he tried riboflavin and magnesium without much help,    I advised him to have another MRI of the brain to make sure that there is no other etiology of the headaches he was advised to avoid migraine triggers which we discussed in detail including foods to avoid and was advised to keep himself well-hydrated and to avoid taking over-the-counter analgesics more than twice a week, discussed with patient and advised him to increase the Neurontin to 300 mg at twice a day and see if it will help with the headaches and also was advised to follow-up with an ophthalmologist to get a complete eye exam if he still continues to have headaches then we could consider putting him on a CGRP injection every month and having him see a headache specialist.    He was advised to keep his blood pressure, cholesterol, sugar and weight under control.  To go to the hospital if has any worsening symptoms and call me otherwise to see me back in 3 months or sooner if needed and follow-up with the other physicians.  NIMO (obstructive sleep apnea)         Management as per the sleep  specialist    Subjective:  Patient is here in follow-up for his headache    HISTORY OF PRESENT ILLNESS    Patient is here in follow-up for headaches for the last several years he is accompanied with his mother, since he last saw me in July 2022 he tells me that he was doing good for certain time but recently has headaches for the last several months have increased it they are almost every day they are usually throbbing in nature on top of the head not associated with photophobia and phonophobia and they started after his TMS treatment for his depression, he is trying to go for weight loss surgery he denies any vision difficulties he is on Topamax for his depression which is being managed by his psychiatrist, he has been taking Excedrin migraines almost every day, no motor or sensory symptoms in upper or lower extremities no suicidal or homicidal thoughts no other complaints.        Prior Work-up:   MRI: Brain without contrast from 3/11/2021 was normal       Past Medical History:    Past Medical History:   Diagnosis Date    Anxiety     Bronchitis     Concussion     Sports related    Depression     Hypertension     Morbid obesity with BMI of 45.0-49.9, adult (HCC)     Sleep apnea, obstructive      Past Surgical History:   Procedure Laterality Date    NO PAST SURGERIES         Family History:  Family History   Adopted: Yes       Social History:  Social History     Tobacco Use    Smoking status: Never    Smokeless tobacco: Never   Vaping Use    Vaping status: Never Used   Substance Use Topics    Alcohol use: Yes     Alcohol/week: 1.0 standard drink of alcohol     Types: 1 Cans of beer per week     Comment: social    Drug use: No      Living situation:    Work:      Allergies:  Allergies   Allergen Reactions    Abilify [Aripiprazole]     Lithium     Lorazepam Other (See Comments)     aggressive    Seroquel [Quetiapine]        Medications:    Current Outpatient Medications:     buPROPion (WELLBUTRIN XL) 300 mg 24 hr  tablet, TAKE 1 TABLET (300 MG TOTAL) BY MOUTH EVERY MORNING., Disp: 90 tablet, Rfl: 1    desvenlafaxine succinate (PRISTIQ) 50 mg 24 hr tablet, TAKE 1 TABLET BY MOUTH EVERY DAY, Disp: 90 tablet, Rfl: 1    doxepin (SINEquan) 100 mg capsule, TAKE 1 CAPSULE (100 MG TOTAL) BY MOUTH 3 (THREE) TIMES DAILY AFTER MEALS, Disp: 270 capsule, Rfl: 1    gabapentin (NEURONTIN) 100 mg capsule, Take 3 tablets twice a day, Disp: 180 capsule, Rfl: 5    losartan (COZAAR) 50 mg tablet, Take 1 tablet (50 mg total) by mouth daily, Disp: 90 tablet, Rfl: 3    mupirocin (BACTROBAN) 2 % ointment, Apply topically 3 (three) times a day (Patient taking differently: Apply topically if needed), Disp: 30 g, Rfl: 0    Omeprazole 20 MG TBEC, Take 1 tablet (20 mg total) by mouth 2 (two) times a day, Disp: 180 tablet, Rfl: 1    propranolol (INDERAL LA) 80 mg 24 hr capsule, TAKE 1 CAPSULE (80 MG TOTAL) BY MOUTH EVERY MORNING, Disp: 90 capsule, Rfl: 3    Sodium Fluoride 5000 PPM 1.1 % GEL, BRUSH ON NIGHTLY AND SPIT OUT EXCESS, Disp: , Rfl:     topiramate (TOPAMAX) 200 MG tablet, TAKE 1 TABLET (200 MG TOTAL) BY MOUTH 2 TIMES A DAY., Disp: 60 tablet, Rfl: 5    ziprasidone (GEODON) 40 mg capsule, TAKE 1 CAPSULE (40 MG TOTAL) BY MOUTH EVERY MORNING., Disp: 90 capsule, Rfl: 2    ziprasidone (GEODON) 80 mg capsule, TAKE 1 CAPSULE (80 MG TOTAL) BY MOUTH EVERY EVENING, Disp: 90 capsule, Rfl: 0    Azelastine HCl 137 MCG/SPRAY SOLN, SPRAY 2 SPRAYS INTO EACH NOSTRIL 2 TIMES A DAY USE IN EACH NOSTRIL AS DIRECTED (Patient not taking: Reported on 9/25/2024), Disp: 90 mL, Rfl: 1    benzonatate (TESSALON) 200 MG capsule, Take 1 capsule (200 mg total) by mouth 3 (three) times a day as needed for cough (Patient not taking: Reported on 11/7/2024), Disp: 20 capsule, Rfl: 0    fluticasone (FLONASE) 50 mcg/act nasal spray, SPRAY 1 SPRAY INTO EACH NOSTRIL EVERY DAY (Patient not taking: Reported on 11/7/2024), Disp: 24 mL, Rfl: 1    Semaglutide-Weight Management (Wegovy) 2.4  "MG/0.75ML, Inject 0.75 mL (2.4 mg total) under the skin once a week (Patient not taking: Reported on 11/7/2024), Disp: 3 mL, Rfl: 0    Objective:  /76 (BP Location: Left arm, Patient Position: Sitting, Cuff Size: Extra-Large)   Pulse 83   Ht 5' 6\" (1.676 m)   Wt (!) 141 kg (309 lb 12.8 oz)   SpO2 93%   BMI 50.00 kg/m²      Labs  I have reviewed pertinent labs:  CBC:   Lab Results   Component Value Date    WBC 8.72 07/06/2024    RBC 5.47 07/06/2024    HGB 13.8 07/06/2024    HCT 43.9 07/06/2024    MCV 80 (L) 07/06/2024     07/06/2024    MCH 25.2 (L) 07/06/2024    MCHC 31.4 07/06/2024    RDW 13.5 07/06/2024    MPV 10.2 07/06/2024    NEUTROABS 3.99 03/13/2021     CMP:   Lab Results   Component Value Date    SODIUM 137 07/06/2024    K 3.8 07/06/2024     07/06/2024    CO2 24 07/06/2024    AGAP 7 07/06/2024    BUN 11 07/06/2024    CREATININE 0.94 07/06/2024    GLUF 97 07/06/2024    CALCIUM 9.2 07/06/2024    AST 15 07/06/2024    ALT 20 07/06/2024    ALKPHOS 51 07/06/2024    TP 7.4 07/06/2024    ALB 4.0 07/06/2024    TBILI 0.37 07/06/2024    EGFR 110 07/06/2024              General Exam  GENERAL APPEARANCE:  No distress, alert, interactive and cooperative.  CARDIOVASCULAR: Warm and well perfused  LUNGS: normal work of breathing on room air  EXTREMITIES: no peripheral edema     Neurologic Exam  MENTAL STATE:  Orientation was normal to time, place and person without aphasia or apraxia. Recent and remote memory was intact.  Attention span and concentration were normal. Language testing was normal for comprehension, repetition, expression, and naming.     CRANIAL NERVES:  CN 2       visual fields full to confrontation.  Visual acuity is 20/20 with hand-held chart  CN 3, 4, 6  Pupils round, 4 mm in diameter, equally reactive to light. Lids symmetric; no ptosis. EOMs normal alignment, full range. No nystagmus.  CN 5  Facial sensation intact bilaterally.  CN 7  Normal and symmetric facial strength.   CN " 8  Hearing intact to finger rub bilaterally.  CN 9  Palate elevates symmetrically.  CN 11  Normal strength of shoulder shrug and neck turning.  CN 12  Tongue midline, with normal bulk and strength.     MOTOR:  Motor exam was normal. Muscle bulk and tone were normal in both upper and lower extremities. Muscle strength was 5/5 in distal and proximal muscles in both upper and lower extremities. No fasciculations, tremor or other abnormal movements were present.     REFLEXES:  RIGHT UE   LEFT UE  BR:2              BR:2    Biceps:2      Biceps:2    Triceps:2     Triceps:2       RIGHT LLE   LEFT LLE    Knee:2           Knee:2    Ankle:2         Ankle:2    Babinski: toes downgoing to plantar stimulation. No clonus.     SENSORY:  Intact to temperature and vibratory sensation in the upper and lower extremities bilaterally. Cortical function is intact.    COORDINATION:   Coordination exam was normal. In both upper extremities, finger-nose-finger was intact without dysmetria or overshoot.      GAIT:  Gait was normal. Station was stable with a normal base. Gait was stable with a normal arm swing and speed. Tandem gait was intact. No Romberg sign was present.    No temporal artery tenderness, no cervical spine tenderness.      ROS:  Review of Systems   Constitutional:  Negative for appetite change, fatigue and fever.   HENT: Negative.  Negative for hearing loss, tinnitus, trouble swallowing and voice change.    Eyes: Negative.  Negative for photophobia, pain and visual disturbance.   Respiratory: Negative.  Negative for shortness of breath.    Cardiovascular: Negative.  Negative for palpitations.   Gastrointestinal: Negative.  Negative for nausea and vomiting.   Endocrine: Negative.  Negative for cold intolerance.   Genitourinary: Negative.  Negative for dysuria, frequency and urgency.   Musculoskeletal:  Negative for back pain, gait problem, myalgias, neck pain and neck stiffness.   Skin: Negative.  Negative for rash.    Allergic/Immunologic: Negative.    Neurological:  Positive for headaches. Negative for dizziness, tremors, seizures, syncope, facial asymmetry, speech difficulty, weakness, light-headedness and numbness.   Hematological: Negative.  Does not bruise/bleed easily.   Psychiatric/Behavioral: Negative.  Negative for confusion, hallucinations and sleep disturbance.    All other systems reviewed and are negative.      I have reviewed the past medical history, surgical history, social and family history, current medications, allergies vitals, review of systems, and updated this information as appropriate today.    Administrative Statements   The total amount of time spent with the patient and on chart review and documentation was 20 minutes. Issues addressed during this clinic visit included overall management, medication counseling or monitoring, and counseling and coordination of care.         Bonifacio Moss MD

## 2024-11-07 NOTE — TELEPHONE ENCOUNTER
Patient called to report that they are in traffic and running a bit late     Patient appointment is at 11 am   Patient will be 15 min late     Front office informed spoke with Eleni GONZALEZ At  that stated they will inform the  MA     Late reason marked on Appt. Note                     Thank you!

## 2024-11-07 NOTE — ASSESSMENT & PLAN NOTE
Orders:    Lyme Total AB W Reflex to IGM/IGG; Future    MRI brain without contrast; Future    gabapentin (NEURONTIN) 100 mg capsule; Take 3 tablets twice a day    Differential diagnosis discussed with the patient probably a combination of migraine headaches with analgesic rebound headache, he is on Topamax 200 mg twice a day which is being managed by his psychiatrist and recently was started on gabapentin 200 mg twice a day by his family physician still continues to have headaches he tried riboflavin and magnesium without much help,    I advised him to have another MRI of the brain to make sure that there is no other etiology of the headaches he was advised to avoid migraine triggers which we discussed in detail including foods to avoid and was advised to keep himself well-hydrated and to avoid taking over-the-counter analgesics more than twice a week, discussed with patient and advised him to increase the Neurontin to 300 mg at twice a day and see if it will help with the headaches and also was advised to follow-up with an ophthalmologist to get a complete eye exam if he still continues to have headaches then we could consider putting him on a CGRP injection every month and having him see a headache specialist.    He was advised to keep his blood pressure, cholesterol, sugar and weight under control.  To go to the hospital if has any worsening symptoms and call me otherwise to see me back in 3 months or sooner if needed and follow-up with the other physicians.

## 2024-11-08 ENCOUNTER — ANESTHESIA EVENT (OUTPATIENT)
Dept: GASTROENTEROLOGY | Facility: HOSPITAL | Age: 29
End: 2024-11-08
Payer: COMMERCIAL

## 2024-11-08 ENCOUNTER — ANESTHESIA (OUTPATIENT)
Dept: GASTROENTEROLOGY | Facility: HOSPITAL | Age: 29
End: 2024-11-08
Payer: COMMERCIAL

## 2024-11-08 ENCOUNTER — HOSPITAL ENCOUNTER (OUTPATIENT)
Dept: GASTROENTEROLOGY | Facility: HOSPITAL | Age: 29
Setting detail: OUTPATIENT SURGERY
End: 2024-11-08
Attending: SURGERY
Payer: COMMERCIAL

## 2024-11-08 VITALS
OXYGEN SATURATION: 95 % | WEIGHT: 309.75 LBS | RESPIRATION RATE: 20 BRPM | DIASTOLIC BLOOD PRESSURE: 55 MMHG | HEIGHT: 66 IN | TEMPERATURE: 97.9 F | BODY MASS INDEX: 49.78 KG/M2 | HEART RATE: 80 BPM | SYSTOLIC BLOOD PRESSURE: 113 MMHG

## 2024-11-08 DIAGNOSIS — E66.01 MORBID (SEVERE) OBESITY DUE TO EXCESS CALORIES (HCC): ICD-10-CM

## 2024-11-08 DIAGNOSIS — G47.33 OSA (OBSTRUCTIVE SLEEP APNEA): Primary | ICD-10-CM

## 2024-11-08 DIAGNOSIS — E66.01 MORBID OBESITY (HCC): ICD-10-CM

## 2024-11-08 PROCEDURE — 43239 EGD BIOPSY SINGLE/MULTIPLE: CPT | Performed by: SURGERY

## 2024-11-08 PROCEDURE — 88305 TISSUE EXAM BY PATHOLOGIST: CPT | Performed by: SPECIALIST

## 2024-11-08 RX ORDER — LIDOCAINE HYDROCHLORIDE 20 MG/ML
INJECTION, SOLUTION EPIDURAL; INFILTRATION; INTRACAUDAL; PERINEURAL AS NEEDED
Status: DISCONTINUED | OUTPATIENT
Start: 2024-11-08 | End: 2024-11-08

## 2024-11-08 RX ORDER — PROPOFOL 10 MG/ML
INJECTION, EMULSION INTRAVENOUS AS NEEDED
Status: DISCONTINUED | OUTPATIENT
Start: 2024-11-08 | End: 2024-11-08

## 2024-11-08 RX ADMIN — LIDOCAINE HYDROCHLORIDE 100 MG: 20 INJECTION, SOLUTION EPIDURAL; INFILTRATION; INTRACAUDAL; PERINEURAL at 09:46

## 2024-11-08 RX ADMIN — PROPOFOL 150 MG: 10 INJECTION, EMULSION INTRAVENOUS at 09:48

## 2024-11-08 RX ADMIN — PROPOFOL 150 MG: 10 INJECTION, EMULSION INTRAVENOUS at 09:46

## 2024-11-08 NOTE — ANESTHESIA PREPROCEDURE EVALUATION
Procedure:  EGD    Relevant Problems   ANESTHESIA (within normal limits)      CARDIO   (+) Essential hypertension   (+) Hyperlipidemia, mixed   (+) Migraine without aura and without status migrainosus, not intractable      GI/HEPATIC   (+) Gastroesophageal reflux disease      NEURO/PSYCH   (+) Anxiety, generalized   (+) Depression, recurrent (HCC)   (+) Migraine without aura and without status migrainosus, not intractable      PULMONARY   (+) NIMO (obstructive sleep apnea) (Compliant with CPAP)      Other   (+) Obesity, Class III, BMI 40-49.9 (morbid obesity) (HCC)        Physical Exam    Airway  Comment: Large neck circumference  Mallampati score: IV  TM Distance: >3 FB  Neck ROM: full     Dental   No notable dental hx     Cardiovascular  Cardiovascular exam normal    Pulmonary   Decreased breath sounds    Other Findings        Anesthesia Plan  ASA Score- 3     Anesthesia Type- IV sedation with anesthesia with ASA Monitors.         Additional Monitors:     Airway Plan:            Plan Factors-Exercise tolerance (METS): >4 METS.    Chart reviewed. EKG reviewed. Imaging results reviewed. Existing labs reviewed. Patient summary reviewed.    Patient is not a current smoker.      Obstructive sleep apnea risk education given perioperatively.        Induction- intravenous.    Postoperative Plan-         Informed Consent- Anesthetic plan and risks discussed with patient.  I personally reviewed this patient with the CRNA. Discussed and agreed on the Anesthesia Plan with the CRNA..

## 2024-11-08 NOTE — ANESTHESIA POSTPROCEDURE EVALUATION
Post-Op Assessment Note    CV Status:  Stable    Pain management: adequate       Mental Status:  Alert and awake   Hydration Status:  Euvolemic   PONV Controlled:  Controlled   Airway Patency:  Patent     Post Op Vitals Reviewed: Yes    No anethesia notable event occurred.            Last Filed PACU Vitals:  Vitals Value Taken Time   Temp 97.9 °F (36.6 °C) 11/08/24 0957   Pulse 80 11/08/24 1017   /55 11/08/24 1017   Resp 20 11/08/24 1017   SpO2 95 % 11/08/24 1017       Modified Jackson:  Activity: 2 (11/8/2024 10:17 AM)  Respiration: 2 (11/8/2024 10:17 AM)  Circulation: 2 (11/8/2024 10:17 AM)  Consciousness: 2 (11/8/2024 10:17 AM)  Oxygen Saturation: 2 (11/8/2024 10:17 AM)  Modified Jackson Score: 10 (11/8/2024 10:17 AM)

## 2024-11-08 NOTE — H&P
"This is a 28 y.o. male with a history of morbid obesity and Body mass index is 49.99 kg/m².  Here for an EGD to evaluate the anatomy of the GI tract and to rule out the presence of H. pylori.    Physical Exam    /66   Pulse 76   Temp 97.9 °F (36.6 °C) (Temporal)   Resp 18   Ht 5' 6\" (1.676 m)   Wt (!) 140 kg (309 lb 11.9 oz)   SpO2 95%   BMI 49.99 kg/m²    AAOx3  RRR  CTA B  Abdomen obese. Benign.  Extremities warm and dry       A/P:    This is a 28 y.o. male with a history of morbid obesity and Body mass index is 49.99 kg/m²..    Will proceed with the EGD and biopsies.      Chey Sam MD  11/8/2024  9:35 AM         "

## 2024-11-08 NOTE — ANESTHESIA POSTPROCEDURE EVALUATION
Post-Op Assessment Note    CV Status:  Stable  Pain Score: 0    Pain management: adequate       Mental Status:  Sleepy   PONV Controlled:  None   Airway Patency:  Patent  Two or more mitigation strategies used for obstructive sleep apnea   Post Op Vitals Reviewed: Yes    No anethesia notable event occurred.            Last Filed PACU Vitals:  Vitals Value Taken Time   Temp     Pulse     BP     Resp     SpO2         Modified Jackson:  Activity: 2 (11/8/2024  9:07 AM)  Respiration: 2 (11/8/2024  9:07 AM)  Circulation: 2 (11/8/2024  9:07 AM)  Consciousness: 2 (11/8/2024  9:07 AM)  Oxygen Saturation: 2 (11/8/2024  9:07 AM)  Modified Jackson Score: 10 (11/8/2024  9:07 AM)

## 2024-11-10 DIAGNOSIS — F33.9 DEPRESSION, RECURRENT (HCC): ICD-10-CM

## 2024-11-11 RX ORDER — ZIPRASIDONE HYDROCHLORIDE 80 MG/1
80 CAPSULE ORAL EVERY EVENING
Qty: 90 CAPSULE | Refills: 0 | Status: SHIPPED | OUTPATIENT
Start: 2024-11-11

## 2024-11-12 ENCOUNTER — TELEPHONE (OUTPATIENT)
Dept: BARIATRICS | Facility: CLINIC | Age: 29
End: 2024-11-12

## 2024-11-12 DIAGNOSIS — R73.03 PREDIABETES: ICD-10-CM

## 2024-11-12 DIAGNOSIS — E66.01 MORBID OBESITY WITH BMI OF 50.0-59.9, ADULT (HCC): ICD-10-CM

## 2024-11-12 RX ORDER — SEMAGLUTIDE 2.4 MG/.75ML
2.4 INJECTION, SOLUTION SUBCUTANEOUS WEEKLY
Qty: 3 ML | Refills: 0 | OUTPATIENT
Start: 2024-11-12

## 2024-11-12 NOTE — TELEPHONE ENCOUNTER
Patient mother calling in and asking for refill of wegovy   Patient stating no side effects     Please send to [facility]

## 2024-11-13 PROCEDURE — 88305 TISSUE EXAM BY PATHOLOGIST: CPT | Performed by: SPECIALIST

## 2024-11-13 NOTE — PROGRESS NOTES
Pre op. Patient has been submitted, awaiting approval. Patient had EGD 11/8. Feels that he has been overeating- hasn't been on Ozempic for 2 weeks and also was a liquid diet and over ate after. Looking for others for approval- started working with a therapist to gain more confidence.   Has an appointment with a new psychiatrist-Dr. Kumar in December   Has been going to bed earlier- 1030PM, using his CPAP, getting up earlier- 8AM  Struggles with eating large portions and and then not getting hungry for long periods.  Encouraged small more frequent meals.   Going to the gym 3-4x per week  Praised patient for his progress. Encouraged him to continue to be empowered to be more independent and pushing past his fears.  Arabella Ramires LCSW

## 2024-11-14 ENCOUNTER — RESULTS FOLLOW-UP (OUTPATIENT)
Dept: BARIATRICS | Facility: CLINIC | Age: 29
End: 2024-11-14

## 2024-11-14 ENCOUNTER — APPOINTMENT (OUTPATIENT)
Dept: LAB | Facility: HOSPITAL | Age: 29
End: 2024-11-14
Attending: PSYCHIATRY & NEUROLOGY
Payer: COMMERCIAL

## 2024-11-14 ENCOUNTER — CLINICAL SUPPORT (OUTPATIENT)
Dept: BARIATRICS | Facility: CLINIC | Age: 29
End: 2024-11-14

## 2024-11-14 VITALS
SYSTOLIC BLOOD PRESSURE: 126 MMHG | WEIGHT: 315 LBS | BODY MASS INDEX: 50.62 KG/M2 | HEART RATE: 85 BPM | DIASTOLIC BLOOD PRESSURE: 80 MMHG | HEIGHT: 66 IN | RESPIRATION RATE: 16 BRPM

## 2024-11-14 DIAGNOSIS — Z71.89 ENCOUNTER FOR PRE-BARIATRIC SURGERY COUNSELING AND EDUCATION: Primary | ICD-10-CM

## 2024-11-14 DIAGNOSIS — G47.33 OSA (OBSTRUCTIVE SLEEP APNEA): ICD-10-CM

## 2024-11-14 DIAGNOSIS — E66.01 MORBID (SEVERE) OBESITY DUE TO EXCESS CALORIES (HCC): ICD-10-CM

## 2024-11-14 DIAGNOSIS — G43.009 MIGRAINE WITHOUT AURA AND WITHOUT STATUS MIGRAINOSUS, NOT INTRACTABLE: ICD-10-CM

## 2024-11-14 DIAGNOSIS — A04.8 H. PYLORI INFECTION: Primary | ICD-10-CM

## 2024-11-14 LAB — B BURGDOR IGG+IGM SER QL IA: NEGATIVE

## 2024-11-14 PROCEDURE — 36415 COLL VENOUS BLD VENIPUNCTURE: CPT

## 2024-11-14 PROCEDURE — RECHECK

## 2024-11-14 PROCEDURE — 86618 LYME DISEASE ANTIBODY: CPT

## 2024-11-14 RX ORDER — CLARITHROMYCIN 500 MG/1
500 TABLET ORAL EVERY 12 HOURS SCHEDULED
Qty: 28 TABLET | Refills: 0 | Status: SHIPPED | OUTPATIENT
Start: 2024-11-14 | End: 2024-11-28

## 2024-11-14 RX ORDER — AMOXICILLIN 500 MG/1
1000 CAPSULE ORAL EVERY 12 HOURS SCHEDULED
Qty: 56 CAPSULE | Refills: 0 | Status: SHIPPED | OUTPATIENT
Start: 2024-11-14 | End: 2024-11-28

## 2024-11-14 NOTE — Clinical Note
[facility].  He is currently on 2.4MG dose with 0 injections left.  Patient denies any side effects/symptoms.   NOTE:  Patient is on surgical side also...  Wt Readings from Last 3 Encounters: 11/14/24 : (!) 144 kg (316 lb 6.4 oz) 11/08/24 : (!) 140 kg (309 lb 11.9 oz) 11/07/24 : (!) 141 kg (309 lb 12.8 oz)

## 2024-11-14 NOTE — RESULT ENCOUNTER NOTE
YESY today (11/14/2024) he has an appt in our office Vimal and we can go more on detail about provider recommendations.

## 2024-11-14 NOTE — RESULT ENCOUNTER NOTE
Please advise patient of H.Pylori infection - this is a fairly common infection and often people have this bacteria in their stomach and are not aware of it. If left untreated it can possibly lead to ulcers or reflux. Please advise patient that I am sending Rx for antibiotics x 14 days and PPI BID x 14 days (if already on a PPI then continue to take it daily if already on Protonix 40mg).  Replete with OTC probiotics Align or Culturelle afterwards, thank you    PLEASE CONFIRM HE STILL HAS OMEPRAZOLE 20MG BID PER HIS MED LIST THANKS

## 2024-11-15 ENCOUNTER — TELEPHONE (OUTPATIENT)
Age: 29
End: 2024-11-15

## 2024-11-15 ENCOUNTER — TELEPHONE (OUTPATIENT)
Dept: BARIATRICS | Facility: CLINIC | Age: 29
End: 2024-11-15

## 2024-11-15 DIAGNOSIS — E66.01 MORBID OBESITY WITH BMI OF 50.0-59.9, ADULT (HCC): ICD-10-CM

## 2024-11-15 DIAGNOSIS — R73.03 PREDIABETES: ICD-10-CM

## 2024-11-15 RX ORDER — SEMAGLUTIDE 2.4 MG/.75ML
2.4 INJECTION, SOLUTION SUBCUTANEOUS WEEKLY
Qty: 3 ML | Refills: 2 | Status: SHIPPED | OUTPATIENT
Start: 2024-11-15 | End: 2025-02-07

## 2024-11-15 NOTE — TELEPHONE ENCOUNTER
Pt isn't sure why need would need to see a nurse since he had a nurse visit yesterday 11/14.  I told him the office is currently closed but I would let Yusra know he was seen on 11/14.  I also let the pt know we did submit the PA for his Wegovy today

## 2024-11-15 NOTE — TELEPHONE ENCOUNTER
Patient returned a call from Yusra regarding scheduling an appointment. Please call the patient back at [phone]. Left voicemail for the patient as the call was dropped.

## 2024-11-15 NOTE — PROGRESS NOTES
LM on pt vm to schedule a followup with Maryse in December.  She represcribed his medication but must see her for a followup

## 2024-12-02 ENCOUNTER — OFFICE VISIT (OUTPATIENT)
Age: 29
End: 2024-12-02
Payer: COMMERCIAL

## 2024-12-02 VITALS
TEMPERATURE: 98.1 F | RESPIRATION RATE: 18 BRPM | WEIGHT: 313 LBS | HEART RATE: 104 BPM | DIASTOLIC BLOOD PRESSURE: 80 MMHG | OXYGEN SATURATION: 98 % | SYSTOLIC BLOOD PRESSURE: 128 MMHG | HEIGHT: 66 IN | BODY MASS INDEX: 50.3 KG/M2

## 2024-12-02 DIAGNOSIS — G47.33 OSA (OBSTRUCTIVE SLEEP APNEA): Primary | ICD-10-CM

## 2024-12-02 DIAGNOSIS — E66.01 MORBID (SEVERE) OBESITY DUE TO EXCESS CALORIES (HCC): ICD-10-CM

## 2024-12-02 PROCEDURE — 99214 OFFICE O/P EST MOD 30 MIN: CPT | Performed by: INTERNAL MEDICINE

## 2024-12-02 NOTE — PROGRESS NOTES
"Assessment/Plan:   Diagnoses and all orders for this visit:    NIMO (obstructive sleep apnea)    Morbid (severe) obesity due to excess calories (HCC)    Moderate obstructive sleep apnea on CPAP 14 cm of water not consistently using it  Again discussed the need for compliance with the CPAP machine understands and verbalizes consequences of untreated sleep apnea discussed with excessive daytime sleepiness, increased risk for myocardial infarction stroke atrial fibrillation discussed  He is planning for a bariatric surgery coming up in January 2025  Again discussed the importance of perioperative use of the CPAP consistently and the perioperative risks for pulmonary complications with untreated sleep apnea understands and verbalizes  Cleaning of the supplies and change of PAP supplies discussed  Will see him back in 6 months or earlier as needed    Return in about 6 months (around 6/2/2025).  All questions are answered to the patient's satisfaction and understanding.  He verbalizes understanding.  He is encouraged to call with any further questions or concerns.    Portions of the record may have been created with voice recognition software.  Occasional wrong word or \"sound a like\" substitutions may have occurred due to the inherent limitations of voice recognition software.  Read the chart carefully and recognize, using context, where substitutions have occurred.    Electronically Signed by Ann Raymundo MD    ______________________________________________________________________    Chief Complaint:   Chief Complaint   Patient presents with    Follow-up       Patient ID: James is a 28 y.o. y.o. male has a past medical history of ADHD, Anxiety, Bronchitis, Concussion, Depression, Hypertension, Mood swings, Morbid obesity with BMI of 45.0-49.9, adult (HCC), and Sleep apnea, obstructive.    12/2/2024  Patient presents today for follow-up visit.  Patient with moderate obstructive sleep apnea on CPAP 14 cm of water, has " not been using his CPAP consistently does state that when he uses it he does feel well rested the next day, he has 2 nocturnal awakenings for nocturia he states he goes to bed between 10 PM to 1 AM does not have any trouble falling asleep and he is out of the bed between 11 AM and noon time, he has 2-3 nocturnal awakenings and he states he just forgets to put it back on once he wakes up in the middle of the night.        Review of Systems   Constitutional:  Positive for fatigue.   HENT: Negative.     Eyes: Negative.    Cardiovascular: Negative.    Gastrointestinal: Negative.    Endocrine: Negative.    Genitourinary: Negative.    Musculoskeletal: Negative.    Allergic/Immunologic: Negative.    Neurological: Negative.    Psychiatric/Behavioral:  Positive for sleep disturbance.        Smoking history: He reports that he has never smoked. He has never used smokeless tobacco.    The following portions of the patient's history were reviewed and updated as appropriate: allergies, current medications, past family history, past medical history, past social history, past surgical history, and problem list.    Immunization History   Administered Date(s) Administered    BCG 05/30/1998, 08/18/2000, 04/06/2001, 07/06/2001, 02/26/2002    COVID-19 Moderna mRNA Vaccine 12 Yr+ 50 mcg/0.5 mL (Spikevax) 11/05/2023    COVID-19 PFIZER VACCINE 0.3 ML IM 03/18/2021, 04/10/2021, 12/20/2021    DTaP 04/21/1997, 05/26/1997, 08/26/1997, 07/08/2000, 12/02/2002    H1N1, All Formulations 12/03/2009    HPV Quadrivalent 07/27/2014, 11/02/2014    Hep A, ped/adol, 2 dose 06/11/2013    Hep B, Adolescent or Pediatric 11/19/2002, 12/30/2002, 06/02/2003, 05/25/2014    Hepatitis A 06/01/2013, 06/11/2013, 07/27/2014    HiB 11/19/2002    INFLUENZA 12/05/2009, 10/07/2015, 12/07/2022    IPV 07/27/2014    Influenza, injectable, quadrivalent, preservative free 0.5 mL 10/16/2020, 12/07/2022    MMR 03/16/1998, 11/19/2002    Meningococcal MCV4P 08/17/2009    OPV  04/21/1997, 05/26/1997, 08/26/1997, 02/16/1998, 04/19/1998    Tdap 08/17/2009    Tuberculin Skin Test-PPD Intradermal 04/27/2015, 06/05/2015    Typhoid Live, oral 06/11/2013    Typhoid, Unspecified 06/11/2013    Varicella 12/02/2002, 06/21/2004, 08/17/2009     Current Outpatient Medications   Medication Sig Dispense Refill    buPROPion (WELLBUTRIN XL) 300 mg 24 hr tablet TAKE 1 TABLET (300 MG TOTAL) BY MOUTH EVERY MORNING. 90 tablet 1    desvenlafaxine succinate (PRISTIQ) 50 mg 24 hr tablet TAKE 1 TABLET BY MOUTH EVERY DAY 90 tablet 1    doxepin (SINEquan) 100 mg capsule TAKE 1 CAPSULE (100 MG TOTAL) BY MOUTH 3 (THREE) TIMES DAILY AFTER MEALS 270 capsule 1    gabapentin (NEURONTIN) 100 mg capsule Take 3 tablets twice a day 180 capsule 5    losartan (COZAAR) 50 mg tablet Take 1 tablet (50 mg total) by mouth daily 90 tablet 3    mupirocin (BACTROBAN) 2 % ointment Apply topically 3 (three) times a day 30 g 0    Omeprazole 20 MG TBEC Take 1 tablet (20 mg total) by mouth 2 (two) times a day 180 tablet 1    Semaglutide-Weight Management (Wegovy) 2.4 MG/0.75ML Inject 0.75 mL (2.4 mg total) under the skin once a week 3 mL 2    Sodium Fluoride 5000 PPM 1.1 % GEL BRUSH ON NIGHTLY AND SPIT OUT EXCESS      topiramate (TOPAMAX) 200 MG tablet TAKE 1 TABLET (200 MG TOTAL) BY MOUTH 2 TIMES A DAY. 60 tablet 5    ziprasidone (GEODON) 40 mg capsule TAKE 1 CAPSULE (40 MG TOTAL) BY MOUTH EVERY MORNING. 90 capsule 2    ziprasidone (GEODON) 80 mg capsule TAKE 1 CAPSULE BY MOUTH EVERY EVENING 90 capsule 0    Azelastine HCl 137 MCG/SPRAY SOLN SPRAY 2 SPRAYS INTO EACH NOSTRIL 2 TIMES A DAY USE IN EACH NOSTRIL AS DIRECTED (Patient not taking: Reported on 12/2/2024) 90 mL 1    benzonatate (TESSALON) 200 MG capsule Take 1 capsule (200 mg total) by mouth 3 (three) times a day as needed for cough (Patient not taking: Reported on 11/7/2024) 20 capsule 0    fluticasone (FLONASE) 50 mcg/act nasal spray SPRAY 1 SPRAY INTO EACH NOSTRIL EVERY DAY  "(Patient not taking: Reported on 12/2/2024) 24 mL 1    propranolol (INDERAL LA) 80 mg 24 hr capsule TAKE 1 CAPSULE (80 MG TOTAL) BY MOUTH EVERY MORNING 90 capsule 3     No current facility-administered medications for this visit.     Allergies: Abilify [aripiprazole], Lithium, Lorazepam, and Seroquel [quetiapine]    Objective:  Vitals:    12/02/24 1444 12/02/24 1446   BP: 128/80    BP Location: Right arm    Patient Position: Sitting    Cuff Size: Large    Pulse: 104    Resp: 18    Temp: 98.1 °F (36.7 °C)    TempSrc: Oral    SpO2: 98% 98%   Weight: (!) 142 kg (313 lb)    Height: 5' 6\" (1.676 m)    Oxygen Therapy  SpO2: 98 %  Oxygen Therapy: None (Room air)  .  Wt Readings from Last 3 Encounters:   12/02/24 (!) 142 kg (313 lb)   11/14/24 (!) 144 kg (316 lb 6.4 oz)   11/08/24 (!) 140 kg (309 lb 11.9 oz)     Body mass index is 50.52 kg/m².    Physical Exam  Vitals reviewed.   Constitutional:       General: He is not in acute distress.     Appearance: Normal appearance. He is obese. He is not ill-appearing.   HENT:      Head: Normocephalic and atraumatic.      Mouth/Throat:      Pharynx: Oropharynx is clear.   Eyes:      Conjunctiva/sclera: Conjunctivae normal.   Cardiovascular:      Rate and Rhythm: Normal rate and regular rhythm.   Pulmonary:      Effort: Pulmonary effort is normal. No respiratory distress.      Breath sounds: Normal breath sounds. No decreased breath sounds, wheezing, rhonchi or rales.   Abdominal:      General: Abdomen is flat. There is no distension.   Musculoskeletal:         General: Normal range of motion.      Cervical back: Normal range of motion.      Right lower leg: No edema.      Left lower leg: No edema.   Skin:     General: Skin is warm and dry.   Neurological:      Mental Status: He is alert and oriented to person, place, and time.   Psychiatric:         Mood and Affect: Mood normal.         Behavior: Behavior normal.         Lab Review:   Appointment on 11/14/2024   Component Date Value "    Lyme Total Antibodies 11/14/2024 Negative    Hospital Outpatient Visit on 11/08/2024   Component Date Value    Case Report 11/08/2024                      Value:Surgical Pathology Report                         Case: [id number]                                  Authorizing Provider:  Chey Sam MD      Collected:           11/08/2024 0951              Ordering Location:     [facility] Received:            11/08/2024 1112                                     Endoscopy                                                                    Pathologist:           Irma Cook MD                                                     Specimen:    Stomach                                                                                    Final Diagnosis 11/08/2024                      Value:A. Stomach:  -  H. pylori associated chronic active antral gastritis and reactive changes.  -  Helicobacter pylori, identified by H&E stain.  -  Negative for intestinal metaplasia, dysplasia or carcinoma.      Additional Information 11/08/2024                      Value:All reported additional testing was performed with appropriately reactive controls.  These tests were developed and their performance characteristics determined by [facility] or appropriate performing facility, though some tests may be performed on tissues which have not been validated for performance characteristics (such as staining performed on alcohol exposed cell blocks and decalcified tissues).  Results should be interpreted with caution and in the context of the patients’ clinical condition. These tests may not be cleared or approved by the U.S. Food and Drug Administration, though the FDA has determined that such clearance or approval is not necessary. These tests are used for clinical purposes and they should not be regarded as investigational or for research. This laboratory has been approved by CLIA 88,  "designated as a high-complexity laboratory and is qualified to perform these tests.  Interpretation performed at [facility], [address] [address]       Gross Description 11/08/2024                      Value:A. The specimen is received in formalin, labeled with the patient's name and hospital number, and is designated \" stomach\".  The specimen consists of 2 tan soft tissue fragments measuring 0.3 and 0.4 cm in greatest dimension.  Entirely submitted. Screened cassette.    Note: The estimated total formalin fixation time based upon information provided by the submitting clinician and the standard processing schedule is under 72 hours.    -[facility]      Clinical Information 11/08/2024                      Value:Cold bx r/o H.pylori   Office Visit on 10/07/2024   Component Date Value    POCT SARS-CoV-2 Ag 10/07/2024 Positive (A)     VALID CONTROL 10/07/2024 Valid    Lab on 07/17/2024   Component Date Value    Iron Saturation 07/17/2024 12 (L)     TIBC 07/17/2024 359     Iron 07/17/2024 42 (L)     UIBC 07/17/2024 317     Ferritin 07/17/2024 74    Appointment on 07/06/2024   Component Date Value    WBC 07/06/2024 8.72     RBC 07/06/2024 5.47     Hemoglobin 07/06/2024 13.8     Hematocrit 07/06/2024 43.9     MCV 07/06/2024 80 (L)     MCH 07/06/2024 25.2 (L)     MCHC 07/06/2024 31.4     RDW 07/06/2024 13.5     Platelets 07/06/2024 304     MPV 07/06/2024 10.2     Sodium 07/06/2024 137     Potassium 07/06/2024 3.8     Chloride 07/06/2024 106     CO2 07/06/2024 24     ANION GAP 07/06/2024 7     BUN 07/06/2024 11     Creatinine 07/06/2024 0.94     Glucose, Fasting 07/06/2024 97     Calcium 07/06/2024 9.2     AST 07/06/2024 15     ALT 07/06/2024 20     Alkaline Phosphatase 07/06/2024 51     Total Protein 07/06/2024 7.4     Albumin 07/06/2024 4.0     Total Bilirubin 07/06/2024 0.37     eGFR 07/06/2024 110     Hemoglobin A1C 07/06/2024 5.9 (H)     EAG 07/06/2024 123     Cholesterol " 07/06/2024 155     Triglycerides 07/06/2024 214 (H)     HDL, Direct 07/06/2024 29 (L)     LDL Calculated 07/06/2024 83     Non-HDL-Chol (CHOL-HDL) 07/06/2024 126     TSH 3RD GENERATON 07/06/2024 2.127        Diagnostics:      ESS: Total score: 9  EGD  Result Date: 11/8/2024  Narrative: Table formatting from the original result was not included. [facility] [address] [id number] [phone] DATE OF SERVICE: 11/08/24 PHYSICIAN(S): Attending: Chey Sam MD Fellow: No Staff Documented INDICATION: Morbid obesity (HCC) POST-OP DIAGNOSIS: See the impression below. PREPROCEDURE: Informed consent was obtained for the procedure, including sedation.  Risks of perforation, hemorrhage, adverse drug reaction and aspiration were discussed. The patient was placed in the left lateral decubitus position. Patient was explained about the risks and benefits of the procedure. Risks including but not limited to bleeding, infection, and perforation were explained in detail. Also explained about less than 100% sensitivity with the exam and other alternatives. PROCEDURE: EGD DETAILS OF PROCEDURE: Patient was taken to the procedure room where a time out was performed to confirm correct patient and correct procedure. The patient underwent monitored anesthesia care, which was administered by an anesthesia professional. The patient's blood pressure, heart rate, level of consciousness, respirations, oxygen, ECG and ETCO2 were monitored throughout the procedure. The scope was introduced through the mouth and advanced to the second part of the duodenum. Retroflexion was performed in the fundus. The patient experienced no blood loss. The procedure was not difficult. The patient tolerated the procedure well. There were no apparent adverse events. ANESTHESIA INFORMATION: ASA: III Anesthesia Type: Anesthesia type not filed in the log. MEDICATIONS: No administrations occurring from 0944 to 0952 on  11/08/24 FINDINGS: Mild erythematous mucosa in the body of the stomach and antrum; performed 2 cold forceps biopsies to rule out H. pylori Regular Z-line 40 cm from the incisors All observed locations appeared normal, including the esophagus, cardia, fundus of the stomach, pylorus, duodenal bulb, 1st part of the duodenum and 2nd part of the duodenum. SPECIMENS: ID Type Source Tests Collected by Time Destination 1 :  Tissue Stomach TISSUE EXAM Chey Sam MD 11/8/2024 0951      Impression: Mild erythematous mucosa in the body of the stomach and antrum; performed cold forceps biopsies Normal. RECOMMENDATION: Continue progress   Chey Sma MD

## 2024-12-04 ENCOUNTER — HOSPITAL ENCOUNTER (OUTPATIENT)
Dept: MRI IMAGING | Facility: HOSPITAL | Age: 29
Discharge: HOME | End: 2024-12-04
Attending: PSYCHIATRY & NEUROLOGY
Payer: COMMERCIAL

## 2024-12-04 DIAGNOSIS — F33.9 DEPRESSION, RECURRENT (HCC): ICD-10-CM

## 2024-12-04 DIAGNOSIS — G43.009 MIGRAINE WITHOUT AURA AND WITHOUT STATUS MIGRAINOSUS, NOT INTRACTABLE: ICD-10-CM

## 2024-12-04 PROCEDURE — 70551 MRI BRAIN STEM W/O DYE: CPT

## 2024-12-04 RX ORDER — ZIPRASIDONE HYDROCHLORIDE 40 MG/1
40 CAPSULE ORAL EVERY MORNING
Qty: 90 CAPSULE | Refills: 2 | Status: SHIPPED | OUTPATIENT
Start: 2024-12-04

## 2024-12-04 NOTE — TELEPHONE ENCOUNTER
Medication: ziprasidone (GEODON) 40 mg capsule     Dose/Frequency: TAKE 1 CAPSULE (40 MG TOTAL) BY MOUTH EVERY MORNING     Quantity:  90 capsule     Pharmacy:  [facility]/pharmacy #[id number] - [place] - [address]     Office:   [x] PCP/Provider -   [] Speciality/Provider -     Does the patient have enough for 3 days?   [] Yes   [x] No - Send as HP to POD

## 2024-12-10 DIAGNOSIS — G89.29 CHRONIC PAIN OF BOTH SHOULDERS: Primary | ICD-10-CM

## 2024-12-10 DIAGNOSIS — M25.511 CHRONIC PAIN OF BOTH SHOULDERS: Primary | ICD-10-CM

## 2024-12-10 DIAGNOSIS — M25.512 CHRONIC PAIN OF BOTH SHOULDERS: Primary | ICD-10-CM

## 2024-12-12 ENCOUNTER — OFFICE VISIT (OUTPATIENT)
Dept: BARIATRICS | Facility: CLINIC | Age: 29
End: 2024-12-12
Payer: COMMERCIAL

## 2024-12-12 ENCOUNTER — CLINICAL SUPPORT (OUTPATIENT)
Dept: BARIATRICS | Facility: CLINIC | Age: 29
End: 2024-12-12

## 2024-12-12 ENCOUNTER — PREP FOR PROCEDURE (OUTPATIENT)
Dept: BARIATRICS | Facility: CLINIC | Age: 29
End: 2024-12-12

## 2024-12-12 VITALS
WEIGHT: 315 LBS | HEIGHT: 66 IN | HEART RATE: 100 BPM | SYSTOLIC BLOOD PRESSURE: 124 MMHG | BODY MASS INDEX: 50.62 KG/M2 | RESPIRATION RATE: 14 BRPM | DIASTOLIC BLOOD PRESSURE: 80 MMHG

## 2024-12-12 DIAGNOSIS — E66.01 MORBID (SEVERE) OBESITY DUE TO EXCESS CALORIES (HCC): Primary | ICD-10-CM

## 2024-12-12 DIAGNOSIS — G47.33 OBSTRUCTIVE SLEEP APNEA (ADULT) (PEDIATRIC): ICD-10-CM

## 2024-12-12 DIAGNOSIS — I10 ESSENTIAL HYPERTENSION, MALIGNANT: ICD-10-CM

## 2024-12-12 DIAGNOSIS — E66.01 MORBID OBESITY (HCC): Primary | ICD-10-CM

## 2024-12-12 DIAGNOSIS — E66.01 MORBID OBESITY WITH BMI OF 50.0-59.9, ADULT (HCC): Primary | ICD-10-CM

## 2024-12-12 DIAGNOSIS — K21.9 ESOPHAGEAL REFLUX: ICD-10-CM

## 2024-12-12 PROCEDURE — RECHECK

## 2024-12-12 PROCEDURE — 99213 OFFICE O/P EST LOW 20 MIN: CPT

## 2024-12-12 NOTE — ASSESSMENT & PLAN NOTE
- Patient is pursuing Conservative Program and follow up visits with medical weight management provider  - Initial weight loss goal of 5-10% weight loss for improved health. Weight loss can improve patient's co-morbid conditions and/or prevent weight-related complications.  - Explained the importance of continuing lifestyle changes in addition to any anti-obesity medications.   - Labs reviewed from 7/24    General Recommendations:  Nutrition:  Eat breakfast daily.  Do not skip meals.      Food log (ie.) www.Independent IP.com, sparkpeople.com, loseit.com, calorieking.com, etc.     Practice mindful eating.  Be sure to set aside time to eat, eat slowly, and savor your food.     Hydration:    At least 64oz of water daily.  No sugar sweetened beverages.  No juice (eat the fruit instead).     Exercise:  Studies have shown that the ideal exercise goal is somewhere between 150 to 300 minutes of moderate intensity exercise a week.  Start with exercising 10 minutes every other day and gradually increase physical activity with a goal of at least 150 minutes of moderate intensity exercise a week, divided over at least 3 days a week.  An example of this would be exercising 30 minutes a day, 5 days a week.  Resistance training can increase muscle mass and increase our resting metabolic rate.   FULL BODY resistance training is recommended 2-3 times a week.  Do not do this on consecutive days to allow for muscle recovery.     Aim for a bare minimum 5000 steps, even on days you do not exercise.     Monitoring:   Weigh yourself daily.  If this causes undue stress, then just weigh yourself once a week.  Weigh yourself the same time of the day with the same amount of clothing on.  Preferably this should be done after waking up, before you eat, and with no clothing or minimal clothing on.     Specific Goals:  -Discussed nutrition and lifestyle. He is encouraged to focus on mindful eating and trying to maintain 3 meals per day with at  least 80-90g of protein per day. He is encouraged to track his food so he can see where he is with his intake. He is being worked up for Surgery which should be scheduled for next month.   -He is hydrating well, and encouraged to continue to do so.   -He is encouraged to increase his exercise and try to do it at least 2-3 times per week with   - Discussed remaining on Wegovy 2.4mg at this time as his surgery date is coming up soon. Follow up in 6 months

## 2024-12-12 NOTE — PROGRESS NOTES
Assessment/Plan:     Morbid obesity with BMI of 50.0-59.9, adult (HCC)  - Patient is pursuing Conservative Program and follow up visits with medical weight management provider  - Initial weight loss goal of 5-10% weight loss for improved health. Weight loss can improve patient's co-morbid conditions and/or prevent weight-related complications.  - Explained the importance of continuing lifestyle changes in addition to any anti-obesity medications.   - Labs reviewed from 7/24    General Recommendations:  Nutrition:  Eat breakfast daily.  Do not skip meals.      Food log (ie.) www.Torbit.com, sparkpeople.com, loseit.com, calorieking.com, etc.     Practice mindful eating.  Be sure to set aside time to eat, eat slowly, and savor your food.     Hydration:    At least 64oz of water daily.  No sugar sweetened beverages.  No juice (eat the fruit instead).     Exercise:  Studies have shown that the ideal exercise goal is somewhere between 150 to 300 minutes of moderate intensity exercise a week.  Start with exercising 10 minutes every other day and gradually increase physical activity with a goal of at least 150 minutes of moderate intensity exercise a week, divided over at least 3 days a week.  An example of this would be exercising 30 minutes a day, 5 days a week.  Resistance training can increase muscle mass and increase our resting metabolic rate.   FULL BODY resistance training is recommended 2-3 times a week.  Do not do this on consecutive days to allow for muscle recovery.     Aim for a bare minimum 5000 steps, even on days you do not exercise.     Monitoring:   Weigh yourself daily.  If this causes undue stress, then just weigh yourself once a week.  Weigh yourself the same time of the day with the same amount of clothing on.  Preferably this should be done after waking up, before you eat, and with no clothing or minimal clothing on.     Specific Goals:  -Discussed nutrition and lifestyle. He is encouraged to  focus on mindful eating and trying to maintain 3 meals per day with at least 80-90g of protein per day. He is encouraged to track his food so he can see where he is with his intake. He is being worked up for Surgery which should be scheduled for next month.   -He is hydrating well, and encouraged to continue to do so.   -He is encouraged to increase his exercise and try to do it at least 2-3 times per week with   - Discussed remaining on Wegovy 2.4mg at this time as his surgery date is coming up soon. Follow up in 6 months          Pino was seen today for follow-up.    Diagnoses and all orders for this visit:    Morbid obesity with BMI of 50.0-59.9, adult (HCC)        Total time spent reviewing chart, interviewing patient, examining patient, discussing plan, answering all questions, and documentin minutes with >50% face-to-face time with the patient.    Follow up in approximately 6 months with Non-Surgical Physician/Advanced Practitioner.    Subjective:   Chief Complaint   Patient presents with    Follow-up     Patient presents for f/u, he takes Wegovy, denied any side effect.       Patient ID: James Marrero  is a 29 y.o. male with excess weight/obesity here to pursue weight management.  Patient is pursuing Conservative Program.   Most recent notes and records were reviewed.    HPI    Wt Readings from Last 20 Encounters:   24 (!) 143 kg (316 lb)   24 (!) 142 kg (313 lb)   24 (!) 144 kg (316 lb 6.4 oz)   24 (!) 140 kg (309 lb 11.9 oz)   24 (!) 141 kg (309 lb 12.8 oz)   10/16/24 (!) 139 kg (306 lb 6.4 oz)   10/07/24 (!) 141 kg (309 lb 12.8 oz)   24 (!) 141 kg (311 lb 11.7 oz)   24 (!) 140 kg (308 lb)   24 (!) 142 kg (312 lb)   24 118 kg (260 lb)   24 (!) 144 kg (317 lb)   24 (!) 144 kg (317 lb)   24 (!) 145 kg (320 lb)   24 (!) 146 kg (321 lb)   24 (!) 146 kg (322 lb)   24 (!) 145 kg (320 lb)   24 (!) 146 kg (321  lb)   04/18/24 (!) 146 kg (321 lb 3.2 oz)   04/11/24 (!) 146 kg (321 lb 3.2 oz)       Patient presents today to medical weight management office for follow up. He has been on wegovy 2.4mg for a while now. His weight has been fluctuating and he has admitted that his hunger is not well controlled and he is eating more portions at his meals. He also states that he has not always been eating the right things. He is following with surgery and is expecting to be scheduled next month for the sleeve.     Weight loss medication and dose: Wegovy 2.4 mg, topamax 200mg (takes for migraines/depression), Wellbutrin 300mg (takes for depression)  Started weight and date: 327 lbs on 12/5/2023  Current weight: 316 lbs   Difference: -11 lbs (3% loss)    Current BMI: 51          B- oatmeal   L- skips or if he eats he sometimes over eats   D- Whatever is made for dinner and he is eating seconds      Take out frequency:1-2 days per week     Hydration- He tries to drink water, drinking flavored yap now  Alcohol- no  Exercise- Walking         The following portions of the patient's history were reviewed and updated as appropriate: allergies, current medications, past family history, past medical history, past social history, past surgical history, and problem list.    Family History   Adopted: Yes        Review of Systems   Constitutional:  Negative for fatigue.   HENT:  Negative for sore throat.    Respiratory:  Negative for cough and shortness of breath.    Cardiovascular:  Negative for chest pain, palpitations and leg swelling.   Gastrointestinal:  Negative for abdominal pain, constipation, diarrhea, nausea and vomiting.   Genitourinary:  Negative for dysuria.   Musculoskeletal:  Negative for arthralgias and back pain.   Skin:  Negative for rash.   Neurological:  Negative for headaches.   Psychiatric/Behavioral:  Negative for dysphoric mood. The patient is not nervous/anxious.        Objective:  /80 (BP Location: Left arm,  "Patient Position: Sitting, Cuff Size: Large)   Pulse 100   Resp 14   Ht 5' 6\" (1.676 m)   Wt (!) 143 kg (316 lb)   BMI 51.00 kg/m²     Physical Exam  Vitals and nursing note reviewed.   Constitutional:       Appearance: Normal appearance. He is obese.   HENT:      Head: Normocephalic.   Pulmonary:      Effort: Pulmonary effort is normal.   Neurological:      Mental Status: He is oriented to person, place, and time.   Psychiatric:         Mood and Affect: Mood normal.         Behavior: Behavior normal.         Thought Content: Thought content normal.         Judgment: Judgment normal.            Labs   Most recent labs reviewed   Lab Results   Component Value Date     09/01/2017    SODIUM 137 07/06/2024    K 3.8 07/06/2024     07/06/2024    CO2 24 07/06/2024    AGAP 7 07/06/2024    BUN 11 07/06/2024    CREATININE 0.94 07/06/2024    GLUF 97 07/06/2024    CALCIUM 9.2 07/06/2024    AST 15 07/06/2024    ALT 20 07/06/2024    ALKPHOS 51 07/06/2024    PROT 7.5 09/01/2017    TP 7.4 07/06/2024    BILITOT 0.9 09/01/2017    TBILI 0.37 07/06/2024    EGFR 110 07/06/2024     Lab Results   Component Value Date    HGBA1C 5.9 (H) 07/06/2024     Lab Results   Component Value Date    RMB8QPXPMKLG 2.127 07/06/2024     Lab Results   Component Value Date    CHOLESTEROL 155 07/06/2024     Lab Results   Component Value Date    HDL 29 (L) 07/06/2024     Lab Results   Component Value Date    TRIG 214 (H) 07/06/2024     Lab Results   Component Value Date    LDLCALC 83 07/06/2024     "

## 2024-12-12 NOTE — PROGRESS NOTES
"Bariatric Nutrition Assessment Note    Type of surgery    Preop (6 months of wt checks)--  Surgery Date: TBD--leaning toward sleeve  Surgeon: Dr Pires (consult was 4/19/24)    Nutrition Assessment   James Marrero  29 y.o.  male       Height: 5'6\"  Current Weight: 316#  BMI: 50.36  ReEval Weight:  322#   BMI: 52#  Wt with BMI of 25: 155.6#  Pre-Op Excess Wt: 167#    BMI: There is no height or weight on file to calculate BMI.    Maryana- [place] Equation:      Estimated calories for weight loss 7114-6321 ( 1-2# per wk wt loss - sedentary )  Estimated protein needs #(1.0-1.5 gms/kg IBW )   Estimated fluid needs 2100-2450ml (30-35 ml/kg IBW )      Weight History   Onset of Obesity: Adult, started when taking the psych meds  Family history of obesity: unknown  Wt Loss Attempts: Exercise  Self Created Diets (Portion Control, Healthy Food Choices, etc.)  Patient has tried the above for 6 months or more with insufficient weight loss or weight regain, which is why patient has requested to be evaluated for weight loss surgery today  Maximum Wt Lost: only lost when was in treatment where they controlled the portions.    Review of History and Medications   Wegovy, starting with Align  Starting Gabapetin  Past Medical History:   Diagnosis Date    ADHD     Anxiety     Bronchitis     Concussion     Sports related    Depression     Hypertension     Mood swings     Morbid obesity with BMI of 45.0-49.9, adult (HCC)     Sleep apnea, obstructive      Past Surgical History:   Procedure Laterality Date    NO PAST SURGERIES       Social History     Socioeconomic History    Marital status: Single     Spouse name: Not on file    Number of children: Not on file    Years of education: Not on file    Highest education level: Not on file   Occupational History    Occupation: student    Occupation:      Employer: [employer]   Tobacco Use    Smoking status: Never    Smokeless tobacco: Never   Vaping Use    Vaping " status: Never Used   Substance and Sexual Activity    Alcohol use: Yes     Alcohol/week: 1.0 standard drink of alcohol     Types: 1 Cans of beer per week     Comment: social    Drug use: No    Sexual activity: Not on file   Other Topics Concern    Not on file   Social History Narrative    Lives with adoptive parents     Social Drivers of Health     Financial Resource Strain: Not on file   Food Insecurity: Not on file   Transportation Needs: Not on file   Physical Activity: Not on file   Stress: Not on file   Social Connections: Unknown (6/18/2024)    Received from [facility]    Social SiSense     How often do you feel lonely or isolated from those around you? (Adult - for ages 18 years and over): Not on file   Intimate Partner Violence: Not on file   Housing Stability: Not on file       Current Outpatient Medications:     Azelastine HCl 137 MCG/SPRAY SOLN, SPRAY 2 SPRAYS INTO EACH NOSTRIL 2 TIMES A DAY USE IN EACH NOSTRIL AS DIRECTED (Patient not taking: Reported on 12/2/2024), Disp: 90 mL, Rfl: 1    benzonatate (TESSALON) 200 MG capsule, Take 1 capsule (200 mg total) by mouth 3 (three) times a day as needed for cough (Patient not taking: Reported on 11/7/2024), Disp: 20 capsule, Rfl: 0    buPROPion (WELLBUTRIN XL) 300 mg 24 hr tablet, TAKE 1 TABLET (300 MG TOTAL) BY MOUTH EVERY MORNING., Disp: 90 tablet, Rfl: 1    desvenlafaxine succinate (PRISTIQ) 50 mg 24 hr tablet, TAKE 1 TABLET BY MOUTH EVERY DAY, Disp: 90 tablet, Rfl: 1    doxepin (SINEquan) 100 mg capsule, TAKE 1 CAPSULE (100 MG TOTAL) BY MOUTH 3 (THREE) TIMES DAILY AFTER MEALS, Disp: 270 capsule, Rfl: 1    fluticasone (FLONASE) 50 mcg/act nasal spray, SPRAY 1 SPRAY INTO EACH NOSTRIL EVERY DAY (Patient not taking: Reported on 12/2/2024), Disp: 24 mL, Rfl: 1    gabapentin (NEURONTIN) 100 mg capsule, Take 3 tablets twice a day, Disp: 180 capsule, Rfl: 5    losartan (COZAAR) 50 mg tablet, Take 1 tablet (50 mg total) by mouth daily, Disp: 90 tablet, Rfl: 3     mupirocin (BACTROBAN) 2 % ointment, Apply topically 3 (three) times a day, Disp: 30 g, Rfl: 0    Omeprazole 20 MG TBEC, Take 1 tablet (20 mg total) by mouth 2 (two) times a day, Disp: 180 tablet, Rfl: 1    propranolol (INDERAL LA) 80 mg 24 hr capsule, TAKE 1 CAPSULE (80 MG TOTAL) BY MOUTH EVERY MORNING, Disp: 90 capsule, Rfl: 3    Semaglutide-Weight Management (Wegovy) 2.4 MG/0.75ML, Inject 0.75 mL (2.4 mg total) under the skin once a week, Disp: 3 mL, Rfl: 2    Sodium Fluoride 5000 PPM 1.1 % GEL, BRUSH ON NIGHTLY AND SPIT OUT EXCESS, Disp: , Rfl:     topiramate (TOPAMAX) 200 MG tablet, TAKE 1 TABLET (200 MG TOTAL) BY MOUTH 2 TIMES A DAY., Disp: 60 tablet, Rfl: 5    ziprasidone (GEODON) 40 mg capsule, Take 1 capsule (40 mg total) by mouth every morning, Disp: 90 capsule, Rfl: 2    ziprasidone (GEODON) 80 mg capsule, TAKE 1 CAPSULE BY MOUTH EVERY EVENING, Disp: 90 capsule, Rfl: 0    Food Intake and Lifestyle Assessment   Food Intake Assessment completed via usual diet recall    1/2-1 cup starch, Fit bit, more veggies, walking   Wake:  8a-12p  Breakfast: today smoothie with unsweet almond milk, banana, body fortress whey protein and PB (~1tbsp--suggested PB2 for less cals) + 2 waffles  Snack: chips at times OR fruit (banana, oranges, apples--more than 1) OR chobani greek yogurt (2)   Lunch: late lunch today will taco salad with lean meat  Snack: has been going with oatmeal with protein shake and or fruit--2 pouches + protein powder, put asked if can add banana to the oatmeal (advised to avoid the banana if doing 2 pouches of oatmeal)  Dinner: 8-9pm:  Last night taco salad with 2 crumbled taco shell w/90/10 ground beef, taco sauce,, light sour cream, cheese OR Jersey Mikes--1/4 of a Giant sandwich (ham, prav, salami with lettuce, tomato an delight clarke--looked it up and 1/4 is 413 cals, 16gm protein) + bake lays    Snack: pop corn or sf outshine bars  Bed: 11:30pm to 3am  **portions are the issue and night  "snacking**    Beverage intake: water, coffee/tea, and 100mg caffeine energy drink (5 cals), sugarfree drink  Protein supplement: has a protein powder, but doesn't use often.  Estimated protein intake per day: 80-100gm  Estimated fluid intake per day: 48-64oz water per day, 1 energy drink (100mg caffeine), 12oz coffee with sugar and fat free half and half  Meals eaten away from home: Jersey Bruce once a week, pizza once a week (usually 3 slices)  Typical meal pattern: 2-3 meals per day and grazes on snacks per day  Eating Behaviors: Consumption of high calorie/ high fat foods, Consumption of high calorie beverages, Large portion sizes, Frequent snacking/ grazing, Mindless eating, Emotional eating, Craves sweet foods, and Craves salty foods    Food allergies or intolerances:   Allergies   Allergen Reactions    Abilify [Aripiprazole]     Lithium     Lorazepam Other (See Comments)     aggressive    Seroquel [Quetiapine]      Cultural or Episcopalian considerations: -    Physical Assessment  Physical Activity  Types of exercise: gym 4x per week: treadmill 2 x per week for 20-30mins, 2 classes per week (strength and a HIIT class)--has been doing this regimen for over 1 year, but hasn't been to the gym this week.   Current physical limitations: -    Psychosocial Assessment   Support systems: parent(s)  Socioeconomic factors: lives with parents--mom does both cooking and food shopping    Nutrition Diagnosis  Diagnosis: Overweight / Obesity (NC-3.3)  Related to: Physical inactivity and Excessive energy intake  As Evidenced by: BMI >25     Nutrition Prescription: Recommend the following diet  Regular    Interventions and Teaching   Discussed pre-op and post-op nutrition guidelines.       Patient educated and handouts provided.  Surgical changes to stomach / GI  Capacity of post-surgery stomach  Diet progression  Adequate hydration  Sugar and fat restriction to decrease \"dumping syndrome\"  Fat restriction to decrease " steatorrhea  Expected weight loss  Weight loss plateaus/ possibility of weight regain  Exercise  Suggestions for pre-op diet  Nutrition considerations after surgery  Protein supplements  Meal planning and preparation  Appropriate carbohydrate, protein, and fat intake, and food/fluid choices to maximize safe weight loss, nutrient intake, and tolerance   Dietary and lifestyle changes  Possible problems with poor eating habits  Intuitive eating  Techniques for self monitoring and keeping daily food journal  Potential for food intolerance after surgery, and ways to deal with them including: lactose intolerance, nausea, reflux, vomiting, diarrhea, food intolerance, appetite changes, gas  Vitamin / Mineral supplementation of Multivitamin with minerals and Vitamin D    Education provided to: patient    Barriers to learning: No barriers identified    Readiness to change: preparation    Prior research on procedure: books, internet, discussed with provider, and friends or family    Comprehension: verbalizes understanding     Expected Compliance: good    Recommendations  Pt is an appropriate candidate for surgery, if can show lifestyle changes over weight checks.    Evaluation / Monitoring  Dietitian to Monitor: Eating pattern as discussed Tolerance of nutrition prescription Body weight Lab values Physical activity    5/6 Weight Check Visit Summary 8/22/2024  Continues to prepare for weight loss surgery. Pt pleased with progress he has made. Reports changes to diet includes: stopped second portions, less carbs (ie: will remove bun on hamburgers, etc), snacks at night but better choice (fruit, popcorn, fruit ice ) and reading labels to make decisions on better food choice. Tries jaden for tracking but better with paper journal. Better with 30/60 rule.  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 Wearing CPAP. - but did stop while on vacation. Goes to gym but limited past month per vacations taken   of pt and his . Does use  treadmill 30 minutes on his own. Pt also got 's Permit and looking for employment. 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 Pt worked with Dr Francisco as advised and now better prepared to pursue WLS. Pt  taking Wegovy. Discussed lab results. Advised to take iron po per low level. Questions answered during session. Pt receptive. Pt accompanied my his mother.     PreOp Visit Summary 10/16/2024  Near completion of workflow - Had to r/s EGD per dx of Covid. Continues to lose weight via sustaining positive changes to diet. Still struggles with cravings, large portions but overall improved in choice and amount . Had discussion to address concern.  Hasn't been able to go to the gym d/t recovering from Covid. .-Near completion of workflow - Had to r/s EGD per dx of Covid. Discussed prep for procedure - no solids. Advised Protein drink and gave samples of Ensure Max. Questions answered during session. Pt receptive. Pt accompanied my his mother.  PreOp Visit Summary 12/12/2024  Surgery approved and scheduled for 1/28/2025  Had discussion with pt and his mother. Pt appeared overwhelmed now that he has a surgery date. Admits to be overeating via large portions and seconds. Possibly stemming from realization of diet expectations post op. Suggested pt follow-up with Arabella. Pt felt he was ok but to determine need post pre op class that pt is scheduled for on 1/2/2025. Reviewed guidelines. Familiar with protein drinks from discussion at last session. Tried sample and makes his own at home. Had seen Maryse earlier today. On Wegovy and may not be as effective as hunger not well controlled and noted weight gain     Workflow reviewed: Completed - Surgery approved  Psych and/or D+A Clearance: N/A  PCP Letter: Needs- has appointment 6/17  Surgeon Appt: 4/19/24  EGD: 11/8/2024  Cardiac Risk Assessment: 7/23/24  Sleep Studies: Uses CPAP  Blood work: Complete- 7/6/2024- advised to start iron  "(Vitron C)  Nicotine test: N/A  Weight loss meds: Wegovy  Required weight checks: 6 months required - completed  Weight Loss: Not required, encouraged positive lifestyle changes.    Pre-op weight goals:  Do NOT Gain  Can go to BMI of 35:   217#  Encouraged weight loss w/ diet and lifestyle changes  Will be started on a 2 week liver shrinking diet, possibly shorter/longer as per the discretion of the surgeon/team, directly prior to surgery  Goals  Continue to work on avoiding sugar sweetened beverages  Food journal via baritastic or in a notebook 3-4 days per week  Exercise 30 minutes 5 times per week--continue with gym, increase activity of daily living  Complete lesson plans 1-6  Eat 3 meals per day with protein at each meal  Eat or protein shake within one hour of waking  Eliminate mindless snacking  Use \"plate method\" with when setting up meals  Continue to work on going to bed earlier--to avoid playing on phone therefore suggested to put alarm for 15mins as a reminder to stop playing and put phone away  More walking --start with 15 mins per day of walking outside or in pool and increase to 30 mins per day as tolerated  PreOp Class 1/2/2025    Time Spent:   40 mins  "

## 2024-12-13 ENCOUNTER — TELEPHONE (OUTPATIENT)
Age: 29
End: 2024-12-13

## 2024-12-13 NOTE — TELEPHONE ENCOUNTER
Patients mother called asking that the order for PT be faxed to [facility] phone [phone]

## 2024-12-18 ENCOUNTER — OFFICE VISIT (OUTPATIENT)
Dept: FAMILY MEDICINE CLINIC | Facility: CLINIC | Age: 29
End: 2024-12-18
Payer: COMMERCIAL

## 2024-12-18 VITALS
WEIGHT: 315 LBS | BODY MASS INDEX: 50.62 KG/M2 | HEIGHT: 66 IN | SYSTOLIC BLOOD PRESSURE: 122 MMHG | HEART RATE: 98 BPM | OXYGEN SATURATION: 99 % | DIASTOLIC BLOOD PRESSURE: 82 MMHG | TEMPERATURE: 97.7 F

## 2024-12-18 DIAGNOSIS — R19.7 DIARRHEA, UNSPECIFIED TYPE: ICD-10-CM

## 2024-12-18 DIAGNOSIS — G47.33 OSA (OBSTRUCTIVE SLEEP APNEA): ICD-10-CM

## 2024-12-18 DIAGNOSIS — F33.9 DEPRESSION, RECURRENT (HCC): ICD-10-CM

## 2024-12-18 DIAGNOSIS — Z23 ENCOUNTER FOR IMMUNIZATION: ICD-10-CM

## 2024-12-18 DIAGNOSIS — Z00.00 ANNUAL PHYSICAL EXAM: Primary | ICD-10-CM

## 2024-12-18 DIAGNOSIS — M25.511 ACUTE PAIN OF RIGHT SHOULDER: ICD-10-CM

## 2024-12-18 PROCEDURE — 90656 IIV3 VACC NO PRSV 0.5 ML IM: CPT

## 2024-12-18 PROCEDURE — 99213 OFFICE O/P EST LOW 20 MIN: CPT | Performed by: FAMILY MEDICINE

## 2024-12-18 PROCEDURE — 90471 IMMUNIZATION ADMIN: CPT

## 2024-12-18 PROCEDURE — 99395 PREV VISIT EST AGE 18-39: CPT | Performed by: FAMILY MEDICINE

## 2024-12-18 NOTE — PROGRESS NOTES
Adult Annual Physical  Name: James Marrero      : 1995      MRN: [id number]  Encounter Provider: Dennis Page DO  Encounter Date: 2024   Encounter department: [facility] [address]    Assessment & Plan  Annual physical exam         Diarrhea, unspecified type  He can use Imodium over-the-counter, we will check a stool culture.  Orders:  •  Stool Enteric Bacterial Panel by PCR; Future    NIMO (obstructive sleep apnea)  Continue CPAP, hopefully after bariatric surgery he will not need to continue this.       Depression, recurrent (HCC)  Stable, continue his current therapy.       Acute pain of right shoulder    Orders:  •  Ambulatory Referral to Physical Therapy; Future    Encounter for immunization    Orders:  •  influenza vaccine preservative-free 0.5 mL IM (Fluzone, Afluria, Fluarix, Flulaval)      Immunizations and preventive care screenings were discussed with patient today. Appropriate education was printed on patient's after visit summary.    Counseling:  Exercise: the importance of regular exercise/physical activity was discussed. Recommend exercise 3-5 times per week for at least 30 minutes.          History of Present Illness     Adult Annual Physical:  Patient presents for annual physical. Patient comes in today for a checkup.  He is scheduled to have bariatric surgery in January.  His only complaint today is of some intermittent diarrhea that has had for several weeks.  He is also complaining of some shoulder pain that is been bothering him for approximately a month..     Diet and Physical Activity:  - Diet/Nutrition: poor diet.  - Exercise: walking.    General Health:  - Sleep: snores loudly and unrefreshing sleep.  - Hearing: normal hearing bilateral ears.  - Vision: no vision problems.  - Dental: regular dental visits.    Review of Systems   Constitutional:  Negative for chills, fatigue and fever.   HENT:  Negative for congestion, ear pain, hearing  "loss, postnasal drip, rhinorrhea and sore throat.    Eyes:  Negative for pain and visual disturbance.   Respiratory:  Negative for chest tightness, shortness of breath and wheezing.    Cardiovascular:  Negative for chest pain and leg swelling.   Gastrointestinal:  Positive for diarrhea. Negative for abdominal distention, abdominal pain, constipation and vomiting.   Endocrine: Negative for cold intolerance and heat intolerance.   Genitourinary:  Negative for difficulty urinating, frequency and urgency.   Musculoskeletal:  Positive for arthralgias. Negative for gait problem.   Skin:  Negative for color change.   Neurological:  Negative for dizziness, tremors, syncope, numbness and headaches.   Hematological:  Negative for adenopathy.   Psychiatric/Behavioral:  Negative for agitation, confusion and sleep disturbance. The patient is not nervous/anxious.          Objective   /82   Pulse 98   Temp 97.7 °F (36.5 °C)   Ht 5' 6\" (1.676 m)   Wt (!) 144 kg (317 lb)   SpO2 99%   BMI 51.17 kg/m²     Physical Exam  Constitutional:       Appearance: He is well-developed. He is obese.   HENT:      Head: Normocephalic.      Right Ear: External ear normal.      Left Ear: External ear normal.      Nose: Nose normal.   Eyes:      Extraocular Movements: Extraocular movements intact.      Conjunctiva/sclera: Conjunctivae normal.      Pupils: Pupils are equal, round, and reactive to light.   Neck:      Thyroid: No thyromegaly.   Cardiovascular:      Rate and Rhythm: Normal rate and regular rhythm.      Heart sounds: Normal heart sounds.   Pulmonary:      Effort: Pulmonary effort is normal.      Breath sounds: Normal breath sounds.   Abdominal:      General: Bowel sounds are normal.      Palpations: Abdomen is soft.   Musculoskeletal:         General: Normal range of motion.      Cervical back: Normal range of motion.   Skin:     General: Skin is warm and dry.   Neurological:      Mental Status: He is alert and oriented to " person, place, and time.   Psychiatric:         Mood and Affect: Mood normal.         Behavior: Behavior normal.

## 2024-12-18 NOTE — PATIENT INSTRUCTIONS
"Patient Education     Routine physical for adults   The Basics   Written by the doctors and editors at [facility]   What is a physical? -- A physical is a routine visit, or \"check-up,\" with your doctor. You might also hear it called a \"wellness visit\" or \"preventive visit.\"  During each visit, the doctor will:   Ask about your physical and mental health   Ask about your habits, behaviors, and lifestyle   Do an exam   Give you vaccines if needed   Talk to you about any medicines you take   Give advice about your health   Answer your questions  Getting regular check-ups is an important part of taking care of your health. It can help your doctor find and treat any problems you have. But it's also important for preventing health problems.  A routine physical is different from a \"sick visit.\" A sick visit is when you see a doctor because of a health concern or problem. Since physicals are scheduled ahead of time, you can think about what you want to ask the doctor.  How often should I get a physical? -- It depends on your age and health. In general, for people age 21 years and older:   If you are younger than 50 years, you might be able to get a physical every 3 years.   If you are 50 years or older, your doctor might recommend a physical every year.  If you have an ongoing health condition, like diabetes or high blood pressure, your doctor will probably want to see you more often.  What happens during a physical? -- In general, each visit will include:   Physical exam - The doctor or nurse will check your height, weight, heart rate, and blood pressure. They will also look at your eyes and ears. They will ask about how you are feeling and whether you have any symptoms that bother you.   Medicines - It's a good idea to bring a list of all the medicines you take to each doctor visit. Your doctor will talk to you about your medicines and answer any questions. Tell them if you are having any side effects that bother you. You " "should also tell them if you are having trouble paying for any of your medicines.   Habits and behaviors - This includes:   Your diet   Your exercise habits   Whether you smoke, drink alcohol, or use drugs   Whether you are sexually active   Whether you feel safe at home  Your doctor will talk to you about things you can do to improve your health and lower your risk of health problems. They will also offer help and support. For example, if you want to quit smoking, they can give you advice and might prescribe medicines. If you want to improve your diet or get more physical activity, they can help you with this, too.   Lab tests, if needed - The tests you get will depend on your age and situation. For example, your doctor might want to check your:   Cholesterol   Blood sugar   Iron level   Vaccines - The recommended vaccines will depend on your age, health, and what vaccines you already had. Vaccines are very important because they can prevent certain serious or deadly infections.   Discussion of screening - \"Screening\" means checking for diseases or other health problems before they cause symptoms. Your doctor can recommend screening based on your age, risk, and preferences. This might include tests to check for:   Cancer, such as breast, prostate, cervical, ovarian, colorectal, prostate, lung, or skin cancer   Sexually transmitted infections, such as chlamydia and gonorrhea   Mental health conditions like depression and anxiety  Your doctor will talk to you about the different types of screening tests. They can help you decide which screenings to have. They can also explain what the results might mean.   Answering questions - The physical is a good time to ask the doctor or nurse questions about your health. If needed, they can refer you to other doctors or specialists, too.  Adults older than 65 years often need other care, too. As you get older, your doctor will talk to you about:   How to prevent falling at " home   Hearing or vision tests   Memory testing   How to take your medicines safely   Making sure that you have the help and support you need at home  All topics are updated as new evidence becomes available and our peer review process is complete.  This topic retrieved from [employer] on: May 02, 2024.  Topic 073142 Version 1.0  Release: 32.4.3 - C32.122  © 2024 UpToDate, Inc. and/or its affiliates. All rights reserved.  Consumer Information Use and Disclaimer   Disclaimer: This generalized information is a limited summary of diagnosis, treatment, and/or medication information. It is not meant to be comprehensive and should be used as a tool to help the user understand and/or assess potential diagnostic and treatment options. It does NOT include all information about conditions, treatments, medications, side effects, or risks that may apply to a specific patient. It is not intended to be medical advice or a substitute for the medical advice, diagnosis, or treatment of a health care provider based on the health care provider's examination and assessment of a patient's specific and unique circumstances. Patients must speak with a health care provider for complete information about their health, medical questions, and treatment options, including any risks or benefits regarding use of medications. This information does not endorse any treatments or medications as safe, effective, or approved for treating a specific patient. UpToDate, Inc. and its affiliates disclaim any warranty or liability relating to this information or the use thereof.The use of this information is governed by the Terms of Use, available at https://www.woltersenVistauwer.com/en/know/clinical-effectiveness-terms. 2024© UpToDate, Inc. and its affiliates and/or licensors. All rights reserved.  Copyright   © 2024 UpToDate, Inc. and/or its affiliates. All rights reserved.

## 2024-12-19 ENCOUNTER — APPOINTMENT (OUTPATIENT)
Dept: PREADMISSION TESTING | Facility: HOSPITAL | Age: 29
End: 2024-12-19
Payer: COMMERCIAL

## 2024-12-19 ENCOUNTER — APPOINTMENT (OUTPATIENT)
Dept: LAB | Facility: HOSPITAL | Age: 29
End: 2024-12-19
Payer: COMMERCIAL

## 2024-12-19 DIAGNOSIS — R19.7 DIARRHEA, UNSPECIFIED TYPE: ICD-10-CM

## 2024-12-19 PROCEDURE — 87505 NFCT AGENT DETECTION GI: CPT

## 2024-12-20 ENCOUNTER — RESULTS FOLLOW-UP (OUTPATIENT)
Dept: FAMILY MEDICINE CLINIC | Facility: CLINIC | Age: 29
End: 2024-12-20

## 2024-12-20 DIAGNOSIS — R19.7 DIARRHEA, UNSPECIFIED TYPE: Primary | ICD-10-CM

## 2024-12-20 LAB
C COLI+JEJUNI TUF STL QL NAA+PROBE: NEGATIVE
EC STX1+STX2 GENES STL QL NAA+PROBE: NEGATIVE
SALMONELLA SP SPAO STL QL NAA+PROBE: NEGATIVE
SHIGELLA SP+EIEC IPAH STL QL NAA+PROBE: NEGATIVE

## 2025-01-02 ENCOUNTER — CLINICAL SUPPORT (OUTPATIENT)
Dept: BARIATRICS | Facility: CLINIC | Age: 30
End: 2025-01-02

## 2025-01-02 DIAGNOSIS — E66.01 MORBID (SEVERE) OBESITY DUE TO EXCESS CALORIES (HCC): Primary | ICD-10-CM

## 2025-01-02 PROCEDURE — RECHECK

## 2025-01-02 NOTE — PROGRESS NOTES
Weight Management Nutrition Class     Diagnosis: Obesity    Bariatric Surgeon: Dr. Sam    Surgery: Vertical Sleeve Gastrectomy    Class: Pt attended pre-op education session. Standardized packet of information for bariatric surgery was provided and reviewed with pt. Importance of lifestyle change and development of regular exercise routine stressed.   Pt. educated on two-week pre operative liquid protein liver shrinking diet.  Pt understands that the diet needs to be followed for 2 weeks prior to surgery. Handout reviewed.   Emphasized the need to drink 80 ounces of fluid per day while on the diet and to contact PCP to adjust any diabetes or blood pressure medicines prior to starting the diet.  Patient will not eat any solid food for 2 days prior to surgery, including 2 cups of non starchy vegetables to ensure that the stomach will be empty day of surgery. Reviewed Ensure pre-surgery ERAS drink instructions, protein supplement suggestions, post-operative clear liquid, full liquid, and pureed diet stages, post-operative nutrition rules and facts, and post-operative bariatric multivitamin/mineral recommendations and brand comparison. Reviewed instructions for stopping or tapering anti-obesity medications prior to surgery.      Pt given the opportunity to ask questions. Questions were answered. Pt verbalized understanding of all information provided. Pt appeared prepared for upcoming surgery. Contact information provided for any questions/concerns.

## 2025-01-06 ENCOUNTER — OFFICE VISIT (OUTPATIENT)
Dept: FAMILY MEDICINE CLINIC | Facility: CLINIC | Age: 30
End: 2025-01-06
Payer: COMMERCIAL

## 2025-01-06 VITALS
TEMPERATURE: 97.7 F | HEART RATE: 104 BPM | SYSTOLIC BLOOD PRESSURE: 110 MMHG | BODY MASS INDEX: 50.62 KG/M2 | DIASTOLIC BLOOD PRESSURE: 80 MMHG | OXYGEN SATURATION: 96 % | HEIGHT: 66 IN | RESPIRATION RATE: 18 BRPM | WEIGHT: 315 LBS

## 2025-01-06 DIAGNOSIS — J06.9 UPPER RESPIRATORY TRACT INFECTION, UNSPECIFIED TYPE: Primary | ICD-10-CM

## 2025-01-06 PROCEDURE — 99213 OFFICE O/P EST LOW 20 MIN: CPT | Performed by: FAMILY MEDICINE

## 2025-01-06 RX ORDER — BENZONATATE 200 MG/1
200 CAPSULE ORAL 3 TIMES DAILY PRN
Qty: 20 CAPSULE | Refills: 0 | Status: SHIPPED | OUTPATIENT
Start: 2025-01-06

## 2025-01-06 NOTE — H&P (VIEW-ONLY)
H&P Exam - Bariatric Surgery   James Marrero 29 y.o. male MRN: [id number]  Unit/Bed#:  Encounter: [id number]      HPI:    29 y.o. yo male with morbid obesity found to be a good candidate to undergo a weight loss operation upon being enrolled here at the [facility].  He has been pre certified to undergo a Laparoscopic Sleeve Gastrectomy.    Here today to review his pre op test results.    Has been medically cleared for the procedure.    (Hx of H.Pylori completed treatment in November, Was on Wegovy 2.4mg with MWM, pre-DM, HTN - on propanolol and Losartan, NIMO - wearing CPAP.)    Chronic pain requiring opioids - (No)    I have discussed with him at length the risks and benefits of the operation and reiterated the components of our multidisciplinary program and the importance of compliance and follow up in the post operative period. Although there is a great statistical chance of improvement or even resolution of most of his associated comorbidities, the results vary from patient to patient and they largely depend on his commitment.     The patient was also instructed with regards to the importance of behavior modification, nutritional counseling, support meeting attendance and lifestyle changes that are important to ensure success.    He was given the opportunity to ask questions and I have answered all of them.    I have addressed with the patient the level of CODE STATUS for this hospital stay and after explaining the different options currently he wishes to be a Level I.   He understands and wishes to proceed.      Review of Systems   Constitutional:  Negative for chills and fever. Unexpected weight change: planned weight loss.  HENT:  Positive for congestion and postnasal drip. Negative for trouble swallowing.    Respiratory:  Positive for cough. Negative for shortness of breath.    Cardiovascular:  Negative for chest pain and palpitations.   Gastrointestinal:  Negative for abdominal pain,  "constipation, diarrhea, nausea and vomiting.   Neurological:  Negative for dizziness.   Psychiatric/Behavioral:          Denies anxiety and depression       Historical Information   Past Medical History:   Diagnosis Date    ADHD     Anxiety     Bronchitis     Concussion     Sports related    CPAP (continuous positive airway pressure) dependence     Depression     GERD (gastroesophageal reflux disease)     Hypertension     Mood swings     Morbid obesity with BMI of 45.0-49.9, adult (HCC)     Sleep apnea, obstructive      Past Surgical History:   Procedure Laterality Date    EGD       Social History   Social History     Substance and Sexual Activity   Alcohol Use Yes    Alcohol/week: 1.0 standard drink of alcohol    Types: 1 Cans of beer per week    Comment: social     Social History     Substance and Sexual Activity   Drug Use Never     Social History     Tobacco Use   Smoking Status Never   Smokeless Tobacco Never     Family History: Family history non-contributory    Meds/Allergies   all medications and allergies reviewed  Allergies   Allergen Reactions    Abilify [Aripiprazole]     Lithium     Lorazepam Other (See Comments)     aggressive    Seroquel [Quetiapine]        Objective     Current Vitals:   Blood Pressure: 130/80 (01/09/25 1439)  Pulse: 71 (01/09/25 1439)  Respirations: 14 (01/09/25 1439)  Height: 5' 6\" (167.6 cm) (01/09/25 1439)  Weight - Scale: (!) 142 kg (313 lb) (01/09/25 1439)      Invasive Devices       None                   Physical Exam  Vitals reviewed.   Constitutional:       General: He is not in acute distress.     Appearance: He is well-developed. He is obese.   HENT:      Head: Normocephalic and atraumatic.   Eyes:      General: No scleral icterus.  Cardiovascular:      Rate and Rhythm: Normal rate and regular rhythm.      Heart sounds: Normal heart sounds.   Pulmonary:      Effort: Pulmonary effort is normal. No respiratory distress.      Breath sounds: Normal breath sounds.   Abdominal: " "     General: Bowel sounds are normal. There is no distension.      Palpations: Abdomen is soft.      Tenderness: There is no abdominal tenderness.   Skin:     General: Skin is warm and dry.   Neurological:      Mental Status: He is alert.   Psychiatric:         Mood and Affect: Mood normal.         Behavior: Behavior normal.         Lab Results: I have personally reviewed pertinent lab results.  , CBC: No results found for: \"WBC\", \"HGB\", \"HCT\", \"MCV\", \"PLT\", \"ADJUSTEDWBC\", \"RBC\", \"MCH\", \"MCHC\", \"RDW\", \"MPV\", \"NRBC\", CMP: No results found for: \"SODIUM\", \"K\", \"CL\", \"CO2\", \"ANIONGAP\", \"BUN\", \"CREATININE\", \"GLUCOSE\", \"CALCIUM\", \"AST\", \"ALT\", \"ALKPHOS\", \"PROT\", \"BILITOT\", \"EGFR\"  Imaging: Results Review Statement: No pertinent imaging studies reviewed.  EKG, Pathology, and Other Studies: Results Review Statement: No pertinent imaging studies reviewed.    Code Status: Level 1    Assessment:  29 y.o. yo male with morbid obesity found to be a good candidate to undergo a weight loss operation upon being enrolled here at the [facility]. He has completed all of the preoperative process for bariatric surgery.    Plan:  - Plan for laparoscopic possible open Sleeve with intraoperative EGD  - consent signed  - preop orders placed              "

## 2025-01-06 NOTE — PROGRESS NOTES
H&P Exam - Bariatric Surgery   James Marrero 29 y.o. male MRN: [id number]  Unit/Bed#:  Encounter: [id number]      HPI:    29 y.o. yo male with morbid obesity found to be a good candidate to undergo a weight loss operation upon being enrolled here at the [facility].  He has been pre certified to undergo a Laparoscopic Sleeve Gastrectomy.    Here today to review his pre op test results.    Has been medically cleared for the procedure.    (Hx of H.Pylori completed treatment in November, Was on Wegovy 2.4mg with MWM, pre-DM, HTN - on propanolol and Losartan, NIMO - wearing CPAP.)    Chronic pain requiring opioids - (No)    I have discussed with him at length the risks and benefits of the operation and reiterated the components of our multidisciplinary program and the importance of compliance and follow up in the post operative period. Although there is a great statistical chance of improvement or even resolution of most of his associated comorbidities, the results vary from patient to patient and they largely depend on his commitment.     The patient was also instructed with regards to the importance of behavior modification, nutritional counseling, support meeting attendance and lifestyle changes that are important to ensure success.    He was given the opportunity to ask questions and I have answered all of them.    I have addressed with the patient the level of CODE STATUS for this hospital stay and after explaining the different options currently he wishes to be a Level I.   He understands and wishes to proceed.      Review of Systems   Constitutional:  Negative for chills and fever. Unexpected weight change: planned weight loss.  HENT:  Positive for congestion and postnasal drip. Negative for trouble swallowing.    Respiratory:  Positive for cough. Negative for shortness of breath.    Cardiovascular:  Negative for chest pain and palpitations.   Gastrointestinal:  Negative for abdominal pain,  "constipation, diarrhea, nausea and vomiting.   Neurological:  Negative for dizziness.   Psychiatric/Behavioral:          Denies anxiety and depression       Historical Information   Past Medical History:   Diagnosis Date    ADHD     Anxiety     Bronchitis     Concussion     Sports related    CPAP (continuous positive airway pressure) dependence     Depression     GERD (gastroesophageal reflux disease)     Hypertension     Mood swings     Morbid obesity with BMI of 45.0-49.9, adult (HCC)     Sleep apnea, obstructive      Past Surgical History:   Procedure Laterality Date    EGD       Social History   Social History     Substance and Sexual Activity   Alcohol Use Yes    Alcohol/week: 1.0 standard drink of alcohol    Types: 1 Cans of beer per week    Comment: social     Social History     Substance and Sexual Activity   Drug Use Never     Social History     Tobacco Use   Smoking Status Never   Smokeless Tobacco Never     Family History: Family history non-contributory    Meds/Allergies   all medications and allergies reviewed  Allergies   Allergen Reactions    Abilify [Aripiprazole]     Lithium     Lorazepam Other (See Comments)     aggressive    Seroquel [Quetiapine]        Objective     Current Vitals:   Blood Pressure: 130/80 (01/09/25 1439)  Pulse: 71 (01/09/25 1439)  Respirations: 14 (01/09/25 1439)  Height: 5' 6\" (167.6 cm) (01/09/25 1439)  Weight - Scale: (!) 142 kg (313 lb) (01/09/25 1439)      Invasive Devices       None                   Physical Exam  Vitals reviewed.   Constitutional:       General: He is not in acute distress.     Appearance: He is well-developed. He is obese.   HENT:      Head: Normocephalic and atraumatic.   Eyes:      General: No scleral icterus.  Cardiovascular:      Rate and Rhythm: Normal rate and regular rhythm.      Heart sounds: Normal heart sounds.   Pulmonary:      Effort: Pulmonary effort is normal. No respiratory distress.      Breath sounds: Normal breath sounds.   Abdominal: " "     General: Bowel sounds are normal. There is no distension.      Palpations: Abdomen is soft.      Tenderness: There is no abdominal tenderness.   Skin:     General: Skin is warm and dry.   Neurological:      Mental Status: He is alert.   Psychiatric:         Mood and Affect: Mood normal.         Behavior: Behavior normal.         Lab Results: I have personally reviewed pertinent lab results.  , CBC: No results found for: \"WBC\", \"HGB\", \"HCT\", \"MCV\", \"PLT\", \"ADJUSTEDWBC\", \"RBC\", \"MCH\", \"MCHC\", \"RDW\", \"MPV\", \"NRBC\", CMP: No results found for: \"SODIUM\", \"K\", \"CL\", \"CO2\", \"ANIONGAP\", \"BUN\", \"CREATININE\", \"GLUCOSE\", \"CALCIUM\", \"AST\", \"ALT\", \"ALKPHOS\", \"PROT\", \"BILITOT\", \"EGFR\"  Imaging: Results Review Statement: No pertinent imaging studies reviewed.  EKG, Pathology, and Other Studies: Results Review Statement: No pertinent imaging studies reviewed.    Code Status: Level 1    Assessment:  29 y.o. yo male with morbid obesity found to be a good candidate to undergo a weight loss operation upon being enrolled here at the [facility]. He has completed all of the preoperative process for bariatric surgery.    Plan:  - Plan for laparoscopic possible open Sleeve with intraoperative EGD  - consent signed  - preop orders placed              "

## 2025-01-06 NOTE — PROGRESS NOTES
"Name: James Marrero      : 1995      MRN: [id number]  Encounter Provider: Dennis Page DO  Encounter Date: 2025   Encounter department: [facility] [address]      Assessment & Plan  Upper respiratory tract infection, unspecified type    Orders:  •  benzonatate (TESSALON) 200 MG capsule; Take 1 capsule (200 mg total) by mouth 3 (three) times a day as needed for cough         History of Present Illness     Patient comes in today with a 3-day history of cough, sore throat.  Denies any other symptoms.  His strep and COVID were negative in the office today.      Review of Systems   Constitutional:  Negative for chills, fatigue and fever.   HENT:  Positive for sore throat. Negative for congestion, ear pain, hearing loss, postnasal drip and rhinorrhea.    Eyes:  Negative for pain and visual disturbance.   Respiratory:  Positive for cough. Negative for chest tightness, shortness of breath and wheezing.    Cardiovascular:  Negative for chest pain and leg swelling.   Gastrointestinal:  Negative for abdominal distention, abdominal pain, constipation, diarrhea and vomiting.   Endocrine: Negative for cold intolerance and heat intolerance.   Genitourinary:  Negative for difficulty urinating, frequency and urgency.   Musculoskeletal:  Negative for arthralgias and gait problem.   Skin:  Negative for color change.   Neurological:  Negative for dizziness, tremors, syncope, numbness and headaches.   Hematological:  Negative for adenopathy.   Psychiatric/Behavioral:  Negative for agitation, confusion and sleep disturbance. The patient is not nervous/anxious.      Objective     /80 (BP Location: Left arm, Cuff Size: Large)   Pulse 104   Temp 97.7 °F (36.5 °C)   Resp 18   Ht 5' 6\" (1.676 m)   Wt (!) 143 kg (316 lb 3.2 oz)   SpO2 96%   BMI 51.04 kg/m²     Physical Exam  Constitutional:       Appearance: He is well-developed.   HENT:      Head: Normocephalic.      Right Ear: " External ear normal.      Left Ear: External ear normal.      Nose: Nose normal.   Eyes:      Extraocular Movements: Extraocular movements intact.      Conjunctiva/sclera: Conjunctivae normal.      Pupils: Pupils are equal, round, and reactive to light.   Neck:      Thyroid: No thyromegaly.   Cardiovascular:      Rate and Rhythm: Normal rate and regular rhythm.      Heart sounds: Normal heart sounds.   Pulmonary:      Effort: Pulmonary effort is normal.      Breath sounds: Normal breath sounds.   Abdominal:      General: Bowel sounds are normal.      Palpations: Abdomen is soft.   Musculoskeletal:         General: Normal range of motion.      Cervical back: Normal range of motion.   Skin:     General: Skin is warm and dry.   Neurological:      Mental Status: He is alert and oriented to person, place, and time.   Psychiatric:         Mood and Affect: Mood normal.         Behavior: Behavior normal.         Dennis Page DO

## 2025-01-07 ENCOUNTER — ANESTHESIA EVENT (OUTPATIENT)
Dept: PERIOP | Facility: HOSPITAL | Age: 30
DRG: 403 | End: 2025-01-07
Payer: COMMERCIAL

## 2025-01-07 DIAGNOSIS — J06.9 UPPER RESPIRATORY TRACT INFECTION, UNSPECIFIED TYPE: Primary | ICD-10-CM

## 2025-01-07 RX ORDER — METHYLPREDNISOLONE 4 MG/1
TABLET ORAL
Qty: 21 TABLET | Refills: 0 | Status: SHIPPED | OUTPATIENT
Start: 2025-01-07 | End: 2025-01-13

## 2025-01-07 NOTE — PRE-PROCEDURE INSTRUCTIONS
Pre-Surgery Instructions:   Medication Instructions    benzonatate (TESSALON) 200 MG capsule Hold day of surgery.    buPROPion (WELLBUTRIN XL) 300 mg 24 hr tablet Take day of surgery.    desvenlafaxine succinate (PRISTIQ) 50 mg 24 hr tablet Take day of surgery.    doxepin (SINEquan) 100 mg capsule Take day of surgery.    gabapentin (NEURONTIN) 100 mg capsule Take day of surgery.    losartan (COZAAR) 50 mg tablet Hold day of surgery.    Omeprazole 20 MG TBEC Take day of surgery.    propranolol (INDERAL LA) 80 mg 24 hr capsule Take day of surgery.    Semaglutide-Weight Management (Wegovy) 2.4 MG/0.75ML Instructions provided by MD hold over 7 days prior surgery    Sodium Fluoride 5000 PPM 1.1 % GEL Hold day of surgery.    topiramate (TOPAMAX) 200 MG tablet Take day of surgery.    ziprasidone (GEODON) 40 mg capsule Take day of surgery.    ziprasidone (GEODON) 80 mg capsule Take night before surgery   Medication instructions for day surgery reviewed. Please use only a sip of water to take your instructed medications. Avoid all over the counter vitamins, supplements and NSAIDS for one week prior to surgery per anesthesia guidelines. Tylenol is ok to take as needed.     You will receive a call one business day prior to surgery with an arrival time and hospital directions. If your surgery is scheduled on a Monday, the hospital will be calling you on the Friday prior to your surgery. If you have not heard from anyone by 8pm, please call the hospital supervisor through the hospital  at [phone]. ([place] [phone] or [place] [phone]).    Do not eat or drink anything after midnight the night before your surgery, including candy, mints, lifesavers, or chewing gum. Do not drink alcohol 24hrs before your surgery. Try not to smoke at least 24hrs before your surgery.       Follow the pre surgery showering instructions as listed in the “My Surgical Experience Booklet” or otherwise provided by your surgeon's  office. Do not use a blade to shave the surgical area 1 week before surgery. It is okay to use a clean electric clippers up to 24 hours before surgery. Do not apply any lotions, creams, including makeup, cologne, deodorant, or perfumes after showering on the day of your surgery. Do not use dry shampoo, hair spray, hair gel, or any type of hair products.     No contact lenses, eye make-up, or artificial eyelashes. Remove nail polish, including gel polish, and any artificial, gel, or acrylic nails if possible. Remove all jewelry including rings and body piercing jewelry.     Wear causal clothing that is easy to take on and off. Consider your type of surgery.    Keep any valuables, jewelry, piercings at home. Please bring any specially ordered equipment (sling, braces) if indicated.    Arrange for a responsible person to drive you to and from the hospital on the day of your surgery. Please confirm the visitor policy for the day of your procedure when you receive your phone call with an arrival time.     Call the surgeon's office with any new illnesses, exposures, or additional questions prior to surgery.    Please reference your “My Surgical Experience Booklet” for additional information to prepare for your upcoming surgery.

## 2025-01-09 ENCOUNTER — OFFICE VISIT (OUTPATIENT)
Dept: BARIATRICS | Facility: CLINIC | Age: 30
End: 2025-01-09
Payer: COMMERCIAL

## 2025-01-09 ENCOUNTER — TELEPHONE (OUTPATIENT)
Age: 30
End: 2025-01-09

## 2025-01-09 VITALS
DIASTOLIC BLOOD PRESSURE: 80 MMHG | RESPIRATION RATE: 14 BRPM | SYSTOLIC BLOOD PRESSURE: 130 MMHG | HEIGHT: 66 IN | HEART RATE: 71 BPM | WEIGHT: 313 LBS | BODY MASS INDEX: 50.3 KG/M2

## 2025-01-09 DIAGNOSIS — E78.2 HYPERLIPIDEMIA, MIXED: ICD-10-CM

## 2025-01-09 DIAGNOSIS — E66.01 MORBID OBESITY WITH BMI OF 50.0-59.9, ADULT (HCC): ICD-10-CM

## 2025-01-09 DIAGNOSIS — G47.33 OSA (OBSTRUCTIVE SLEEP APNEA): ICD-10-CM

## 2025-01-09 DIAGNOSIS — I10 ESSENTIAL HYPERTENSION: ICD-10-CM

## 2025-01-09 DIAGNOSIS — E88.810 METABOLIC SYNDROME: ICD-10-CM

## 2025-01-09 DIAGNOSIS — Z01.818 OTHER SPECIFIED PRE-OPERATIVE EXAMINATION: Primary | ICD-10-CM

## 2025-01-09 PROCEDURE — 99214 OFFICE O/P EST MOD 30 MIN: CPT | Performed by: PHYSICIAN ASSISTANT

## 2025-01-09 RX ORDER — ENOXAPARIN SODIUM 100 MG/ML
40 INJECTION SUBCUTANEOUS DAILY
Qty: 5.2 ML | Refills: 0 | Status: CANCELLED | OUTPATIENT
Start: 2025-01-09

## 2025-01-09 RX ORDER — CELECOXIB 200 MG/1
200 CAPSULE ORAL ONCE
OUTPATIENT
Start: 2025-01-09 | End: 2025-01-09

## 2025-01-09 RX ORDER — ACETAMINOPHEN 10 MG/ML
1000 INJECTION, SOLUTION INTRAVENOUS ONCE
OUTPATIENT
Start: 2025-01-09 | End: 2025-01-09

## 2025-01-09 RX ORDER — ENOXAPARIN SODIUM 300 MG/3ML
40 INJECTION INTRAVENOUS; SUBCUTANEOUS ONCE
OUTPATIENT
Start: 2025-01-09 | End: 2025-01-09

## 2025-01-09 NOTE — TELEPHONE ENCOUNTER
Patient's mom called about his appointment today with Whit at 2:45 for an H&P. Mom states they have many concerns regarding the surgery and pre-procedure instructions.     Mom stated she has a lot of papers to go over and would like to see either Whit or Erika this afternoon along with the patient. She noted that she is a  and has many questions that were unanswered at the previous class.      I attempted to transfer the call to the [place] [facility] clerical team but there was no answer. Please call patient's mom back regarding an extended appointment today with either Whit or Erika. Thank you.

## 2025-01-09 NOTE — TELEPHONE ENCOUNTER
Dani Pringle and spoke with his mother.  The appointment is extended for the full 30 minutes and is 15 minutes earlier that originally set for.  They are aware.

## 2025-01-13 ENCOUNTER — OFFICE VISIT (OUTPATIENT)
Dept: FAMILY MEDICINE CLINIC | Facility: CLINIC | Age: 30
End: 2025-01-13
Payer: COMMERCIAL

## 2025-01-13 VITALS
RESPIRATION RATE: 16 BRPM | BODY MASS INDEX: 50.62 KG/M2 | TEMPERATURE: 97.2 F | HEIGHT: 66 IN | WEIGHT: 315 LBS | OXYGEN SATURATION: 96 % | SYSTOLIC BLOOD PRESSURE: 108 MMHG | DIASTOLIC BLOOD PRESSURE: 70 MMHG | HEART RATE: 102 BPM

## 2025-01-13 DIAGNOSIS — J40 BRONCHITIS: Primary | ICD-10-CM

## 2025-01-13 PROCEDURE — 99213 OFFICE O/P EST LOW 20 MIN: CPT | Performed by: FAMILY MEDICINE

## 2025-01-13 RX ORDER — AZITHROMYCIN 250 MG/1
TABLET, FILM COATED ORAL
Qty: 6 TABLET | Refills: 0 | Status: SHIPPED | OUTPATIENT
Start: 2025-01-13 | End: 2025-01-17

## 2025-01-13 RX ORDER — HYDROCODONE BITARTRATE AND HOMATROPINE METHYLBROMIDE ORAL SOLUTION 5; 1.5 MG/5ML; MG/5ML
5 LIQUID ORAL 4 TIMES DAILY PRN
Qty: 120 ML | Refills: 0 | Status: SHIPPED | OUTPATIENT
Start: 2025-01-13

## 2025-01-13 NOTE — PROGRESS NOTES
"Name: James Marrero      : 1995      MRN: [id number]  Encounter Provider: Dennis Page DO  Encounter Date: 2025   Encounter department: [facility] [address]      Assessment & Plan  Bronchitis    Orders:  •  azithromycin (ZITHROMAX) 250 mg tablet; Take 2 tablets today then 1 tablet daily x 4 days  •  HYDROcodone Bit-Homatrop MBr (HYCODAN) 5-1.5 mg/5 mL syrup; Take 5 mL by mouth 4 (four) times a day as needed for cough Max Daily Amount: 20 mL         History of Present Illness     Patient comes in today complaining of persistent cough that did not improve after taking a course of steroids.  He states that it is worse at night and is having difficulty sleeping because of it.      Review of Systems   Constitutional:  Negative for chills, fatigue and fever.   HENT:  Negative for congestion, ear pain, hearing loss, postnasal drip, rhinorrhea and sore throat.    Eyes:  Negative for pain and visual disturbance.   Respiratory:  Positive for cough. Negative for chest tightness, shortness of breath and wheezing.    Cardiovascular:  Negative for chest pain and leg swelling.   Gastrointestinal:  Negative for abdominal distention, abdominal pain, constipation, diarrhea and vomiting.   Endocrine: Negative for cold intolerance and heat intolerance.   Genitourinary:  Negative for difficulty urinating, frequency and urgency.   Musculoskeletal:  Negative for arthralgias and gait problem.   Skin:  Negative for color change.   Neurological:  Negative for dizziness, tremors, syncope, numbness and headaches.   Hematological:  Negative for adenopathy.   Psychiatric/Behavioral:  Negative for agitation, confusion and sleep disturbance. The patient is not nervous/anxious.      Objective     /70 (BP Location: Left arm, Patient Position: Sitting)   Pulse 102   Temp (!) 97.2 °F (36.2 °C)   Resp 16   Ht 5' 6\" (1.676 m)   Wt (!) 143 kg (316 lb)   SpO2 96%   BMI 51.00 kg/m² "     Physical Exam  Constitutional:       Appearance: He is well-developed.   HENT:      Head: Normocephalic.      Right Ear: External ear normal.      Left Ear: External ear normal.      Nose: Nose normal.   Eyes:      Extraocular Movements: Extraocular movements intact.      Conjunctiva/sclera: Conjunctivae normal.      Pupils: Pupils are equal, round, and reactive to light.   Neck:      Thyroid: No thyromegaly.   Cardiovascular:      Rate and Rhythm: Normal rate and regular rhythm.      Heart sounds: Normal heart sounds.   Pulmonary:      Effort: Pulmonary effort is normal.      Breath sounds: Wheezing present.   Abdominal:      General: Bowel sounds are normal.      Palpations: Abdomen is soft.   Musculoskeletal:         General: Normal range of motion.      Cervical back: Normal range of motion.   Skin:     General: Skin is warm and dry.   Neurological:      Mental Status: He is alert and oriented to person, place, and time.   Psychiatric:         Mood and Affect: Mood normal.         Behavior: Behavior normal.         Dennis Page,       [Dear  ___] : Dear  [unfilled], [Consult Letter:] : I had the pleasure of evaluating your patient, [unfilled]. [Consult Closing:] : Thank you very much for allowing me to participate in the care of this patient.  If you have any questions, please do not hesitate to contact me. [Please see my note below.] : Please see my note below. [Sincerely,] : Sincerely, [FreeTextEntry3] : Debbie Jeter MD

## 2025-01-20 ENCOUNTER — TELEPHONE (OUTPATIENT)
Age: 30
End: 2025-01-20

## 2025-01-23 ENCOUNTER — TELEPHONE (OUTPATIENT)
Age: 30
End: 2025-01-23

## 2025-01-23 DIAGNOSIS — F41.1 ANXIETY, GENERALIZED: ICD-10-CM

## 2025-01-23 RX ORDER — DESVENLAFAXINE 50 MG/1
50 TABLET, FILM COATED, EXTENDED RELEASE ORAL DAILY
Qty: 90 TABLET | Refills: 1 | Status: SHIPPED | OUTPATIENT
Start: 2025-01-23

## 2025-01-23 NOTE — TELEPHONE ENCOUNTER
PT's mother, Beronica, called in. [facility] Medical Communication Consent Form verified. PT scheduled for Procedure: GASTRECTOMY SLEEVE LAPAROSCOPIC AND INTRAOPERATIVE EGD (Abdomen) with Dr. Sam on 1/28/25. Mother with x2 questions:    1) asking if PT can have snow peas while on liver shrinking diet. Advised that PT cannot have any starchy vegetables including peas. Mother reports that liver shrinking diet instructions state that PT may have snap peas.     2) asking if PT should repurchase Bariatric Fusion post-op.    Beronica call back: [phone]

## 2025-01-24 ENCOUNTER — TELEPHONE (OUTPATIENT)
Dept: BARIATRICS | Facility: CLINIC | Age: 30
End: 2025-01-24

## 2025-01-24 NOTE — TELEPHONE ENCOUNTER
Returned call to Patient's mother. Answered questions she had on Liver Shrinking daiet. Reports Pino doing well with diet and has lost 10# to date.

## 2025-01-26 DIAGNOSIS — F33.9 DEPRESSION, RECURRENT (HCC): ICD-10-CM

## 2025-01-27 RX ORDER — BUPROPION HYDROCHLORIDE 300 MG/1
300 TABLET ORAL EVERY MORNING
Qty: 90 TABLET | Refills: 1 | Status: SHIPPED | OUTPATIENT
Start: 2025-01-27

## 2025-01-28 ENCOUNTER — ANESTHESIA (OUTPATIENT)
Dept: PERIOP | Facility: HOSPITAL | Age: 30
DRG: 403 | End: 2025-01-28
Payer: COMMERCIAL

## 2025-01-28 ENCOUNTER — HOSPITAL ENCOUNTER (INPATIENT)
Facility: HOSPITAL | Age: 30
LOS: 1 days | Discharge: HOME | DRG: 403 | End: 2025-01-29
Attending: SURGERY | Admitting: SURGERY
Payer: COMMERCIAL

## 2025-01-28 DIAGNOSIS — Z98.84 S/P BARIATRIC SURGERY: ICD-10-CM

## 2025-01-28 DIAGNOSIS — K21.9 ESOPHAGEAL REFLUX: ICD-10-CM

## 2025-01-28 DIAGNOSIS — I10 ESSENTIAL HYPERTENSION, MALIGNANT: ICD-10-CM

## 2025-01-28 DIAGNOSIS — I10 ESSENTIAL HYPERTENSION: Primary | ICD-10-CM

## 2025-01-28 DIAGNOSIS — G47.33 OSA (OBSTRUCTIVE SLEEP APNEA): ICD-10-CM

## 2025-01-28 DIAGNOSIS — G47.33 OBSTRUCTIVE SLEEP APNEA (ADULT) (PEDIATRIC): ICD-10-CM

## 2025-01-28 DIAGNOSIS — E66.01 MORBID OBESITY (HCC): ICD-10-CM

## 2025-01-28 PROBLEM — E66.813 CLASS 3 SEVERE OBESITY DUE TO EXCESS CALORIES WITH SERIOUS COMORBIDITY AND BODY MASS INDEX (BMI) OF 45.0 TO 49.9 IN ADULT (HCC): Status: ACTIVE | Noted: 2025-01-28

## 2025-01-28 LAB — GLUCOSE SERPL-MCNC: 124 MG/DL (ref 65–140)

## 2025-01-28 PROCEDURE — 93005 ELECTROCARDIOGRAM TRACING: CPT

## 2025-01-28 PROCEDURE — 82948 REAGENT STRIP/BLOOD GLUCOSE: CPT

## 2025-01-28 PROCEDURE — 43775 LAP SLEEVE GASTRECTOMY: CPT | Performed by: PHYSICIAN ASSISTANT

## 2025-01-28 PROCEDURE — 88307 TISSUE EXAM BY PATHOLOGIST: CPT | Performed by: STUDENT IN AN ORGANIZED HEALTH CARE EDUCATION/TRAINING PROGRAM

## 2025-01-28 PROCEDURE — 0DJ08ZZ INSPECTION OF UPPER INTESTINAL TRACT, VIA NATURAL OR ARTIFICIAL OPENING ENDOSCOPIC: ICD-10-PCS | Performed by: SURGERY

## 2025-01-28 PROCEDURE — 43775 LAP SLEEVE GASTRECTOMY: CPT | Performed by: SURGERY

## 2025-01-28 PROCEDURE — 99255 IP/OBS CONSLTJ NEW/EST HI 80: CPT

## 2025-01-28 PROCEDURE — 0DB64Z3 EXCISION OF STOMACH, PERCUTANEOUS ENDOSCOPIC APPROACH, VERTICAL: ICD-10-PCS | Performed by: SURGERY

## 2025-01-28 RX ORDER — ACETAMINOPHEN 10 MG/ML
1000 INJECTION, SOLUTION INTRAVENOUS ONCE
Status: COMPLETED | OUTPATIENT
Start: 2025-01-28 | End: 2025-01-28

## 2025-01-28 RX ORDER — ROCURONIUM BROMIDE 10 MG/ML
INJECTION, SOLUTION INTRAVENOUS AS NEEDED
Status: DISCONTINUED | OUTPATIENT
Start: 2025-01-28 | End: 2025-01-28

## 2025-01-28 RX ORDER — BUPROPION HYDROCHLORIDE 150 MG/1
300 TABLET ORAL EVERY MORNING
Status: DISCONTINUED | OUTPATIENT
Start: 2025-01-29 | End: 2025-01-29 | Stop reason: HOSPADM

## 2025-01-28 RX ORDER — DOXEPIN HYDROCHLORIDE 100 MG/1
300 CAPSULE ORAL
Status: DISCONTINUED | OUTPATIENT
Start: 2025-01-28 | End: 2025-01-29 | Stop reason: HOSPADM

## 2025-01-28 RX ORDER — TOPIRAMATE 100 MG/1
200 TABLET, FILM COATED ORAL 2 TIMES DAILY
Status: DISCONTINUED | OUTPATIENT
Start: 2025-01-28 | End: 2025-01-29 | Stop reason: HOSPADM

## 2025-01-28 RX ORDER — BUPIVACAINE HYDROCHLORIDE 5 MG/ML
INJECTION, SOLUTION EPIDURAL; INTRACAUDAL AS NEEDED
Status: DISCONTINUED | OUTPATIENT
Start: 2025-01-28 | End: 2025-01-28 | Stop reason: HOSPADM

## 2025-01-28 RX ORDER — FAMOTIDINE 10 MG/ML
20 INJECTION, SOLUTION INTRAVENOUS EVERY 12 HOURS SCHEDULED
Status: DISCONTINUED | OUTPATIENT
Start: 2025-01-28 | End: 2025-01-29 | Stop reason: HOSPADM

## 2025-01-28 RX ORDER — ONDANSETRON 2 MG/ML
INJECTION INTRAMUSCULAR; INTRAVENOUS AS NEEDED
Status: DISCONTINUED | OUTPATIENT
Start: 2025-01-28 | End: 2025-01-28

## 2025-01-28 RX ORDER — BACLOFEN 10 MG/1
10 TABLET ORAL 3 TIMES DAILY
Status: DISCONTINUED | OUTPATIENT
Start: 2025-01-28 | End: 2025-01-29 | Stop reason: HOSPADM

## 2025-01-28 RX ORDER — KETAMINE HCL IN NACL, ISO-OSM 100MG/10ML
SYRINGE (ML) INJECTION AS NEEDED
Status: DISCONTINUED | OUTPATIENT
Start: 2025-01-28 | End: 2025-01-28

## 2025-01-28 RX ORDER — HYDROMORPHONE HCL/PF 1 MG/ML
1 SYRINGE (ML) INJECTION EVERY 4 HOURS PRN
Status: DISCONTINUED | OUTPATIENT
Start: 2025-01-28 | End: 2025-01-29 | Stop reason: HOSPADM

## 2025-01-28 RX ORDER — DEXAMETHASONE SODIUM PHOSPHATE 10 MG/ML
INJECTION, SOLUTION INTRAMUSCULAR; INTRAVENOUS AS NEEDED
Status: DISCONTINUED | OUTPATIENT
Start: 2025-01-28 | End: 2025-01-28

## 2025-01-28 RX ORDER — DIPHENHYDRAMINE HYDROCHLORIDE 50 MG/ML
12.5 INJECTION INTRAMUSCULAR; INTRAVENOUS ONCE AS NEEDED
Status: DISCONTINUED | OUTPATIENT
Start: 2025-01-28 | End: 2025-01-28 | Stop reason: HOSPADM

## 2025-01-28 RX ORDER — DOXEPIN HYDROCHLORIDE 100 MG/1
100 CAPSULE ORAL
Status: DISCONTINUED | OUTPATIENT
Start: 2025-01-28 | End: 2025-01-28

## 2025-01-28 RX ORDER — HYDROMORPHONE HCL/PF 1 MG/ML
0.5 SYRINGE (ML) INJECTION
Status: COMPLETED | OUTPATIENT
Start: 2025-01-28 | End: 2025-01-28

## 2025-01-28 RX ORDER — CELECOXIB 200 MG/1
200 CAPSULE ORAL ONCE
Status: COMPLETED | OUTPATIENT
Start: 2025-01-28 | End: 2025-01-28

## 2025-01-28 RX ORDER — GABAPENTIN 300 MG/1
300 CAPSULE ORAL 2 TIMES DAILY
Status: DISCONTINUED | OUTPATIENT
Start: 2025-01-28 | End: 2025-01-29 | Stop reason: HOSPADM

## 2025-01-28 RX ORDER — EPHEDRINE SULFATE 50 MG/ML
INJECTION INTRAVENOUS AS NEEDED
Status: DISCONTINUED | OUTPATIENT
Start: 2025-01-28 | End: 2025-01-28

## 2025-01-28 RX ORDER — PROPRANOLOL HYDROCHLORIDE 80 MG/1
80 CAPSULE, EXTENDED RELEASE ORAL EVERY MORNING
Status: DISCONTINUED | OUTPATIENT
Start: 2025-01-29 | End: 2025-01-29 | Stop reason: HOSPADM

## 2025-01-28 RX ORDER — MIDAZOLAM HYDROCHLORIDE 2 MG/2ML
INJECTION, SOLUTION INTRAMUSCULAR; INTRAVENOUS AS NEEDED
Status: DISCONTINUED | OUTPATIENT
Start: 2025-01-28 | End: 2025-01-28

## 2025-01-28 RX ORDER — ONDANSETRON 2 MG/ML
4 INJECTION INTRAMUSCULAR; INTRAVENOUS EVERY 6 HOURS PRN
Status: DISCONTINUED | OUTPATIENT
Start: 2025-01-28 | End: 2025-01-29 | Stop reason: HOSPADM

## 2025-01-28 RX ORDER — DESVENLAFAXINE 50 MG/1
50 TABLET, FILM COATED, EXTENDED RELEASE ORAL DAILY
Status: DISCONTINUED | OUTPATIENT
Start: 2025-01-29 | End: 2025-01-29 | Stop reason: HOSPADM

## 2025-01-28 RX ORDER — PROPOFOL 10 MG/ML
INJECTION, EMULSION INTRAVENOUS CONTINUOUS PRN
Status: DISCONTINUED | OUTPATIENT
Start: 2025-01-28 | End: 2025-01-28

## 2025-01-28 RX ORDER — SODIUM CHLORIDE, SODIUM LACTATE, POTASSIUM CHLORIDE, CALCIUM CHLORIDE 600; 310; 30; 20 MG/100ML; MG/100ML; MG/100ML; MG/100ML
100 INJECTION, SOLUTION INTRAVENOUS CONTINUOUS
Status: DISCONTINUED | OUTPATIENT
Start: 2025-01-28 | End: 2025-01-29 | Stop reason: HOSPADM

## 2025-01-28 RX ORDER — ZIPRASIDONE HYDROCHLORIDE 20 MG/1
40 CAPSULE ORAL EVERY MORNING
Status: DISCONTINUED | OUTPATIENT
Start: 2025-01-29 | End: 2025-01-29 | Stop reason: HOSPADM

## 2025-01-28 RX ORDER — PHENYLEPHRINE HCL IN 0.9% NACL 1 MG/10 ML
SYRINGE (ML) INTRAVENOUS AS NEEDED
Status: DISCONTINUED | OUTPATIENT
Start: 2025-01-28 | End: 2025-01-28

## 2025-01-28 RX ORDER — ZIPRASIDONE HYDROCHLORIDE 20 MG/1
80 CAPSULE ORAL EVERY EVENING
Status: DISCONTINUED | OUTPATIENT
Start: 2025-01-28 | End: 2025-01-29 | Stop reason: HOSPADM

## 2025-01-28 RX ORDER — SODIUM CHLORIDE, SODIUM LACTATE, POTASSIUM CHLORIDE, CALCIUM CHLORIDE 600; 310; 30; 20 MG/100ML; MG/100ML; MG/100ML; MG/100ML
INJECTION, SOLUTION INTRAVENOUS CONTINUOUS PRN
Status: DISCONTINUED | OUTPATIENT
Start: 2025-01-28 | End: 2025-01-28

## 2025-01-28 RX ORDER — DIPHENHYDRAMINE HCL 25 MG
25 TABLET ORAL
Status: DISCONTINUED | OUTPATIENT
Start: 2025-01-28 | End: 2025-01-29 | Stop reason: HOSPADM

## 2025-01-28 RX ORDER — ACETAMINOPHEN 10 MG/ML
1000 INJECTION, SOLUTION INTRAVENOUS EVERY 8 HOURS SCHEDULED
Status: DISCONTINUED | OUTPATIENT
Start: 2025-01-28 | End: 2025-01-29 | Stop reason: HOSPADM

## 2025-01-28 RX ORDER — OXYCODONE HCL 5 MG/5 ML
10 SOLUTION, ORAL ORAL EVERY 4 HOURS PRN
Status: DISCONTINUED | OUTPATIENT
Start: 2025-01-28 | End: 2025-01-29 | Stop reason: HOSPADM

## 2025-01-28 RX ORDER — LIDOCAINE HYDROCHLORIDE 20 MG/ML
INJECTION, SOLUTION EPIDURAL; INFILTRATION; INTRACAUDAL; PERINEURAL AS NEEDED
Status: DISCONTINUED | OUTPATIENT
Start: 2025-01-28 | End: 2025-01-28

## 2025-01-28 RX ORDER — SIMETHICONE 80 MG
80 TABLET,CHEWABLE ORAL EVERY 12 HOURS SCHEDULED
Status: DISCONTINUED | OUTPATIENT
Start: 2025-01-28 | End: 2025-01-29 | Stop reason: HOSPADM

## 2025-01-28 RX ORDER — PROPOFOL 10 MG/ML
INJECTION, EMULSION INTRAVENOUS AS NEEDED
Status: DISCONTINUED | OUTPATIENT
Start: 2025-01-28 | End: 2025-01-28

## 2025-01-28 RX ORDER — FENTANYL CITRATE/PF 50 MCG/ML
25 SYRINGE (ML) INJECTION
Status: DISCONTINUED | OUTPATIENT
Start: 2025-01-28 | End: 2025-01-28 | Stop reason: HOSPADM

## 2025-01-28 RX ORDER — MAGNESIUM HYDROXIDE 1200 MG/15ML
LIQUID ORAL AS NEEDED
Status: DISCONTINUED | OUTPATIENT
Start: 2025-01-28 | End: 2025-01-28 | Stop reason: HOSPADM

## 2025-01-28 RX ORDER — KETOROLAC TROMETHAMINE 30 MG/ML
15 INJECTION, SOLUTION INTRAMUSCULAR; INTRAVENOUS EVERY 6 HOURS SCHEDULED
Status: DISCONTINUED | OUTPATIENT
Start: 2025-01-28 | End: 2025-01-29 | Stop reason: HOSPADM

## 2025-01-28 RX ORDER — SODIUM CHLORIDE 9 MG/ML
INJECTION INTRAVENOUS AS NEEDED
Status: DISCONTINUED | OUTPATIENT
Start: 2025-01-28 | End: 2025-01-28 | Stop reason: HOSPADM

## 2025-01-28 RX ORDER — PROMETHAZINE HYDROCHLORIDE 25 MG/ML
12.5 INJECTION, SOLUTION INTRAMUSCULAR; INTRAVENOUS ONCE AS NEEDED
Status: DISCONTINUED | OUTPATIENT
Start: 2025-01-28 | End: 2025-01-28 | Stop reason: HOSPADM

## 2025-01-28 RX ORDER — FENTANYL CITRATE 50 UG/ML
INJECTION, SOLUTION INTRAMUSCULAR; INTRAVENOUS AS NEEDED
Status: DISCONTINUED | OUTPATIENT
Start: 2025-01-28 | End: 2025-01-28

## 2025-01-28 RX ORDER — OXYCODONE HCL 5 MG/5 ML
5 SOLUTION, ORAL ORAL EVERY 4 HOURS PRN
Status: DISCONTINUED | OUTPATIENT
Start: 2025-01-28 | End: 2025-01-29 | Stop reason: HOSPADM

## 2025-01-28 RX ORDER — ENOXAPARIN SODIUM 100 MG/ML
40 INJECTION SUBCUTANEOUS ONCE
Status: COMPLETED | OUTPATIENT
Start: 2025-01-28 | End: 2025-01-28

## 2025-01-28 RX ORDER — ENOXAPARIN SODIUM 100 MG/ML
40 INJECTION SUBCUTANEOUS DAILY
Status: DISCONTINUED | OUTPATIENT
Start: 2025-01-29 | End: 2025-01-29 | Stop reason: HOSPADM

## 2025-01-28 RX ADMIN — SODIUM CHLORIDE, SODIUM LACTATE, POTASSIUM CHLORIDE, AND CALCIUM CHLORIDE 100 ML/HR: .6; .31; .03; .02 INJECTION, SOLUTION INTRAVENOUS at 16:22

## 2025-01-28 RX ADMIN — ROCURONIUM BROMIDE 50 MG: 10 INJECTION, SOLUTION INTRAVENOUS at 09:53

## 2025-01-28 RX ADMIN — ACETAMINOPHEN 1000 MG: 10 INJECTION INTRAVENOUS at 22:59

## 2025-01-28 RX ADMIN — SIMETHICONE 80 MG: 80 TABLET, CHEWABLE ORAL at 15:40

## 2025-01-28 RX ADMIN — TOPIRAMATE 200 MG: 100 TABLET, FILM COATED ORAL at 17:51

## 2025-01-28 RX ADMIN — ZIPRASIDONE HYDROCHLORIDE 80 MG: 20 CAPSULE ORAL at 21:25

## 2025-01-28 RX ADMIN — MIDAZOLAM 2 MG: 1 INJECTION INTRAMUSCULAR; INTRAVENOUS at 09:45

## 2025-01-28 RX ADMIN — OXYCODONE HYDROCHLORIDE 10 MG: 5 SOLUTION ORAL at 21:19

## 2025-01-28 RX ADMIN — PROPOFOL 100 MCG/KG/MIN: 10 INJECTION, EMULSION INTRAVENOUS at 09:53

## 2025-01-28 RX ADMIN — FENTANYL CITRATE 50 MCG: 50 INJECTION INTRAMUSCULAR; INTRAVENOUS at 10:03

## 2025-01-28 RX ADMIN — FOSAPREPITANT 150 MG: 150 INJECTION, POWDER, LYOPHILIZED, FOR SOLUTION INTRAVENOUS at 08:29

## 2025-01-28 RX ADMIN — Medication 100 MCG: at 10:16

## 2025-01-28 RX ADMIN — PROPOFOL 5 MG: 10 INJECTION, EMULSION INTRAVENOUS at 09:51

## 2025-01-28 RX ADMIN — SUGAMMADEX 400 MG: 100 INJECTION, SOLUTION INTRAVENOUS at 10:58

## 2025-01-28 RX ADMIN — BACLOFEN 10 MG: 10 TABLET ORAL at 17:51

## 2025-01-28 RX ADMIN — KETOROLAC TROMETHAMINE 15 MG: 30 INJECTION, SOLUTION INTRAMUSCULAR; INTRAVENOUS at 17:49

## 2025-01-28 RX ADMIN — DEXMEDETOMIDINE HYDROCHLORIDE 12 MCG: 100 INJECTION, SOLUTION INTRAVENOUS at 09:45

## 2025-01-28 RX ADMIN — EPHEDRINE SULFATE 10 MG: 50 INJECTION, SOLUTION INTRAVENOUS at 10:36

## 2025-01-28 RX ADMIN — FAMOTIDINE 20 MG: 10 INJECTION, SOLUTION INTRAVENOUS at 17:49

## 2025-01-28 RX ADMIN — FENTANYL CITRATE 50 MCG: 50 INJECTION INTRAMUSCULAR; INTRAVENOUS at 10:49

## 2025-01-28 RX ADMIN — FENTANYL CITRATE 25 MCG: 50 INJECTION INTRAMUSCULAR; INTRAVENOUS at 11:55

## 2025-01-28 RX ADMIN — CEFAZOLIN 3000 MG: 1 INJECTION, POWDER, FOR SOLUTION INTRAMUSCULAR; INTRAVENOUS at 09:57

## 2025-01-28 RX ADMIN — LIDOCAINE HYDROCHLORIDE 100 MG: 20 INJECTION, SOLUTION EPIDURAL; INFILTRATION; INTRACAUDAL; PERINEURAL at 09:51

## 2025-01-28 RX ADMIN — EPHEDRINE SULFATE 5 MG: 50 INJECTION, SOLUTION INTRAVENOUS at 10:34

## 2025-01-28 RX ADMIN — SIMETHICONE 80 MG: 80 TABLET, CHEWABLE ORAL at 22:59

## 2025-01-28 RX ADMIN — GABAPENTIN 300 MG: 300 CAPSULE ORAL at 17:51

## 2025-01-28 RX ADMIN — ACETAMINOPHEN 1000 MG: 10 INJECTION INTRAVENOUS at 08:16

## 2025-01-28 RX ADMIN — ACETAMINOPHEN 1000 MG: 10 INJECTION INTRAVENOUS at 13:49

## 2025-01-28 RX ADMIN — ROCURONIUM BROMIDE 25 MG: 10 INJECTION, SOLUTION INTRAVENOUS at 10:31

## 2025-01-28 RX ADMIN — DEXAMETHASONE SODIUM PHOSPHATE 4 MG: 10 INJECTION, SOLUTION INTRAMUSCULAR; INTRAVENOUS at 09:55

## 2025-01-28 RX ADMIN — DOXEPIN HYDROCHLORIDE 300 MG: 100 CAPSULE ORAL at 22:59

## 2025-01-28 RX ADMIN — SODIUM CHLORIDE, SODIUM LACTATE, POTASSIUM CHLORIDE, CALCIUM CHLORIDE: 600; 310; 30; 20 INJECTION, SOLUTION INTRAVENOUS at 09:50

## 2025-01-28 RX ADMIN — ONDANSETRON 4 MG: 2 INJECTION INTRAMUSCULAR; INTRAVENOUS at 10:55

## 2025-01-28 RX ADMIN — BACLOFEN 10 MG: 10 TABLET ORAL at 22:59

## 2025-01-28 RX ADMIN — PROPOFOL 5 MG: 10 INJECTION, EMULSION INTRAVENOUS at 09:52

## 2025-01-28 RX ADMIN — SODIUM CHLORIDE, SODIUM LACTATE, POTASSIUM CHLORIDE, AND CALCIUM CHLORIDE: .6; .31; .03; .02 INJECTION, SOLUTION INTRAVENOUS at 09:30

## 2025-01-28 RX ADMIN — CELECOXIB 200 MG: 200 CAPSULE ORAL at 08:10

## 2025-01-28 RX ADMIN — HYDROMORPHONE HYDROCHLORIDE 0.5 MG: 1 INJECTION, SOLUTION INTRAMUSCULAR; INTRAVENOUS; SUBCUTANEOUS at 13:35

## 2025-01-28 RX ADMIN — KETOROLAC TROMETHAMINE 15 MG: 30 INJECTION, SOLUTION INTRAMUSCULAR; INTRAVENOUS at 13:49

## 2025-01-28 RX ADMIN — ENOXAPARIN SODIUM 40 MG: 40 INJECTION SUBCUTANEOUS at 08:10

## 2025-01-28 RX ADMIN — FENTANYL CITRATE 25 MCG: 50 INJECTION INTRAMUSCULAR; INTRAVENOUS at 12:10

## 2025-01-28 RX ADMIN — Medication 20 MG: at 09:51

## 2025-01-28 RX ADMIN — Medication 100 MCG: at 10:31

## 2025-01-28 RX ADMIN — HYDROMORPHONE HYDROCHLORIDE 0.5 MG: 1 INJECTION, SOLUTION INTRAMUSCULAR; INTRAVENOUS; SUBCUTANEOUS at 12:43

## 2025-01-28 NOTE — ANESTHESIA POSTPROCEDURE EVALUATION
Post-Op Assessment Note    CV Status:  Stable  Pain Score: 2    Pain management: adequate    Multimodal analgesia used between 6 hours prior to anesthesia start to PACU discharge    Mental Status:  Alert and awake   Hydration Status:  Euvolemic   PONV Controlled:  Controlled   Airway Patency:  Patent     Post Op Vitals Reviewed: Yes    No anethesia notable event occurred.    Staff: CRNA           Last Filed PACU Vitals:  Vitals Value Taken Time   Temp 97 °F (36.1 °C) 01/28/25 1120   Pulse 62 01/28/25 1121   /58 01/28/25 1120   Resp 18 01/28/25 1121   SpO2 99 % 01/28/25 1121   Vitals shown include unfiled device data.    Modified Jackson:     Vitals Value Taken Time   Activity 2 01/28/25 1330   Respiration 2 01/28/25 1330   Circulation 2 01/28/25 1330   Consciousness 2 01/28/25 1330   Oxygen Saturation 1 01/28/25 1330     Modified Jackson Score: 9

## 2025-01-28 NOTE — OP NOTE
OPERATIVE REPORT  PATIENT NAME: James Marrero    :  1995  MRN: [id number]  Pt Location: [facility] OR ROOM 04    SURGERY DATE: 2025    Surgeons and Role:     * Chey Sam MD - Primary     * Whit Calderón PA-C - Assisting    Preop Diagnosis:  Morbid obesity (HCC) [E66.01]  Essential hypertension, malignant [I10]  Esophageal reflux [K21.9]  Obstructive sleep apnea (adult) (pediatric) [G47.33]    Post-Op Diagnosis Codes:     * Morbid obesity (HCC) [E66.01]     * Essential hypertension, malignant [I10]     * Esophageal reflux [K21.9]     * Obstructive sleep apnea (adult) (pediatric) [G47.33]    Procedure(s):  (1) GASTRECTOMY SLEEVE LAPAROSCOPIC AND INTRAOPERATIVE EGD    Specimen(s):  ID Type Source Tests Collected by Time Destination   1 : Portion of stomach for H.Pylori Tissue Stomach TISSUE EXAM Chey Sam MD 2025 1025        Estimated Blood Loss:   20 ml    Drains:  * No LDAs found *    Anesthesia Type:   General    Operative Indications:  Morbid obesity (HCC) [E66.01]  Essential hypertension, malignant [I10]  Esophageal reflux [K21.9]  Obstructive sleep apnea (adult) (pediatric) [G47.33]  BMI 48.71 kg/m2    Operative Findings:  Normal anatomy       Complications:   None    Procedure and Technique:  The patient was identified by name, armband and conversation. The patient was then brought to the operative theatre. After successful induction of general anesthesia, the patient was prepped and draped in the usual sterile fashion. A timeout was performed and all were in agreement.  Optiview technique was used to gain entrance into the abdomen with a 12 mm 0 degree laparoscope in the left upper quadrant. The abdomen was then insufflated to 15 mmHg. A 12 mm port was placed near the umbilicus. A 12 mm port was placed in the right mid abdomen. A 5 mm epigastric liver retractor was placed. A 5 mm left lower quadrant port was placed. Four quadrant Exparel/Marcaine transversus abdominus plane block  was performed.  Gastoesophageal fat pad was resected. Starting 5 cm from the pylorus, the vessels along the greater curvature of the stomach were ligated and divided with the ultrasonic luann all the way to the gastroesophageal junction such that the left candice was exposed. A 36 Uzbek ViSiGi tube was then passed along the lesser curvature of the stomach toward the pylorus and was placed on suction.  Starting 5 cm from the pylorus, a series of firings with the Ethicon stapler using a gold load and the remainder blue with SLR was used to create the sleeve gastrectomy until a point 1 cm from the gastroesophageal junction was reached.   EGD was then performed. The endoscope was advanced past the posterior pharynx into the first portion of the duodenum. No twist, kinks or angulation of the incisura. The incisura was wide open. No staple line bleeding. The first portion of the duodenum appear normal. No apical outpouching.  The specimen was retrieved from the 12 mm port. The right mid abdomen and umbilical ports were then closed with transfascial 0 vicryl suture. All port sites were examined as we exited the abdomen to ensure hemostasis. The skin was then closed with monocryl and Exofin. All instrument, sponge and needle counts were correct.      I was present for the entire procedure., A qualified resident physician was not available., and A physician assistant was required during the procedure for retraction, tissue handling, dissection and suturing, traction/countertraction, stapling, camera driving, and performance of the intraoperative EGD.    Patient Disposition:  PACU              SIGNATURE: Chey Sam MD  DATE: January 28, 2025  TIME: 11:01 AM

## 2025-01-28 NOTE — INTERVAL H&P NOTE
H&P reviewed. After examining the patient I find no changes in the patients condition since the H&P had been written.    Vitals:    01/28/25 0812   BP: 116/62   Pulse: 85   Resp: 20   Temp: (!) 97.1 °F (36.2 °C)   SpO2: 95%

## 2025-01-28 NOTE — ANESTHESIA PREPROCEDURE EVALUATION
Procedure:  GASTRECTOMY SLEEVE LAPAROSCOPIC AND INTRAOPERATIVE EGD (Abdomen)    Propranolol last dose today  Wegovy last dose 1/12/2025  Morbid Obesity BMI 49  Relevant Problems   CARDIO   (+) Essential hypertension   (+) Hyperlipidemia, mixed   (+) Migraine without aura and without status migrainosus, not intractable      GI/HEPATIC   (+) Gastroesophageal reflux disease      NEURO/PSYCH   (+) Anxiety, generalized   (+) Depression, recurrent (HCC)   (+) Migraine without aura and without status migrainosus, not intractable      PULMONARY   (+) NIMO (obstructive sleep apnea)      Echo 2022  Left Ventricle Left ventricular cavity size is normal. Wall thickness is mildly increased. There is mild concentric hypertrophy. Systolic function is normal.  Wall motion is normal. Diastolic function is normal.   Right Ventricle Right ventricular cavity size is normal. Systolic function is normal. Wall thickness is normal.   Left Atrium The atrium is normal in size.   Right Atrium The atrium is normal in size.   Aortic Valve The aortic valve is trileaflet. The leaflets are not thickened. The leaflets are not calcified. The leaflets exhibit normal mobility. There is no evidence of regurgitation. The aortic valve has no significant stenosis.   Mitral Valve The mitral valve has normal structure and function. There is no evidence of regurgitation. There is no evidence of stenosis.   Tricuspid Valve Tricuspid valve structure is normal. There is no evidence of regurgitation. There is no evidence of stenosis.   Pulmonic Valve Pulmonic valve structure is normal. There is no evidence of regurgitation. There is no evidence of stenosis.   Ascending Aorta The aortic root is normal in size.   IVC/SVC The inferior vena cava is normal in size.   Pericardium There is no pericardial effusion. The pericardium is normal in appearance.       Physical Exam    Airway    Mallampati score: IV  TM Distance: <3 FB       Dental   No notable dental hx      Cardiovascular  Cardiovascular exam normal    Pulmonary  Pulmonary exam normal     Other Findings        Anesthesia Plan  ASA Score- 3     Anesthesia Type- general with ASA Monitors.         Additional Monitors:     Airway Plan: ETT.    Comment: Risks/benefits and alternatives discussed with patient including possible PONV, sore throat, damage to teeth/lips/gums/esophagus, and possibility of rare anesthetic and surgical emergencies including but not limited to heart attack, stroke, and/or death. All questions were answered.  .       Plan Factors-Exercise tolerance (METS): >4 METS.    Chart reviewed.   Existing labs reviewed. Patient summary reviewed.    Patient is not a current smoker.      Obstructive sleep apnea risk education given perioperatively.        Induction- intravenous.    Postoperative Plan- Plan for postoperative opioid use. Planned trial extubation        Informed Consent- Anesthetic plan and risks discussed with patient.  I personally reviewed this patient with the CRNA. Discussed and agreed on the Anesthesia Plan with the CRNA..      NPO Status:  Vitals Value Taken Time   Date of last liquid     Time of last liquid     Date of last solid 01/26/25 01/28/25 0808   Time of last solid 2000 01/28/25 0808

## 2025-01-29 ENCOUNTER — TRANSITIONAL CARE MANAGEMENT (OUTPATIENT)
Dept: FAMILY MEDICINE CLINIC | Facility: CLINIC | Age: 30
End: 2025-01-29

## 2025-01-29 VITALS
WEIGHT: 302.03 LBS | RESPIRATION RATE: 18 BRPM | OXYGEN SATURATION: 94 % | TEMPERATURE: 97.7 F | BODY MASS INDEX: 48.54 KG/M2 | HEART RATE: 77 BPM | HEIGHT: 66 IN | DIASTOLIC BLOOD PRESSURE: 68 MMHG | SYSTOLIC BLOOD PRESSURE: 102 MMHG

## 2025-01-29 PROBLEM — D64.89 OTHER SPECIFIED ANEMIAS: Status: ACTIVE | Noted: 2025-01-29

## 2025-01-29 PROBLEM — D50.9 IRON DEFICIENCY ANEMIA: Status: ACTIVE | Noted: 2025-01-29

## 2025-01-29 LAB
ANION GAP SERPL CALCULATED.3IONS-SCNC: 8 MMOL/L (ref 4–13)
ATRIAL RATE: 70 BPM
BUN SERPL-MCNC: 9 MG/DL (ref 5–25)
CALCIUM SERPL-MCNC: 7.9 MG/DL (ref 8.4–10.2)
CHLORIDE SERPL-SCNC: 110 MMOL/L (ref 96–108)
CO2 SERPL-SCNC: 19 MMOL/L (ref 21–32)
CREAT SERPL-MCNC: 0.61 MG/DL (ref 0.6–1.3)
ERYTHROCYTE [DISTWIDTH] IN BLOOD BY AUTOMATED COUNT: 13.9 % (ref 11.6–15.1)
GFR SERPL CREATININE-BSD FRML MDRD: 135 ML/MIN/1.73SQ M
GLUCOSE SERPL-MCNC: 89 MG/DL (ref 65–140)
HCT VFR BLD AUTO: 36.6 % (ref 36.5–49.3)
HGB BLD-MCNC: 11.6 G/DL (ref 12–17)
MCH RBC QN AUTO: 25.6 PG (ref 26.8–34.3)
MCHC RBC AUTO-ENTMCNC: 31.7 G/DL (ref 31.4–37.4)
MCV RBC AUTO: 81 FL (ref 82–98)
P AXIS: 18 DEGREES
PLATELET # BLD AUTO: 249 THOUSANDS/UL (ref 149–390)
PMV BLD AUTO: 10.4 FL (ref 8.9–12.7)
POTASSIUM SERPL-SCNC: 3.9 MMOL/L (ref 3.5–5.3)
PR INTERVAL: 180 MS
QRS AXIS: 38 DEGREES
QRSD INTERVAL: 100 MS
QT INTERVAL: 376 MS
QTC INTERVAL: 406 MS
RBC # BLD AUTO: 4.54 MILLION/UL (ref 3.88–5.62)
SODIUM SERPL-SCNC: 137 MMOL/L (ref 135–147)
T WAVE AXIS: 29 DEGREES
VENTRICULAR RATE: 70 BPM
WBC # BLD AUTO: 8.56 THOUSAND/UL (ref 4.31–10.16)

## 2025-01-29 PROCEDURE — 99232 SBSQ HOSP IP/OBS MODERATE 35: CPT

## 2025-01-29 PROCEDURE — 80048 BASIC METABOLIC PNL TOTAL CA: CPT | Performed by: PHYSICIAN ASSISTANT

## 2025-01-29 PROCEDURE — 99024 POSTOP FOLLOW-UP VISIT: CPT | Performed by: PHYSICIAN ASSISTANT

## 2025-01-29 PROCEDURE — NC001 PR NO CHARGE: Performed by: PHYSICIAN ASSISTANT

## 2025-01-29 PROCEDURE — 85027 COMPLETE CBC AUTOMATED: CPT | Performed by: PHYSICIAN ASSISTANT

## 2025-01-29 PROCEDURE — 93010 ELECTROCARDIOGRAM REPORT: CPT | Performed by: INTERNAL MEDICINE

## 2025-01-29 RX ORDER — BACLOFEN 10 MG/1
10 TABLET ORAL 3 TIMES DAILY
Qty: 21 TABLET | Refills: 0 | Status: SHIPPED | OUTPATIENT
Start: 2025-01-29 | End: 2025-02-05

## 2025-01-29 RX ORDER — ACETAMINOPHEN 325 MG/1
1000 TABLET ORAL EVERY 8 HOURS SCHEDULED
Start: 2025-01-29 | End: 2025-02-05

## 2025-01-29 RX ADMIN — KETOROLAC TROMETHAMINE 15 MG: 30 INJECTION, SOLUTION INTRAMUSCULAR; INTRAVENOUS at 06:12

## 2025-01-29 RX ADMIN — KETOROLAC TROMETHAMINE 15 MG: 30 INJECTION, SOLUTION INTRAMUSCULAR; INTRAVENOUS at 00:30

## 2025-01-29 RX ADMIN — BACLOFEN 10 MG: 10 TABLET ORAL at 08:24

## 2025-01-29 RX ADMIN — SODIUM CHLORIDE, SODIUM LACTATE, POTASSIUM CHLORIDE, AND CALCIUM CHLORIDE 100 ML/HR: .6; .31; .03; .02 INJECTION, SOLUTION INTRAVENOUS at 03:09

## 2025-01-29 RX ADMIN — GABAPENTIN 300 MG: 300 CAPSULE ORAL at 08:24

## 2025-01-29 RX ADMIN — ACETAMINOPHEN 1000 MG: 10 INJECTION INTRAVENOUS at 06:12

## 2025-01-29 RX ADMIN — FAMOTIDINE 20 MG: 10 INJECTION, SOLUTION INTRAVENOUS at 09:50

## 2025-01-29 RX ADMIN — SIMETHICONE 80 MG: 80 TABLET, CHEWABLE ORAL at 10:09

## 2025-01-29 RX ADMIN — BUPROPION HYDROCHLORIDE 300 MG: 150 TABLET, EXTENDED RELEASE ORAL at 08:24

## 2025-01-29 RX ADMIN — ENOXAPARIN SODIUM 40 MG: 40 INJECTION SUBCUTANEOUS at 08:23

## 2025-01-29 RX ADMIN — PROPRANOLOL HYDROCHLORIDE 80 MG: 80 CAPSULE, EXTENDED RELEASE ORAL at 08:25

## 2025-01-29 RX ADMIN — DESVENLAFAXINE 50 MG: 50 TABLET, FILM COATED, EXTENDED RELEASE ORAL at 08:25

## 2025-01-29 RX ADMIN — TOPIRAMATE 200 MG: 100 TABLET, FILM COATED ORAL at 08:24

## 2025-01-29 RX ADMIN — ZIPRASIDONE HYDROCHLORIDE 40 MG: 20 CAPSULE ORAL at 08:23

## 2025-01-29 NOTE — DISCHARGE SUMMARY
"Discharge Summary - Bariatrics     Discharge Summary - James Marrero 29 y.o. male MRN: [id number]    Unit/Bed#: -01 Encounter: [id number]      Pre-Operative Diagnosis: Pre-Op Diagnosis Codes:      * Morbid obesity (HCC) [E66.01]     * Essential hypertension, malignant [I10]     * Esophageal reflux [K21.9]     * Obstructive sleep apnea (adult) (pediatric) [G47.33]    Post-Operative Diagnosis: Post-Op Diagnosis Codes:     * Morbid obesity (HCC) [E66.01]     * Essential hypertension, malignant [I10]     * Esophageal reflux [K21.9]     * Obstructive sleep apnea (adult) (pediatric) [G47.33]    Procedures Performed:  Procedure(s):  (1) GASTRECTOMY SLEEVE LAPAROSCOPIC AND INTRAOPERATIVE EGD    Surgeon: Chey Sam MD    See H & P for full details of admission and Operative Note for full details of operations performed.     Hospital Course:  Patient was admitted for a Laparoscopic Sleeve Gastrectomy. Post operatively pain was controlled with oral analgesics and the patient is ambulating/micturating without difficulty. Vital signs and lab work were stable. The patient is tolerating clear liquid diet without nausea or vomiting. The patient is cleared for D/C by the surgeon on POD1.    DVT Risk Calculator Score: 0.2904% - no extended prophylaxis recommended    Patient was seen and examined prior to discharge.      Provisions for Follow-Up Care:  See After Visit Summary for information related to follow-up care and home orders.      Disposition: Home, in stable condition. Patient should refer to \"Discharge Instructions\" for further information.    Planned Readmission: No    Discharge Medications:  See After Visit Summary for reconciled discharge medications provided to patient and family.      Post Operative instructions: Reviewed with patient and/or family.    This text is generated with voice recognition software. There may be translation, syntax,  or grammatical errors. If you have any questions, please contact " the dictating provider.     Signature:   Whit Calderón PA-C  Date: 1/29/2025 Time: 7:39 AM

## 2025-01-29 NOTE — PLAN OF CARE
Problem: PAIN - ADULT  Goal: Verbalizes/displays adequate comfort level or baseline comfort level  Description: Interventions:  - Encourage patient to monitor pain and request assistance  - Assess pain using appropriate pain scale  - Administer analgesics based on type and severity of pain and evaluate response  - Implement non-pharmacological measures as appropriate and evaluate response  - Consider cultural and social influences on pain and pain management  - Notify physician/advanced practitioner if interventions unsuccessful or patient reports new pain  Outcome: Progressing     Problem: INFECTION - ADULT  Goal: Absence or prevention of progression during hospitalization  Description: INTERVENTIONS:  - Assess and monitor for signs and symptoms of infection  - Monitor lab/diagnostic results  - Monitor all insertion sites, i.e. indwelling lines, tubes, and drains  - Monitor endotracheal if appropriate and nasal secretions for changes in amount and color  - [place] appropriate cooling/warming therapies per order  - Administer medications as ordered  - Instruct and encourage patient and family to use good hand hygiene technique  - Identify and instruct in appropriate isolation precautions for identified infection/condition  Outcome: Progressing  Goal: Absence of fever/infection during neutropenic period  Description: INTERVENTIONS:  - Monitor WBC    Outcome: Progressing     Problem: GASTROINTESTINAL - ADULT  Goal: Minimal or absence of nausea and/or vomiting  Description: INTERVENTIONS:  - Administer IV fluids if ordered to ensure adequate hydration  - Maintain NPO status until nausea and vomiting are resolved  - Nasogastric tube if ordered  - Administer ordered antiemetic medications as needed  - Provide nonpharmacologic comfort measures as appropriate  - Advance diet as tolerated, if ordered  - Consider nutrition services referral to assist patient with adequate nutrition and appropriate food choices  Outcome:  Progressing  Goal: Maintains or returns to baseline bowel function  Description: INTERVENTIONS:  - Assess bowel function  - Encourage oral fluids to ensure adequate hydration  - Administer IV fluids if ordered to ensure adequate hydration  - Administer ordered medications as needed  - Encourage mobilization and activity  - Consider nutritional services referral to assist patient with adequate nutrition and appropriate food choices  Outcome: Progressing  Goal: Maintains adequate nutritional intake  Description: INTERVENTIONS:  - Monitor percentage of each meal consumed  - Identify factors contributing to decreased intake, treat as appropriate  - Assist with meals as needed  - Monitor I&O, weight, and lab values if indicated  - Obtain nutrition services referral as needed  Outcome: Progressing  Goal: Oral mucous membranes remain intact  Description: INTERVENTIONS  - Assess oral mucosa and hygiene practices  - Implement preventative oral hygiene regimen  - Implement oral medicated treatments as ordered  - Initiate Nutrition services referral as needed  Outcome: Progressing

## 2025-01-29 NOTE — ASSESSMENT & PLAN NOTE
Body mass index is 48.75 kg/m².    Recommend incorporating a more whole foods plant-predominant diet along with decreasing consumption of red meats and processed foods  Per AHA guidelines, recommend moderate-vigorous intensity exercise for 30 minutes a day for 5 days a week or a total of 150 min/week     Patient s/p bariatric surgery, postop day 1.    Hopefully that we will help with weight loss in addition to lifestyle change.  Pain and supportive management per primary team  Advance diet as tolerated

## 2025-01-29 NOTE — PROGRESS NOTES
Pastoral Care Progress Note          Chaplaincy Interventions Utilized:   Empowerment: Encouraged focus on present and Provided chaplaincy education    Exploration: Explored relational needs & resources, Explored spiritual needs & resources, and Facilitated story telling     Relationship Building: Cultivated a relationship of care and support and Listened empathically    Ritual: Provided prayer    Chaplaincy Outcomes Achieved:  Expressed gratitude and Identified meaningful connections    Spiritual Coping Strategies Utilized:   Connectedness and Spiritual community     provided introduction to pt during rounds. Pt verbally processed post op experience and parent's level of support thereafter. Pt discussed his maggie community leadership update at [facility]. Pt requested prayer.  obliged. Pt states he will be discharged soon. No follow up needed as presently assessed.

## 2025-01-29 NOTE — ASSESSMENT & PLAN NOTE
Currently blood pressure control.  Does not take any blood pressure medication  Hopefully after weight loss patient will have improvement in blood pressure.

## 2025-01-29 NOTE — ASSESSMENT & PLAN NOTE
Patient on multiple antianxiety medication including Wellbutrin, geodon, desvenlafaxine succinate and doxepin 300 mg at bedtime.  Agreed to continue all antianxiety medications.

## 2025-01-29 NOTE — ASSESSMENT & PLAN NOTE
S/p bariatric surgery.  Pain and supportive management per primary care.  Advance diet as tolerated

## 2025-01-29 NOTE — PROGRESS NOTES
Progress Note - Bariatrics   Name: James Marrero 29 y.o. male I MRN: [id number]  Unit/Bed#: -01 I Date of Admission: 1/28/2025   Date of Service: 1/29/2025 I Hospital Day: 1     Assessment & Plan  Class 3 severe obesity due to excess calories with serious comorbidity and body mass index (BMI) of 45.0 to 49.9 in adult (HCC)  Assessment/Plan  28 yo M s/p Lap sleeve POD1 with stable post op course. Encourage PO fluids, ambulation, and incentive spirometry.  Will plan for D/C home today    DVT Risk Calculator Score: 0.2904% - no extended prophylaxis recommended    Plan of care was discussed with patient and patient's nurse  Care plan discussed with Dr. Sam    Dispo: Continue bariatric clear liquid diet, ambulation, incentive spirometry.     Anxiety, generalized  Continue home medications and outpatient follow up  Hyperlipidemia, mixed  Should improve s/p surgery  -Avoid fried foods and trans fat, limit saturated fats and refined carbohydrates  -Increase fish/omega 3 FA consumption  -Increase physical activity  -will obtain labs outpatient  Essential hypertension  Should improve s/p surgery  No medications - appreciate SLIM recommendations  Prediabetes  Should improve s/p surgery  NIMO (obstructive sleep apnea)  Should improve s/p surgery   Continue using CPAP    Subjective   Having some mild gas pains but overall feeling good. Tolerating liquid diet without nausea or vomiting, pain adequately controlled on oral pain medication, ambulating without assistance, voiding well, using incentive spirometer. Denies fevers, chills, sweats, SOB, CP, calf pain.      Objective :  Temp:  [97 °F (36.1 °C)-97.5 °F (36.4 °C)] 97.3 °F (36.3 °C)  HR:  [55-85] 85  BP: (107-133)/(58-77) 127/77  Resp:  [13-22] 18  SpO2:  [93 %-99 %] 95 %  O2 Device: None (Room air)  Nasal Cannula O2 Flow Rate (L/min):  [2 L/min] 2 L/min    Physical Exam  Vitals reviewed.   Constitutional:       General: He is not in acute distress.     Appearance:  He is well-developed.   HENT:      Head: Normocephalic and atraumatic.   Eyes:      General: No scleral icterus.  Cardiovascular:      Rate and Rhythm: Normal rate and regular rhythm.   Pulmonary:      Effort: Pulmonary effort is normal. No respiratory distress.   Abdominal:      General: There is no distension.      Palpations: Abdomen is soft.      Tenderness: There is no abdominal tenderness.      Comments: Incisions C/D/I   Skin:     General: Skin is warm and dry.   Neurological:      Mental Status: He is alert.   Psychiatric:         Mood and Affect: Mood normal.         Behavior: Behavior normal.       Lab Results: I have reviewed the following results:  Lab Results   Component Value Date    WBC 8.56 01/29/2025    HGB 11.6 (L) 01/29/2025    HCT 36.6 01/29/2025    MCV 81 (L) 01/29/2025     01/29/2025     Lab Results   Component Value Date     09/01/2017    SODIUM 137 01/29/2025    K 3.9 01/29/2025     (H) 01/29/2025    CO2 19 (L) 01/29/2025    AGAP 8 01/29/2025    BUN 9 01/29/2025    CREATININE 0.61 01/29/2025    GLUC 89 01/29/2025    GLUF 97 07/06/2024    CALCIUM 7.9 (L) 01/29/2025    AST 15 07/06/2024    ALT 20 07/06/2024    ALKPHOS 51 07/06/2024    PROT 7.5 09/01/2017    TP 7.4 07/06/2024    BILITOT 0.9 09/01/2017    TBILI 0.37 07/06/2024    EGFR 135 01/29/2025     Lab Results   Component Value Date    HGBA1C 5.9 (H) 07/06/2024     Lab Results   Component Value Date    GTH5YRWUDPXC 2.127 07/06/2024     Lab Results   Component Value Date    CHOLESTEROL 155 07/06/2024     Lab Results   Component Value Date    HDL 29 (L) 07/06/2024     Lab Results   Component Value Date    TRIG 214 (H) 07/06/2024     Lab Results   Component Value Date    LDLCALC 83 07/06/2024       Imaging Results Review: No pertinent imaging studies reviewed.  Other Study Results Review: No additional pertinent studies reviewed.    VTE Pharmacologic Prophylaxis: Enoxaparin (Lovenox)  VTE Mechanical Prophylaxis: sequential  compression device

## 2025-01-29 NOTE — PLAN OF CARE
Problem: PAIN - ADULT  Goal: Verbalizes/displays adequate comfort level or baseline comfort level  Description: Interventions:  - Encourage patient to monitor pain and request assistance  - Assess pain using appropriate pain scale  - Administer analgesics based on type and severity of pain and evaluate response  - Implement non-pharmacological measures as appropriate and evaluate response  - Consider cultural and social influences on pain and pain management  - Notify physician/advanced practitioner if interventions unsuccessful or patient reports new pain  Outcome: Progressing     Problem: INFECTION - ADULT  Goal: Absence or prevention of progression during hospitalization  Description: INTERVENTIONS:  - Assess and monitor for signs and symptoms of infection  - Monitor lab/diagnostic results  - Monitor all insertion sites, i.e. indwelling lines, tubes, and drains  - Monitor endotracheal if appropriate and nasal secretions for changes in amount and color  - [place] appropriate cooling/warming therapies per order  - Administer medications as ordered  - Instruct and encourage patient and family to use good hand hygiene technique  - Identify and instruct in appropriate isolation precautions for identified infection/condition  Outcome: Progressing  Goal: Absence of fever/infection during neutropenic period  Description: INTERVENTIONS:  - Monitor WBC    Outcome: Progressing     Problem: GASTROINTESTINAL - ADULT  Goal: Minimal or absence of nausea and/or vomiting  Description: INTERVENTIONS:  - Administer IV fluids if ordered to ensure adequate hydration  - Maintain NPO status until nausea and vomiting are resolved  - Nasogastric tube if ordered  - Administer ordered antiemetic medications as needed  - Provide nonpharmacologic comfort measures as appropriate  - Advance diet as tolerated, if ordered  - Consider nutrition services referral to assist patient with adequate nutrition and appropriate food choices  Outcome:  Progressing  Goal: Maintains or returns to baseline bowel function  Description: INTERVENTIONS:  - Assess bowel function  - Encourage oral fluids to ensure adequate hydration  - Administer IV fluids if ordered to ensure adequate hydration  - Administer ordered medications as needed  - Encourage mobilization and activity  - Consider nutritional services referral to assist patient with adequate nutrition and appropriate food choices  Outcome: Progressing  Goal: Maintains adequate nutritional intake  Description: INTERVENTIONS:  - Monitor percentage of each meal consumed  - Identify factors contributing to decreased intake, treat as appropriate  - Assist with meals as needed  - Monitor I&O, weight, and lab values if indicated  - Obtain nutrition services referral as needed  Outcome: Progressing  Goal: Oral mucous membranes remain intact  Description: INTERVENTIONS  - Assess oral mucosa and hygiene practices  - Implement preventative oral hygiene regimen  - Implement oral medicated treatments as ordered  - Initiate Nutrition services referral as needed  Outcome: Progressing

## 2025-01-29 NOTE — ASSESSMENT & PLAN NOTE
Body mass index is 48.75 kg/m².    Recommend incorporating a more whole foods plant-predominant diet along with decreasing consumption of red meats and processed foods  Per AHA guidelines, recommend moderate-vigorous intensity exercise for 30 minutes a day for 5 days a week or a total of 150 min/week     Patient s/p bariatric surgery, postop day 0.  Hopefully that we will help with weight loss in addition to lifestyle change.  Pain and supportive management per primary team  Advance diet as tolerated

## 2025-01-29 NOTE — UTILIZATION REVIEW
Initial Clinical Review    Elective  surgical procedure  Age/Sex: 29 y.o. male  Surgery Date: 1/28  Procedure: GASTRECTOMY SLEEVE LAPAROSCOPIC AND INTRAOPERATIVE EGD   Anesthesia: general   Operative Findings: normal anatomy     POD#1 Progress Note: POD1 with stable post op course. Encourage PO fluids, ambulation, and incentive spirometry.  Continue bariatric clear liquid diet, ambulation, incentive spirometry. Will plan for D/C home today     Admission Orders: Date/Time/Statement:   Admission Orders (From admission, onward)       Ordered        01/28/25 1116  Inpatient Admission  Once                          Orders Placed This Encounter   Procedures    Inpatient Admission     Standing Status:   Standing     Number of Occurrences:   1     Level of Care:   Med Surg [16]     Bed Type:   Bariatric [1]     Estimated length of stay:   Inpatient Only Surgery     Diet: bariatric clears   Mobility: amb   DVT Prophylaxis: SCD    Medications/Pain Control:   Scheduled Medications:  acetaminophen, 1,000 mg, Intravenous, Q8H MAX  baclofen, 10 mg, Oral, TID  buPROPion, 300 mg, Oral, QAM  desvenlafaxine succinate, 50 mg, Oral, Daily  doxepin, 300 mg, Oral, HS  enoxaparin, 40 mg, Subcutaneous, Daily  famotidine, 20 mg, Intravenous, Q12H MAX  gabapentin, 300 mg, Oral, BID  ketorolac, 15 mg, Intravenous, Q6H MAX  propranolol, 80 mg, Oral, QAM  simethicone, 80 mg, Oral, Q12H MAX  topiramate, 200 mg, Oral, BID  ziprasidone, 40 mg, Oral, QAM  ziprasidone, 80 mg, Oral, QPM      Continuous IV Infusions:  lactated ringers, 100 mL/hr, Intravenous, Continuous      PRN Meds:  diphenhydrAMINE, 25 mg, Oral, HS PRN  HYDROmorphone, 1 mg, Intravenous, Q4H PRN  lactated ringers, 1,000 mL, Intravenous, Once PRN   And  lactated ringers, 1,000 mL, Intravenous, Once PRN  ondansetron, 4 mg, Intravenous, Q6H PRN  oxyCODONE, 10 mg, Oral, Q4H PRN  oxyCODONE, 5 mg, Oral, Q4H PRN  phenol, 1 spray, Mouth/Throat, Q2H PRN  sodium chloride, 1,000 mL,  Intravenous, Once PRN   And  sodium chloride, 1,000 mL, Intravenous, Once PRN      Vital Signs (last 3 days)       Date/Time Temp Pulse Resp BP MAP (mmHg) SpO2 Calculated FIO2 (%) - Nasal Cannula O2 Flow Rate (L/min) Nasal Cannula O2 Flow Rate (L/min) O2 Device Cardiac (WDL) [place] Coma Scale Score Pain    01/29/25 0825 -- -- -- -- -- -- -- -- -- -- -- 15 4 01/29/25 07:33:22 -- 85 -- 127/77 94 95 % -- -- -- -- -- -- --    01/29/25 0612 -- -- -- -- -- -- -- -- -- -- -- -- 4 01/29/25 0030 -- -- -- -- -- -- -- -- -- -- -- -- 4 01/28/25 22:57:59 97.3 °F (36.3 °C) 83 18 109/61 77 93 % -- -- -- -- -- -- --    01/28/25 2119 -- -- -- -- -- -- -- -- -- -- -- -- 7    01/28/25 21:00:10 97.5 °F (36.4 °C) 79 18 117/65 82 94 % -- -- -- None (Room air) -- -- --    01/28/25 1920 -- -- -- -- -- -- -- -- -- None (Room air) -- 15 4 01/28/25 1755 -- -- -- -- -- -- -- -- -- -- -- -- 3    01/28/25 1754 97 °F (36.1 °C) 69 18 107/69 -- 95 % -- -- -- -- -- -- --    01/28/25 1750 -- -- -- -- -- -- -- -- -- -- -- 15 --    01/28/25 1749 -- -- -- -- -- -- -- -- -- -- -- -- 6    01/28/25 1515 -- -- -- -- -- 95 % -- 0 L/min -- None (Room air) -- -- --    01/28/25 1430 -- 69 20 107/69 83 97 % -- -- -- None (Room air) WDL -- 6 01/28/25 1400 -- 61 16 123/70 91 97 % -- 2 L/min -- Nasal cannula WDL -- --    01/28/25 1345 -- 68 18 123/68 89 98 % -- 2 L/min -- Nasal cannula WDL -- 6 01/28/25 1335 -- -- -- -- -- -- -- -- -- -- -- -- 7 01/28/25 1330 -- 64 17 133/75 99 97 % -- 2 L/min -- Nasal cannula WDL -- 6 01/28/25 1315 -- 61 20 116/70 -- 98 % -- 2 L/min -- Nasal cannula WDL -- --    01/28/25 1300 -- 61 20 118/75 -- 97 % 28 -- 2 L/min Nasal cannula WDL -- 6 01/28/25 1245 -- 62 22 113/76 -- 97 % 28 -- 2 L/min Nasal cannula WDL -- 8 01/28/25 1243 -- -- 20 -- -- -- -- -- -- -- -- -- 8 01/28/25 1230 -- 58 15 111/65 -- 97 % 28 -- 2 L/min Nasal cannula WDL -- --    01/28/25 1215 -- 59 14 117/73 89 95 % -- -- -- Nasal cannula  WDL -- --    01/28/25 1210 -- -- -- -- -- -- -- -- -- -- -- -- 4 01/28/25 1200 -- 56 15 118/74 92 95 % -- -- -- Nasal cannula WDL -- 4 01/28/25 1155 -- -- 20 -- -- -- -- -- -- -- -- -- 5 01/28/25 1145 -- 55 16 118/73 -- 94 % -- 2 L/min -- Nasal cannula WDL 15 5 01/28/25 1130 -- 61 16 119/70 -- 98 % -- -- -- None (Room air) WDL -- 5 01/28/25 1120 97 °F (36.1 °C) 63 13 113/58 79 99 % -- 5 L/min -- Simple mask WDL -- --    01/28/25 0812 97.1 °F (36.2 °C) 85 20 116/62 -- 95 % -- -- -- None (Room air) -- -- No Pain    01/28/25 0810 -- -- -- -- -- -- -- -- -- -- -- -- Med Not Given for Pain - for MAR use only          Weight (last 2 days)       Date/Time Weight    01/28/25 1754 137 (302.03)    01/28/25 0812 137 (301.81)            Pertinent Labs/Diagnostic Test Results:   Radiology:  No orders to display     Cardiology:  No orders to display     GI:  No orders to display       Results from last 7 days   Lab Units 01/29/25  0552   WBC Thousand/uL 8.56   HEMOGLOBIN g/dL 11.6*   HEMATOCRIT % 36.6   PLATELETS Thousands/uL 249       Results from last 7 days   Lab Units 01/29/25  0552   SODIUM mmol/L 137   POTASSIUM mmol/L 3.9   CHLORIDE mmol/L 110*   CO2 mmol/L 19*   ANION GAP mmol/L 8   BUN mg/dL 9   CREATININE mg/dL 0.61   EGFR ml/min/1.73sq m 135   CALCIUM mg/dL 7.9*       Results from last 7 days   Lab Units 01/28/25  1124   POC GLUCOSE mg/dl 124     Results from last 7 days   Lab Units 01/29/25  0552   GLUCOSE RANDOM mg/dL 89     Network Utilization Review Department  ATTENTION: Please call with any questions or concerns to [phone] and carefully listen to the prompts so that you are directed to the right person. All voicemails are confidential.   For Discharge needs, contact Care Management DC Support Team at [phone] opt. 2  Send all requests for admission clinical reviews, approved or denied determinations and any other requests to dedicated fax number below belonging to the campus where the  patient is receiving treatment. List of dedicated fax numbers for the Facilities:  FACILITY NAME UR FAX NUMBER   ADMISSION DENIALS (Administrative/Medical Necessity) [phone]   DISCHARGE SUPPORT TEAM (NETWORK) [phone]   PARENT CHILD HEALTH (Maternity/NICU/Pediatrics) [phone]   [facility] [phone]   [facility] [phone]   [facility] [phone]   [facility] [phone]   [facility] [phone]   [facility] [phone]   [facility] [phone]   [facility] [phone]   [facility] [phone]   [facility] [phone]   [facility] [phone]   [facility] [phone]

## 2025-01-29 NOTE — ASSESSMENT & PLAN NOTE
There is noted a drop in hemoglobin from 13-11.  Patient was also maintained on IV fluids could be dilutional.  Patient denies bleeding episodes.  Recommended to repeat a CBC in 1 week with the primary care doctor

## 2025-01-29 NOTE — ASSESSMENT & PLAN NOTE
Assessment/Plan  30 yo M s/p Lap sleeve POD1 with stable post op course. Encourage PO fluids, ambulation, and incentive spirometry.  Will plan for D/C home today    DVT Risk Calculator Score: 0.2904% - no extended prophylaxis recommended    Plan of care was discussed with patient and patient's nurse  Care plan discussed with Dr. Sam    Dispo: Continue bariatric clear liquid diet, ambulation, incentive spirometry.

## 2025-01-29 NOTE — PROGRESS NOTES
Progress Note - Hospitalist   Name: James Marrero 29 y.o. male I MRN: [id number]  Unit/Bed#: -01 I Date of Admission: 1/28/2025   Date of Service: 1/29/2025 I Hospital Day: 1    Assessment & Plan  Class 3 severe obesity due to excess calories with serious comorbidity and body mass index (BMI) of 45.0 to 49.9 in adult (HCC)  Body mass index is 48.75 kg/m².    Recommend incorporating a more whole foods plant-predominant diet along with decreasing consumption of red meats and processed foods  Per AHA guidelines, recommend moderate-vigorous intensity exercise for 30 minutes a day for 5 days a week or a total of 150 min/week     Patient s/p bariatric surgery, postop day 1.    Hopefully that we will help with weight loss in addition to lifestyle change.  Pain and supportive management per primary team  Advance diet as tolerated  Anxiety, generalized  Patient on multiple antianxiety medication including Wellbutrin, geodon, desvenlafaxine succinate and doxepin 300 mg at bedtime.  Agreed to continue all antianxiety medications.  Hyperlipidemia, mixed  Continue lifestyle modification.  Essential hypertension  Currently blood pressure control.  Does not take any blood pressure medication  Hopefully after weight loss patient will have improvement in blood pressure.  Morbid (severe) obesity due to excess calories (HCC)  S/p bariatric surgery.  Pain and supportive management per primary care.  Advance diet as tolerated  Prediabetes  Currently manages with lifestyle modification.  Monitor blood sugar  NIMO (obstructive sleep apnea)  Continue CPAP at bedtime  Other specified anemias  There is noted a drop in hemoglobin from 13-11.  Patient was also maintained on IV fluids could be dilutional.  Patient denies bleeding episodes.  Recommended to repeat a CBC in 1 week with the primary care doctor    VTE Pharmacologic Prophylaxis: VTE Score: 4 Moderate Risk (Score 3-4) - Pharmacological DVT Prophylaxis Ordered: enoxaparin  (Lovenox).    Mobility:   Basic Mobility Inpatient Raw Score: 24  JH-HLM Goal: 8: Walk 250 feet or more  JH-HLM Achieved: 8: Walk 250 feet ot more  JH-HLM Goal achieved. Continue to encourage appropriate mobility.    Patient Centered Rounds: I performed bedside rounds with nursing staff today.   Discussions with Specialists or Other Care Team Provider:         Current Length of Stay: 1 day(s)  Current Patient Status: Inpatient     Discharge Plan: [facility] is following this patient on consult. They are medically stable for discharge when deemed appropriate by primary service.    Code Status: No Order    Subjective   Patient resting in the chair and appears comfortable.  Reports mild epigastric pain however tolerable.  Was able to maintain oral intake.    Objective :  Temp:  [97 °F (36.1 °C)-98.1 °F (36.7 °C)] 98.1 °F (36.7 °C)  HR:  [] 96  BP: (107-132)/(61-78) 116/69  Resp:  [17-18] 18  SpO2:  [93 %-95 %] 95 %  O2 Device: None (Room air)    Body mass index is 48.75 kg/m².     Input and Output Summary (last 24 hours):   No intake or output data in the 24 hours ending 01/29/25 1702    Physical Exam  Vitals and nursing note reviewed.   Constitutional:       General: He is not in acute distress.     Appearance: He is well-developed. He is obese.   HENT:      Head: Normocephalic and atraumatic.   Eyes:      Conjunctiva/sclera: Conjunctivae normal.   Cardiovascular:      Rate and Rhythm: Normal rate and regular rhythm.      Heart sounds: No murmur heard.  Pulmonary:      Effort: Pulmonary effort is normal. No respiratory distress.      Breath sounds: Normal breath sounds.   Abdominal:      Palpations: Abdomen is soft.      Tenderness: There is abdominal tenderness (Epigastric area).   Musculoskeletal:         General: No swelling.      Cervical back: Neck supple.      Right lower leg: No edema.      Left lower leg: No edema.   Skin:     General: Skin is warm and dry.      Capillary Refill: Capillary refill takes less  than 2 seconds.   Neurological:      Mental Status: He is alert.   Psychiatric:         Mood and Affect: Mood normal.           Lines/Drains:              Lab Results: I have reviewed the following results:   Results from last 7 days   Lab Units 01/29/25  0552   WBC Thousand/uL 8.56   HEMOGLOBIN g/dL 11.6*   HEMATOCRIT % 36.6   PLATELETS Thousands/uL 249     Results from last 7 days   Lab Units 01/29/25  0552   SODIUM mmol/L 137   POTASSIUM mmol/L 3.9   CHLORIDE mmol/L 110*   CO2 mmol/L 19*   BUN mg/dL 9   CREATININE mg/dL 0.61   ANION GAP mmol/L 8   CALCIUM mg/dL 7.9*   GLUCOSE RANDOM mg/dL 89         Results from last 7 days   Lab Units 01/28/25  1124   POC GLUCOSE mg/dl 124               Recent Cultures (last 7 days):         Imaging Results Review: No pertinent imaging studies reviewed.  Other Study Results Review: No additional pertinent studies reviewed.    Last 24 Hours Medication List:   No current facility-administered medications for this encounter.    Administrative Statements   Today, Patient Was Seen By: Marika Castro MD      **Please Note: This note may have been constructed using a voice recognition system.**

## 2025-01-29 NOTE — DISCHARGE INSTR - AVS FIRST PAGE
Bariatric/Weight Loss Surgery  Hospital Discharge Instructions  ACTIVITY:  Progress as feels comfortable - a good rule is:  if you are doing something and it begins to hurt, stop doing the activity. Walk every hour while at home.  You may walk stairs if you do so slowly  You may shower 48 hours after surgery.  Use your incentive spirometer 10 times per hour while awake for 1 week  Do NOT drive for 48 hours after surgery. No driving 24 hours after taking certain prescription pain medications . Examples of such medication are Percocet, Darvocet, Oxycodone, Tylenol #3, and Tylenol with Codeine. Follow your pharmacist’s orders.    DIET  Stay on a liquid diet for 7 days after your surgery date, sipping slowly. Refer to your manual for examples of choices. Remember to keep your liquids sugar free or low calorie. You may have protein drinks. Make sure to drink 48 to 64 ounces per day of fluids.   You may advance to a pureed diet one week after surgery as instructed by your diet progression pamphlet. Once you get approval from your surgeon at your first post operative visit you may advance to the soft diet.     MEDICATIONS:  The abdominal nerve block will wear off during the first 1-2 days that you are home, and you may become sore. Continue to take your Tylenol and your pain medication as instructed.   Start vitamins and minerals per Ronel's instructions  Anti-acid Medication as per prescription.  Other medications as indicated on the Physician Patient Discharge Instructions form given to you at the time of discharge.  You will need to consult with your Family Doctor in regards to all your prescribed medication, particularly those for blood pressure and diabetes.  As you lose weight, medical conditions may change, requiring an alteration or elimination of the drug dose.   DO NOT TAKE BIRTH CONTROL(BC) MEDICATIONS, INSERT BC VAGINAL RINGS, OR PLACE IUD OR ANY OTHER BC METHODS UNTIL 31 DAYS FROM DAY OF DISCHARGE FROM  HOSPITAL. THIS PLACES YOU AT HIGH RISK FOR A POTENTIALLY LIFE THREATENING BLOOD CLOT. Remember to always use barrier methods for birth control and speak to your GYN about using two forms of birth control to start 31 days after surgery. It is very important to avoid pregnancy until at least 18-24 months after surgery.       INCISION CARE  You may shower and get incisions wet 2 days after surgery. No soaking tub baths or swimming for 30 days after surgery. Keep abdominal area and incisions clean. Use soap and water to create a good lather and rinse off. Do not scrub incisions.   If you have a drain, empty the drain as the nurses instructed.    FOLLOW-UP APPOINTMENT should be made for one week after discharge. Call surgeon’s office at [phone] to schedule an appointment.    CALL YOUR DOCTOR FOR:  pain not controlled by pain medications, a temperature greater than 101.5° F, any increase or change in drainage or redness from any incision, any vomiting or inability to keep liquids down, shortness of breath, shoulder pain, or bleeding

## 2025-01-29 NOTE — ASSESSMENT & PLAN NOTE
Should improve s/p surgery  -Avoid fried foods and trans fat, limit saturated fats and refined carbohydrates  -Increase fish/omega 3 FA consumption  -Increase physical activity  -will obtain labs outpatient

## 2025-01-29 NOTE — CONSULTS
Consultation - Hospitalist   Name: James Marrero 29 y.o. male I MRN: [id number]  Unit/Bed#: -01 I Date of Admission: 1/28/2025   Date of Service: 1/28/2025 I Hospital Day: 0   Consults  Physician Requesting Evaluation: Chey Sam MD   Reason for Evaluation / Principal Problem: Manage chronic medical problems    Assessment & Plan  Class 3 severe obesity due to excess calories with serious comorbidity and body mass index (BMI) of 45.0 to 49.9 in adult (HCC)  Body mass index is 48.75 kg/m².    Recommend incorporating a more whole foods plant-predominant diet along with decreasing consumption of red meats and processed foods  Per AHA guidelines, recommend moderate-vigorous intensity exercise for 30 minutes a day for 5 days a week or a total of 150 min/week     Patient s/p bariatric surgery, postop day 0.  Hopefully that we will help with weight loss in addition to lifestyle change.  Pain and supportive management per primary team  Advance diet as tolerated  Anxiety, generalized  Patient on multiple antianxiety medication including Wellbutrin, geodon, desvenlafaxine succinate and doxepin 300 mg at bedtime.  Agreed to continue all antianxiety medications.  Hyperlipidemia, mixed  Continue lifestyle modification.  Essential hypertension  Currently blood pressure control.  Does not take any blood pressure medication  Hopefully after weight loss patient will have improvement in blood pressure.  Morbid (severe) obesity due to excess calories (HCC)  S/p bariatric surgery.  Pain and supportive management per primary care.  Advance diet as tolerated  Prediabetes  Currently manages with lifestyle modification.  Monitor blood sugar  NIMO (obstructive sleep apnea)  Continue CPAP at bedtime        VTE Pharmacologic Prophylaxis: VTE Score: 4 Moderate Risk (Score 3-4) - Pharmacological DVT Prophylaxis Ordered: enoxaparin (Lovenox).  Code Status: No Order   Discussion with family: Updated  (father and mother)  at bedside.    Anticipated Length of Stay: Patient will be admitted on an inpatient basis with an anticipated length of stay of greater than 2 midnights secondary to postop.    History of Present Illness   Chief Complaint: Elective bariatric surgery    James Marrero is a 29 y.o. male with a PMH of hypertension, prediabetes, obesity, anxiety and depression who presents for elective bariatric surgery.  Internal medicine was consulted for medical management    Review of Systems   Constitutional:  Negative for chills and fever.   HENT:  Negative for ear pain and sore throat.    Eyes:  Negative for pain and visual disturbance.   Respiratory:  Negative for cough and shortness of breath.    Cardiovascular:  Negative for chest pain and palpitations.   Gastrointestinal:  Positive for abdominal pain (Mild abdominal pain postop). Negative for nausea and vomiting.   Genitourinary:  Negative for dysuria and hematuria.   Musculoskeletal:  Negative for arthralgias and back pain.   Skin:  Negative for color change and rash.   Neurological:  Negative for seizures and syncope.   All other systems reviewed and are negative.      Historical Information   Past Medical History:   Diagnosis Date    ADHD     Anxiety     Bronchitis     Concussion     Sports related    CPAP (continuous positive airway pressure) dependence     Depression     GERD (gastroesophageal reflux disease)     Hypertension     Mood swings     Morbid obesity with BMI of 45.0-49.9, adult (HCC)     Sleep apnea, obstructive      Past Surgical History:   Procedure Laterality Date    EGD       Social History     Tobacco Use    Smoking status: Never    Smokeless tobacco: Never   Vaping Use    Vaping status: Never Used   Substance and Sexual Activity    Alcohol use: Yes     Alcohol/week: 1.0 standard drink of alcohol     Types: 1 Cans of beer per week     Comment: social    Drug use: Never    Sexual activity: Not on file     E-Cigarette/Vaping    E-Cigarette Use Never User       E-Cigarette/Vaping Substances    Nicotine No     THC No     CBD No     Flavoring No     Other No     Unknown No        Social History:  Marital Status: Single   Occupation:   Patient Pre-hospital Living Situation: Home  Patient Pre-hospital Level of Mobility: walks  Patient Pre-hospital Diet Restrictions:     Meds/Allergies   I have reviewed home medications with patient personally.  Prior to Admission medications    Medication Sig Start Date End Date Taking? Authorizing Provider   benzonatate (TESSALON) 200 MG capsule Take 1 capsule (200 mg total) by mouth 3 (three) times a day as needed for cough  Patient not taking: Reported on 1/9/2025 1/6/25  Yes Dennis Page DO   buPROPion (WELLBUTRIN XL) 300 mg 24 hr tablet TAKE 1 TABLET (300 MG TOTAL) BY MOUTH EVERY MORNING. 1/27/25  Yes Dennis Page DO   desvenlafaxine succinate (PRISTIQ) 50 mg 24 hr tablet TAKE 1 TABLET BY MOUTH EVERY DAY 1/23/25  Yes Dennis Page DO   doxepin (SINEquan) 100 mg capsule TAKE 1 CAPSULE (100 MG TOTAL) BY MOUTH 3 (THREE) TIMES DAILY AFTER MEALS 8/21/24  Yes Dennis Page DO   gabapentin (NEURONTIN) 100 mg capsule Take 3 tablets twice a day 11/7/24  Yes Bonifacio Moss MD   Omeprazole 20 MG TBEC Take 1 tablet (20 mg total) by mouth 2 (two) times a day 9/20/23  Yes Dennis Page DO   propranolol (INDERAL LA) 80 mg 24 hr capsule TAKE 1 CAPSULE (80 MG TOTAL) BY MOUTH EVERY MORNING 1/29/24 1/28/25 Yes Dennis Page DO   Semaglutide-Weight Management (Wegovy) 2.4 MG/0.75ML Inject 0.75 mL (2.4 mg total) under the skin once a week  Patient not taking: Reported on 1/9/2025 11/15/24 2/7/25 Yes MART Chang   Sodium Fluoride 5000 PPM 1.1 % GEL BRUSH ON NIGHTLY AND SPIT OUT EXCESS 12/22/23  Yes Historical Provider, MD   topiramate (TOPAMAX) 200 MG tablet TAKE 1 TABLET (200 MG TOTAL) BY MOUTH 2 TIMES A DAY. 8/11/24  Yes Dennis Page DO   ziprasidone (GEODON) 40 mg capsule Take 1 capsule (40 mg total) by mouth every morning 12/4/24   Yes Dennis Page DO   ziprasidone (GEODON) 80 mg capsule TAKE 1 CAPSULE BY MOUTH EVERY EVENING 11/11/24  Yes Dennis Page DO   HYDROcodone Bit-Homatrop MBr (HYCODAN) 5-1.5 mg/5 mL syrup Take 5 mL by mouth 4 (four) times a day as needed for cough Max Daily Amount: 20 mL 1/13/25   Dennis Page DO     Allergies   Allergen Reactions    Abilify [Aripiprazole]     Lithium     Lorazepam Other (See Comments)     aggressive    Seroquel [Quetiapine]        Objective :  Temp:  [97 °F (36.1 °C)-97.1 °F (36.2 °C)] 97 °F (36.1 °C)  HR:  [55-85] 69  BP: (107-133)/(58-76) 107/69  Resp:  [13-22] 18  SpO2:  [94 %-99 %] 95 %  O2 Device: None (Room air)  Nasal Cannula O2 Flow Rate (L/min):  [2 L/min] 2 L/min    Physical Exam  Vitals and nursing note reviewed.   Constitutional:       General: He is not in acute distress.     Appearance: He is well-developed. He is obese.   HENT:      Head: Normocephalic and atraumatic.   Eyes:      Conjunctiva/sclera: Conjunctivae normal.   Cardiovascular:      Rate and Rhythm: Normal rate and regular rhythm.      Heart sounds: No murmur heard.  Pulmonary:      Effort: Pulmonary effort is normal. No respiratory distress.      Breath sounds: Normal breath sounds.   Abdominal:      General: Bowel sounds are normal.      Palpations: Abdomen is soft.      Tenderness: There is abdominal tenderness (Epigastric area).   Musculoskeletal:         General: No swelling.      Cervical back: Neck supple.      Right lower leg: No edema.      Left lower leg: No edema.   Skin:     General: Skin is warm and dry.      Capillary Refill: Capillary refill takes less than 2 seconds.   Neurological:      Mental Status: He is alert.   Psychiatric:         Mood and Affect: Mood normal.          Lines/Drains:            Lab Results: I have reviewed the following results:              Results from last 7 days   Lab Units 01/28/25  1124   POC GLUCOSE mg/dl 124     Lab Results   Component Value Date    HGBA1C 5.9 (H) 07/06/2024     HGBA1C 6.0 (H) 01/06/2024    HGBA1C 5.9 (H) 12/05/2022           Imaging Results Review: No pertinent imaging studies reviewed.  Other Study Results Review: No additional pertinent studies reviewed.    Administrative Statements       ** Please Note: This note has been constructed using a voice recognition system. **

## 2025-01-30 ENCOUNTER — RESULTS FOLLOW-UP (OUTPATIENT)
Dept: BARIATRICS | Facility: CLINIC | Age: 30
End: 2025-01-30

## 2025-01-30 ENCOUNTER — TELEPHONE (OUTPATIENT)
Age: 30
End: 2025-01-30

## 2025-01-30 ENCOUNTER — TELEPHONE (OUTPATIENT)
Dept: MEDSURG UNIT | Facility: HOSPITAL | Age: 30
End: 2025-01-30

## 2025-01-30 DIAGNOSIS — A04.8 H. PYLORI INFECTION: ICD-10-CM

## 2025-01-30 DIAGNOSIS — A04.8 H. PYLORI INFECTION: Primary | ICD-10-CM

## 2025-01-30 PROCEDURE — 88307 TISSUE EXAM BY PATHOLOGIST: CPT | Performed by: STUDENT IN AN ORGANIZED HEALTH CARE EDUCATION/TRAINING PROGRAM

## 2025-01-30 RX ORDER — TETRACYCLINE HYDROCHLORIDE 500 MG/1
500 CAPSULE ORAL 4 TIMES DAILY
Qty: 40 CAPSULE | Refills: 0 | Status: SHIPPED | OUTPATIENT
Start: 2025-01-30 | End: 2025-02-09

## 2025-01-30 RX ORDER — BISMUTH SUBCITRATE POTASSIUM, METRONIDAZOLE, TETRACYCLINE HYDROCHLORIDE 140; 125; 125 MG/1; MG/1; MG/1
3 CAPSULE ORAL
Qty: 120 CAPSULE | Refills: 0 | Status: SHIPPED | OUTPATIENT
Start: 2025-01-30 | End: 2025-02-09

## 2025-01-30 RX ORDER — OMEPRAZOLE 40 MG/1
40 CAPSULE, DELAYED RELEASE ORAL 2 TIMES DAILY
Qty: 20 CAPSULE | Refills: 0 | Status: SHIPPED | OUTPATIENT
Start: 2025-01-30 | End: 2025-02-09

## 2025-01-30 RX ORDER — METRONIDAZOLE 500 MG/1
500 TABLET ORAL EVERY 6 HOURS
Qty: 40 TABLET | Refills: 0 | Status: SHIPPED | OUTPATIENT
Start: 2025-01-30 | End: 2025-02-09

## 2025-01-30 RX ORDER — BISMUTH SUBSALICYLATE 262 MG/1
524 TABLET, CHEWABLE ORAL
Qty: 30 TABLET | Refills: 0 | Status: SHIPPED | OUTPATIENT
Start: 2025-01-30 | End: 2025-02-09

## 2025-01-30 NOTE — RESULT ENCOUNTER NOTE
He has resistant H.Pylori - will need to treat him again with antibiotics but he will wait about 1 month to start the treatment to make it more tolerable. We will repeat breath test to ensure eradication after he is off PPI in about 4 months

## 2025-01-30 NOTE — TELEPHONE ENCOUNTER
Pt of Dr. Pires s/p (1) GASTRECTOMY SLEEVE LAPAROSCOPIC AND INTRAOPERATIVE EGD (Abdomen) 1/28/25-    Pt calling in to inquire about directions of Pylera medication and report burning sensation in stomach.   Informed pt the burning sensation is likely from the H. Pylori and tylenol, as well as Pylera and omeprazole should help with symptoms.   Reviewed directions to medications per script.     Pt, and mother, thankful for the assistance. Will call back with new or worsening symptoms.     Pt seeing PCP tomorrow and f/u WM next Friday.

## 2025-01-30 NOTE — TELEPHONE ENCOUNTER
Patient called back, tried to transfer to [facility] office but no one was available.  Please call the patient back if neccessary.

## 2025-01-30 NOTE — TELEPHONE ENCOUNTER
Pt calling in to report that the medication prescribed today Pylera is not covered by insurance and is over $800. Pt looking for alternative and was hoping to start immediately due to pain.     Secure chat messaged on call provider.   On call provider ordering medications to [facility] on file.     Called pt and made pt aware. Pt appreciative.

## 2025-01-30 NOTE — TELEPHONE ENCOUNTER
Post op follow up phone call completed.  Pt is sipping liquids and has consumed 40 oz so far today.  Pt has been awake since 6 am.  Instructed pt to make sure he is sipping slowly.   Using IS as instructed, reinforced importance of using IS to help prevent pneumonia. Ambulating about home without difficulty.  Pain controlled with analgesia.  Reaffirmed examples of liquid diet over the next week.  Passing gas, had loose stool today, did not start miralax.  Pt stated understanding about discharge instructions and medication adjustments.  Follow up appt with surgeon scheduled for next week.   Instructed to call with any additional questions or concerns.

## 2025-01-31 ENCOUNTER — OFFICE VISIT (OUTPATIENT)
Dept: FAMILY MEDICINE CLINIC | Facility: CLINIC | Age: 30
End: 2025-01-31
Payer: COMMERCIAL

## 2025-01-31 ENCOUNTER — TELEPHONE (OUTPATIENT)
Age: 30
End: 2025-01-31

## 2025-01-31 VITALS
OXYGEN SATURATION: 99 % | WEIGHT: 304 LBS | RESPIRATION RATE: 18 BRPM | HEART RATE: 84 BPM | DIASTOLIC BLOOD PRESSURE: 68 MMHG | BODY MASS INDEX: 51.9 KG/M2 | SYSTOLIC BLOOD PRESSURE: 118 MMHG | HEIGHT: 64 IN | TEMPERATURE: 98 F

## 2025-01-31 DIAGNOSIS — E66.01 MORBID OBESITY WITH BMI OF 50.0-59.9, ADULT (HCC): Primary | ICD-10-CM

## 2025-01-31 DIAGNOSIS — I10 ESSENTIAL HYPERTENSION: ICD-10-CM

## 2025-01-31 PROCEDURE — 99214 OFFICE O/P EST MOD 30 MIN: CPT | Performed by: FAMILY MEDICINE

## 2025-01-31 PROCEDURE — 99495 TRANSJ CARE MGMT MOD F2F 14D: CPT | Performed by: FAMILY MEDICINE

## 2025-01-31 RX ORDER — BISMUTH SUBSALICYLATE 262 MG/1
TABLET, CHEWABLE ORAL
Refills: 0 | OUTPATIENT
Start: 2025-01-31

## 2025-01-31 NOTE — TELEPHONE ENCOUNTER
Reason for call:   [x] Prior Auth  [] Other:     Caller:  [] Patient  [x] Pharmacy  [id number] - [place] - [address]          Ordering Provider:   [x] Speciality/Provider - dk Calderón

## 2025-01-31 NOTE — PROGRESS NOTES
Transition of Care Visit  Name: James Marrero      : 1995      MRN: [id number]  Encounter Provider: Dennis Page DO  Encounter Date: 2025   Encounter department: [facility] [address]    Assessment & Plan  Morbid obesity with BMI of 50.0-59.9, adult (HCC)         Essential hypertension  If he starts to develop dizziness let us know and we will decrease his propranolol.            History of Present Illness     Transitional Care Management Review:   James Marrero is a 29 y.o. male here for TCM follow up.     During the TCM phone call patient stated:  TCM Call     Date and time call was made  2025  1:33 PM    Hospital care reviewed  Records reviewed    Patient was hospitialized at  [facility]    Date of Admission  25    Date of discharge  25    Diagnosis  Class 3 severe obesity due to excess calories with serious comorbidity and body mass index (BMI) of 45.0 to 49.9 in adult    Disposition  Home    Were the patients medications reviewed and updated  Yes    Current Symptoms  None      TCM Call     Post hospital issues  None    Scheduled for follow up?  Yes    Did you obtain your prescribed medications  Yes    Do you need help managing your prescriptions or medications  No    Is transportation to your appointment needed  No    I have advised the patient to call PCP with any new or worsening symptoms  Milka Chatterjee, Practice Coordinator    Living Arrangements  Family members    Support System  Family    Are you recieving any outpatient services  No    Are you recieving home care services  No    Are you using any community resources  No    Current waiver services  No    Have you fallen in the last 12 months  No    Interperter language line needed  No    Counseling  Patient    Comments  TCM scheduled for  at 10 am        Patient was in today for hospital follow-up, he status post laparoscopic gastrectomy .  He was noted to have H.  "pylori gastritis on pathology.  He was given a prescription for antibiotic treatment he did take 1 dose of this and it did upset his stomach.  We will contact his surgeon to see if he wishes to continue this at this time or wait.      Review of Systems   Constitutional:  Negative for chills, fatigue and fever.   HENT:  Negative for congestion, ear pain, hearing loss, postnasal drip, rhinorrhea and sore throat.    Eyes:  Negative for pain and visual disturbance.   Respiratory:  Negative for chest tightness, shortness of breath and wheezing.    Cardiovascular:  Negative for chest pain and leg swelling.   Gastrointestinal:  Negative for abdominal distention, abdominal pain, constipation, diarrhea and vomiting.   Endocrine: Negative for cold intolerance and heat intolerance.   Genitourinary:  Negative for difficulty urinating, frequency and urgency.   Musculoskeletal:  Negative for arthralgias and gait problem.   Skin:  Negative for color change.   Neurological:  Negative for dizziness, tremors, syncope, numbness and headaches.   Hematological:  Negative for adenopathy.   Psychiatric/Behavioral:  Negative for agitation, confusion and sleep disturbance. The patient is not nervous/anxious.      Objective   /68 (BP Location: Left arm, Patient Position: Sitting, Cuff Size: Standard)   Pulse 84   Temp 98 °F (36.7 °C) (Tympanic)   Resp 18   Ht 5' 4\" (1.626 m)   Wt (!) 138 kg (304 lb)   SpO2 99%   BMI 52.18 kg/m²     Physical Exam  Constitutional:       Appearance: He is well-developed.   HENT:      Head: Normocephalic.      Right Ear: External ear normal.      Left Ear: External ear normal.      Nose: Nose normal.   Eyes:      Extraocular Movements: Extraocular movements intact.      Conjunctiva/sclera: Conjunctivae normal.      Pupils: Pupils are equal, round, and reactive to light.   Neck:      Thyroid: No thyromegaly.   Cardiovascular:      Rate and Rhythm: Normal rate and regular rhythm.      Heart sounds: " Normal heart sounds.   Pulmonary:      Effort: Pulmonary effort is normal.      Breath sounds: Normal breath sounds.   Abdominal:      General: Bowel sounds are normal.      Palpations: Abdomen is soft.   Musculoskeletal:         General: Normal range of motion.      Cervical back: Normal range of motion.   Skin:     General: Skin is warm and dry.   Neurological:      Mental Status: He is alert and oriented to person, place, and time.   Psychiatric:         Mood and Affect: Mood normal.         Behavior: Behavior normal.       Medications have been reviewed by provider in current encounter

## 2025-02-01 ENCOUNTER — NURSE TRIAGE (OUTPATIENT)
Dept: OTHER | Facility: OTHER | Age: 30
End: 2025-02-01

## 2025-02-01 NOTE — TELEPHONE ENCOUNTER
Regarding: diarrhea  ----- Message from Araceli CARTER sent at 2/1/2025 11:51 AM EST -----  Patient had weight loss surgery last Tuesday 01/28. He says since then he's had horrible diarrhea on Wednesday, and it's even worse today. He says he has gone to the bathroom 3 times today. The last time was horrible and very explosive. He just wants some advise on what to do. Thank you!

## 2025-02-01 NOTE — TELEPHONE ENCOUNTER
On call provider paged. On call advised fluid hydration for diarrhea. If unable to tolerate, may need to be evaluated at [facility] or ED. Please take medications as prescribed for H. Pylori infection. RN reviewed medications and directions with patient. Per patient, waiting on Bismuth Subsalicylate 524mg QID to be filled by pharmacy. Should be available on Monday. RN advised to start taking as soon as possible. Patient verbalized understanding and agreeable to plan of care.

## 2025-02-01 NOTE — TELEPHONE ENCOUNTER
"Reason for Disposition  • [1] Caller has URGENT question AND [2] triager unable to answer question    Answer Assessment - Initial Assessment Questions  1. SYMPTOM: \"What's the main symptom you're concerned about?\" (e.g., pain, fever, vomiting)      Diarrhea    2. ONSET: \"When did diarrhea start?\"      Has been having diarrhea since surgery on 1/28. States it stopped last Wednesday and started again today.    3. SURGERY: \"What surgery did you have?\"      (1) GASTRECTOMY SLEEVE LAPAROSCOPIC AND INTRAOPERATIVE EGD    4. DATE of SURGERY: \"When was the surgery?\"       1/28    5. ANESTHESIA: \"What type of anesthesia did you have?\" (e.g., general, spinal, epidural, local)      General    6. DRAINS: \"Were any drains place in or around the wound?\" (e.g., Hemovac, Bud-Morrison, Penrose)      Denies    7. PAIN: \"Is there any pain?\" If Yes, ask: \"How bad is it?\"  (Scale 1-10; or mild, moderate, severe)      Mild abdominal pain     8. FEVER: \"Do you have a fever?\" If Yes, ask: \"What is your temperature, how was it measured, and when did it start?\"      Denies    9. VOMITING: \"Is there any vomiting?\" If Yes, ask: \"How many times?\"      Denies    10. BLEEDING: \"Is there any bleeding?\" If Yes, ask: \"How much?\" and \"Where?\"        Denies    11. OTHER SYMPTOMS: \"Do you have any other symptoms?\" (e.g., drainage from wound, painful urination, constipation)        Denies    Protocols used: Post-Op Symptoms and Questions-Adult-AH    "

## 2025-02-05 ENCOUNTER — TELEPHONE (OUTPATIENT)
Dept: NEUROLOGY | Facility: CLINIC | Age: 30
End: 2025-02-05

## 2025-02-05 DIAGNOSIS — G43.009 MIGRAINE WITHOUT AURA AND WITHOUT STATUS MIGRAINOSUS, NOT INTRACTABLE: ICD-10-CM

## 2025-02-05 DIAGNOSIS — I10 ESSENTIAL HYPERTENSION: ICD-10-CM

## 2025-02-05 RX ORDER — PROPRANOLOL HYDROCHLORIDE 80 MG/1
80 CAPSULE, EXTENDED RELEASE ORAL EVERY MORNING
Qty: 90 CAPSULE | Refills: 1 | Status: SHIPPED | OUTPATIENT
Start: 2025-02-05 | End: 2025-03-07

## 2025-02-05 NOTE — TELEPHONE ENCOUNTER
LVM confirming appointment with Dr. Moss for 2/6/25 at 11 AM.  Gave [phone] to reschedule if needed.

## 2025-02-05 NOTE — TELEPHONE ENCOUNTER
Left a message letting Pino hoover appt with Dr. Moss 2/6 needs to be changed to a virtual appt due to the incoming weather.

## 2025-02-05 NOTE — TELEPHONE ENCOUNTER
Spoke with pt regarding the bad weather expected 2/6, offered a virtual appt instead of in person appt, pt agreed to the virtual

## 2025-02-06 ENCOUNTER — TELEPHONE (OUTPATIENT)
Dept: NEUROLOGY | Facility: CLINIC | Age: 30
End: 2025-02-06

## 2025-02-06 ENCOUNTER — TELEMEDICINE (OUTPATIENT)
Dept: NEUROLOGY | Facility: CLINIC | Age: 30
End: 2025-02-06
Payer: COMMERCIAL

## 2025-02-06 DIAGNOSIS — G43.009 MIGRAINE WITHOUT AURA AND WITHOUT STATUS MIGRAINOSUS, NOT INTRACTABLE: Primary | ICD-10-CM

## 2025-02-06 PROCEDURE — 99213 OFFICE O/P EST LOW 20 MIN: CPT | Performed by: PSYCHIATRY & NEUROLOGY

## 2025-02-06 NOTE — PROGRESS NOTES
Virtual Regular Visit  Name: James Marrero      : 1995      MRN: [id number]  Encounter Provider: Bonifacio Moss MD  Encounter Date: 2025   [facility]      Verification of patient location:  Patient is located at Home in the following state in which I hold an active license [place] :  Assessment & Plan  Migraine without aura and without status migrainosus, not intractable         Patient is on Topamax 200 mg twice a day which according to the family he is on for his mood this is being managed by his family physician/psychiatrist he is also on Neurontin 300 mg twice a day and still has some minor headaches associated with photophobia phonophobia couple of times a week discussed with the patient about other options including either Qulipta or CGRP injection he is going to discuss with his bariatric surgeon and other physicians and if agreeable we can start him on that and see how he does.    He was advised to avoid migraine triggers which we discussed in detail including foods to avoid also was advised to keep his blood pressure, cholesterol, sugar under control.  Avoid excessive use of over-the-counter analgesics and to sleep regularly, follow-up with the sleep specialist, to go to the hospital if has any worsening symptoms and call me otherwise to see me back in 6 months or sooner if needed and follow-up with the other physicians  Encounter provider Bonifacio Moss MD    The patient was identified by name and date of birth. James Marrero was informed that this is a telemedicine visit and that the visit is being conducted through Telephone.  My office door was closed. No one else was in the room.  He acknowledged consent and understanding of privacy and security of the video platform. The patient has agreed to participate and understands they can discontinue the visit at any time.    Patient is aware this is a billable service.     History of Present  Illness   Patient is here in follow-up for his headaches, he could not do video call secondary to technical issues and hence we did a telephone call his mother is next to him according to them since the last visit he had a bariatric surgery recently and is recovering from that he still continues to have headaches on and off couple of times a week they are not as bad as they used to be associated with photophobia and phonophobia not associated with any vision symptoms he is on Topamax for his depression and mood which is being managed by his psychiatrist he denies taking Excedrin Migraine's every day he is on Neurontin 300 mg twice a day no motor or sensory symptoms in upper or lower extremities no focal weakness.    MRI of the brain without contrast from 12//24 ported as normal noncontrast MRI of the brain no acute intracranial process.  HPI  Review of Systems   Constitutional:  Negative for appetite change, fatigue and fever.   HENT: Negative.  Negative for hearing loss, tinnitus, trouble swallowing and voice change.    Eyes: Negative.  Negative for photophobia, pain and visual disturbance.   Respiratory: Negative.  Negative for shortness of breath.    Cardiovascular: Negative.  Negative for palpitations.   Gastrointestinal: Negative.  Negative for nausea and vomiting.   Endocrine: Negative.  Negative for cold intolerance.   Genitourinary: Negative.  Negative for dysuria, frequency and urgency.   Musculoskeletal:  Negative for back pain, gait problem, myalgias, neck pain and neck stiffness.   Skin: Negative.  Negative for rash.   Allergic/Immunologic: Negative.    Neurological: Negative.  Negative for dizziness, tremors, seizures, syncope, facial asymmetry, speech difficulty, weakness, light-headedness, numbness and headaches.   Hematological: Negative.  Does not bruise/bleed easily.   Psychiatric/Behavioral: Negative.  Negative for confusion, hallucinations and sleep disturbance.        Objective   There were no  vitals taken for this visit.    Physical Exam    Visit Time  Total Visit Duration: 15

## 2025-02-06 NOTE — PROGRESS NOTES
Virtual Regular Visit  Name: James Marrero      : 1995      MRN: [id number]  Encounter Provider: Bonifacio Moss MD  Encounter Date: 2025   [facility]      Verification of patient location:  Patient is located at Home in the following state in which I hold an active license [place] :  Assessment & Plan  Migraine without aura and without status migrainosus, not intractable               Encounter provider Bonifacio Moss MD    The patient was identified by name and date of birth. James Marrero was informed that this is a telemedicine visit and that the visit is being conducted through Telephone.  {Telemedicine confidentiality :67557} No one else was in the room.  He acknowledged consent and understanding of privacy and security of the video platform. The patient has agreed to participate and understands they can discontinue the visit at any time.    Patient is aware this is a billable service.     History of Present Illness {?Quick Links Encounters * My Last Note * Last Note in Specialty * Snapshot * Since Last Visit * History :80658}    HPI  Review of Systems    Objective {?Quick Links Trend Vitals * Enter New Vitals * Results Review * Timeline (Adult) * Labs * Imaging * Cardiology * Procedures * Lung Cancer Screening * Surgical eConsent :17095}  There were no vitals taken for this visit.    Physical Exam    Visit Time  Total Visit Duration: ***

## 2025-02-06 NOTE — PROGRESS NOTES
Weight Management Nutrition Education    Diagnosis: Obesity    Bariatric Surgeon: Dr. Sam    Surgery: Vertical Sleeve Gastrectomy on 1/28/2025    Class: first post op note    Topics discussed today include:     fluid goals post op, protein goals post op, constipation, chew food well, exercise, avoidance of alcohol, PPI use, diet progression, hypoglycemia, dumping syndrome, protein supplems, vitamin/mineral supplements, and calcium supplements    Patient was able to verbalize basic diet (protein, fluid, vitamin and mineral) recommendations and possible nutrition-related complications. Yes     Protein and Fluids adequate   Protein Drinks: Fairlife 1-2   Fluids: Herbal teas, decaf coffee, Water Hint, Gatorade Zero  48-64 oz   Started puree diet and tolerating:  scrambled egg, eggsalad, bake ricotta, SF pudding, Outshine pops, Cottage cheese   (did have high fat cheese + diarrhea and some dorito chips+ vomiting  Measuring 1/4 c:Yes   Following 30/60 rule: yes  Eating slow and sipping fluids: yes  Started chewable multivitamin and calcium: Bariatric Pal and Celebrate Calcium Chews    To start soft diet next week    F/U RD and SW monthly

## 2025-02-06 NOTE — TELEPHONE ENCOUNTER
Pt hit declined by mistake for VV appt. Can you send link to email address please. Pt stated he can not get back in now.

## 2025-02-06 NOTE — PROGRESS NOTES
James Marrero is a 29 y.o. male. No chief complaint on file.      Assessment:  1. Migraine without aura and without status migrainosus, not intractable        Plan:  [ ]    Discussion:      Subjective:    HPI       There were no vitals filed for this visit.    Current Medications    Current Outpatient Medications:     baclofen 10 mg tablet, Take 1 tablet (10 mg total) by mouth 3 (three) times a day for 7 days, Disp: 21 tablet, Rfl: 0    bismuth subsalicylate (PEPTO BISMOL) 262 MG chewable tablet, Chew 2 tablets (524 mg total) 4 (four) times a day (before meals and at bedtime) for 10 days, Disp: 30 tablet, Rfl: 0    bismuth-metronidazole-tetracycline (PYLERA) 140-125-125 MG per capsule, Take 3 capsules by mouth 4 (four) times a day (before meals and at bedtime) for 10 days, Disp: 120 capsule, Rfl: 0    buPROPion (WELLBUTRIN XL) 300 mg 24 hr tablet, TAKE 1 TABLET (300 MG TOTAL) BY MOUTH EVERY MORNING., Disp: 90 tablet, Rfl: 1    desvenlafaxine succinate (PRISTIQ) 50 mg 24 hr tablet, TAKE 1 TABLET BY MOUTH EVERY DAY, Disp: 90 tablet, Rfl: 1    doxepin (SINEquan) 100 mg capsule, TAKE 1 CAPSULE (100 MG TOTAL) BY MOUTH 3 (THREE) TIMES DAILY AFTER MEALS, Disp: 270 capsule, Rfl: 1    gabapentin (NEURONTIN) 100 mg capsule, Take 3 tablets twice a day, Disp: 180 capsule, Rfl: 5    metroNIDAZOLE (FLAGYL) 500 mg tablet, Take 1 tablet (500 mg total) by mouth every 6 (six) hours for 10 days, Disp: 40 tablet, Rfl: 0    omeprazole (PriLOSEC) 40 MG capsule, Take 1 capsule (40 mg total) by mouth 2 (two) times a day for 10 days, Disp: 20 capsule, Rfl: 0    Omeprazole 20 MG TBEC, Take 1 tablet (20 mg total) by mouth 2 (two) times a day, Disp: 180 tablet, Rfl: 1    propranolol (INDERAL LA) 80 mg 24 hr capsule, TAKE 1 CAPSULE (80 MG TOTAL) BY MOUTH EVERY MORNING, Disp: 90 capsule, Rfl: 1    Sodium Fluoride 5000 PPM 1.1 % GEL, BRUSH ON NIGHTLY AND SPIT OUT EXCESS, Disp: , Rfl:     tetracycline (ACHROMYCIN,SUMYCIN) 500 MG capsule, Take 1  capsule (500 mg total) by mouth 4 (four) times a day for 10 days, Disp: 40 capsule, Rfl: 0    topiramate (TOPAMAX) 200 MG tablet, TAKE 1 TABLET (200 MG TOTAL) BY MOUTH 2 TIMES A DAY., Disp: 60 tablet, Rfl: 5    ziprasidone (GEODON) 40 mg capsule, Take 1 capsule (40 mg total) by mouth every morning, Disp: 90 capsule, Rfl: 2    ziprasidone (GEODON) 80 mg capsule, TAKE 1 CAPSULE BY MOUTH EVERY EVENING, Disp: 90 capsule, Rfl: 0      Allergies  Abilify [aripiprazole], Lithium, Lorazepam, and Seroquel [quetiapine]    Past Medical History  Past Medical History:   Diagnosis Date    ADHD     Anxiety     Bronchitis     Concussion     Sports related    CPAP (continuous positive airway pressure) dependence     Depression     GERD (gastroesophageal reflux disease)     Hypertension     Mood swings     Morbid obesity with BMI of 45.0-49.9, adult (HCC)     Sleep apnea, obstructive          Past Surgical History:  Past Surgical History:   Procedure Laterality Date    EGD      RI LAPS GSTRC RSTRICTIV PX LONGITUDINAL GASTRECTOMY N/A 1/28/2025    Procedure: (1) GASTRECTOMY SLEEVE LAPAROSCOPIC AND INTRAOPERATIVE EGD;  Surgeon: Chey Sam MD;  Location: [facility];  Service: Bariatrics         Family History:  Family History   Adopted: Yes   Family history unknown: Yes       Social History:   reports that he has never smoked. He has never used smokeless tobacco. He reports current alcohol use of about 1.0 standard drink of alcohol per week. He reports that he does not use drugs.     Objective:    Physical Exam    Neurological Exam      ROS:  Review of Systems   Constitutional:  Negative for appetite change, fatigue and fever.   HENT: Negative.  Negative for hearing loss, tinnitus, trouble swallowing and voice change.    Eyes: Negative.  Negative for photophobia, pain and visual disturbance.   Respiratory: Negative.  Negative for shortness of breath.    Cardiovascular: Negative.  Negative for palpitations.   Gastrointestinal:  Negative.  Negative for nausea and vomiting.   Endocrine: Negative.  Negative for cold intolerance.   Genitourinary: Negative.  Negative for dysuria, frequency and urgency.   Musculoskeletal:  Negative for back pain, gait problem, myalgias, neck pain and neck stiffness.   Skin: Negative.  Negative for rash.   Allergic/Immunologic: Negative.    Neurological: Negative.  Negative for dizziness, tremors, seizures, syncope, facial asymmetry, speech difficulty, weakness, light-headedness, numbness and headaches.   Hematological: Negative.  Does not bruise/bleed easily.   Psychiatric/Behavioral: Negative.  Negative for confusion, hallucinations and sleep disturbance.

## 2025-02-06 NOTE — PROGRESS NOTES
James Marrero is a 29 y.o. male. No chief complaint on file.      Assessment:  1. Migraine without aura and without status migrainosus, not intractable        Plan:  [ ]    Discussion:      Subjective:    HPI       There were no vitals filed for this visit.    Current Medications    Current Outpatient Medications:     baclofen 10 mg tablet, Take 1 tablet (10 mg total) by mouth 3 (three) times a day for 7 days, Disp: 21 tablet, Rfl: 0    bismuth subsalicylate (PEPTO BISMOL) 262 MG chewable tablet, Chew 2 tablets (524 mg total) 4 (four) times a day (before meals and at bedtime) for 10 days, Disp: 30 tablet, Rfl: 0    bismuth-metronidazole-tetracycline (PYLERA) 140-125-125 MG per capsule, Take 3 capsules by mouth 4 (four) times a day (before meals and at bedtime) for 10 days, Disp: 120 capsule, Rfl: 0    buPROPion (WELLBUTRIN XL) 300 mg 24 hr tablet, TAKE 1 TABLET (300 MG TOTAL) BY MOUTH EVERY MORNING., Disp: 90 tablet, Rfl: 1    desvenlafaxine succinate (PRISTIQ) 50 mg 24 hr tablet, TAKE 1 TABLET BY MOUTH EVERY DAY, Disp: 90 tablet, Rfl: 1    doxepin (SINEquan) 100 mg capsule, TAKE 1 CAPSULE (100 MG TOTAL) BY MOUTH 3 (THREE) TIMES DAILY AFTER MEALS, Disp: 270 capsule, Rfl: 1    gabapentin (NEURONTIN) 100 mg capsule, Take 3 tablets twice a day, Disp: 180 capsule, Rfl: 5    metroNIDAZOLE (FLAGYL) 500 mg tablet, Take 1 tablet (500 mg total) by mouth every 6 (six) hours for 10 days, Disp: 40 tablet, Rfl: 0    omeprazole (PriLOSEC) 40 MG capsule, Take 1 capsule (40 mg total) by mouth 2 (two) times a day for 10 days, Disp: 20 capsule, Rfl: 0    Omeprazole 20 MG TBEC, Take 1 tablet (20 mg total) by mouth 2 (two) times a day, Disp: 180 tablet, Rfl: 1    propranolol (INDERAL LA) 80 mg 24 hr capsule, TAKE 1 CAPSULE (80 MG TOTAL) BY MOUTH EVERY MORNING, Disp: 90 capsule, Rfl: 1    Sodium Fluoride 5000 PPM 1.1 % GEL, BRUSH ON NIGHTLY AND SPIT OUT EXCESS, Disp: , Rfl:     tetracycline (ACHROMYCIN,SUMYCIN) 500 MG capsule, Take 1  capsule (500 mg total) by mouth 4 (four) times a day for 10 days, Disp: 40 capsule, Rfl: 0    topiramate (TOPAMAX) 200 MG tablet, TAKE 1 TABLET (200 MG TOTAL) BY MOUTH 2 TIMES A DAY., Disp: 60 tablet, Rfl: 5    ziprasidone (GEODON) 40 mg capsule, Take 1 capsule (40 mg total) by mouth every morning, Disp: 90 capsule, Rfl: 2    ziprasidone (GEODON) 80 mg capsule, TAKE 1 CAPSULE BY MOUTH EVERY EVENING, Disp: 90 capsule, Rfl: 0      Allergies  Abilify [aripiprazole], Lithium, Lorazepam, and Seroquel [quetiapine]    Past Medical History  Past Medical History:   Diagnosis Date    ADHD     Anxiety     Bronchitis     Concussion     Sports related    CPAP (continuous positive airway pressure) dependence     Depression     GERD (gastroesophageal reflux disease)     Hypertension     Mood swings     Morbid obesity with BMI of 45.0-49.9, adult (HCC)     Sleep apnea, obstructive          Past Surgical History:  Past Surgical History:   Procedure Laterality Date    EGD      AR LAPS GSTRC RSTRICTIV PX LONGITUDINAL GASTRECTOMY N/A 1/28/2025    Procedure: (1) GASTRECTOMY SLEEVE LAPAROSCOPIC AND INTRAOPERATIVE EGD;  Surgeon: Chey Sam MD;  Location: [facility] OR;  Service: Bariatrics         Family History:  Family History   Adopted: Yes   Family history unknown: Yes       Social History:   reports that he has never smoked. He has never used smokeless tobacco. He reports current alcohol use of about 1.0 standard drink of alcohol per week. He reports that he does not use drugs.     Objective:    Physical Exam    Neurological Exam      ROS:  Review of Systems

## 2025-02-06 NOTE — TELEPHONE ENCOUNTER
Called and left VM for patient to check in for virtual appointment. No answer. Called alternative numbers, spoke to Dad who said Pino is aware of his appointment. Called Pino's number again, no answer.

## 2025-02-06 NOTE — PROGRESS NOTES
James Marrero is a 29 y.o. male. No chief complaint on file.      Assessment:  1. Migraine without aura and without status migrainosus, not intractable        Plan:  [ ]    Discussion:      Subjective:    HPI       There were no vitals filed for this visit.    Current Medications    Current Outpatient Medications:     baclofen 10 mg tablet, Take 1 tablet (10 mg total) by mouth 3 (three) times a day for 7 days, Disp: 21 tablet, Rfl: 0    bismuth subsalicylate (PEPTO BISMOL) 262 MG chewable tablet, Chew 2 tablets (524 mg total) 4 (four) times a day (before meals and at bedtime) for 10 days, Disp: 30 tablet, Rfl: 0    bismuth-metronidazole-tetracycline (PYLERA) 140-125-125 MG per capsule, Take 3 capsules by mouth 4 (four) times a day (before meals and at bedtime) for 10 days, Disp: 120 capsule, Rfl: 0    buPROPion (WELLBUTRIN XL) 300 mg 24 hr tablet, TAKE 1 TABLET (300 MG TOTAL) BY MOUTH EVERY MORNING., Disp: 90 tablet, Rfl: 1    desvenlafaxine succinate (PRISTIQ) 50 mg 24 hr tablet, TAKE 1 TABLET BY MOUTH EVERY DAY, Disp: 90 tablet, Rfl: 1    doxepin (SINEquan) 100 mg capsule, TAKE 1 CAPSULE (100 MG TOTAL) BY MOUTH 3 (THREE) TIMES DAILY AFTER MEALS, Disp: 270 capsule, Rfl: 1    gabapentin (NEURONTIN) 100 mg capsule, Take 3 tablets twice a day, Disp: 180 capsule, Rfl: 5    metroNIDAZOLE (FLAGYL) 500 mg tablet, Take 1 tablet (500 mg total) by mouth every 6 (six) hours for 10 days, Disp: 40 tablet, Rfl: 0    omeprazole (PriLOSEC) 40 MG capsule, Take 1 capsule (40 mg total) by mouth 2 (two) times a day for 10 days, Disp: 20 capsule, Rfl: 0    Omeprazole 20 MG TBEC, Take 1 tablet (20 mg total) by mouth 2 (two) times a day, Disp: 180 tablet, Rfl: 1    propranolol (INDERAL LA) 80 mg 24 hr capsule, TAKE 1 CAPSULE (80 MG TOTAL) BY MOUTH EVERY MORNING, Disp: 90 capsule, Rfl: 1    Sodium Fluoride 5000 PPM 1.1 % GEL, BRUSH ON NIGHTLY AND SPIT OUT EXCESS, Disp: , Rfl:     tetracycline (ACHROMYCIN,SUMYCIN) 500 MG capsule, Take 1  capsule (500 mg total) by mouth 4 (four) times a day for 10 days, Disp: 40 capsule, Rfl: 0    topiramate (TOPAMAX) 200 MG tablet, TAKE 1 TABLET (200 MG TOTAL) BY MOUTH 2 TIMES A DAY., Disp: 60 tablet, Rfl: 5    ziprasidone (GEODON) 40 mg capsule, Take 1 capsule (40 mg total) by mouth every morning, Disp: 90 capsule, Rfl: 2    ziprasidone (GEODON) 80 mg capsule, TAKE 1 CAPSULE BY MOUTH EVERY EVENING, Disp: 90 capsule, Rfl: 0      Allergies  Abilify [aripiprazole], Lithium, Lorazepam, and Seroquel [quetiapine]    Past Medical History  Past Medical History:   Diagnosis Date    ADHD     Anxiety     Bronchitis     Concussion     Sports related    CPAP (continuous positive airway pressure) dependence     Depression     GERD (gastroesophageal reflux disease)     Hypertension     Mood swings     Morbid obesity with BMI of 45.0-49.9, adult (HCC)     Sleep apnea, obstructive          Past Surgical History:  Past Surgical History:   Procedure Laterality Date    EGD      LA LAPS GSTRC RSTRICTIV PX LONGITUDINAL GASTRECTOMY N/A 1/28/2025    Procedure: (1) GASTRECTOMY SLEEVE LAPAROSCOPIC AND INTRAOPERATIVE EGD;  Surgeon: Chey Sam MD;  Location: [facility] OR;  Service: Bariatrics         Family History:  Family History   Adopted: Yes   Family history unknown: Yes       Social History:   reports that he has never smoked. He has never used smokeless tobacco. He reports current alcohol use of about 1.0 standard drink of alcohol per week. He reports that he does not use drugs.     Objective:    Physical Exam    Neurological Exam      ROS:  Review of Systems

## 2025-02-07 ENCOUNTER — CLINICAL SUPPORT (OUTPATIENT)
Dept: BARIATRICS | Facility: CLINIC | Age: 30
End: 2025-02-07

## 2025-02-07 ENCOUNTER — OFFICE VISIT (OUTPATIENT)
Dept: BARIATRICS | Facility: CLINIC | Age: 30
End: 2025-02-07

## 2025-02-07 VITALS
BODY MASS INDEX: 46.41 KG/M2 | RESPIRATION RATE: 16 BRPM | SYSTOLIC BLOOD PRESSURE: 124 MMHG | WEIGHT: 288.8 LBS | TEMPERATURE: 97 F | HEART RATE: 93 BPM | DIASTOLIC BLOOD PRESSURE: 82 MMHG | HEIGHT: 66 IN

## 2025-02-07 DIAGNOSIS — K91.2 POSTSURGICAL MALABSORPTION: Primary | ICD-10-CM

## 2025-02-07 DIAGNOSIS — R73.03 PREDIABETES: ICD-10-CM

## 2025-02-07 DIAGNOSIS — E66.01 CLASS 3 SEVERE OBESITY DUE TO EXCESS CALORIES WITH SERIOUS COMORBIDITY AND BODY MASS INDEX (BMI) OF 45.0 TO 49.9 IN ADULT (HCC): Primary | ICD-10-CM

## 2025-02-07 DIAGNOSIS — E78.2 HYPERLIPIDEMIA, MIXED: ICD-10-CM

## 2025-02-07 DIAGNOSIS — K91.2 POSTSURGICAL MALABSORPTION: ICD-10-CM

## 2025-02-07 DIAGNOSIS — I10 ESSENTIAL HYPERTENSION: ICD-10-CM

## 2025-02-07 DIAGNOSIS — E66.813 CLASS 3 SEVERE OBESITY DUE TO EXCESS CALORIES WITH SERIOUS COMORBIDITY AND BODY MASS INDEX (BMI) OF 45.0 TO 49.9 IN ADULT (HCC): Primary | ICD-10-CM

## 2025-02-07 DIAGNOSIS — G43.009 MIGRAINE WITHOUT AURA AND WITHOUT STATUS MIGRAINOSUS, NOT INTRACTABLE: ICD-10-CM

## 2025-02-07 DIAGNOSIS — G47.33 OSA (OBSTRUCTIVE SLEEP APNEA): ICD-10-CM

## 2025-02-07 PROCEDURE — 99024 POSTOP FOLLOW-UP VISIT: CPT | Performed by: SURGERY

## 2025-02-07 PROCEDURE — G2211 COMPLEX E/M VISIT ADD ON: HCPCS | Performed by: SURGERY

## 2025-02-07 PROCEDURE — RECHECK

## 2025-02-07 NOTE — LETTER
February 7, 2025     Dennis Page DO  [address]  [facility], operated by [facility] [id number]    Patient: James Marrero   YOB: 1995   Date of Visit: 2/7/2025       Dear Dr. Page:    Thank you for referring James Marrero to me for metabolic and bariatric surgery. Below are my notes for this postoperative visit.    If you have questions, please do not hesitate to call me. I look forward to following your patient along with you.         Sincerely,        Chey Sam MD        CC: No Recipients    Chey Sam MD  2/7/2025 11:13 AM  Signed  FIRST POST-OPERATIVE VISIT - BARIATRIC SURGERY  James Marrero 29 y.o. male MRN: [id number]  Unit/Bed#:  Encounter: [id number]      HPI:  James Marrero is a 29 y.o. male who presents for follow up s/p laparoscopic sleeve gastrectomy.  He is present with his mom.  He is doing well.  He is tolerating a diet.  He denies pain.    Amount of Oxycodone 5 mg tablets taken - (pain score-0; tablets-0)        Historical Information  Past Medical History:   Diagnosis Date   • ADHD    • Anxiety    • Bronchitis    • Concussion     Sports related   • CPAP (continuous positive airway pressure) dependence    • Depression    • GERD (gastroesophageal reflux disease)    • Hypertension    • Mood swings    • Morbid obesity with BMI of 45.0-49.9, adult (HCC)    • Sleep apnea, obstructive      Past Surgical History:   Procedure Laterality Date   • EGD     • OH LAPS GSTRC RSTRICTIV PX LONGITUDINAL GASTRECTOMY N/A 1/28/2025    Procedure: (1) GASTRECTOMY SLEEVE LAPAROSCOPIC AND INTRAOPERATIVE EGD;  Surgeon: Chey Sam MD;  Location: [facility];  Service: Bariatrics     Social History  Social History     Substance and Sexual Activity   Alcohol Use Yes   • Alcohol/week: 1.0 standard drink of alcohol   • Types: 1 Cans of beer per week    Comment: social     Social History     Substance and Sexual Activity   Drug Use Never     Social History     Tobacco Use   Smoking  "Status Never   Smokeless Tobacco Never     Family History: Family history non-contributory    Meds/Allergies  all medications and allergies reviewed  Allergies   Allergen Reactions   • Abilify [Aripiprazole]    • Lithium    • Lorazepam Other (See Comments)     aggressive   • Seroquel [Quetiapine]        Objective    Current Vitals:   /82 (BP Location: Left arm, Patient Position: Sitting, Cuff Size: Large)   Pulse 93   Temp (!) 97 °F (36.1 °C) (Tympanic)   Resp 16   Ht 5' 6\" (1.676 m)   Wt 131 kg (288 lb 12.8 oz)   BMI 46.61 kg/m²     Invasive Devices       None                   Physical Exam  Cardiovascular:      Rate and Rhythm: Normal rate.   Pulmonary:      Effort: Pulmonary effort is normal. No respiratory distress.   Abdominal:      General: Abdomen is flat. There is no distension.      Palpations: Abdomen is soft.      Tenderness: There is no abdominal tenderness.      Comments: Wounds clean/dry/intact without surrounding erythema   Neurological:      Mental Status: He is alert.         Assessment/Plan:    Class 3 severe obesity due to excess calories with serious comorbidity and body mass index (BMI) of 45.0 to 49.9 in adult (HCC)  Patient is presenting for the first postoperative visit, patient hospital stay was uneventful without any complications, patient is doing well, has no complaints, is taking vitamins as instructed, currently tolerating the blenderized diet, will advance to soft diet.     Patient will also be meeting with our dietician today to review vitamin and mineral supplements and also go over diet and emphasize postoperative commitment and compliance. The patient was also instructed to start exercising on a regular basis. However, I recommended no heavy lifting, or weight exercises for another 2 weeks. F/U in 4 weeks. Patient was instructed to call if develops nausea, vomiting, fever or chills. Pathology was also reviewed and was negative for metaplasia but positive for H. pylori " and he is receiving therapy.

## 2025-02-07 NOTE — PROGRESS NOTES
"FIRST POST-OPERATIVE VISIT - BARIATRIC SURGERY  James Marrero 29 y.o. male MRN: [id number]  Unit/Bed#:  Encounter: [id number]      HPI:  James Marrero is a 29 y.o. male who presents for follow up s/p laparoscopic sleeve gastrectomy.  He is present with his mom.  He is doing well.  He is tolerating a diet.  He denies pain.    Amount of Oxycodone 5 mg tablets taken - (pain score-0; tablets-0)        Historical Information   Past Medical History:   Diagnosis Date    ADHD     Anxiety     Bronchitis     Concussion     Sports related    CPAP (continuous positive airway pressure) dependence     Depression     GERD (gastroesophageal reflux disease)     Hypertension     Mood swings     Morbid obesity with BMI of 45.0-49.9, adult (HCC)     Sleep apnea, obstructive      Past Surgical History:   Procedure Laterality Date    EGD      MS LAPS GSTRC RSTRICTIV PX LONGITUDINAL GASTRECTOMY N/A 1/28/2025    Procedure: (1) GASTRECTOMY SLEEVE LAPAROSCOPIC AND INTRAOPERATIVE EGD;  Surgeon: Chey Sam MD;  Location: [facility];  Service: Bariatrics     Social History   Social History     Substance and Sexual Activity   Alcohol Use Yes    Alcohol/week: 1.0 standard drink of alcohol    Types: 1 Cans of beer per week    Comment: social     Social History     Substance and Sexual Activity   Drug Use Never     Social History     Tobacco Use   Smoking Status Never   Smokeless Tobacco Never     Family History: Family history non-contributory    Meds/Allergies   all medications and allergies reviewed  Allergies   Allergen Reactions    Abilify [Aripiprazole]     Lithium     Lorazepam Other (See Comments)     aggressive    Seroquel [Quetiapine]        Objective     Current Vitals:   /82 (BP Location: Left arm, Patient Position: Sitting, Cuff Size: Large)   Pulse 93   Temp (!) 97 °F (36.1 °C) (Tympanic)   Resp 16   Ht 5' 6\" (1.676 m)   Wt 131 kg (288 lb 12.8 oz)   BMI 46.61 kg/m²     Invasive Devices       None             "       Physical Exam  Cardiovascular:      Rate and Rhythm: Normal rate.   Pulmonary:      Effort: Pulmonary effort is normal. No respiratory distress.   Abdominal:      General: Abdomen is flat. There is no distension.      Palpations: Abdomen is soft.      Tenderness: There is no abdominal tenderness.      Comments: Wounds clean/dry/intact without surrounding erythema   Neurological:      Mental Status: He is alert.         Assessment/Plan:    Class 3 severe obesity due to excess calories with serious comorbidity and body mass index (BMI) of 45.0 to 49.9 in adult (HCC)  Patient is presenting for the first postoperative visit, patient hospital stay was uneventful without any complications, patient is doing well, has no complaints, is taking vitamins as instructed, currently tolerating the blenderized diet, will advance to soft diet.     Patient will also be meeting with our dietician today to review vitamin and mineral supplements and also go over diet and emphasize postoperative commitment and compliance. The patient was also instructed to start exercising on a regular basis. However, I recommended no heavy lifting, or weight exercises for another 2 weeks. F/U in 4 weeks. Patient was instructed to call if develops nausea, vomiting, fever or chills. Pathology was also reviewed and was negative for metaplasia but positive for H. pylori and he is receiving therapy.

## 2025-02-07 NOTE — ASSESSMENT & PLAN NOTE
Patient is presenting for the first postoperative visit, patient hospital stay was uneventful without any complications, patient is doing well, has no complaints, is taking vitamins as instructed, currently tolerating the blenderized diet, will advance to soft diet.     Patient will also be meeting with our dietician today to review vitamin and mineral supplements and also go over diet and emphasize postoperative commitment and compliance. The patient was also instructed to start exercising on a regular basis. However, I recommended no heavy lifting, or weight exercises for another 2 weeks. F/U in 4 weeks. Patient was instructed to call if develops nausea, vomiting, fever or chills. Pathology was also reviewed and was negative for metaplasia but positive for H. pylori and he is receiving therapy.

## 2025-02-21 ENCOUNTER — OFFICE VISIT (OUTPATIENT)
Dept: FAMILY MEDICINE CLINIC | Facility: CLINIC | Age: 30
End: 2025-02-21
Payer: COMMERCIAL

## 2025-02-21 VITALS
WEIGHT: 287 LBS | BODY MASS INDEX: 46.12 KG/M2 | DIASTOLIC BLOOD PRESSURE: 76 MMHG | HEIGHT: 66 IN | SYSTOLIC BLOOD PRESSURE: 114 MMHG | OXYGEN SATURATION: 98 % | HEART RATE: 79 BPM | TEMPERATURE: 97.3 F

## 2025-02-21 DIAGNOSIS — B37.42 CANDIDAL BALANITIS: Primary | ICD-10-CM

## 2025-02-21 DIAGNOSIS — R30.0 DYSURIA: ICD-10-CM

## 2025-02-21 LAB
SL AMB  POCT GLUCOSE, UA: NEGATIVE
SL AMB LEUKOCYTE ESTERASE,UA: NEGATIVE
SL AMB POCT BILIRUBIN,UA: NEGATIVE
SL AMB POCT BLOOD,UA: NEGATIVE
SL AMB POCT CLARITY,UA: CLEAR
SL AMB POCT COLOR,UA: YELLOW
SL AMB POCT KETONES,UA: 80
SL AMB POCT NITRITE,UA: NEGATIVE
SL AMB POCT PH,UA: 5
SL AMB POCT SPECIFIC GRAVITY,UA: 1.01
SL AMB POCT URINE PROTEIN: 15
SL AMB POCT UROBILINOGEN: 0.2

## 2025-02-21 PROCEDURE — 87086 URINE CULTURE/COLONY COUNT: CPT | Performed by: FAMILY MEDICINE

## 2025-02-21 PROCEDURE — 99214 OFFICE O/P EST MOD 30 MIN: CPT | Performed by: FAMILY MEDICINE

## 2025-02-21 PROCEDURE — 81002 URINALYSIS NONAUTO W/O SCOPE: CPT | Performed by: FAMILY MEDICINE

## 2025-02-21 RX ORDER — FLUCONAZOLE 150 MG/1
150 TABLET ORAL ONCE
Qty: 1 TABLET | Refills: 0 | Status: CANCELLED | OUTPATIENT
Start: 2025-02-21 | End: 2025-02-21

## 2025-02-21 RX ORDER — CLOTRIMAZOLE 1 %
CREAM (GRAM) TOPICAL 2 TIMES DAILY
Qty: 28 G | Refills: 0 | Status: SHIPPED | OUTPATIENT
Start: 2025-02-21 | End: 2025-03-07

## 2025-02-21 NOTE — PROGRESS NOTES
"Name: James Marrero      : 1995      MRN: [id number]  Encounter Provider: Vashti Cohn DO  Encounter Date: 2025   Encounter department: [facility] [address]  :  Assessment & Plan  Candidal balanitis    Orders:  •  clotrimazole (LOTRIMIN) 1 % cream; Apply topically 2 (two) times a day for 14 days    Dysuria    Orders:  •  Urine culture; Future  •  POCT urine dip           History of Present Illness   HPI    Notes that he has had some burning when he urinates. States that he has had some itching. Reports intermittent blood in the urine. States that this has been on going for 4 days. Denies any urinary frequency or urgency.     Reports that he has had some white, cottage cheese like residue under the foreskin.     S/p bariatric surgery on 25.  Recently treated for H pylori infection.     Review of Systems    Objective   /76 (BP Location: Left arm, Patient Position: Sitting, Cuff Size: Large)   Pulse 79   Temp (!) 97.3 °F (36.3 °C) (Temporal)   Ht 5' 6\" (1.676 m)   Wt 130 kg (287 lb)   SpO2 98%   BMI 46.32 kg/m²      Physical Exam  Vitals reviewed.   Constitutional:       General: He is not in acute distress.     Appearance: He is not ill-appearing.   HENT:      Head: Normocephalic and atraumatic.      Right Ear: External ear normal.      Left Ear: External ear normal.      Nose: Nose normal.      Mouth/Throat:      Mouth: Mucous membranes are moist.   Eyes:      Extraocular Movements: Extraocular movements intact.      Conjunctiva/sclera: Conjunctivae normal.   Pulmonary:      Effort: Pulmonary effort is normal. No respiratory distress.      Breath sounds: No wheezing.   Abdominal:      Hernia: There is no hernia in the left inguinal area or right inguinal area.   Genitourinary:     Pubic Area: No rash.       Penis: Uncircumcised. Erythema and discharge (thick, white discharge underlying foreskin) present.       Testes: Normal.   Lymphadenopathy: "      Lower Body: No right inguinal adenopathy. No left inguinal adenopathy.   Neurological:      General: No focal deficit present.      Mental Status: He is alert and oriented to person, place, and time. Mental status is at baseline.           DO Vimal Alicea [facility]  2/21/2025 11:07 AM

## 2025-02-23 LAB — BACTERIA UR CULT: NORMAL

## 2025-02-24 ENCOUNTER — RESULTS FOLLOW-UP (OUTPATIENT)
Dept: FAMILY MEDICINE CLINIC | Facility: CLINIC | Age: 30
End: 2025-02-24

## 2025-02-24 NOTE — TELEPHONE ENCOUNTER
Patient called in regarding results read verbatim the providers message. Patient did state that he has been using the cream for the past couple days and just want make sure that will take the infection away. Please advise and call patient back

## 2025-02-26 DIAGNOSIS — F33.9 DEPRESSION, RECURRENT (HCC): ICD-10-CM

## 2025-02-27 RX ORDER — ZIPRASIDONE HYDROCHLORIDE 80 MG/1
80 CAPSULE ORAL EVERY EVENING
Qty: 90 CAPSULE | Refills: 0 | Status: SHIPPED | OUTPATIENT
Start: 2025-02-27

## 2025-03-02 DIAGNOSIS — F33.9 DEPRESSION, RECURRENT (HCC): ICD-10-CM

## 2025-03-03 ENCOUNTER — CLINICAL SUPPORT (OUTPATIENT)
Dept: BARIATRICS | Facility: CLINIC | Age: 30
End: 2025-03-03

## 2025-03-03 VITALS — BODY MASS INDEX: 45.06 KG/M2 | WEIGHT: 279.2 LBS

## 2025-03-03 DIAGNOSIS — F41.1 ANXIETY, GENERALIZED: ICD-10-CM

## 2025-03-03 DIAGNOSIS — R53.83 FATIGUE: ICD-10-CM

## 2025-03-03 DIAGNOSIS — Z98.84 BARIATRIC SURGERY STATUS: Primary | ICD-10-CM

## 2025-03-03 PROCEDURE — RECHECK

## 2025-03-03 RX ORDER — TOPIRAMATE 200 MG/1
TABLET, FILM COATED ORAL
Qty: 60 TABLET | Refills: 5 | Status: SHIPPED | OUTPATIENT
Start: 2025-03-03

## 2025-03-03 NOTE — PROGRESS NOTES
"Post op support. S/P Sleeve Gastrectomy Dr. Pires 1/28/25. Mom also present.  Patient typically gets up at 12pm, has his first meal before 2pm, will have a protein shake at around 4pm, dinner at 8pm-9pm, ice pops right after and then looking for something else. Maybe try a protein shake at this time?  Talked to mom about setting boundaries. \"Feels bad that he is going through this\"- has a hard time saying no to him, wants him to be happy. Discussed how patient plays into this. Talked with patient about \"stepping up\". The more effort that he puts into his care and \"owning this\" the more his mom will step back. Talked with both of them about this. Praised patient for his progress so far, gave encouragement.  Has been taking his vitamins consistently.   Encouraged patient to find what works for him  Drinking 40-60 oz of propel zero and 16-32 water, protein shake  Has been using his CPAP, but still struggling with fatigue- to check with PCP to order vitamin D- there is an order for it from Dr. Pires, but that is to be done in May and I don't want to mess with that order.  Patient seeing a psychiatric NP- to discuss medications may needing to be adjusted with time as he loses weight. Patient to follow up with LCSW in one month.  Arabella Ramires, LIAM    "

## 2025-03-05 ENCOUNTER — APPOINTMENT (OUTPATIENT)
Dept: LAB | Facility: HOSPITAL | Age: 30
End: 2025-03-05
Payer: COMMERCIAL

## 2025-03-05 DIAGNOSIS — R73.03 PREDIABETES: ICD-10-CM

## 2025-03-05 DIAGNOSIS — K91.2 POSTSURGICAL MALABSORPTION: ICD-10-CM

## 2025-03-05 LAB — 25(OH)D3 SERPL-MCNC: 38.7 NG/ML (ref 30–100)

## 2025-03-05 PROCEDURE — 36415 COLL VENOUS BLD VENIPUNCTURE: CPT

## 2025-03-05 PROCEDURE — 82306 VITAMIN D 25 HYDROXY: CPT

## 2025-03-07 ENCOUNTER — RESULTS FOLLOW-UP (OUTPATIENT)
Dept: BARIATRICS | Facility: CLINIC | Age: 30
End: 2025-03-07

## 2025-03-07 NOTE — RESULT ENCOUNTER NOTE
Please let Pino know that his Vitamin D levels are now normal but only low normal - I suggest he take an extra 1-2,000IU D3 OTC supplement daily with food to keep his levels up! Thank you

## 2025-03-11 ENCOUNTER — TELEPHONE (OUTPATIENT)
Dept: BARIATRICS | Facility: CLINIC | Age: 30
End: 2025-03-11

## 2025-03-11 NOTE — TELEPHONE ENCOUNTER
[employer] message from 3/7/25 came back as unread.  Spoke to patient and his mother to inform him of lab results and Whit's recommendation for additional 1-2000 IU daily of D3.  They verbalized understanding.

## 2025-03-19 ENCOUNTER — CLINICAL SUPPORT (OUTPATIENT)
Dept: BARIATRICS | Facility: CLINIC | Age: 30
End: 2025-03-19

## 2025-03-19 DIAGNOSIS — K91.2 POSTSURGICAL MALABSORPTION: Primary | ICD-10-CM

## 2025-03-19 PROCEDURE — RECHECK

## 2025-03-19 NOTE — PROGRESS NOTES
Weight Management Nutrition Class - Virtual    Diagnosis: Obesity    Bariatric Surgeon: Dr. Sma    Surgery: Vertical Sleeve Gastrectomy    Class: 5 week post op     Topics discussed today include:     fluid goals post op, protein goals post op, constipation, chew food well, exercise, avoidance of alcohol, PPI use, diet progression, hypoglycemia, dumping syndrome, protein supplems, vitamin/mineral supplements, and calcium supplements    Patient was able to verbalize basic diet (protein, fluid, vitamin and mineral) recommendations and possible nutrition-related complications. Yes

## 2025-03-20 DIAGNOSIS — M54.9 BACK PAIN: Primary | ICD-10-CM

## 2025-03-21 ENCOUNTER — TELEPHONE (OUTPATIENT)
Dept: BARIATRICS | Facility: CLINIC | Age: 30
End: 2025-03-21

## 2025-03-21 NOTE — TELEPHONE ENCOUNTER
Patient called in and asked to speak with Ximena, she was in a consult at the time.  He asked if she could call him back when she has a free moment.

## 2025-03-21 NOTE — TELEPHONE ENCOUNTER
"Message for the Dietician: Patient of . GASTRECTOMY SLEEVE LAPAROSCOPIC AND INTRAOPERATIVE EGD (Abdomen) done on 1/28/25. Is currently at the \"regular food stage\" He would like to speak to the dietician to go over food choices. His CB# is [phone]  "

## 2025-03-26 ENCOUNTER — EVALUATION (OUTPATIENT)
Dept: PHYSICAL THERAPY | Facility: CLINIC | Age: 30
End: 2025-03-26
Payer: COMMERCIAL

## 2025-03-26 DIAGNOSIS — G89.29 CHRONIC RIGHT-SIDED THORACIC BACK PAIN: Primary | ICD-10-CM

## 2025-03-26 DIAGNOSIS — M54.6 CHRONIC RIGHT-SIDED THORACIC BACK PAIN: Primary | ICD-10-CM

## 2025-03-26 PROCEDURE — 97161 PT EVAL LOW COMPLEX 20 MIN: CPT

## 2025-03-26 PROCEDURE — 97110 THERAPEUTIC EXERCISES: CPT

## 2025-03-26 NOTE — PROGRESS NOTES
PT Evaluation     Today's date: 3/26/2025  Patient name: James Marrreo  : 1995  MRN: [id number]  Referring provider: Whit Calderón PA-C  Dx:   Encounter Diagnosis     ICD-10-CM    1. Chronic right-sided thoracic back pain  M54.6 Ambulatory Referral to Physical Therapy    G89.29           Start Time: 1022  Stop Time: 1102  Total time in clinic (min): 40 minutes    Assessment  Impairments: abnormal or restricted ROM, activity intolerance, impaired physical strength, lacks appropriate home exercise program, pain with function, poor posture , participation limitations, activity limitations and endurance  Symptom irritability: high    Assessment details: Patient is a pleasant 29 y.o. male who presents to physical therapy with main reports of R sided thoracic pain.    No further referral appears necessary at this time based upon examination results.    Primary movement impairment diagnosis of thoracic hypomobility resulting in pathoanatomical symptoms of decreased ROM, decreased strength, and increased symptom irritability. This limits Pino's ability to sit in his chair for prolonged periods of time, participate in heavier lifting at the gym, and allow him to perform ADLs w/o onset of pain.     Prognosis is good given HEP compliance and PT 2 times per week over the next 12 weeks.  Positive prognostic indicators include positive attitude toward recovery.  Negative prognostic indicators include prolonged positions w/o support.    Please contact me if you have any questions.  Thank you for the opportunity to share in James Marrero care.    Understanding of Dx/Px/POC: good     Prognosis: good    Goals  Short term:  Pt will be independent w/ individualized HEP  Pt will have a decrease in symptom irritability by 2 points     Long term:  Pt will be able to sit in his stu chair for 3 hours w/o onset of pain  Pt will be able to return to the gym and participate in group exercises w/o pain  Pt will improve FOTO by  MDC      Plan  Patient would benefit from: skilled physical therapy  Referral necessary: No    Planned therapy interventions: abdominal trunk stabilization, activity modification, IASTM, joint mobilization, manual therapy, massage, nerve gliding, neuromuscular re-education, postural training, strengthening, stretching, therapeutic activities, therapeutic exercise, functional ROM exercises, flexibility and home exercise program    Frequency: 2x week  Plan of Care beginning date: 3/26/2025  Plan of Care expiration date: 2025  Treatment plan discussed with: patient        Subjective Evaluation    History of Present Illness  Mechanism of injury: Pt reports to outpatient PT following onset of exacerbation of chronic mid back pain. Pt had recently had gastric by pass surgery on - lost 40 pounds since then. Pt has heavy mid back pain when sitting in his stu chair for prolonged periods of time. Pt reports he has decreased quality of posture while stu. Massage and tylenol have helped to decrease his pain for short periods. Pt is in the gym 4x per week, mostly Hit training. Pt has no current red flags present.              Recurrent probem    Quality of life: fair    Patient Goals  Patient goals for therapy: decreased pain and increased strength  Patient goal: Continue to be in gym to lose more weight  Pain  Current pain ratin  At best pain ratin  At worst pain ratin  Location: R sided thoracic pain  Quality: dull ache  Relieving factors: ice, medications and change in position  Aggravating factors: sitting  Progression: no change    Social Support  Lives with: parents    Employment status: not working  Treatments  Previous treatment: chiropractic  Current treatment: chiropractic and medication        Objective     Concurrent Complaints      Additional Special Questions  Trouble sleeping, not due to the pain    Postural Observations  Seated posture: poor  Standing posture: poor  Correction of  posture: has no consistent effect      Neurological Testing     Sensation   Cervical/Thoracic   Left   Intact: light touch    Right   Intact: light touch    Reflexes   Left   Biceps (C5/C6): normal (2+)  Brachioradialis (C6): normal (2+)  Triceps (C7): normal (2+)    Right   Biceps (C5/C6): normal (2+)    Active Range of Motion   Cervical/Thoracic Spine       Cervical  Subcranial protraction:  WFL   Subcranial retraction:  WFL   Flexion:  WFL  Extension:  WFL  Left lateral flexion:  WFL  Right lateral flexion:  WFL  Right rotation:  WFL    Thoracic    Flexion:  WFL and with pain  Extension:  Restriction level: moderate  Left lateral flexion:  WFL  Right lateral flexion:  with pain Restriction level: minimal  Left rotation:  Restriction level: minimal  Right rotation:  Restriction level: minimal    Joint Play     Hypomobile: C7, T1, T2, T3, T4, T5, T6, T7, T8, T9, T10, T11 and T12     Pain: T3, T4, T5, T6, T7 and T8     Strength/Myotome Testing     Left Shoulder     Planes of Motion   Flexion: 4+   Extension: 4   Abduction: 4+   Adduction: 4+   External rotation at 0°: WFL   Internal rotation at 0°: WFL     Isolated Muscles   Lower trapezius: 4   Middle trapezius: 4   Upper trapezius: 5     Right Shoulder     Planes of Motion   Flexion: 4+   Extension: 4   Abduction: 4+   Adduction: 4+   External rotation at 0°: WFL   Internal rotation at 0°: WFL     Isolated Muscles   Lower trapezius: 4   Middle trapezius: 4   Upper trapezius: 5     Left Elbow   Flexion: WFL  Extension: WFL    Right Elbow   Flexion: WFL  Extension: WFL    Left Wrist/Hand   Wrist extension: WFL  Wrist flexion: WFL  Radial deviation: WFL  Ulnar deviation: WFL  Thumb extension: WFL    Right Wrist/Hand   Wrist extension: WFL  Wrist flexion: WFL  Radial deviation: WFL  Ulnar deviation: WFL  Thumb extension: WFL             Precautions: Recent gastric bypass surgery  POC expires Unit limit Auth Expiration date PT/OT/ST + Visit Limit?   6/18/25 N/A N/A  BOMN                           Visit/Unit Tracking  AUTH Status:  Date 3/26              N/A Used 1               Remaining                  HEP: [id number]   Manuals 3/26            G5 thoracic JMK- pain DC                                                   Neuro Re-Ed                                                                                                        Ther Ex             HEP education 8'                                                                                                       Ther Activity                                       Gait Training                                       Modalities

## 2025-03-28 NOTE — PROGRESS NOTES
"Bariatric Nutrition Progress Note      S/P Sleeve Dr. Sam on  1/28/2025    CC: Weight Stall    Vitamin Regimen: Bariatric Multi and calcium chews  Bloodwork to be completed at 3 months post op    Diet and exercise:    Tolerating a regular diet -Yes  3 meals: Yes  Snacking/grazing Yes  Volume: at 2 months 1/4 - 1/3 cup  Eating at least 60 grams of protein per day-  Protein drinks: 1  Following 30/60 minute rule with liquids-most of the time  Eating/drinking: chewing food well- sipping fluids Hard to slow pace of eating  Drinking at least 64 ounces of fluid per day-   Water and Hint (Also Gatorade Zero)  Drinking carbonated beverages-no  Food logging No  GI discomforts Denies  Exercise: gym, walking  Other    Recall:  B: FF Yogurt - Greek  L- Oatmeal High Protein  D- Meal   Snack: Fairlife shake, nuts, popcorn etc    Visit Summary  3/31/2025  2 months post op. Feels he is at weight stall.   Pt reports to be \"cheating\" - cookies, et. Also adding extra fats to diet.  Explained calories appears over rec 600 for this phase, Reviewed macros. Pt receptive and will try to make changes though most behavioral - some physical so 1 planned snack would be okay. Pt pleased to receive updated manual jose behavioral section and plans to review. Pt more aware of sugar content in foods and has been reading labels. Session also attended my his mom. Working towards pt being more independent in his food choice.   Goals  Keep to post op guidelines  Maintain protein (70-80 grams)  Maintain hydration ( 64 oz )  Avoid grazing  Volume at 1/3 c per meals - Macro goals as discussed  Keep to vitamin regimen as advised ( Dony Multi with 45 mg Iron and 1500 mg Calcium)   Increase physical activity and exercise as tolerated  F/U RD q 4-6 weeks    Time Spent: 45 minutes    "

## 2025-03-31 ENCOUNTER — CLINICAL SUPPORT (OUTPATIENT)
Dept: BARIATRICS | Facility: CLINIC | Age: 30
End: 2025-03-31

## 2025-03-31 ENCOUNTER — OFFICE VISIT (OUTPATIENT)
Dept: PHYSICAL THERAPY | Facility: CLINIC | Age: 30
End: 2025-03-31
Payer: COMMERCIAL

## 2025-03-31 VITALS — WEIGHT: 274.3 LBS | BODY MASS INDEX: 44.27 KG/M2

## 2025-03-31 DIAGNOSIS — K91.2 POSTSURGICAL MALABSORPTION: Primary | ICD-10-CM

## 2025-03-31 DIAGNOSIS — G89.29 CHRONIC RIGHT-SIDED THORACIC BACK PAIN: Primary | ICD-10-CM

## 2025-03-31 DIAGNOSIS — M54.6 CHRONIC RIGHT-SIDED THORACIC BACK PAIN: Primary | ICD-10-CM

## 2025-03-31 PROCEDURE — 97110 THERAPEUTIC EXERCISES: CPT

## 2025-03-31 PROCEDURE — RECHECK

## 2025-03-31 PROCEDURE — 97112 NEUROMUSCULAR REEDUCATION: CPT

## 2025-03-31 NOTE — PROGRESS NOTES
Daily Note     Today's date: 3/31/2025  Patient name: James Marrero  : 1995  MRN: [id number]  Referring provider: Whit Calderón PA-C  Dx:   Encounter Diagnosis     ICD-10-CM    1. Chronic right-sided thoracic back pain  M54.6     G89.29         Start Time: 0804  Stop Time: 0845  Total time in clinic (min): 41 minutes    Subjective: Pt reports that his HEP provides temporarily relief that only lasts a few minutes. Pt reports he was able to complete the exercises one time per day.       Objective: See treatment diary below      Assessment: Tolerated treatment well. Educated Pt on importance of increasing frequency of exercises. Pt required verbal and tactile cueing for parascapular exercises. Pt had minor onset of R shoulder pain during today's visit, but no onset of mid thoracic pain. Patient demonstrated fatigue post treatment, exhibited good technique with therapeutic exercises, and would benefit from continued PT for increased mobility, increased strength, and decreased symptom irritability.       Plan: Continue per plan of care.      Precautions: Recent gastric bypass surgery  POC expires Unit limit Auth Expiration date PT/OT/ST + Visit Limit?   25 N/A N/A BOMN                           Visit/Unit Tracking  AUTH Status:  Date 3/26 3/31             N/A Used 1 2              Remaining                  HEP: [id number]   Manuals 3/26 3/31           G5 thoracic JMK- pain DC                                                   Neuro Re-Ed             Roberto Row  30# 3x10           Roberto shoulder extension  15# 3x10           [employer]  5# 3x10                                                               Ther Ex             HEP education 8' 5'           UE bike  3'/3'           Thoracic circuit  5'                                                                            Ther Activity                                       Gait Training                                       Modalities

## 2025-04-02 ENCOUNTER — OFFICE VISIT (OUTPATIENT)
Dept: PHYSICAL THERAPY | Facility: CLINIC | Age: 30
End: 2025-04-02
Payer: COMMERCIAL

## 2025-04-02 DIAGNOSIS — G89.29 CHRONIC RIGHT-SIDED THORACIC BACK PAIN: Primary | ICD-10-CM

## 2025-04-02 DIAGNOSIS — M54.6 CHRONIC RIGHT-SIDED THORACIC BACK PAIN: Primary | ICD-10-CM

## 2025-04-02 PROCEDURE — 97110 THERAPEUTIC EXERCISES: CPT

## 2025-04-02 PROCEDURE — 97112 NEUROMUSCULAR REEDUCATION: CPT

## 2025-04-02 NOTE — PROGRESS NOTES
Daily Note     Today's date: 2025  Patient name: James Marrero  : 1995  MRN: [id number]  Referring provider: Whit Calderón PA-C  Dx:   Encounter Diagnosis     ICD-10-CM    1. Chronic right-sided thoracic back pain  M54.6     G89.29         Start Time: 1502  Stop Time: 1547  Total time in clinic (min): 45 minutes    Subjective: Pt reports that his mid back has been feeling okay today, but his allergies are acting up today. Pt has a heavy sinus headache.       Objective: See treatment diary below      Assessment: Tolerated treatment well. Pt had decreased thoracic rotation w/ new exercises. Pt reported no thoracic pain during exercises, but had increased headache symptoms. Educated Pt on headaches and possible causes, recommended he does not over exhaust himself at the gym if headache persists. Patient demonstrated fatigue post treatment, exhibited good technique with therapeutic exercises, and would benefit from continued PT for increased mobility, increased strength, and decreased symptom irritability.       Plan: Continue per plan of care.      Precautions: Recent gastric bypass surgery  POC expires Unit limit Auth Expiration date PT/OT/ST + Visit Limit?   25 N/A N/A BOMN                           Visit/Unit Tracking  AUTH Status:  Date 3/26 3/31 4/2            N/A Used 1 2 3             Remaining                  HEP: [id number]   Manuals 3/26 3/31 4/2          G5 thoracic JMK- pain DC                                                   Neuro Re-Ed             Roberto Row  30# 3x10 30# 3x10          [place]to shoulder extension  15# 3x10 15# 3x10          [place] Robberies  5# 3x10           SA Row   2x10 ea 20#                                                 Ther Ex             HEP education 8' 5' 5'          UE bike  3'/3' 3'/3'          Thoracic circuit  5' 5'          Thoracic rotation TB   BTB 2x10 ea                                                              Ther Activity                                        Gait Training                                       Modalities

## 2025-04-07 ENCOUNTER — OFFICE VISIT (OUTPATIENT)
Dept: FAMILY MEDICINE CLINIC | Facility: CLINIC | Age: 30
End: 2025-04-07
Payer: COMMERCIAL

## 2025-04-07 ENCOUNTER — OFFICE VISIT (OUTPATIENT)
Dept: PHYSICAL THERAPY | Facility: CLINIC | Age: 30
End: 2025-04-07
Payer: COMMERCIAL

## 2025-04-07 VITALS
HEART RATE: 86 BPM | RESPIRATION RATE: 18 BRPM | BODY MASS INDEX: 43.55 KG/M2 | SYSTOLIC BLOOD PRESSURE: 116 MMHG | OXYGEN SATURATION: 97 % | DIASTOLIC BLOOD PRESSURE: 74 MMHG | HEIGHT: 66 IN | WEIGHT: 271 LBS

## 2025-04-07 DIAGNOSIS — G89.29 CHRONIC RIGHT-SIDED THORACIC BACK PAIN: Primary | ICD-10-CM

## 2025-04-07 DIAGNOSIS — J30.1 SEASONAL ALLERGIC RHINITIS DUE TO POLLEN: Primary | ICD-10-CM

## 2025-04-07 DIAGNOSIS — K12.0 CANKER SORE: ICD-10-CM

## 2025-04-07 DIAGNOSIS — M54.6 CHRONIC RIGHT-SIDED THORACIC BACK PAIN: Primary | ICD-10-CM

## 2025-04-07 PROCEDURE — 97112 NEUROMUSCULAR REEDUCATION: CPT

## 2025-04-07 PROCEDURE — 99214 OFFICE O/P EST MOD 30 MIN: CPT | Performed by: STUDENT IN AN ORGANIZED HEALTH CARE EDUCATION/TRAINING PROGRAM

## 2025-04-07 PROCEDURE — 97110 THERAPEUTIC EXERCISES: CPT

## 2025-04-07 RX ORDER — FLUTICASONE PROPIONATE 50 MCG
1 SPRAY, SUSPENSION (ML) NASAL DAILY
Qty: 9.9 ML | Refills: 1 | Status: SHIPPED | OUTPATIENT
Start: 2025-04-07

## 2025-04-07 RX ORDER — TETRACYCLINE HYDROCHLORIDE 500 MG/1
1 CAPSULE ORAL 4 TIMES DAILY
COMMUNITY
Start: 2025-02-09

## 2025-04-07 RX ORDER — LEVOCETIRIZINE DIHYDROCHLORIDE 5 MG/1
5 TABLET, FILM COATED ORAL EVERY EVENING
Qty: 30 TABLET | Refills: 2 | Status: SHIPPED | OUTPATIENT
Start: 2025-04-07

## 2025-04-07 NOTE — PROGRESS NOTES
Daily Note     Today's date: 2025  Patient name: James Marrero  : 1995  MRN: [id number]  Referring provider: Whit Calderón PA-C  Dx:   Encounter Diagnosis     ICD-10-CM    1. Chronic right-sided thoracic back pain  M54.6     G89.29         Start Time: 1229  Stop Time: 1316  Total time in clinic (min): 47 minutes    Subjective: Pt reports that he is feeling better this week now that he has allergy medicine. Pt reports his shoulder and back are sore from moving a TV over the weekend. No mid back pain last few days.       Objective: See treatment diary below      Assessment: Tolerated treatment well. Pt was able to progress exercise prescription w/o onset of thoracic pain. Provide Pt w/ updated HEP NV. Patient demonstrated fatigue post treatment, exhibited good technique with therapeutic exercises, and would benefit from continued PT for increased mobility, increased strength, and decreased symptom irritability.       Plan: Continue per plan of care.      Precautions: Recent gastric bypass surgery  POC expires Unit limit Auth Expiration date PT/OT/ST + Visit Limit?   25 N/A N/A BOMN                           Visit/Unit Tracking  AUTH Status:  Date 3/26 3/31 4/2 4/7           N/A Used 1 2 3 4            Remaining                  HEP: [id number]   Manuals 3/26 3/31 4/2 4/7         G5 thoracic JMK- pain DC                                                   Neuro Re-Ed             Roberto Row  30# 3x10 30# 3x10 30# 3x10         Roberto shoulder extension  15# 3x10 15# 3x10 18# 3x10         [place]to Robberies  5# 3x10  8# 3x10         SA Row   2x10 ea 20#                                                 Ther Ex             HEP education 8' 5' 5'          UE bike  3'/3' 3'/3' 3'/3'         Thoracic circuit  5' 5' 5'         Thoracic rotation TB   BTB 2x10 ea BTB 2x10 ea                                                             Ther Activity                                       Gait Training                                        Modalities

## 2025-04-09 ENCOUNTER — APPOINTMENT (OUTPATIENT)
Dept: PHYSICAL THERAPY | Facility: CLINIC | Age: 30
End: 2025-04-09
Payer: COMMERCIAL

## 2025-04-09 ENCOUNTER — PATIENT MESSAGE (OUTPATIENT)
Dept: NEUROLOGY | Facility: CLINIC | Age: 30
End: 2025-04-09

## 2025-04-14 ENCOUNTER — OFFICE VISIT (OUTPATIENT)
Dept: PHYSICAL THERAPY | Facility: CLINIC | Age: 30
End: 2025-04-14
Payer: COMMERCIAL

## 2025-04-14 DIAGNOSIS — M54.6 CHRONIC RIGHT-SIDED THORACIC BACK PAIN: Primary | ICD-10-CM

## 2025-04-14 DIAGNOSIS — G89.29 CHRONIC RIGHT-SIDED THORACIC BACK PAIN: Primary | ICD-10-CM

## 2025-04-14 PROCEDURE — 97110 THERAPEUTIC EXERCISES: CPT

## 2025-04-14 PROCEDURE — 97112 NEUROMUSCULAR REEDUCATION: CPT

## 2025-04-14 NOTE — PROGRESS NOTES
Daily Note     Today's date: 2025  Patient name: James Marrero  : 1995  MRN: [id number]  Referring provider: Whit Calderón PA-C  Dx:   Encounter Diagnosis     ICD-10-CM    1. Chronic right-sided thoracic back pain  M54.6     G89.29         Start Time: 1240  Stop Time: 1315  Total time in clinic (min): 35 minutes    Subjective: Pt reports that he has not had any mid back pain for the past week.       Objective: See treatment diary below      Assessment: Tolerated treatment well. Updated Pt's HEP w/ new exercises. No onset of mid thoracic pain throughout today's session. Patient demonstrated fatigue post treatment, exhibited good technique with therapeutic exercises, and would benefit from continued PT for increased mobility, increased strength, and decreased symptom irritability.       Plan: Continue per plan of care.      Precautions: Recent gastric bypass surgery  POC expires Unit limit Auth Expiration date PT/OT/ST + Visit Limit?   25 N/A N/A BOMN                           Visit/Unit Tracking  AUTH Status:  Date 3/26 3/31 4/2 4/7 4/14          N/A Used 1 2 3 4 5           Remaining                  HEP: [id number]   Manuals 3/26 3/31 4/2 4/7 4/14        G5 thoracic JMK- pain DC                                                   Neuro Re-Ed             Roberto Row  30# 3x10 30# 3x10 30# 3x10 32# 3x10        [place] shoulder extension  15# 3x10 15# 3x10 18# 3x10 18# 3x10        Roberto Robberies  5# 3x10  8# 3x10 8# 3x10        SA Row   2x10 ea 20#                                                 Ther Ex             HEP education 8' 5' 5'          UE bike  3'/3' 3'/3' 3'/3' 3'/3'        Thoracic circuit  5' 5' 5' 5'        Thoracic rotation TB   BTB 2x10 ea BTB 2x10 ea                                                             Ther Activity                                       Gait Training                                       Modalities

## 2025-04-14 NOTE — PATIENT COMMUNICATION
Inbound call received from Patient stating Dr. Moss left him a message to call into the office to speak with him. Patient was read the MyChart advisement from Dr. Moss. Patient verbalized understanding and states he will go onto MyChart and check with his other providers.     Dr. Moss, please be aware and let us know if there was anything else you wanted him to know? Thank you!

## 2025-04-15 ENCOUNTER — TELEPHONE (OUTPATIENT)
Age: 30
End: 2025-04-15

## 2025-04-15 NOTE — TELEPHONE ENCOUNTER
Patient called to let Dr. Moss know he received approval from PCP and Bariatrics re: Nurtec and Qulipta. See responses below.     Naima Pringle - I'm sorry to hear that you're having severe headaches. You are safe to take those medications from our end. I hope you get some relief soon. Take care and feel better, Whit Calderón PA-C to Pino Marrreo (Bariatrics)    Dennis Page, DO to Pino Marrero (PCP)    4/15/25 10:28 AM  Pino, I am good with that plan.      Please send scripts to [address]    [phone] - okay to leave detailed msg.    Dr. Moss - please enter rx for Nurtec and Qulipta. Thank you!

## 2025-04-16 ENCOUNTER — TELEPHONE (OUTPATIENT)
Age: 30
End: 2025-04-16

## 2025-04-16 ENCOUNTER — APPOINTMENT (OUTPATIENT)
Dept: PHYSICAL THERAPY | Facility: CLINIC | Age: 30
End: 2025-04-16
Payer: COMMERCIAL

## 2025-04-16 ENCOUNTER — TELEPHONE (OUTPATIENT)
Dept: NEUROLOGY | Facility: CLINIC | Age: 30
End: 2025-04-16

## 2025-04-16 DIAGNOSIS — G44.89 OTHER HEADACHE SYNDROME: ICD-10-CM

## 2025-04-16 DIAGNOSIS — G44.89 OTHER HEADACHE SYNDROME: Primary | ICD-10-CM

## 2025-04-16 RX ORDER — ATOGEPANT 30 MG/1
TABLET ORAL
Qty: 30 TABLET | Refills: 5 | Status: SHIPPED | OUTPATIENT
Start: 2025-04-16

## 2025-04-16 RX ORDER — RIMEGEPANT SULFATE 75 MG/75MG
TABLET, ORALLY DISINTEGRATING ORAL
Refills: 0 | OUTPATIENT
Start: 2025-04-16

## 2025-04-16 NOTE — TELEPHONE ENCOUNTER
Patient and mother called regarding Gabapentin 300 mg BID from Dr. Moss. Patient asked if he should continue taking Gabapentin along with Nurtec. Advised Dr. Moss prescribed Ubrelvy instead of Nurtec. Also, there was nothing in the office notes from 2-6-25 referencing patient stopping Gabapentin at this time. Patient voiced clear understanding.    Patient stated [facility] stated Qulipta requires a prior authorization. Will work on same.

## 2025-04-17 DIAGNOSIS — G44.89 OTHER HEADACHE SYNDROME: ICD-10-CM

## 2025-04-17 RX ORDER — RIMEGEPANT SULFATE 75 MG/75MG
TABLET, ORALLY DISINTEGRATING ORAL
Refills: 0 | OUTPATIENT
Start: 2025-04-17

## 2025-04-17 RX ORDER — ERENUMAB-AOOE 70 MG/ML
INJECTION SUBCUTANEOUS
Refills: 0 | OUTPATIENT
Start: 2025-04-17

## 2025-04-17 NOTE — TELEPHONE ENCOUNTER
Received inbound call from pt's mom, Maribell. Pt with mom during phone call. She says the pharmacy notified them that they do not have the generic of Qulipta available.    She says pt is asking if there is an alternative to this.    Pt's mom says she hates to see her son in this much distress due to headaches. She says they are very debilitating and affecting his ability to do normal activities.    Called [facility] benefit plan at [phone]. Automated message states that PA must be submitted via online or by faxing form.    Spoke with pharmacist with [facility]. She states that Qulipta requires PA. They do have it available but this need to be authorized by insurance.    Ubrelvy also requires PA.    Ubrelvy PA:  Key: [id number]    Qulipta PA:  Key: [id number]    Spoke with pt's mom and advised her that the pharmacy does have Qulipta, however, this medication needs a PA. Informed her that PA has been submitted as urgent. When we receive their determination, we will notify pt and mom.    Awaiting determination.

## 2025-04-17 NOTE — TELEPHONE ENCOUNTER
Recd call from Beronica. Beronica stated that she just spoke with pt's insurance, Medicaid and informed her that the TAT would be 24 hours.

## 2025-04-17 NOTE — TELEPHONE ENCOUNTER
Spoke with pharmacist and she says she's not sure why we received this request. She does not see it on their end. She says to please cancel this request.    Dr. Moss - Please cancel request from pharmacy as nursing  unable to do so. Thank you!

## 2025-04-18 NOTE — TELEPHONE ENCOUNTER
Patient and his mother, Beronica called. They were calling to follow up on the prior authorizations for Ubrelvy and Qulipta. Per CMM, there is not a determination on either medication, but there is a letter scanned into pt's chart that states that Ubrelvy has been approved for a quantity of 10 tabs per 28 days. Told Beronica that this nurse would call [facility] and request that they process the claim for Ubrelvy for 10 tabs per 28 days. Will also request that they try to process the Qulipta, and if the pharmacist is able to obtain a paid claim, will call Beronica back.    Called [facility] and spoke with the pharmacist. The pharmacist was able to obtain a paid claim for Ubrelvy and will get that medication ready for the pt. She is still unable to process the claim for Qulitpa.

## 2025-04-18 NOTE — TELEPHONE ENCOUNTER
Phone call to patient regarding Dr. Moss's message below. Advised same. Told patient [facility] will notify our office if these medication require authorization and we will work on those if needed. Patient voiced clear understanding. Nothing additional needed at this time.

## 2025-04-18 NOTE — TELEPHONE ENCOUNTER
MD Ximena Tamayo, RN2 days ago       Please let the patient know that I have given him a prescription for Qulipta 30 mg a day and instead of Nurtec I have given him Ubrelvy 50 mg as needed can repeat if needed in 2 hours maximum 100 mg a day and maximum twice a week please tell him to check with his bariatric surgeon regarding Ubrelvy and make sure that they are okay and then he can take it

## 2025-04-21 ENCOUNTER — OFFICE VISIT (OUTPATIENT)
Dept: PHYSICAL THERAPY | Facility: CLINIC | Age: 30
End: 2025-04-21
Payer: COMMERCIAL

## 2025-04-21 ENCOUNTER — CLINICAL SUPPORT (OUTPATIENT)
Dept: BARIATRICS | Facility: CLINIC | Age: 30
End: 2025-04-21

## 2025-04-21 VITALS — WEIGHT: 268.8 LBS | BODY MASS INDEX: 43.39 KG/M2

## 2025-04-21 DIAGNOSIS — G43.009 MIGRAINE WITHOUT AURA AND WITHOUT STATUS MIGRAINOSUS, NOT INTRACTABLE: Primary | ICD-10-CM

## 2025-04-21 DIAGNOSIS — G89.29 CHRONIC RIGHT-SIDED THORACIC BACK PAIN: Primary | ICD-10-CM

## 2025-04-21 DIAGNOSIS — Z98.84 BARIATRIC SURGERY STATUS: Primary | ICD-10-CM

## 2025-04-21 DIAGNOSIS — M54.6 CHRONIC RIGHT-SIDED THORACIC BACK PAIN: Primary | ICD-10-CM

## 2025-04-21 DIAGNOSIS — F41.1 ANXIETY, GENERALIZED: ICD-10-CM

## 2025-04-21 PROCEDURE — 97112 NEUROMUSCULAR REEDUCATION: CPT

## 2025-04-21 PROCEDURE — RECHECK

## 2025-04-21 PROCEDURE — 97110 THERAPEUTIC EXERCISES: CPT

## 2025-04-21 NOTE — PROGRESS NOTES
Post op support. Mom also present. Had vitamin D checked and it was within normal range, but low normal- Whit suggested to add 1-2000 IU daily. Scheduled to take his drivers test on 5/1. His neurologist started him on an injection for his migraines-  waiting to hear back to get that started. Has been drinking hint water, decaf coffee, 1 cup regular coffee. His PT suggested body armour lite to help with sodium, but he was getting excessive electrolytes before with gatorade zero. Try to push non-caffeined fluids as much as possible. May also help the the migraines and fatigue. To work with therapist on mindfulness to help with slowing down eating and being more in-tune to his emotions.  Arabella Ramires LCSW

## 2025-04-21 NOTE — TELEPHONE ENCOUNTER
Bonifacio Moss MD to Michael Kaba, RN  Neurology Epilepsy Team 1     4/21/25 10:49 AM  I left a message for the patient, could try Ajovy once a month if agreeable with the patient please let me know.    Thank you    ------------------------------    Inbound call received from Patient and they were provided the advisement from Dr. Moss. Patietn is agreeable to trying Ajovy.     Dr. Moss please place order if adviseable Thank you!

## 2025-04-21 NOTE — TELEPHONE ENCOUNTER
Qulipta PA denied for not trying all of the following preferred alternatives: Aimovig, Ajovy, Emgality. Please see denial letter under Media tab for full explanation.    Called pt to make aware of Qulipta and Ubrelvy (below) and left a detailed VM with a call back #. Will f/u with pt once Dr. Moss has selected an alternative to the Qulipta.    Dr. Moss - please advise on alternative CGRP. Thank you.

## 2025-04-21 NOTE — PROGRESS NOTES
Daily Note     Today's date: 2025  Patient name: James Marrero  : 1995  MRN: [id number]  Referring provider: Whit Calderón PA-C  Dx:   Encounter Diagnosis     ICD-10-CM    1. Chronic right-sided thoracic back pain  M54.6     G89.29         Start Time: 1240  Stop Time: 1319  Total time in clinic (min): 39 minutes    Subjective: Pt reports that his headaches have not stopped and that it has bene limiting his exercises. Pt reports some cervical pain, no thoracic pain.        Objective: See treatment diary below      Assessment: Tolerated treatment well. Educated Pt and mother on headache onset and the importance of speaking to nutritionist regarding current diet and fluid intake along w/ staying in discussion w/ neurologist regarding the current treatment for his headaches. Pt did not get a headache during today's session, was able to complete all exercise sw/o any onset. Pt had CTJ pain w/ cervical retraction and extension exercise as the extension made his neck feel worse. Educated Pt on utilizing repeated retraction for relief as that positivity impacted his overall symptoms. Patient demonstrated fatigue post treatment, exhibited good technique with therapeutic exercises, and would benefit from continued PT for increased mobility, increased strength, and decreased symptom irritability.       Plan: Continue per plan of care.      Precautions: Recent gastric bypass surgery  POC expires Unit limit Auth Expiration date PT/OT/ST + Visit Limit?   25 N/A N/A BOMN                           Visit/Unit Tracking  AUTH Status:  Date 3/26 3/31 4/2 4/7 4/14 4/21         N/A Used 1 2 3 4 5 6          Remaining                  HEP: [id number]   Manuals 3/26 3/31 4/2 4/7 4/14 4/21       G5 thoracic JMK- pain DC                                                   Neuro Re-Ed             Roberto Row  30# 3x10 30# 3x10 30# 3x10 32# 3x10 32# 3x12       [place] shoulder extension  15# 3x10 15# 3x10 18# 3x10 18# 3x10 18#  3x10       [place] Robberies  5# 3x10  8# 3x10 8# 3x10        SA Row   2x10 ea 20#                                                 Ther Ex             HEP education 8' 5' 5'          UE bike  3'/3' 3'/3' 3'/3' 3'/3' 3'/3'       Thoracic circuit  5' 5' 5' 5' 5'       Thoracic rotation TB   BTB 2x10 ea BTB 2x10 ea  BTB 2x10 ea       Cervical retraction      3x10       Cervical retraction w/extension      x15                                 Ther Activity                                       Gait Training                                       Modalities

## 2025-04-22 NOTE — TELEPHONE ENCOUNTER
Patient called to f/u on PA for ajovy; upon chart review, advised submitted this AM on urgent basis; determination pending.    Patient was appreciative.

## 2025-04-23 ENCOUNTER — OFFICE VISIT (OUTPATIENT)
Dept: PHYSICAL THERAPY | Facility: CLINIC | Age: 30
End: 2025-04-23
Payer: COMMERCIAL

## 2025-04-23 DIAGNOSIS — G89.29 CHRONIC RIGHT-SIDED THORACIC BACK PAIN: Primary | ICD-10-CM

## 2025-04-23 DIAGNOSIS — M54.6 CHRONIC RIGHT-SIDED THORACIC BACK PAIN: Primary | ICD-10-CM

## 2025-04-23 PROCEDURE — 97112 NEUROMUSCULAR REEDUCATION: CPT

## 2025-04-23 PROCEDURE — 97110 THERAPEUTIC EXERCISES: CPT

## 2025-04-23 NOTE — TELEPHONE ENCOUNTER
Patient called in to see if there is an update on the Ajovy approval      I informed it was approved though 10/19/25. Pt appreciated the update and aware to contact pharmacy.        During call pt had question about migraines: I provided thorough education, including using the migraine curt jaden .     He also informed he only has 5 Ubrelvy tabs left, as he just got the fill on 4/18/25, and insurance only approved 10 tabs per month      Pt advised his migraine currently is a 5/10, stable and he does not need a med currently. However, I advised him to call our office if his migraine worsens at that point as he may take 1 tab of Ubrelvy, however at that time it may be best to reach out to his provider to get another med, since he only has 5 tabs of Ubrelvy left.     He and his mother appreciated the guidance during the call he had no other questions at this time and agreeable to call office if symptoms worsen

## 2025-04-23 NOTE — TELEPHONE ENCOUNTER
Eliezer MCKEON approved through 10/19/25    Called [facility] pharmacy and left a message making them aware of the approval and for a cb if any questions

## 2025-04-24 DIAGNOSIS — G43.009 MIGRAINE WITHOUT AURA AND WITHOUT STATUS MIGRAINOSUS, NOT INTRACTABLE: ICD-10-CM

## 2025-04-24 DIAGNOSIS — I10 ESSENTIAL HYPERTENSION: ICD-10-CM

## 2025-04-24 RX ORDER — PROPRANOLOL HYDROCHLORIDE 80 MG/1
80 CAPSULE, EXTENDED RELEASE ORAL EVERY MORNING
Qty: 90 CAPSULE | Refills: 1 | Status: SHIPPED | OUTPATIENT
Start: 2025-04-24 | End: 2025-05-24

## 2025-04-24 NOTE — TELEPHONE ENCOUNTER
Patient called in asking for a refill states he is completely out of medication. Did verify that the [facility] pharmacy in [place] is the best pharmacy

## 2025-04-25 DIAGNOSIS — F33.9 DEPRESSION, RECURRENT (HCC): ICD-10-CM

## 2025-04-25 RX ORDER — DOXEPIN HYDROCHLORIDE 100 MG/1
100 CAPSULE ORAL
Qty: 270 CAPSULE | Refills: 1 | Status: SHIPPED | OUTPATIENT
Start: 2025-04-25

## 2025-04-28 ENCOUNTER — APPOINTMENT (OUTPATIENT)
Dept: PHYSICAL THERAPY | Facility: CLINIC | Age: 30
End: 2025-04-28
Payer: COMMERCIAL

## 2025-04-29 DIAGNOSIS — J30.1 SEASONAL ALLERGIC RHINITIS DUE TO POLLEN: ICD-10-CM

## 2025-04-29 RX ORDER — LEVOCETIRIZINE DIHYDROCHLORIDE 5 MG/1
5 TABLET, FILM COATED ORAL EVERY EVENING
Qty: 90 TABLET | Refills: 1 | Status: SHIPPED | OUTPATIENT
Start: 2025-04-29

## 2025-04-30 ENCOUNTER — OFFICE VISIT (OUTPATIENT)
Dept: PHYSICAL THERAPY | Facility: CLINIC | Age: 30
End: 2025-04-30
Payer: COMMERCIAL

## 2025-04-30 DIAGNOSIS — M54.6 CHRONIC RIGHT-SIDED THORACIC BACK PAIN: Primary | ICD-10-CM

## 2025-04-30 DIAGNOSIS — G89.29 CHRONIC RIGHT-SIDED THORACIC BACK PAIN: Primary | ICD-10-CM

## 2025-04-30 PROCEDURE — 97110 THERAPEUTIC EXERCISES: CPT

## 2025-04-30 NOTE — PROGRESS NOTES
"Daily Note     Today's date: 2025  Patient name: James Marrero  : 1995  MRN: [id number]  Referring provider: Whit Calderón PA-C  Dx:   Encounter Diagnosis     ICD-10-CM    1. Chronic right-sided thoracic back pain  M54.6     G89.29         Start Time: 1237  Stop Time: 1302  Total time in clinic (min): 25 minutes    Subjective: Pt reports that his headache is a 7/10 today and that he is not feeling good. Pt reports he has no mid back pain but his neck is sore from the headaches.       Objective: See treatment diary below  Pt presents 10 min late to PT    Assessment: Tolerated treatment fair. Pt was unable to exercise w/o exacerbation of headache symptoms, performed shortened session due to the symptom onset. Patient demonstrated fatigue post treatment, exhibited good technique with therapeutic exercises, and would benefit from continued PT for increased mobility, increased strength, and decreased symptom irritability.       Plan: Continue per plan of care.      Precautions: Recent gastric bypass surgery  POC expires Unit limit Auth Expiration date PT/OT/ST + Visit Limit?   25 N/A N/A BOMN                           Visit/Unit Tracking  AUTH Status:  Date 3/26 3/31 4/2 4/7 4/14 4/21 4/23 4/30       N/A Used 1 2 3 4 5 6 7 8        Remaining                  HEP: [id number]   Manuals 3/26 3/31 4/2 4/7 4/14 4/21 4/23 4/30     G5 thoracic JMK- pain DC                                                   Neuro Re-Ed             Roberto Row  30# 3x10 30# 3x10 30# 3x10 32# 3x10 32# 3x12       [place] shoulder extension  15# 3x10 15# 3x10 18# 3x10 18# 3x10 18# 3x10       Roberto Robberies  5# 3x10  8# 3x10 8# 3x10        SA Row   2x10 ea 20#    3x10 ea 25#      Prone scap retraction       1x10 3\"      Prone Y       2x10 3\"                   Ther Ex             HEP education 8' 5' 5'     JMK     UE bike  3'/3' 3'/3' 3'/3' 3'/3' 3'/3' 3'/3' 3'/3'     Thoracic circuit  5' 5' 5' 5' 5'  5'     Thoracic rotation TB   BTB " 2x10 ea BTB 2x10 ea  BTB 2x10 ea BTB 2x10 ea      Cervical retraction      3x10  3x10     Cervical retraction w/extension      x15       Open books        3x10 ea                  Ther Activity                                       Gait Training                                       Modalities

## 2025-05-02 DIAGNOSIS — G89.29 CHRONIC INTRACTABLE HEADACHE, UNSPECIFIED HEADACHE TYPE: Primary | ICD-10-CM

## 2025-05-02 DIAGNOSIS — R51.9 CHRONIC INTRACTABLE HEADACHE, UNSPECIFIED HEADACHE TYPE: Primary | ICD-10-CM

## 2025-05-05 ENCOUNTER — CLINICAL SUPPORT (OUTPATIENT)
Dept: BARIATRICS | Facility: CLINIC | Age: 30
End: 2025-05-05

## 2025-05-05 ENCOUNTER — OFFICE VISIT (OUTPATIENT)
Age: 30
End: 2025-05-05
Payer: COMMERCIAL

## 2025-05-05 VITALS
BODY MASS INDEX: 43.07 KG/M2 | DIASTOLIC BLOOD PRESSURE: 60 MMHG | WEIGHT: 268 LBS | HEART RATE: 90 BPM | SYSTOLIC BLOOD PRESSURE: 112 MMHG | OXYGEN SATURATION: 98 % | HEIGHT: 66 IN

## 2025-05-05 VITALS — WEIGHT: 266.8 LBS | BODY MASS INDEX: 42.88 KG/M2 | HEIGHT: 66 IN

## 2025-05-05 DIAGNOSIS — G43.701 CHRONIC MIGRAINE WITHOUT AURA, WITH STATUS MIGRAINOSUS: Primary | ICD-10-CM

## 2025-05-05 DIAGNOSIS — K91.2 POSTSURGICAL MALABSORPTION: Primary | ICD-10-CM

## 2025-05-05 DIAGNOSIS — G44.89 OTHER HEADACHE SYNDROME: ICD-10-CM

## 2025-05-05 PROCEDURE — RECHECK

## 2025-05-05 PROCEDURE — 99417 PROLNG OP E/M EACH 15 MIN: CPT | Performed by: PSYCHIATRY & NEUROLOGY

## 2025-05-05 PROCEDURE — 99215 OFFICE O/P EST HI 40 MIN: CPT | Performed by: PSYCHIATRY & NEUROLOGY

## 2025-05-05 NOTE — PATIENT INSTRUCTIONS
"Headache Preventive Treatment:   Please keep in mind that it takes 4-6 weeks for the medication to start working well and 2-3 months at the appropriate dose before deciding if it will be useful or not. If it is not helping at all by this time, then we will discuss other medications to try. Supplements may take 3-6 months until you see full effect.     HEADACHE DIET: Foods and beverages which may trigger migraine  Note that only 20% of headache patients are food sensitive. You will know if you are food sensitive if you get a headache consistently 20 minutes to 2 hours after eating a certain food. Only cut out a food if it causes headaches, otherwise you might remove foods you enjoy! What matters most for diet is to eat a well balanced healthy diet full of vegetables and low fat protein, and to not miss meals.    Chocolate, other sweets    ALL cheeses except cottage and cream cheese    Dairy products, yogurt, sour cream, ice cream    Liver    Meat extracts (Bovril, Marmite, meat tenderizers)    Meats or fish which have undergone aging, fermenting, pickling or smoking. These include: Hotdogs,salami,Lox,sausage,  mortadellas,smoked salmon, pepperoni, Pickled herring    Pods of broad bean (English beans, Chinese pea pods, Italian (refugio) beans, lima and navy beans    Ripe avocado, ripe banana    Yeast extracts or active yeast preparations such as Storm's or Ronni's (commercial bakes goods are permitted)    Tomato based foods, pizza (lasagna, etc.)    MSG (monosodium glutamate) is disguised as many things; look for these common aliases:  · Monopotassium glutamate  · Autolysed yeast  · Hydrolysed protein  · Sodium caseinate  · “flavorings”  · “all natural preservatives\"    Nutrasweet    Avoid all other foods that convincingly provoke headaches.    Headache Prevention Strategies:    1. Maintain a headache diary; learn to identify and avoid triggers. Common triggers include:    Emotional triggers:  · Emotional/Upset " family or friends  · Emotional/Upset occupation  · Business reversal/success  · Anticipation anxiety  · Crisis-serious  · Post-crisis periodNew job/position    Physical triggers:  · Vacation Day  · Weekend  · Strenuous Exercise  · [place] Location  · New Move  · Menstrual Day  · Physical Illness  · Oversleep/Not enough sleep  · Weather changes  · Light: Photophobia or light sesnitivity treatment involves a balance between desensitization and reduction in overly strong input. Use dark polarized glasses outside, but not inside. Avoid bright or fluorescent light, but do not dim environment to the point that going into a normally lit room hurts. Consider FL-41 tint lenses, which reduce the most irritating wavelengths without blocking too much light. These can be obtained at [employer] or [employer]  · Foods: see list above.    2. Limit use of acute treatments (over-the-counter medications, triptans, etc.) to no more than 2 days per week or 10 days per month to prevent medication overuse headache (rebound headache).     3. Follow a regular schedule (including weekends and holidays):  · Don't skip meals. Eat a balanced diet.  · 8 hours of sleep nightly.  · Minimize stress.  · Exercise 30 minutes per day. Being overweight is associated with a 5 times increased risk of chronic migraine.  · Keep well hydrated and drink 6-8 glasses of water per day.    4. Initiate non-pharmacologic measures at the earliest onset of your headache.  · Rest and quiet environment.  · Relax and reduce stress. Wloywma5Klgcc is a free jaden that can instruct you on some simple relaxtion and breathing techniques. Http://[employer] is a free website that provides teaching videos on relaxation. Also, there are many apps that can be downloaded for “mindful” relaxation. An jaden called YOGA NIDRA will help walk you through mindfulness.  · Cold compresses.    5. Don't wait!! Take the maximum allowable dosage of prescribed medication at the  first sign of migraine.    6. Compliance: Take prescribed medication regularly as directed and at the first sign of a migraine.    7. Communicate: Call your physician when problems arise, especially if your headaches change, increase in frequency/severity, or become associated with neurological symptoms (weakness, numbness, slurred speech, etc.).    8. Headache/pain management therapies: Consider various complementary methods, including medication, behavioral therapy, psychological counselling, biofeedback, massage therapy, acupuncture, dry needling, and other modalities. Such measures may reduce the need for medications. Counseling for pain management, where patients learn to function and ignore/minimize their pain, seems to work very well.    9. Recommend changing family's attention and focus away from patient's headaches. Instead, emphasize daily activities. If first question of day is 'How are your headaches/Do you have a headache today?', then patient will constantly think about headaches, thus making them worse. Goal is to re-direct attention away from headaches, toward daily activities and other distractions.    10. Helpful Websites:  www.AmericanHeadacheSociety.org  www.migrainetrust.org  www.headaches.org  www.migraine.org.uk  www.achenet.org    11. HEADACHE EXPECTATIONS:  There are many types of headaches, and only a rare few in which complete relief can be expected. In general, there is no cure for headache, especially migraine based headaches. There is nothing available that completely prevents headaches from occurring, breaking through, or having periodic flare-ups and fluctuations. Regardless of what you are using on a daily basis for prevention, episodic headaches should still be expected, and periods where frequency may escalate and fluctuate are unavoidable.    There is no quick fix for most headaches. Furthermore, the longer you have had high frequency headaches (such as chronic daily headache),  "the longer it will likely take to expect any improvement. In fact, some people will never improve, regardless of how many medications or other treatments we try. Our treatment strategy is to evaluate for possible causes of your headache, although testing is usually always normal, even in cases of daily continuous headaches for years. Most types of headache such as migraine are electrical brain disorders (similar to how epilepsy is an electrical brain disorders). Therefore, there is no testing that will reveal this \"dysfunctional electrical circuitry\" such on MRI, or other testing. We try to find a medication that may help lessen the frequency and/or severity of your headaches. The goal is not to completely stop them from happening, although if that happens, great! Different people respond to different medications, and some people just don't respond to anything, so it's usually a matter of trying different options. We can not predict if or when exactly you will respond to a treatment that we provide.    Preventive headache medications take 4-6 weeks to start working, and 2-3 months to see full effect, assuming you reach an effective dose. Therefore, calling or messaging frequently because you have a headache flare prior to the 3 month bridgett is unlikely to change anything, and unfortunately there is nothing available that will expedite this, so please try to avoid this. Our recommendation will generally be to give it adequate time first. If you are unable to wait it out for medications to work, we can also try IV infusions for some temporary relief.     In general, the best that preventive medications or other treatments (including Botox) are able to offer in migraine management (variable in other headache types) is a 50% improvement in frequency and/or severity of headache. That is our goal, and any additional benefit is considered a bonus. Some people do significantly better than this, others do not get close to " this. Therefore, if your headaches are not improving by at least 3 months on your preventive strategy, contact us and we can discuss further adjustments. Keep in mind that complete headache cure is not a realistic expectation.    Our Team:  The nursing staff, and medical assistants are a major part of YOUR TREATMENT TEAM and will be handling your phone calls and inquiries, if any. Unless explicitly told otherwise at the time of your office visit, your study results and ensuing treatment plans will be released via [facility] and discussed during your follow-up appointment. Follow-up appointments are primarily provided by the Nurse Practioners and Physician’s Assistants in order to provide timely, accessible care.     MyChart: Please ask the schedulers to give you an activation code. The main way of communication is by [employer] rather than phone lines, so if you have not signed up, please do so. [employer] is also the way that you can review your labs and testing. We are not able to contact everyone to tell them results are normal. If you do not hear back from us regarding testing you have had, it should be considered normal or within normal range. If you have any questions about the results, you are free to message us.    [employer] is meant for simple questions regarding medications, possible side effects, or other simple straight forward questions in limited sentences, rather than multiple paragraphs of discussion. [facility] is not meant for, or efficient for these complex questions, extensive questions, extensive medication adjustments, complex new symptoms or concerns. These issues beyond simple questions require a follow up visit with myself, one of our physician assistants, nurse practitioners, or a Virtual Visit via computer or smart phone, as detailed further down.    Refills:  Please pay attention to when your refills will need to be renewed. Due to the volume of phone calls daily, this could potentially take a few  days, although we certainly try to honor your refill requests as soon as we can. You should call at least 1 week in advance of needing a refill to ensure you do not run out of medication. Keep in mind that refill requests on Fridays may not be filled until the following week.     The Headache and Facial Pain Section does not complete disability or any other insurance-related forms/documention. We will complete FMLA forms. All of the office notes, study results, and other pertinent documentation generated as part of your evaluation will be available to you and to your Primary Care Physician (PCP). Use of this material to complete such forms will be at the discretion of your PCP/referring physician.        Lifestyle adjustments. Irrespective of treatment modalities applied, trigger control and lifestyle modification are indispensable to the successful management of migraine. This is especially important in children, adolescents, or pregnant women where drug treatments must be especially limited.    Although there is no robust evidence for most of the recommendations, most are general health measures that, given the lack of adverse effects and the benefit for general well-being, we consider should be recommended in all patients.    Avoid triggers. Many patients attribute the onset or worsening of pain to specific triggers such as stress, sleep changes, food, or atmospheric changes, among others. It is even possible that the relationship occurs inversely, so that premonitory symptoms such as sleep disturbances and appetite 48 to 72 hours before the onset of pain can be misinterpreted by the patient as the trigger of migraine attacks. The therapeutic implications of this relationship are also unclear. There are possible triggers such as sleep deprivation, fasting, or certain foods that can be easily avoidable. But avoiding other triggers can lead to very restrictive lifestyles with a reduction in quality of life that  does not outweigh the potential beneficial.    Sleep. Another complex relationship is headache and sleep. An excess or lack of sleep can trigger migraine attacks and at the same time rest is one of the most used treatments to improve the symptoms of the migraine attack. Additionally, migraine and other headaches occur comorbidly with sleep disorders. Patients with chronic migraine have a higher prevalence of sleep disorders, specifically poor sleep habits and nonrestorative rest.    Regarding general sleep measures for patients with headache, it is recommended to define regular sleep schedules that allow 8 hours of rest per day, insisting that they remain constant also during the weekend; have dinner 4 hours before bedtime and avoid liquids in the last two hours; and eliminate naps and avoid using screens, television, reading, or listening to music in bed. A nonpharmacological intervention to improve sleep habits can improve headache frequency and even reverse chronic to episodic migraine.    Diet. In the scientific literature, but especially in the informative websites and magazines, multiple and varied diets are proposed that aim to reduce the frequency of headaches. There are two main approaches: elimination diets, which consist of suppressing potentially triggering foods such as chocolate, alcohol, cheese, nuts, or citrus fruits and diets that provide high or low amounts of certain components, ie, rich in vitamin B12, B6, or D or low in histamine, lactose, or fatty acids. The studies are not very rigorous and most do not have a control group (). In addition, it must be considered that food triggers were only associated with onset of headache in less than 10% of the participants.    Dietary recommendations for patients with migraine should be the same as for the general population with special emphasis on the prevention of obesity, which is a factor related to headache chronification. It is recommendable to have  a varied diet, eating five meals a day to avoid periods of prolonged fasting and incorporating water intake to reach around 2.5 liters per day, which should be increased in case of physical activity or increase in temperature or humidity. Specific diets should be recommended solely based on whether there are other comorbidities in the patient.    Caffeine at moderate doses (< 400mg/day: equivalent to two cups of coffee) does not seem to have a negative effect on headache frequency although it should be taken regularly to avoid withdrawal headaches.    Although patients with migraine headaches and cluster headaches may be more susceptible to alcohol as a precipitant, there is no evidence to recommend abstinence from alcohol in all patients. Individual predisposition and cultural factors must be considered.    Exercise. Aerobic exercise can prevent or reduce symptoms of multiple chronic diseases, including headache. With some methodological limitations, there are studies that demonstrate benefits of aerobic exercise as a therapeutic intervention to reduce the frequency and intensity of headaches, as well as the quality of life measured by questionnaires. Exercise can have a beneficial effect on headaches directly but also indirectly, improving sleep quality, mood, cardiovascular function, and preventing weight gain. In addition, it can improve the control of other diseases frequently comorbid with headache such as obesity, hypertension, anxiety, depression, or sleep disorders (). The clinical benefit of yoga as an add-on therapy in patients with episodic migraine has been demonstrated.             Natural supplements for headache:  Magnesium Oxide 500 mg at bed  Coenzyme Q10 300 mg in AM  Vitamin B2- 200 mg twice a day  Feverfew 50 mg twice a day    Vitamins and herbs that show potential    Magnesium: Magnesium (250 mg twice a day or 500 mg at bed) has a relaxant effect on smooth muscles such as blood vessels.  Individuals suffering from frequent or daily headache usually have low magnesium levels which can be increase with daily supplementation of 400-750 mg. Three trials found 40-90% average headache reduction when used as a preventative. Magnesium also demonstrated the benefit in menstrually related migraine. Magnesium is part of the messenger system in the serotonin cascade and it is a good muscle relaxant. It is also useful for constipation which can be a side effect of other medications used to treat migraine. Good sources include nuts, whole grains, and tomatoes.    Riboflavin (vitamin B 2) 200 mg twice a day. This vitamin assists nerve cells in the production of ATP a principal energy storing molecule. It is necessary for many chemical reactions in the body. There have been at least 3 clinical trials of riboflavin using 400 mg per day all of which suggested that migraine frequency can be decreased. All 3 trials showed significant improvement in over half of migraine sufferers. The supplement is found in bread, cereal, milk, meat, and poultry. Most Americans get more riboflavin than the recommended daily allowance, however riboflavin deficiency is not necessary for the supplements to help prevent headache.    Feverfew: Feverfew is a common garden herb native to [place] and popular in [facility] as a treatment for disorders typically controlled by aspirin. The mechanism of action is unknown but is believed to be related to a chemical called parthenolide which helps the body use serotonin more effectively. Serotonin helps prevent migraine and assists with resolution when it occurs. Parthenolide also inhibits the release of histamine which is linked to pain and inflammation.    Consistency of active ingredients in different products can be a problem. Some formulations don't have the active ingredient (parthenolide) that prevents migraine. A parthenolide content of 0.2% is generally recommended. Typical dosage is one  capsule 3 times a day.    Coenzyme Q10: This is present in almost all cells in the body and is critical component for the conversion of energy. Recent studies have shown that a nutritional supplement of CoQ10 can reduce the frequency of migraine attacks by improving the energy production of cells as with riboflavin. Doses of 150 mg twice a day have been shown to be effective.    Melatonin: Increasing evidence shows correlation between melatonin secretion and headache conditions. Melatonin supplementation has decreased headache intensity and duration. It is widely used as a sleep aid. Sleep is natures way of dealing with migraine. A dose of 3 mg is recommended to start for headaches including cluster headache. Higher doses up to 15 mg has been reviewed for use in Cluster headache and have been used.  The rationale behind using melatonin for cluster is that many theories regarding the cause of Cluster headache center around the disruption of the normal circadian rhythm in the brain. This helps restore the normal circadian rhythm.    Margie: Margie has a small amount of antihistamine and anti-inflammatory action which may help headache. It is primarily used for nausea and may aid in the absorption of other medications.

## 2025-05-05 NOTE — PROGRESS NOTES
"Bariatric Nutrition Progress Note      S/P Sleeve Dr. Sam on  1/28/2025    CC: Weight Stall    Height: 5'6\"    Current Weight: 266.8#   BMI: 43.1  Weight Prior to Weight Loss Surgery: 322   BMI: 52  Weight in (lb) to have BMI = 25: 155.6  SAKINA:Current  Regain: N/A      Vitamin Regimen: Bariatric Multi and calcium chews  Bloodwork to be completed at 3 months post op    Diet and exercise:    Tolerating a regular diet -Yes  3 meals: Yes  Snacking/grazing Yes  Volume: at 2 months 1/4 - 1/3 cup  Eating at least 60 grams of protein per day-  Protein drinks: 1  Following 30/60 minute rule with liquids-most of the time  Eating/drinking: chewing food well- sipping fluids Hard to slow pace of eating  Drinking at least 64 ounces of fluid per day-   Water and Hint (Also Gatorade Zero)  Drinking carbonated beverages-no  Food logging No  GI discomforts Denies  Exercise: gym, walking  Other    Recall:  B: FF Yogurt - Greek  L- Oatmeal High Protein  D- Meal   Snack: Fairlife shake, nuts, popcorn etc    Visit Summary  3/31/2025  2 months post op. Feels he is at weight stall.   Pt reports to be \"cheating\" - cookies, et. Also adding extra fats to diet.  Explained calories appears over rec 600 for this phase, Reviewed macros. Pt receptive and will try to make changes though most behavioral - some physical so 1 planned snack would be okay. Pt pleased to receive updated manual jose behavioral section and plans to review. Pt more aware of sugar content in foods and has been reading labels. Session also attended my his mom. Working towards pt being more independent in his food choice.     Visit Summary  5/5/2025   Weight: 266.8#  3+ months post op. Weight loss slowed. Pt reports to be eating out of boredom. Denies any dumping but does vomit if eats too much or too fats. Pt reports that he regrets having the surgery as not able to eat what he wants and how much. Had discussion and noted positive outcomes since his surgery. Provided " "support and encouragement as well as ideas to add variety to diet. Pt looking for alternatives to satisfy his craving for chocolate and \"crunch\" Mom provided labels to review. Advised pt of better choice but still need to limit to 1/2 to 1 serving per day. Pt also having severe headaches - notes worse since WLS and been debiliatating that not at gym x 2 weeks. Discussed certain sweeteners, electrolytes and/or dehydration can play as role as well as environmental factors. Pt has appt [facility] headache specialist later this afternoon for evaluation  Goals  Keep to post op guidelines  Maintain protein (70-80 grams)  Maintain hydration ( 64 oz )  Avoid grazing  Volume at 1/3 c per meals - Macro goals as discussed  Keep to vitamin regimen as advised ( Dony Multi with 45 mg Iron and 1500 mg Calcium)   Increase physical activity and exercise as tolerated  F/U RD q 4-6 weeks    Time Spent: 45 minutes    "

## 2025-05-06 ENCOUNTER — OFFICE VISIT (OUTPATIENT)
Dept: PHYSICAL THERAPY | Facility: CLINIC | Age: 30
End: 2025-05-06
Payer: COMMERCIAL

## 2025-05-06 ENCOUNTER — TELEPHONE (OUTPATIENT)
Age: 30
End: 2025-05-06

## 2025-05-06 DIAGNOSIS — M54.6 CHRONIC RIGHT-SIDED THORACIC BACK PAIN: Primary | ICD-10-CM

## 2025-05-06 DIAGNOSIS — G89.29 CHRONIC RIGHT-SIDED THORACIC BACK PAIN: Primary | ICD-10-CM

## 2025-05-06 PROCEDURE — 97112 NEUROMUSCULAR REEDUCATION: CPT

## 2025-05-06 PROCEDURE — 97110 THERAPEUTIC EXERCISES: CPT

## 2025-05-06 NOTE — PROGRESS NOTES
"Daily Note     Today's date: 2025  Patient name: James Marrero  : 1995  MRN: [id number]  Referring provider: Whit Calderón PA-C  Dx:   Encounter Diagnosis     ICD-10-CM    1. Chronic right-sided thoracic back pain  M54.6     G89.29         Start Time: 929  Stop Time: 100  Total time in clinic (min): 37 minutes    Subjective: Pt reports that hi mid back is okay and that his neck is still bothersome. Pt reports he saw a headache specialist and is scheduled for Botox injections.       Objective: See treatment diary below      Assessment: Tolerated treatment well. Pt was able to advance parascapular exercises w/o increased headache symptoms. Pt responded well to cervical distraction, retraction w/ extension. Pt had minor suboccipital symptom irritability post session. Patient demonstrated fatigue post treatment, exhibited good technique with therapeutic exercises, and would benefit from continued PT for increased mobility, increased strength, and decreased symptom irritability.       Plan: Continue per plan of care.      Precautions: Recent gastric bypass surgery  POC expires Unit limit Auth Expiration date PT/OT/ST + Visit Limit?   25 N/A N/A BOMN                           Visit/Unit Tracking  AUTH Status:  Date 3/26 3/31 4/ 5/6      N/A Used 1 2 3 4 5 6 7 8 9       Remaining                  HEP: [id number]   Manuals 3/26 3/31 4/2 4/7  5/6    G5 thoracic JMK- pain DC            Cervical distraction retraction extension         JMK                              Neuro Re-Ed             Roberto Row  30# 3x10 30# 3x10 30# 3x10 32# 3x10 32# 3x12       Roberto shoulder extension  15# 3x10 15# 3x10 18# 3x10 18# 3x10 18# 3x10       [place] [employer]  5# 3x10  8# 3x10 8# 3x10        SA Row   2x10 ea 20#    3x10 ea 25#  3x10 ea 25#    Prone scap retraction       1x10 3\"  3x10 3\"    Prone Y       2x10 3\"  3x10 3\"                 Ther Ex             HEP education 8' 5' 5' "     JMK     UE bike  3'/3' 3'/3' 3'/3' 3'/3' 3'/3' 3'/3' 3'/3' 3'/3'    Thoracic circuit  5' 5' 5' 5' 5'  5' 5'    Thoracic rotation TB   BTB 2x10 ea BTB 2x10 ea  BTB 2x10 ea BTB 2x10 ea      Cervical retraction      3x10  3x10     Cervical retraction w/extension      x15       Open books        3x10 ea                  Ther Activity                                       Gait Training                                       Modalities

## 2025-05-06 NOTE — PROGRESS NOTES
Neurology New Patient Ambulatory Visit Note    Name: James Marrero       : 1995       MRN: [id number]   Encounter Provider: Ephraim Ace MD   Encounter Date: 2025  Encounter department: [facility]    History of Present Illness     James Marrero is a 29 y.o. male presents with chronic headaches for evaluation and management. He is accompanied by his mother. He was referred by Dr. Page for evaluation of chronic headaches.    He has been experiencing headaches for approximately three years, which have progressively worsened. The headaches are described as throbbing, typically starting in the forehead and radiating to the back of the neck. They last about an hour but can return more severely, with pain intensity ranging from 7 to 10 out of 10. The most severe episodes lead to vomiting. No photophobia or phonophobia is noted.    He has tried various medications including Ubrelvy, Ajovy, Ubrelvy, Aimovig, Topamax, gabapentin, and propranolol, with limited success. Riboflavin and magnesium supplements were initially tried but were ineffective. His insurance has denied coverage for Nurtec and Qulipta. He underwent bariatric weight loss surgery in January, which may affect medication absorption.    He has a history of a minor concussion from playing football in high school but no recent head injuries or motor vehicle accidents. He denies any family history of headaches as he was adopted from [place] at age [age].    He experiences nausea and vomiting with severe headaches. He uses a CPAP machine for sleep apnea and reports no issues with it. He drinks about four to five 16-ounce cups of water daily and consumes one cup of caffeinated coffee per day. He avoids NSAIDs due to his gastric bypass surgery and uses acetaminophen with caffeine for headache relief, though it is not very effective.    He is a social drinker and has not consumed alcohol since his surgery. Red wine can  "trigger headaches. 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      Objective   /60 (BP Location: Left arm, Patient Position: Sitting, Cuff Size: Extra-Large)   Pulse 90   Ht 5' 6\" (1.676 m)   Wt 122 kg (268 lb)   SpO2 98%   BMI 43.26 kg/m²        Neurological examination:     Mental status exam: Alert, oriented to time place and person  Cranial nerve exam:    I Not tested   II Normal visual fields to finger counting.   II, Ill,  IV, VI Pupils were symmetric, briskly reactive. No afferent pupillary defect.  Eye movements are full without nystagmus. No ptosis.   V Facial sensation were intact   VII Facial movements were symmetrical    VIII Hearing was intact   IX, X Intact   XI Shoulder shrug and head turn was normal   XII Tongue protrusion is in the mid line.          Motor exam      Arm Right  Left Leg Right  Left   Deltoid 5/5 5/5 lliopsoas 5/5 5/5   Biceps 5/5 5/5 Quads 5/5 5/5   Triceps 5/5 5/5 Hamstrings 5/5 5/5   Wrist Extension 5/5 5/5 Ankle Dorsi Flexion 5/5 5/5   Wrist Flexion 5/5 5/5 Ankle Plantar Flexion 5/5 5/5            Deep Tendon reflexes:       Brachioradialis Biceps Triceps Patellar Achilles   Right 1+ 1+ 1+ 1+ 1+   Left 1+ 1+ 1+ 1+ 1+            Sensory exam:  Sensation to dull touch Intact  Sensation to temperature Intact    Coordination:  Normal finger to nose testing  Finger tapping test was normal    Gait and Station:    normal        Pertinent diagnostic testing:  Labs:  Lab Results   Component Value Date    WBC 8.56 01/29/2025     Lab Results   Component Value Date    HGB 11.6 (L) 01/29/2025     Lab Results   Component Value Date     01/29/2025     Lab Results   Component Value Date    LYMPHSABS 2.66 03/13/2021     No results found for: \"LABLYMP\"  Lab Results   Component Value Date    EOSABS 0.15 03/13/2021     No components found for: \"NEUTROPHILS\"    No results found for: \"LABMONO\"         No results found for: " "\"LABGLUC\"  Lab Results   Component Value Date    CREATININE 0.61 01/29/2025       Lab Results   Component Value Date    AST 15 07/06/2024     Lab Results   Component Value Date    ALT 20 07/06/2024     Lab Results   Component Value Date    ALKPHOS 51 07/06/2024     Lab Results   Component Value Date    AST 15 07/06/2024        Lab Results   Component Value Date    FOLATE >20.0 (H) 05/03/2018     Lab Results   Component Value Date    XGTGCHNA96 808 05/03/2018           Neuroimaging:   Results for orders placed during the hospital encounter of 12/04/24    MRI brain without contrast    Impression  Normal noncontrast MRI of the brain. No acute intracranial process. No substantial change compared to prior from 3/11/2021.    Workstation performed: [id number]      Results for orders placed during the hospital encounter of 03/11/21    MRI brain without contrast    Impression  Normal MR of the brain.  Findings stable when compared to prior study 8/23/2019    Workstation performed: [id number]      Results for orders placed during the hospital encounter of 08/23/19    MRI brain wo contrast    Impression  Unremarkable MRI of the brain    Workstation performed: [id number]                       Assessment & Plan  Chronic migraine without aura, with status migrainosus  Mr. Marrero is a very pleasant 29 y.o. male presenting with Chronic migraines for approximately three years, worsening over time. Headaches originate in the forehead, radiate to the back of the neck, and are described as throbbing. Severity ranges from 7 to 10 out of 10, with associated nausea and vomiting at peak intensity. Light and sound do not exacerbate symptoms. Previous treatments include Abreva, Ajovy, Ubrelvy, Aimovig, Topamax, gabapentin, riboflavin, and magnesium, with limited success. Insurance denied Nurtec and Qulipta. Concerns about medication absorption post-bariatric surgery. Discussed potential triggers including screen time and dietary factors. " Botox and Vyepti (IV infusion) considered as treatment options. Discussed risks of Vyepti, including potential for anaphylactic shock, and benefits of Botox in muscle relaxation. Emphasized the importance of identifying and avoiding migraine triggers to reduce reliance on medications.      - Order Botox injections for migraine management.  - Provide information on Vyepti IV infusion as a potential treatment option.  - Increase Ubrelvy dose to 100 mg.  - Provide list of potential migraine triggers and dietary recommendations.  - Encourage reduction of screen time to identify potential triggers.  - Advise against neck cracking due to stroke risk.  - Suggest acupuncture as a complementary therapy.  - Encourage use of Migraine Buddy jaden for tracking symptoms and triggers.  Orders:    Botulinum Toxin Type A SOLR 200 Units    Other headache syndrome    Orders:    Ubrogepant (UBRELVY) 100 MG tablet; Take 1 tablet (100 mg) one time as needed for migraine. May repeat one additional tablet (100 mg) at least two hours after the first dose. Do not use more than two doses per day, or for more than eight days per month.          Mr. Marrero will Return in about 4 months (around 9/5/2025), or if symptoms worsen or fail to improve.    Administrative Statements   The management plan was discussed in detail with the patient and all questions were answered.     Mr. Marrero was encouraged to contact our office with any questions or concerns and to contact the clinic or go to the nearest emergency room if symptoms change or worsen.     I have spent a total time of 61 minutes in caring for this patient on the day of the visit/encounter including Diagnostic results, Prognosis, Risks and benefits of tx options, Instructions for management, Patient and family education, Importance of tx compliance, Risk factor reductions, Impressions, Counseling / Coordination of care, Documenting in the medical record, Reviewing/placing orders in the medical  record (including tests, medications, and/or procedures), and Obtaining or reviewing history  .         Ephraim Cook MD.   Staff Neurologist,   Neuroimmunology and Neuroinfectious disease  05/06/25     This report has been created through the use of voice recognition/text compilation software.  Typographical and content errors may occur with this process.  While efforts are made to detect and correct such errors, in some cases errors will persist.  For this reason, wording in this document should be considered in the proper context and not strictly verbatim.  If, when reviewing the document, an error is discovered, please let the office know at [phone]

## 2025-05-06 NOTE — TELEPHONE ENCOUNTER
"Pt's mother called to advise that [facility] advised her that the insurance had kicked back the prescription for Ubrelvy due to it being too soon and the only way for pt to obtain medication is for Dr. Cook to \"override it\".  "

## 2025-05-06 NOTE — TELEPHONE ENCOUNTER
Pt previously approved for Ubrelvy 50 mg. Provider increased dose to 100 mg. Increased dose may require PA.    Ubrelvy 100 mg:  Key: [id number]    PA submitted via CMM. Awaiting determination.

## 2025-05-07 NOTE — TELEPHONE ENCOUNTER
Pt and his mother called to follow up on Ubrelvy 100 mg. Made them aware that there is an approval letter scanned into the pt's chart. The medication is approved through 4/18/2026. They will contact pt's pharmacy. Pt's mother also had questions regarding how the Botox authorization works. Answered her questions.

## 2025-05-08 NOTE — TELEPHONE ENCOUNTER
Spoke with pt and his mother on the phone. Pt stated that he went to [facility] yesterday and was told that his co-pay for Ubrelvy was over $1200. Ubrelvy was approved by his insurance.    Called [facility] to follow up. Spoke with the pharmacist. She was able to obtain a paid claim for Ubrelvy 100 mg. Pt has a co-pay of $3.00. She has to order the medication, but it should arrive by tomorrow. Called pt back and made him aware of this.    Pt also requested to ask if the prior authorization was started yet for the Botox. Routed to the Botox Coordinator to assist.

## 2025-05-12 ENCOUNTER — OFFICE VISIT (OUTPATIENT)
Dept: PHYSICAL THERAPY | Facility: CLINIC | Age: 30
End: 2025-05-12
Payer: COMMERCIAL

## 2025-05-12 DIAGNOSIS — M54.6 CHRONIC RIGHT-SIDED THORACIC BACK PAIN: Primary | ICD-10-CM

## 2025-05-12 DIAGNOSIS — G89.29 CHRONIC RIGHT-SIDED THORACIC BACK PAIN: Primary | ICD-10-CM

## 2025-05-12 PROCEDURE — 97112 NEUROMUSCULAR REEDUCATION: CPT

## 2025-05-12 PROCEDURE — 97140 MANUAL THERAPY 1/> REGIONS: CPT

## 2025-05-12 PROCEDURE — 97110 THERAPEUTIC EXERCISES: CPT

## 2025-05-12 NOTE — PROGRESS NOTES
Daily Note/ Re-evaluation     Today's date: 2025  Patient name: James Marrero  : 1995  MRN: [id number]  Referring provider: Whit Calderón PA-C  Dx:   Encounter Diagnosis     ICD-10-CM    1. Chronic right-sided thoracic back pain  M54.6     G89.29         Start Time: 1545  Stop Time: 1630  Total time in clinic (min): 45 minutes    Subjective: Pt reports that his thoracic spine feels fine w/cervical pain being his largest limitation.        Objective: See treatment diary below    - Pt has full cervical AROM: Pain w/ side bending, rotation L, and extension  - Pt has hypomobility w/ P to A throughout thoracic spine  - Painful up and lateral glide when accessing cervical joint mobility  - 4/5 MMT parascapular musculature BL  - No positive special tests  - DNF: 22 sec  Pt posture remains poor while seated and standing      Assessment: Tolerated treatment well. Re-evaluated Pt's cervical spine. Pt presents w/ same deficits as original evaluation. Educated Pt and family member about future POC and exercising in a gym setting. Patient demonstrated fatigue post treatment, exhibited good technique with therapeutic exercises, and would benefit from continued PT for increased mobility, increased strength, and decreased symptom irritability.       Plan: Continue per plan of care.      Precautions: Recent gastric bypass surgery  POC expires Unit limit Auth Expiration date PT/OT/ST + Visit Limit?   25 N/A N/A BOMN                           Visit/Unit Tracking  AUTH Status:  Date 3/26 3/31 4/2 4/7 4/14 4/21 4/23 4/30 5/6 5/12     N/A Used 1 2 3 4 5 6 7 8 9 10      Remaining                  HEP: [id number]   Manuals 3/26 3/31 4// 5/6 5/12   G5 thoracic JMK- pain DC            Cervical distraction retraction extension         JMK    Cervical re-evaluation          JMK                Neuro Re-Ed             Roberto Row  30# 3x10 30# 3x10 30# 3x10 32# 3x10 32# 3x12       [place] shoulder  "extension  15# 3x10 15# 3x10 18# 3x10 18# 3x10 18# 3x10       [place] Robberies  5# 3x10  8# 3x10 8# 3x10        SA Row   2x10 ea 20#    3x10 ea 25#  3x10 ea 25# 3x10 ea 25#   Prone scap retraction       1x10 3\"  3x10 3\" 3x10 3\"   Prone Y       2x10 3\"  3x10 3\" 3x10 3\"                Ther Ex             HEP education 8' 5' 5'     JUDIEK  PATRICK   UE bike  3'/3' 3'/3' 3'/3' 3'/3' 3'/3' 3'/3' 3'/3' 3'/3' 3'/3'   Thoracic circuit  5' 5' 5' 5' 5'  5' 5'    Thoracic rotation TB   BTB 2x10 ea BTB 2x10 ea  BTB 2x10 ea BTB 2x10 ea      Cervical retraction      3x10  3x10     Cervical retraction w/extension      x15       Open books        3x10 ea                  Ther Activity                                       Gait Training                                       Modalities                                            "

## 2025-05-13 NOTE — TELEPHONE ENCOUNTER
Pt called back and requested an update regarding the Botox authorization. Routed to the Botox Coordinator to assist.

## 2025-05-16 ENCOUNTER — TELEPHONE (OUTPATIENT)
Age: 30
End: 2025-05-16

## 2025-05-16 DIAGNOSIS — G43.701 CHRONIC MIGRAINE WITHOUT AURA, WITH STATUS MIGRAINOSUS: Primary | ICD-10-CM

## 2025-05-16 PROBLEM — Z48.815 ENCOUNTER FOR SURGICAL AFTERCARE FOLLOWING SURGERY OF DIGESTIVE SYSTEM: Status: ACTIVE | Noted: 2025-05-16

## 2025-05-16 NOTE — TELEPHONE ENCOUNTER
Pt calling in regarding making first botox appt. Pt received approval from insurance today and would like to schedule.     Please assist, Thank you.

## 2025-05-16 NOTE — TELEPHONE ENCOUNTER
Patients mother. Beronica, called, on consent, requesting an appointment for her son's MONTY.    [facility] states that they have received authorization from patients insurance company.    Please call to schedule.

## 2025-05-19 DIAGNOSIS — G43.701 CHRONIC MIGRAINE WITHOUT AURA, WITH STATUS MIGRAINOSUS: Primary | ICD-10-CM

## 2025-05-19 NOTE — PROGRESS NOTES
1. Chronic migraine without aura, with status migrainosus (Primary)    - Botulinum Toxin Type A SOLR 200 Units        Ephraim Cook MD.   Staff Neurologist  05/19/25   2:04 PM

## 2025-05-20 ENCOUNTER — OFFICE VISIT (OUTPATIENT)
Dept: PHYSICAL THERAPY | Facility: CLINIC | Age: 30
End: 2025-05-20
Payer: COMMERCIAL

## 2025-05-20 DIAGNOSIS — G43.009 MIGRAINE WITHOUT AURA AND WITHOUT STATUS MIGRAINOSUS, NOT INTRACTABLE: ICD-10-CM

## 2025-05-20 DIAGNOSIS — M54.6 CHRONIC RIGHT-SIDED THORACIC BACK PAIN: Primary | ICD-10-CM

## 2025-05-20 DIAGNOSIS — G89.29 CHRONIC RIGHT-SIDED THORACIC BACK PAIN: Primary | ICD-10-CM

## 2025-05-20 PROCEDURE — 97112 NEUROMUSCULAR REEDUCATION: CPT

## 2025-05-20 PROCEDURE — 97110 THERAPEUTIC EXERCISES: CPT

## 2025-05-20 RX ORDER — SUMATRIPTAN 50 MG/1
50 TABLET, FILM COATED ORAL AS NEEDED
Qty: 9 TABLET | Refills: 2 | Status: SHIPPED | OUTPATIENT
Start: 2025-05-20

## 2025-05-20 RX ORDER — GABAPENTIN 100 MG/1
300 CAPSULE ORAL 2 TIMES DAILY
Qty: 180 CAPSULE | Refills: 5 | Status: SHIPPED | OUTPATIENT
Start: 2025-05-20

## 2025-05-20 NOTE — ADDENDUM NOTE
Addended by: MARIA FERNANDA PAVON on: 5/20/2025 02:52 PM     Modules accepted: Orders    
17-Apr-2023 11:19

## 2025-05-20 NOTE — TELEPHONE ENCOUNTER
Received secure chat from Bobby hager pts will always get a letter that is was approved. however there is a process for delivery from the pharmacy we must get shipment or if it is a buy and sell from stock . there is more to it than just scheduling. once it is a green light I will call. she must be patient or she will be billed thousands of dollars    JM  I cannot schedule unless I have a delivery from pharmacy and an opening     I will call once I can call to schedule     Pt's mom made aware of all of the above. She verbalized understanding.    Leland that pt is still having migraines. Pt is due for his next shot of Ajovy this Sat.   States that Ubrevly is not effective. Mom wants to know if Dr Cook can prescribe alternative abortive med. Requesting script be sent to [facility] pharmacy on file.

## 2025-05-20 NOTE — TELEPHONE ENCOUNTER
Thank you for helping!    We can consider Imitrex or Relpax. It seems that he has already tried Maxalt in the past which did not helped.     I will order the Imitrex 50 mg to his pharmacy.       He should also increase his hydration and decrease video game time.       1. Chronic migraine without aura, with status migrainosus (Primary)    - SUMAtriptan (Imitrex) 50 mg tablet; Take 1 tablet (50 mg total) by mouth if needed for migraine (Take one tab at the start of the headache, if no relief can repeat the dose in 2 hours but not more than 3 doses in 24 hours.) for up to 1 dose  Dispense: 9 tablet; Refill: 2        Ephraim Cook MD.   Staff Neurologist  05/20/25   2:52 PM

## 2025-05-20 NOTE — PROGRESS NOTES
Discharge Note    Today's date: 2025  Patient name: James Marrero  : 1995  MRN: [id number]  Referring provider: Whit Calderón PA-C  Dx:   Encounter Diagnosis     ICD-10-CM    1. Chronic right-sided thoracic back pain  M54.6     G89.29         Start Time: 1545  Stop Time: 1634  Total time in clinic (min): 49 minutes    Subjective: Pt reports that his mid back and cervical spine have been feeling better. Pt headaches have not gotten better and he is returning to specialist for other treatments options.       Objective: See treatment diary below      Assessment: Tolerated treatment well. Updated Pt's HEP w/ parascapular and shoulder strengthening. Pt has full thoracic rotation when performing open books. Pt had minor dizziness when going from laying down to standing, Pt BP was 117/86. Pt did not participate in FOTO during initial evaluation.     Goals  Short term:  Pt will be independent w/ individualized HEP- MET  Pt will have a decrease in symptom irritability by 2 points- MET    Long term:  Pt will be able to sit in his stu chair for 3 hours w/o onset of pain- MET  Pt will be able to return to the gym and participate in group exercises w/o pain- MET  Pt will improve FOTO by MDC- N/A      Plan: Continue per plan of care.      Precautions: Recent gastric bypass surgery  POC expires Unit limit Auth Expiration date PT/OT/ST + Visit Limit?   25 N/A N/A BOMN                           Visit/Unit Tracking  AUTH Status:  Date 3/26 3/31 4/2 4/7 4/14 4/21 4/23 4/30 5/6 5/12 5/20    N/A Used 1 2 3 4 5 6 7 8 9 10 11     Remaining                  HEP: [id number]   Manuals 3/26 3/31 4/2 4/7 4/14 4/21 4/23 4/30 5/   G5 thoracic JMK- pain DC             Cervical distraction retraction extension         JMK     Cervical re-evaluation          JMK                  Neuro Re-Ed              [place] Row  30# 3x10 30# 3x10 30# 3x10 32# 3x10 32# 3x12        [place] shoulder extension  15# 3x10 15# 3x10 18#  "3x10 18# 3x10 18# 3x10        [place] Robber[employer]  5# 3x10  8# 3x10 8# 3x10         SA Row   2x10 ea 20#    3x10 ea 25#  3x10 ea 25# 3x10 ea 25# 3x10 ea 25#   Prone scap retraction       1x10 3\"  3x10 3\" 3x10 3\" 3x10 3\"   Prone Y       2x10 3\"  3x10 3\" 3x10 3\" 3x10 3\"                 Ther Ex              HEP education 8' 5' 5'     PATRICK NGUYEN    UE bike  3'/3' 3'/3' 3'/3' 3'/3' 3'/3' 3'/3' 3'/3' 3'/3' 3'/3' 3'/3'   Thoracic circuit  5' 5' 5' 5' 5'  5' 5'  5'   Thoracic rotation TB   BTB 2x10 ea BTB 2x10 ea  BTB 2x10 ea BTB 2x10 ea       Cervical retraction      3x10  3x10      Cervical retraction w/extension      x15        Open books        3x10 ea   2x10 ea                 Ther Activity                                          Gait Training                                          Modalities                                               "

## 2025-05-20 NOTE — TELEPHONE ENCOUNTER
Pt's mom Beronica called and states that she called multiple times and left few messages about scheduling pt's botox.   She received a letter in the mail that botox PA has been approved. She has been waiting to schedule pt's botox but has not heard from anyone. She needs to schedule pt's botox asap. Placed pt on hold while I send secure chat to Cherrie LEMUS.    Secure chat sent to Cherrie Olmos

## 2025-05-21 ENCOUNTER — OFFICE VISIT (OUTPATIENT)
Age: 30
End: 2025-05-21
Payer: COMMERCIAL

## 2025-05-21 ENCOUNTER — TELEPHONE (OUTPATIENT)
Age: 30
End: 2025-05-21

## 2025-05-21 VITALS
WEIGHT: 262 LBS | SYSTOLIC BLOOD PRESSURE: 120 MMHG | OXYGEN SATURATION: 98 % | TEMPERATURE: 97.8 F | HEART RATE: 109 BPM | BODY MASS INDEX: 42.11 KG/M2 | HEIGHT: 66 IN | RESPIRATION RATE: 16 BRPM | DIASTOLIC BLOOD PRESSURE: 76 MMHG

## 2025-05-21 DIAGNOSIS — E66.813 CLASS 3 SEVERE OBESITY DUE TO EXCESS CALORIES WITH SERIOUS COMORBIDITY AND BODY MASS INDEX (BMI) OF 40.0 TO 44.9 IN ADULT: ICD-10-CM

## 2025-05-21 DIAGNOSIS — G47.33 OSA ON CPAP: Primary | ICD-10-CM

## 2025-05-21 PROCEDURE — 99213 OFFICE O/P EST LOW 20 MIN: CPT | Performed by: PHYSICIAN ASSISTANT

## 2025-05-21 NOTE — ASSESSMENT & PLAN NOTE
Patient underwent gastric sleeve surgery in January 2025.  He has lost about 50 pounds since then.

## 2025-05-21 NOTE — TELEPHONE ENCOUNTER
[facility] Medical Communication Consent Form verified.  Pt mother called in and rescheduled 3 month follow up appointment to next week. Mother states they forgot to get blood work done but will get it done tomorrow.

## 2025-05-21 NOTE — PROGRESS NOTES
Follow-up  Visit - Pulmonary Medicine   Name: James Marrero      : 1995      MRN: [id number]  Encounter Provider: Nicolas Nuñez PA-C  Encounter Date: 2025   Encounter department: [facility] PULMONARY [facility]  :  Assessment & Plan  NIMO on CPAP  Reviewed compliance and patient is using his CPAP on almost a nightly basis  Residual AHI 0.3  There are some mask leak, suspect significant weight loss has resulted in him needing a smaller mask  I asked him to reach out to [facility] for a mask fit appointment to make sure he is on the right size mask.  BMI has come down but still 42, he did inquire about inspire but I did tell him his BMI is still above the limit.  If he is doing well with the CPAP, would continue him on this for the time being.  With adequate weight loss, we can repeat a sleep study to see if he even has sleep apnea when he reaches his goal weight.  Orders:    Mask fitting only; Future    Class 3 severe obesity due to excess calories with serious comorbidity and body mass index (BMI) of 40.0 to 44.9 in adult  Patient underwent gastric sleeve surgery in 2025.  He has lost about 50 pounds since then.         Return in about 6 months (around 2025).    History of Present Illness   James Marrero is a 29 y.o. male with past medical history of obesity, NIMO, anxiety/depression, hypertension, migraines, GERD who presents for follow-up.  Since his last visit with us he underwent gastric sleeve surgery and has lost about 50 pounds.  He is using the CPAP mostly on a nightly basis.    Review of Systems   Constitutional: Negative.    HENT: Negative.     Respiratory: Negative.     Cardiovascular: Negative.    Gastrointestinal: Negative.    Genitourinary: Negative.    Musculoskeletal: Negative.    Skin: Negative.    Allergic/Immunologic: Negative.    Neurological: Negative.    Psychiatric/Behavioral: Negative.         Aside from what is mentioned in the HPI, ROS is  "otherwise negative    Medications Ordered Prior to Encounter[1]      Medical History Reviewed by provider this encounter:     .    Objective   /76 (BP Location: Right arm, Patient Position: Sitting, Cuff Size: Large)   Pulse (!) 109   Temp 97.8 °F (36.6 °C) (Oral)   Resp 16   Ht 5' 6\" (1.676 m)   Wt 119 kg (262 lb)   SpO2 98%   BMI 42.29 kg/m²     Physical Exam  Vitals reviewed.   Constitutional:       General: He is not in acute distress.     Appearance: Normal appearance. He is well-developed. He is not ill-appearing.   HENT:      Head: Normocephalic and atraumatic.      Mouth/Throat:      Pharynx: Oropharynx is clear.     Eyes:      Pupils: Pupils are equal, round, and reactive to light.       Cardiovascular:      Rate and Rhythm: Normal rate and regular rhythm.   Pulmonary:      Effort: Pulmonary effort is normal. No respiratory distress.      Breath sounds: Normal breath sounds. No decreased breath sounds, wheezing, rhonchi or rales.   Abdominal:      General: Abdomen is flat. There is no distension.     Musculoskeletal:         General: Normal range of motion.      Cervical back: Normal range of motion.      Right lower leg: No edema.      Left lower leg: No edema.     Skin:     General: Skin is warm and dry.      Findings: No rash.     Neurological:      Mental Status: He is alert and oriented to person, place, and time.     Psychiatric:         Mood and Affect: Mood normal.         Behavior: Behavior normal.           Diagnostic Data:  Labs: I personally reviewed the most recent laboratory data pertinent to today's visit.      Radiology results:  Radiology Results Review : No pertinent imaging studies reviewed.      PFT/spirometry results:  No results found for: \"FEV1\", \"FVC\", \"IDM4OTC\", \"TLC\", \"DLCO\"       Oximetry testing:      Other studies:  Reviewed previous sleep study, moderate NIMO with an AHI of 16, reviewed CPAP compliance    Nicolas Nuñez PA-C           [1]   Current Outpatient " Medications on File Prior to Visit   Medication Sig Dispense Refill    buPROPion (WELLBUTRIN XL) 300 mg 24 hr tablet TAKE 1 TABLET (300 MG TOTAL) BY MOUTH EVERY MORNING. 90 tablet 1    desvenlafaxine succinate (PRISTIQ) 50 mg 24 hr tablet TAKE 1 TABLET BY MOUTH EVERY DAY 90 tablet 1    doxepin (SINEquan) 100 mg capsule TAKE 1 CAPSULE (100 MG TOTAL) BY MOUTH 3 (THREE) TIMES DAILY AFTER MEALS 270 capsule 1    fluticasone (FLONASE) 50 mcg/act nasal spray 1 spray into each nostril daily 9.9 mL 1    fremanezumab-vfrm (Ajovy) 225 MG/1.5ML auto-injector Once a month 1.5 mL 5    gabapentin (NEURONTIN) 100 mg capsule TAKE 3 CAPSULES BY MOUTH TWICE A  capsule 5    levocetirizine (XYZAL) 5 MG tablet TAKE 1 TABLET BY MOUTH EVERY DAY IN THE EVENING 90 tablet 1    Omeprazole 20 MG TBEC Take 1 tablet (20 mg total) by mouth 2 (two) times a day 180 tablet 1    propranolol (INDERAL LA) 80 mg 24 hr capsule Take 1 capsule (80 mg total) by mouth every morning 90 capsule 1    SUMAtriptan (Imitrex) 50 mg tablet Take 1 tablet (50 mg total) by mouth if needed for migraine (Take one tab at the start of the headache, if no relief can repeat the dose in 2 hours but not more than 3 doses in 24 hours.) for up to 1 dose 9 tablet 2    topiramate (TOPAMAX) 200 MG tablet TAKE 1 TABLET (200 MG TOTAL) BY MOUTH 2 TIMES A DAY. 60 tablet 5    ziprasidone (GEODON) 40 mg capsule Take 1 capsule (40 mg total) by mouth every morning 90 capsule 2    ziprasidone (GEODON) 80 mg capsule TAKE 1 CAPSULE BY MOUTH EVERY EVENING 90 capsule 0    Atogepant (Qulipta) 30 MG TABS 1 tablet p.o. once a day (Patient not taking: Reported on 5/21/2025) 30 tablet 5     Current Facility-Administered Medications on File Prior to Visit   Medication Dose Route Frequency Provider Last Rate Last Admin    Botulinum Toxin Type A SOLR 200 Units  200 Units Injection See Admin Instructions         Botulinum Toxin Type A SOLR 200 Units  200 Units Injection See Admin Instructions

## 2025-05-22 ENCOUNTER — NURSE TRIAGE (OUTPATIENT)
Age: 30
End: 2025-05-22

## 2025-05-22 NOTE — TELEPHONE ENCOUNTER
Regarding: Dizziness  ----- Message from Rosaura MONTES DE OCA sent at 5/22/2025  8:46 AM EDT -----  The following was sent via [employer]:    Almas Page,  As of the last 2-3 weeks I have found that I have been getting dizzy when I stand up or straighten when I bend over. I am being treated by Dr. Cook for my migraine headaches. The headaches increase as well as the dizziness when I stand up or straighten up from bending over. Should my blood pressure medications be adjusted? You can check my recent blood pressures on MY Chart from my last several visits. Thank you for your help in this medical matter.  Sincerely,  Pino Marrero

## 2025-05-22 NOTE — TELEPHONE ENCOUNTER
Patient states this started 2-3 weeks ago every time he bends over or stands up - patient agrees to monitor his BP over the weekend and scheduled an appt with pcp next week

## 2025-05-22 NOTE — TELEPHONE ENCOUNTER
Three attempts to reach patient, voice mails left to call back for further questions in regards to his [employer] message.     Reason for Disposition   Third attempt to contact caller AND no contact made. Phone number verified.    Protocols used: No Contact or Duplicate Contact Call-Adult-[place]

## 2025-05-23 DIAGNOSIS — G43.701 CHRONIC MIGRAINE WITHOUT AURA WITH STATUS MIGRAINOSUS, NOT INTRACTABLE: Primary | ICD-10-CM

## 2025-05-24 ENCOUNTER — APPOINTMENT (OUTPATIENT)
Dept: LAB | Facility: HOSPITAL | Age: 30
End: 2025-05-24
Payer: COMMERCIAL

## 2025-05-24 DIAGNOSIS — F33.9 DEPRESSION, RECURRENT (HCC): ICD-10-CM

## 2025-05-24 DIAGNOSIS — K91.2 POSTSURGICAL MALABSORPTION: Primary | ICD-10-CM

## 2025-05-24 LAB
ALBUMIN SERPL BCG-MCNC: 4.3 G/DL (ref 3.5–5)
ALP SERPL-CCNC: 47 U/L (ref 34–104)
ALT SERPL W P-5'-P-CCNC: 36 U/L (ref 7–52)
ANION GAP SERPL CALCULATED.3IONS-SCNC: 7 MMOL/L (ref 4–13)
AST SERPL W P-5'-P-CCNC: 22 U/L (ref 13–39)
BILIRUB SERPL-MCNC: 0.43 MG/DL (ref 0.2–1)
BUN SERPL-MCNC: 12 MG/DL (ref 5–25)
CALCIUM SERPL-MCNC: 9.1 MG/DL (ref 8.4–10.2)
CHLORIDE SERPL-SCNC: 109 MMOL/L (ref 96–108)
CHOLEST SERPL-MCNC: 170 MG/DL (ref ?–200)
CO2 SERPL-SCNC: 22 MMOL/L (ref 21–32)
CREAT SERPL-MCNC: 0.73 MG/DL (ref 0.6–1.3)
ERYTHROCYTE [DISTWIDTH] IN BLOOD BY AUTOMATED COUNT: 13.9 % (ref 11.6–15.1)
GFR SERPL CREATININE-BSD FRML MDRD: 126 ML/MIN/1.73SQ M
GLUCOSE P FAST SERPL-MCNC: 115 MG/DL (ref 65–99)
HCT VFR BLD AUTO: 44.6 % (ref 36.5–49.3)
HDLC SERPL-MCNC: 32 MG/DL
HGB BLD-MCNC: 14.4 G/DL (ref 12–17)
LDLC SERPL CALC-MCNC: 106 MG/DL (ref 0–100)
MCH RBC QN AUTO: 26.5 PG (ref 26.8–34.3)
MCHC RBC AUTO-ENTMCNC: 32.3 G/DL (ref 31.4–37.4)
MCV RBC AUTO: 82 FL (ref 82–98)
NONHDLC SERPL-MCNC: 138 MG/DL
PLATELET # BLD AUTO: 272 THOUSANDS/UL (ref 149–390)
PMV BLD AUTO: 10.3 FL (ref 8.9–12.7)
POTASSIUM SERPL-SCNC: 3.8 MMOL/L (ref 3.5–5.3)
PROT SERPL-MCNC: 7.2 G/DL (ref 6.4–8.4)
RBC # BLD AUTO: 5.44 MILLION/UL (ref 3.88–5.62)
SODIUM SERPL-SCNC: 138 MMOL/L (ref 135–147)
TRIGL SERPL-MCNC: 161 MG/DL (ref ?–150)
TSH SERPL DL<=0.05 MIU/L-ACNC: 1.51 UIU/ML (ref 0.45–4.5)
WBC # BLD AUTO: 6.66 THOUSAND/UL (ref 4.31–10.16)

## 2025-05-24 PROCEDURE — 82607 VITAMIN B-12: CPT

## 2025-05-24 PROCEDURE — 83540 ASSAY OF IRON: CPT

## 2025-05-24 PROCEDURE — 83970 ASSAY OF PARATHORMONE: CPT

## 2025-05-24 PROCEDURE — 83550 IRON BINDING TEST: CPT

## 2025-05-24 PROCEDURE — 84630 ASSAY OF ZINC: CPT

## 2025-05-24 PROCEDURE — 84590 ASSAY OF VITAMIN A: CPT

## 2025-05-24 PROCEDURE — 84443 ASSAY THYROID STIM HORMONE: CPT

## 2025-05-24 PROCEDURE — 84425 ASSAY OF VITAMIN B-1: CPT

## 2025-05-24 PROCEDURE — 82746 ASSAY OF FOLIC ACID SERUM: CPT

## 2025-05-24 PROCEDURE — 83036 HEMOGLOBIN GLYCOSYLATED A1C: CPT

## 2025-05-24 PROCEDURE — 85027 COMPLETE CBC AUTOMATED: CPT

## 2025-05-24 PROCEDURE — 80061 LIPID PANEL: CPT

## 2025-05-24 PROCEDURE — 80053 COMPREHEN METABOLIC PANEL: CPT

## 2025-05-24 PROCEDURE — 36415 COLL VENOUS BLD VENIPUNCTURE: CPT

## 2025-05-24 PROCEDURE — 82728 ASSAY OF FERRITIN: CPT

## 2025-05-25 LAB
EST. AVERAGE GLUCOSE BLD GHB EST-MCNC: 126 MG/DL
FERRITIN SERPL-MCNC: 40 NG/ML (ref 30–336)
FOLATE SERPL-MCNC: 14.2 NG/ML
HBA1C MFR BLD: 6 %
IRON SATN MFR SERPL: 14 % (ref 15–50)
IRON SERPL-MCNC: 54 UG/DL (ref 50–212)
PTH-INTACT SERPL-MCNC: 39 PG/ML (ref 12–88)
TIBC SERPL-MCNC: 379.4 UG/DL (ref 250–450)
TRANSFERRIN SERPL-MCNC: 271 MG/DL (ref 203–362)
UIBC SERPL-MCNC: 325 UG/DL (ref 155–355)
VIT B12 SERPL-MCNC: 630 PG/ML (ref 180–914)

## 2025-05-27 DIAGNOSIS — F33.9 DEPRESSION, RECURRENT (HCC): ICD-10-CM

## 2025-05-27 RX ORDER — ZIPRASIDONE HYDROCHLORIDE 80 MG/1
80 CAPSULE ORAL EVERY EVENING
Qty: 90 CAPSULE | Refills: 0 | Status: SHIPPED | OUTPATIENT
Start: 2025-05-27

## 2025-05-27 NOTE — TELEPHONE ENCOUNTER
Medication/Dose/Frequency:     ziprasidone (GEODON) 80 mg capsule   TAKE 1 CAPSULE BY MOUTH EVERY EVENING      Quantity: 90    Pharmacy: [facility]/pharmacy #[id number] - [place] - [address]     Office:   [x] PCP/Provider - Dennis Page, DO  [] Speciality/Provider -     Does the patient have enough for 3 days?   [x] Yes-   [] No - Send as HP to POD

## 2025-05-28 ENCOUNTER — OFFICE VISIT (OUTPATIENT)
Dept: FAMILY MEDICINE CLINIC | Facility: CLINIC | Age: 30
End: 2025-05-28
Payer: COMMERCIAL

## 2025-05-28 ENCOUNTER — OFFICE VISIT (OUTPATIENT)
Dept: BARIATRICS | Facility: CLINIC | Age: 30
End: 2025-05-28
Payer: COMMERCIAL

## 2025-05-28 ENCOUNTER — TELEPHONE (OUTPATIENT)
Age: 30
End: 2025-05-28

## 2025-05-28 VITALS
OXYGEN SATURATION: 97 % | HEART RATE: 74 BPM | HEIGHT: 66 IN | SYSTOLIC BLOOD PRESSURE: 110 MMHG | RESPIRATION RATE: 12 BRPM | BODY MASS INDEX: 41.95 KG/M2 | DIASTOLIC BLOOD PRESSURE: 70 MMHG | WEIGHT: 261 LBS

## 2025-05-28 VITALS
SYSTOLIC BLOOD PRESSURE: 104 MMHG | HEIGHT: 66 IN | HEART RATE: 85 BPM | OXYGEN SATURATION: 98 % | DIASTOLIC BLOOD PRESSURE: 64 MMHG | BODY MASS INDEX: 41.95 KG/M2 | WEIGHT: 261 LBS | RESPIRATION RATE: 18 BRPM

## 2025-05-28 DIAGNOSIS — K21.9 GASTROESOPHAGEAL REFLUX DISEASE, UNSPECIFIED WHETHER ESOPHAGITIS PRESENT: ICD-10-CM

## 2025-05-28 DIAGNOSIS — Z48.815 ENCOUNTER FOR SURGICAL AFTERCARE FOLLOWING SURGERY OF DIGESTIVE SYSTEM: Primary | ICD-10-CM

## 2025-05-28 DIAGNOSIS — G47.33 OSA (OBSTRUCTIVE SLEEP APNEA): ICD-10-CM

## 2025-05-28 DIAGNOSIS — K91.2 POSTSURGICAL MALABSORPTION: ICD-10-CM

## 2025-05-28 DIAGNOSIS — E78.2 HYPERLIPIDEMIA, MIXED: ICD-10-CM

## 2025-05-28 DIAGNOSIS — I10 ESSENTIAL HYPERTENSION: ICD-10-CM

## 2025-05-28 DIAGNOSIS — I10 ESSENTIAL HYPERTENSION: Primary | ICD-10-CM

## 2025-05-28 PROBLEM — R73.03 PREDIABETES: Status: RESOLVED | Noted: 2022-03-17 | Resolved: 2025-05-28

## 2025-05-28 LAB — ZINC SERPL-MCNC: 92 UG/DL (ref 44–115)

## 2025-05-28 PROCEDURE — 99214 OFFICE O/P EST MOD 30 MIN: CPT | Performed by: PHYSICIAN ASSISTANT

## 2025-05-28 PROCEDURE — 99213 OFFICE O/P EST LOW 20 MIN: CPT | Performed by: FAMILY MEDICINE

## 2025-05-28 RX ORDER — ZIPRASIDONE HYDROCHLORIDE 80 MG/1
80 CAPSULE ORAL EVERY EVENING
Qty: 90 CAPSULE | Refills: 0 | OUTPATIENT
Start: 2025-05-28

## 2025-05-28 NOTE — ASSESSMENT & PLAN NOTE
Stop his propranolol, monitor his blood pressure at home 1-2 times a week.  Call us if he continues to have symptoms of dizziness.

## 2025-05-28 NOTE — ASSESSMENT & PLAN NOTE
-Using CPAP  -Encouraged consistent use of CPAP and follow up with sleep medicine for mask refitting/adjustments

## 2025-05-28 NOTE — ASSESSMENT & PLAN NOTE
-s/p Vertical Sleeve Gastrectomy with Dr. Pires on 01/28/25. Overall doing well with weight loss. Continue healthy diet and exercise as able and f/u with me in 3 months. Recommend RD and LCSW for additional support.    Initial: 327lbs  Current: 261lbs  EWL: 38%  Yogesh: current  Current BMI is Body mass index is 42.13 kg/m².    Tolerating a regular diet-yes  Eating at least 60 grams of protein per day-yes  Following 30/60 minute rule with liquids-yes  Drinking at least 64 ounces of fluid per day-yes  Drinking carbonated beverages-no  Sufficient exercise-no  Using NSAIDs regularly-no  Using nicotine-no  Using alcohol-no. Advised about the risks of alcohol s/p bariatric surgery and recommend avoiding all alcohol

## 2025-05-28 NOTE — PROGRESS NOTES
"Name: James Marrero      : 1995      MRN: [id number]  Encounter Provider: Dennis Page DO  Encounter Date: 2025   Encounter department: [facility] [address]  :  Assessment & Plan  Essential hypertension  Stop his propranolol, monitor his blood pressure at home 1-2 times a week.  Call us if he continues to have symptoms of dizziness.         Assessment & Plan           History of Present Illness   Patient comes in today complaining of increasing dizziness.  He is now noting this when he exercises.  He is no longer taking his ARB.  He does continue to take his propranolol.  They have been monitoring his blood pressures at home and they have been running in the 100-120/60-80 range      Review of Systems   Constitutional:  Negative for chills, fatigue and fever.   HENT:  Negative for congestion, ear pain, hearing loss, postnasal drip, rhinorrhea and sore throat.    Eyes:  Negative for pain and visual disturbance.   Respiratory:  Negative for chest tightness, shortness of breath and wheezing.    Cardiovascular:  Negative for chest pain and leg swelling.   Gastrointestinal:  Negative for abdominal distention, abdominal pain, constipation, diarrhea and vomiting.   Endocrine: Negative for cold intolerance and heat intolerance.   Genitourinary:  Negative for difficulty urinating, frequency and urgency.   Musculoskeletal:  Negative for arthralgias and gait problem.   Skin:  Negative for color change.   Neurological:  Positive for dizziness. Negative for tremors, syncope, numbness and headaches.   Hematological:  Negative for adenopathy.   Psychiatric/Behavioral:  Negative for agitation, confusion and sleep disturbance. The patient is not nervous/anxious.        Objective   /64 (Patient Position: Standing)   Pulse 85   Resp 18   Ht 5' 6\" (1.676 m)   Wt 118 kg (261 lb)   SpO2 98%   BMI 42.13 kg/m²      Physical Exam  Constitutional:       Appearance: He is " well-developed.   HENT:      Head: Normocephalic.      Right Ear: External ear normal.      Left Ear: External ear normal.      Nose: Nose normal.     Eyes:      Extraocular Movements: Extraocular movements intact.      Conjunctiva/sclera: Conjunctivae normal.      Pupils: Pupils are equal, round, and reactive to light.     Neck:      Thyroid: No thyromegaly.     Cardiovascular:      Rate and Rhythm: Normal rate and regular rhythm.      Heart sounds: Normal heart sounds.   Pulmonary:      Effort: Pulmonary effort is normal.      Breath sounds: Normal breath sounds.   Abdominal:      General: Bowel sounds are normal.      Palpations: Abdomen is soft.     Musculoskeletal:         General: Normal range of motion.      Cervical back: Normal range of motion.     Skin:     General: Skin is warm and dry.     Neurological:      Mental Status: He is alert and oriented to person, place, and time.     Psychiatric:         Mood and Affect: Mood normal.         Behavior: Behavior normal.

## 2025-05-28 NOTE — TELEPHONE ENCOUNTER
jolie from [facility] pharm called to confirm where botox should be shipped to.      Please call pharm at [phone] option 1

## 2025-05-28 NOTE — PROGRESS NOTES
Assessment/Plan:    Encounter for surgical aftercare following surgery of digestive system  -s/p Vertical Sleeve Gastrectomy with Dr. Pires on 01/28/25. Overall doing well with weight loss. Continue healthy diet and exercise as able and f/u with me in 3 months. Recommend RD and LCSW for additional support.    Initial: 327lbs  Current: 261lbs  EWL: 38%  Yogesh: current  Current BMI is Body mass index is 42.13 kg/m².    Tolerating a regular diet-yes  Eating at least 60 grams of protein per day-yes  Following 30/60 minute rule with liquids-yes  Drinking at least 64 ounces of fluid per day-yes  Drinking carbonated beverages-no  Sufficient exercise-no  Using NSAIDs regularly-no  Using nicotine-no  Using alcohol-no. Advised about the risks of alcohol s/p bariatric surgery and recommend avoiding all alcohol      Essential hypertension  -Taken off propranolol today  -Continue monitoring and management with prescribing provider      NIMO (obstructive sleep apnea)  -Using CPAP  -Encouraged consistent use of CPAP and follow up with sleep medicine for mask refitting/adjustments       Gastroesophageal reflux disease  -Advised avoidance of food triggers and gastric irritants, follow anti-reflux diet and lifestyle measures  -Attempt to slowly wean/taper off PPI and bridge to pepcid, gaviscon prn  -Notify me if issues      Hyperlipidemia, mixed  -Avoid fried foods and trans fat, limit saturated fats and refined carbohydrates  -Increase fish/omega 3 FA consumption  -Increase physical activity  -obtain lipid panel    Postsurgical malabsorption  -At risk for malabsorption of vitamins/minerals secondary to malabsorption and restriction of intake from bariatric surgery  -Currently taking adequate postop bariatric surgery vitamin supplementation: Celebrate calcium citrate 500mg TID, MVI with 45mg iron     -Last set of bariatric labs completed on 05/24/25:    -iron levels now normalized but low normal and ferritin dropped considerably and low  normal (40)- continue with extra VITRON C daily if tolerated - repeat in 3 months  -rest of vitamins/minerals look great     -Next set of bariatric labs ordered for approximately 3 months  -Patient received education about the importance of adhering to a lifelong supplementation regimen to avoid vitamin/mineral deficiencies          Diagnoses and all orders for this visit:    Encounter for surgical aftercare following surgery of digestive system    Essential hypertension    NIMO (obstructive sleep apnea)    Gastroesophageal reflux disease, unspecified whether esophagitis present    Hyperlipidemia, mixed  -     Lipid panel; Future    Postsurgical malabsorption  -     CBC and differential; Future  -     Comprehensive metabolic panel; Future  -     Folate; Future  -     Hemoglobin A1C; Future  -     Iron Panel (Includes Ferritin, Iron Sat%, Iron, and TIBC); Future  -     Vitamin A; Future  -     TSH, 3rd generation with Free T4 reflex; Future  -     PTH, intact; Future  -     Lipid panel; Future  -     Vitamin B1, whole blood; Future  -     Vitamin B12; Future  -     Vitamin D 25 hydroxy; Future  -     Zinc; Future          Subjective:      Patient ID: James Marrero is a 29 y.o. male.    -s/p Vertical Sleeve Gastrectomy with Dr. Pires on 01/28/25.  Presents to the office today for routine follow up. Tolerating diet without issues; denies N/V, dysphagia, reflux. Overall doing Well.     Admits to some snacking and eats very fast - due to stressful eating when very young prior to adoption.    Was having some dizziness when standing - taken off Propranolol today with PCP. Back pain. Getting migraines.    Present with his Mother Lisa (she was HS ) - he was adopted from [place] at age 7. Lives at home - plays video games.    Diet Recall:   B - Greek yogurt - Chobani fruit yogurt   Snack - oatmeal with cottage cheese with SF fruit cups  L - veggie straws  D - carrots, grilled chicken and steamed veggies or green  "salad    Fluids - 64oz water, 1 cup coffee      The following portions of the patient's history were reviewed and updated as appropriate: allergies, current medications, past family history, past medical history, past social history, past surgical history and problem list.    Review of Systems   Constitutional:  Negative for chills and fever. Unexpected weight change: planned weight loss.  HENT:  Negative for trouble swallowing.    Respiratory:  Negative for cough and shortness of breath.    Cardiovascular:  Negative for chest pain and palpitations.   Gastrointestinal:  Negative for abdominal pain, constipation, diarrhea, nausea and vomiting.   Musculoskeletal:  Positive for back pain.   Neurological:  Positive for dizziness and headaches.   Psychiatric/Behavioral:          Denies anxiety and depression         Objective:      /70 (BP Location: Right arm, Patient Position: Sitting, Cuff Size: Large)   Pulse 74   Resp 12   Ht 5' 6\" (1.676 m)   Wt 118 kg (261 lb)   SpO2 97%   BMI 42.13 kg/m²     Colonoscopy-Not applicable       Physical Exam  Vitals reviewed.   Constitutional:       General: He is not in acute distress.     Appearance: He is well-developed.   HENT:      Head: Normocephalic and atraumatic.     Eyes:      General: No scleral icterus.      Cardiovascular:      Rate and Rhythm: Normal rate and regular rhythm.   Pulmonary:      Effort: Pulmonary effort is normal. No respiratory distress.   Abdominal:      General: Bowel sounds are normal. There is no distension.     Skin:     General: Skin is warm and dry.     Neurological:      Mental Status: He is alert.     Psychiatric:         Mood and Affect: Mood normal.         Behavior: Behavior normal.           BARRIERS: none identified    GOALS:   Continued/Maintain healthy weight loss with good nutrition intakes.  Adequate hydration with at least 64oz. fluid intake.  Normal vitamin and mineral levels.  Exercise as tolerated.    Follow-up in 3 " months. We kindly ask that your arrive 15 minutes before your scheduled appointment time with your provider to allow our staff to room you, get your vital signs and update your chart.    Follow diet as discussed.      Get lab work done in the next 2 weeks.  You have been given a lab slip today.  Please call the office if you need a replacement.  It is recommended to check with your insurance BEFORE getting labs done to make sure they are covered by your policy.  Also, please check with your PCP and other providers before getting labs to avoid duplicate labs. Make sure to HOLD any multivitamins that may contain biotin and any biotin supplements FOR 5 DAYS before any labs since it can affect the results.    Follow vitamin and mineral recommendations as reviewed with you.    Call our office if you have any problems with abdominal pain especially associated with fever, chills, nausea, vomiting or any other concerns.    All  Post-bariatric surgery patients should be aware that very small quantities of any alcohol can cause impairment and it is very possible not to feel the effect. The effect can be in the system for several hours.  It is also a stomach irritant.     It is advised to AVOID alcohol, Nonsteroidal antiinflammatory drugs (NSAIDS) and nicotine of all forms . Any of these can cause stomach irritation/pain.

## 2025-05-28 NOTE — ASSESSMENT & PLAN NOTE
-Advised avoidance of food triggers and gastric irritants, follow anti-reflux diet and lifestyle measures  -Attempt to slowly wean/taper off PPI and bridge to pepcid, gaviscon prn  -Notify me if issues

## 2025-05-28 NOTE — ASSESSMENT & PLAN NOTE
-At risk for malabsorption of vitamins/minerals secondary to malabsorption and restriction of intake from bariatric surgery  -Currently taking adequate postop bariatric surgery vitamin supplementation: Celebrate calcium citrate 500mg TID, MVI with 45mg iron     -Last set of bariatric labs completed on 05/24/25:    -iron levels now normalized but low normal and ferritin dropped considerably and low normal (40)- continue with extra VITRON C daily if tolerated - repeat in 3 months  -rest of vitamins/minerals look great     -Next set of bariatric labs ordered for approximately 3 months  -Patient received education about the importance of adhering to a lifelong supplementation regimen to avoid vitamin/mineral deficiencies

## 2025-05-29 DIAGNOSIS — J30.1 SEASONAL ALLERGIC RHINITIS DUE TO POLLEN: ICD-10-CM

## 2025-05-29 LAB — VIT B1 BLD-SCNC: 112.9 NMOL/L (ref 66.5–200)

## 2025-05-30 RX ORDER — FLUTICASONE PROPIONATE 50 MCG
SPRAY, SUSPENSION (ML) NASAL
Qty: 24 ML | Refills: 1 | Status: SHIPPED | OUTPATIENT
Start: 2025-05-30

## 2025-05-31 LAB — VIT A SERPL-MCNC: 24.9 UG/DL (ref 18.9–57.3)

## 2025-06-03 NOTE — TELEPHONE ENCOUNTER
Botox 200 units received at [place] office in error, called  for  to [facility] today.  Melina, it should be coming your way within a few hours.  Thanks

## 2025-06-03 NOTE — TELEPHONE ENCOUNTER
Patient has called in to find out the status of his botox delivery .     Patient will like to schedule botox appointment as soon as possible.    I have informed that it looks like botox has not yet been delivered.     Please call back patient with any updates.

## 2025-06-03 NOTE — TELEPHONE ENCOUNTER
Botox number of units: 200  Botox quantity: 1  Arrived at what location: [facility]  Botox at Correct Administering Location: yes  NDC number: 5945-0579-45  Lot number: [id number]  Expiration Date: 10/2027  Appt notes indicate correct medication: yes

## 2025-06-30 ENCOUNTER — CLINICAL SUPPORT (OUTPATIENT)
Dept: BARIATRICS | Facility: CLINIC | Age: 30
End: 2025-06-30

## 2025-06-30 DIAGNOSIS — K91.2 POSTSURGICAL MALABSORPTION: Primary | ICD-10-CM

## 2025-06-30 PROCEDURE — RECHECK

## 2025-06-30 NOTE — PROGRESS NOTES
"Bariatric Nutrition Progress Note      S/P Sleeve Dr. Sam on  1/28/2025      Height: 5'6\"    Current Weight: 259#     Weight Prior to Weight Loss Surgery: 322   BMI: 52  Weight in (lb) to have BMI = 25: 155.6  SAKINA:Current  Regain: N/A      Vitamin Regimen: Bariatric Multi and calcium chews  Bloodwork to be completed at 3 months post op    Diet and exercise:    Tolerating a regular diet -Yes  3 meals: Yes  Snacking/grazing Yes  Volume: at 5-6 months 1/2 cup  Eating at least 60 grams of protein per day-Yes  Protein drinks: 1  Following 30/60 minute rule with liquids-most of the time  Eating/drinking: chewing food well- sipping fluids Hard to slow pace of eating  Drinking at least 64 ounces of fluid per day-   Water and Hint (Also Gatorade Zero)  Drinking carbonated beverages-no  Food logging No  GI discomforts Denies  Exercise: gym, walking  Other    Recall:  B: FF Yogurt - Greek  L- Oatmeal High Protein  D- Meal   Snack: Fairlife shake, nuts, popcorn etc    Visit Summary  3/31/2025  2 months post op. Feels he is at weight stall.   Pt reports to be \"cheating\" - cookies, et. Also adding extra fats to diet.  Explained calories appears over rec 600 for this phase, Reviewed macros. Pt receptive and will try to make changes though most behavioral - some physical so 1 planned snack would be okay. Pt pleased to receive updated manual jose behavioral section and plans to review. Pt more aware of sugar content in foods and has been reading labels. Session also attended my his mom. Working towards pt being more independent in his food choice.     Visit Summary  5/5/2025   Weight: 266.8#  3+ months post op. Weight loss slowed. Pt reports to be eating out of boredom. Denies any dumping but does vomit if eats too much or too fats. Pt reports that he regrets having the surgery as not able to eat what he wants and how much. Had discussion and noted positive outcomes since his surgery. Provided support and encouragement as well " "as ideas to add variety to diet. Pt looking for alternatives to satisfy his craving for chocolate and \"crunch\" Mom provided labels to review. Advised pt of better choice but still need to limit to 1/2 to 1 serving per day. Pt also having severe headaches - notes worse since WLS and been debiliatating that not at gym x 2 weeks. Discussed certain sweeteners, electrolytes and/or dehydration can play as role as well as environmental factors. Pt has appt [facility] headache specialist later this afternoon for evaluation      Visit Summary  6/30/2025   Weight: 259#  Struggling with snacking. Tried veggie chips as a healthier options but no control on portion.    Did get 's license and drove to gym.   Diet Recall:  1st meal depends on what time he gets up  B - Greek yogurt - Chobani fruit yogurt  Or oatmeal with cottage cheese with SF fruit cups  L - skips or snacks on veggie straws  D - carrots, grilled chicken and steamed veggies or green salad  Snacks veggie chips   Fluids - 64oz water, 1 cup coffee  Volume 4-5 oz   Had discussion to address concerns and discuss ways to manage his eating.   Goals  Keep to post op guidelines  Maintain protein (70-80 grams)  Maintain hydration ( 64 oz )  Avoid grazing  Volume at 1/2 c per meals - Macro goals as discussed  Keep to vitamin regimen as advised ( Dony Multi with 45 mg Iron and 1500 mg Calcium)   Increase physical activity and exercise as tolerated  F/U RD q 4-6 weeks    Time Spent: 45 minutes    "

## 2025-07-01 ENCOUNTER — PROCEDURE VISIT (OUTPATIENT)
Age: 30
End: 2025-07-01
Payer: COMMERCIAL

## 2025-07-01 VITALS — DIASTOLIC BLOOD PRESSURE: 80 MMHG | HEART RATE: 85 BPM | TEMPERATURE: 97.5 F | SYSTOLIC BLOOD PRESSURE: 120 MMHG

## 2025-07-01 DIAGNOSIS — G43.701 CHRONIC MIGRAINE WITHOUT AURA WITH STATUS MIGRAINOSUS, NOT INTRACTABLE: Primary | ICD-10-CM

## 2025-07-01 PROCEDURE — 64615 CHEMODENERV MUSC MIGRAINE: CPT | Performed by: PSYCHIATRY & NEUROLOGY

## 2025-07-01 NOTE — PROGRESS NOTES
"Universal Protocol   Consent: Verbal consent obtained. Written consent obtained  Risks and benefits: risks, benefits and alternatives were discussed  Consent given by: patient  Time out: Immediately prior to procedure a \"time out\" was called to verify the correct patient, procedure, equipment, support staff and site/side marked as required.  Timeout called at: 7/1/2025 11:45 AM.  Patient understanding: patient states understanding of the procedure being performed  Patient consent: the patient's understanding of the procedure matches consent given  Procedure consent: procedure consent matches procedure scheduled  Relevant documents: relevant documents present and verified  Patient identity confirmed: verbally with patient      Chemodenervation     Date/Time  7/1/2025 11:30 AM     Performed by  Ephraim Cook MD   Authorized by  Ephraim Cook MD     Pre-procedure details      Preparation: Patient was prepped and draped in usual sterile fashion     Botox      Botox Type:  Type A    Brand:  Botox    mL's of Botulinum Toxin:  155    Needle Gauge:  30 G 2.5 inch   Procedures      Botox Procedures: chronic headache      Indications: migraines     Total Units      Total units used:  155    Total units discarded:  45   Post-procedure details      Chemodenervation:  Chronic migraine    Facial Nerve Location::  Bilateral facial nerve    Patient tolerance of procedure:  Tolerated well, no immediate complications   Comments       Procerus, L , R , L frontalis, R frontalis, L temporalis, R temporalis, R lateral occipitalis, L medial occipitalis, L inferior cervical paraspinal, R superior cervical paraspinal, R inferior cervical paraspinal, L superior cervical paraspinal, L inferior trapezius, R inferior trapezius, L superior trapezius and R superior trapezius    Comments:   10 units divided in 2 sites Procerus 5 units in 1 site  Frontalis 20 units divided in 4 sites  " Temporalis 40 units divided in 8 sites  Occipitalis 30 units divided in 6 sites  Cervical paraspinals 20 units divided in 4 sites  Trapezius 30 units divided in 6 sites    Follow up in 3 months       Ephraim Cook MD.   Staff Neurologist.

## 2025-07-03 VITALS — BODY MASS INDEX: 41.8 KG/M2 | WEIGHT: 259 LBS

## 2025-07-18 ENCOUNTER — CLINICAL SUPPORT (OUTPATIENT)
Dept: BARIATRICS | Facility: CLINIC | Age: 30
End: 2025-07-18

## 2025-07-18 DIAGNOSIS — F41.1 ANXIETY, GENERALIZED: ICD-10-CM

## 2025-07-18 DIAGNOSIS — Z98.84 BARIATRIC SURGERY STATUS: Primary | ICD-10-CM

## 2025-07-18 DIAGNOSIS — F33.9 DEPRESSION, RECURRENT (HCC): ICD-10-CM

## 2025-07-18 PROCEDURE — RECHECK: Performed by: SOCIAL WORKER

## 2025-07-21 ENCOUNTER — TELEPHONE (OUTPATIENT)
Age: 30
End: 2025-07-21

## 2025-07-25 DIAGNOSIS — F41.1 ANXIETY, GENERALIZED: ICD-10-CM

## 2025-07-25 RX ORDER — DESVENLAFAXINE 50 MG/1
50 TABLET, FILM COATED, EXTENDED RELEASE ORAL DAILY
Qty: 90 TABLET | Refills: 1 | Status: SHIPPED | OUTPATIENT
Start: 2025-07-25

## 2025-07-30 DIAGNOSIS — J30.1 SEASONAL ALLERGIC RHINITIS DUE TO POLLEN: ICD-10-CM

## 2025-07-30 DIAGNOSIS — F33.9 DEPRESSION, RECURRENT (HCC): ICD-10-CM

## 2025-07-30 RX ORDER — BUPROPION HYDROCHLORIDE 300 MG/1
300 TABLET ORAL EVERY MORNING
Qty: 90 TABLET | Refills: 1 | Status: SHIPPED | OUTPATIENT
Start: 2025-07-30

## 2025-07-31 RX ORDER — FLUTICASONE PROPIONATE 50 MCG
SPRAY, SUSPENSION (ML) NASAL
Qty: 24 ML | Refills: 1 | Status: SHIPPED | OUTPATIENT
Start: 2025-07-31

## 2025-08-04 ENCOUNTER — CLINICAL SUPPORT (OUTPATIENT)
Dept: BARIATRICS | Facility: CLINIC | Age: 30
End: 2025-08-04

## 2025-08-04 VITALS — BODY MASS INDEX: 40.82 KG/M2 | WEIGHT: 254 LBS | HEIGHT: 66 IN

## 2025-08-04 DIAGNOSIS — K91.2 POSTSURGICAL MALABSORPTION: Primary | ICD-10-CM

## 2025-08-04 DIAGNOSIS — F33.9 DEPRESSION, RECURRENT (HCC): ICD-10-CM

## 2025-08-04 PROCEDURE — RECHECK

## 2025-08-06 RX ORDER — ZIPRASIDONE HYDROCHLORIDE 40 MG/1
40 CAPSULE ORAL EVERY MORNING
Qty: 90 CAPSULE | Refills: 2 | Status: SHIPPED | OUTPATIENT
Start: 2025-08-06

## 2025-08-11 ENCOUNTER — TELEPHONE (OUTPATIENT)
Age: 30
End: 2025-08-11

## 2025-08-14 ENCOUNTER — TELEPHONE (OUTPATIENT)
Age: 30
End: 2025-08-14

## 2025-08-22 ENCOUNTER — CLINICAL SUPPORT (OUTPATIENT)
Dept: BARIATRICS | Facility: CLINIC | Age: 30
End: 2025-08-22

## 2025-08-22 DIAGNOSIS — Z48.815 ENCOUNTER FOR SURGICAL AFTERCARE FOLLOWING SURGERY OF DIGESTIVE SYSTEM: Primary | ICD-10-CM

## 2025-08-22 PROCEDURE — RECHECK

## (undated) DEVICE — SCD SEQUENTIAL COMPRESSION COMFORT SLEEVE LARGE KNEE LENGTH: Brand: KENDALL SCD

## (undated) DEVICE — INSUFFLATION NEEDLE TO ESTABLISH PNEUMOPERITONEUM.: Brand: INSUFFLATION NEEDLE

## (undated) DEVICE — HARMONIC 1100 SHEARS, 36CM SHAFT LENGTH: Brand: HARMONIC

## (undated) DEVICE — EXOFIN PRECISION PEN HIGH VISCOSITY TOPICAL SKIN ADHESIVE: Brand: EXOFIN PRECISION PEN, 1G

## (undated) DEVICE — GLOVE SRG BIOGEL ECLIPSE 7.5

## (undated) DEVICE — GLOVE INDICATOR PI UNDERGLOVE SZ 7 BLUE

## (undated) DEVICE — GLOVE SRG BIOGEL ECLIPSE 8

## (undated) DEVICE — ENDOPATH ECHELON ENDOSCOPIC LINEAR CUTTER RELOADS, BLUE, 60MM: Brand: ECHELON ENDOPATH

## (undated) DEVICE — ECHELON 3000 60MM LONG: Brand: ECHELON

## (undated) DEVICE — TROCAR: Brand: KII SLEEVE

## (undated) DEVICE — TUBE SET SMOKE EVAC PNEUMOCLEAR HUMIDIFIED

## (undated) DEVICE — UTILITY MARKER,BLACK WITH LABELS: Brand: DEVON

## (undated) DEVICE — ASTOUND STANDARD SURGICAL GOWN, XL: Brand: CONVERTORS

## (undated) DEVICE — KERLIX BANDAGE ROLL: Brand: KERLIX

## (undated) DEVICE — NEPTUNE E-SEP SMOKE EVACUATION PENCIL, COATED, 70MM BLADE, PUSH BUTTON SWITCH: Brand: NEPTUNE E-SEP

## (undated) DEVICE — VISIGI 3D®  CALIBRATION SYSTEM  SIZE 36FR SLEEVE/STD: Brand: BOEHRINGER® VISIGI 3D™ SLEEVE GASTRECTOMY CALIBRATION SYSTEM, SIZE 36FR

## (undated) DEVICE — TOWEL SURG XR DETECT GREEN STRL RFD

## (undated) DEVICE — 60 ML SYRINGE,REGULAR TIP: Brand: MONOJECT

## (undated) DEVICE — INTENDED FOR TISSUE SEPARATION, AND OTHER PROCEDURES THAT REQUIRE A SHARP SURGICAL BLADE TO PUNCTURE OR CUT.: Brand: BARD-PARKER SAFETY BLADES SIZE 11, STERILE

## (undated) DEVICE — GLOVE SRG BIOGEL 7

## (undated) DEVICE — SUT MONOCRYL 4-0 PS-2 27 IN Y426H

## (undated) DEVICE — LIGHT GLOVE GREEN

## (undated) DEVICE — ENDOPATH ECHELON ENDOSCOPIC LINEAR CUTTER RELOADS, GREEN, 60MM: Brand: ECHELON ENDOPATH

## (undated) DEVICE — PAD GROUNDING DUAL ADULT

## (undated) DEVICE — TROCAR: Brand: KII® SLEEVE

## (undated) DEVICE — ECHELON 60MM REINFORCEMENT: Brand: ECHLEON

## (undated) DEVICE — TRAVELKIT CONTAINS FIRST STEP KIT (200ML EP-4 KIT) AND SOILED SCOPE BAG - 1 KIT: Brand: TRAVELKIT CONTAINS FIRST STEP KIT AND SOILED SCOPE BAG

## (undated) DEVICE — METZENBAUM ADTEC SINGLE USE DISSECTING SCISSORS, SHAFT ONLY, MONOPOLAR, CURVED TO LEFT, WORKING LENGTH: 12 1/4", (310 MM), DIAM. 5 MM, INSULATED, DOUBLE ACTION, STERILE, DISPOSABLE, PACKAGE OF 10 PIECES: Brand: AESCULAP

## (undated) DEVICE — SPONGE 4 X 4 XRAY 16 PLY STRL LF RFD

## (undated) DEVICE — X-RAY DETECTABLE SPONGES,16 PLY: Brand: VISTEC

## (undated) DEVICE — 4-PORT MANIFOLD: Brand: NEPTUNE 2

## (undated) DEVICE — PAD CAST 4 IN COTTON NON STERILE

## (undated) DEVICE — KIT, GASTRIC BYPASS: Brand: CARDINAL HEALTH

## (undated) DEVICE — LIGACLIP 10-M/L, 10MM ENDOSCOPIC ROTATING MULTIPLE CLIP APPLIERS: Brand: LIGACLIP

## (undated) DEVICE — ENDOPATH XCEL BLADELESS TROCARS WITH STABILITY SLEEVES: Brand: ENDOPATH XCEL

## (undated) DEVICE — MICRO HVTSA, 0.5G AND HVTSA SOURCEMARK PRODUCT CODE M1206 AND M1206-01: Brand: EXOFIN MICRO HVTSA, 0.5G

## (undated) DEVICE — DRAPE SHEET THREE QUARTER

## (undated) DEVICE — LUBRICANT JELLY SURGILUBE TUBE 4OZ FLIP TOP